# Patient Record
Sex: FEMALE | Race: WHITE | NOT HISPANIC OR LATINO | Employment: OTHER | URBAN - METROPOLITAN AREA
[De-identification: names, ages, dates, MRNs, and addresses within clinical notes are randomized per-mention and may not be internally consistent; named-entity substitution may affect disease eponyms.]

---

## 2017-04-16 ENCOUNTER — HOSPITAL ENCOUNTER (INPATIENT)
Facility: HOSPITAL | Age: 82
LOS: 5 days | Discharge: HOME HEALTH | DRG: 291 | End: 2017-04-21
Attending: EMERGENCY MEDICINE | Admitting: INTERNAL MEDICINE
Payer: COMMERCIAL

## 2017-04-16 ENCOUNTER — APPOINTMENT (EMERGENCY)
Dept: RADIOLOGY | Facility: HOSPITAL | Age: 82
DRG: 291 | End: 2017-04-16
Payer: COMMERCIAL

## 2017-04-16 DIAGNOSIS — G45.9 TIA (TRANSIENT ISCHEMIC ATTACK): ICD-10-CM

## 2017-04-16 DIAGNOSIS — J90 PLEURAL EFFUSION: ICD-10-CM

## 2017-04-16 DIAGNOSIS — J18.9 PNA (PNEUMONIA): Primary | ICD-10-CM

## 2017-04-16 PROBLEM — I50.9 CHF (CONGESTIVE HEART FAILURE) (HCC): Status: ACTIVE | Noted: 2017-04-16

## 2017-04-16 LAB
ANION GAP SERPL CALCULATED.3IONS-SCNC: 7 MMOL/L (ref 4–13)
ATRIAL RATE: 312 BPM
BASOPHILS # BLD AUTO: 0.05 THOUSANDS/ΜL (ref 0–0.1)
BASOPHILS NFR BLD AUTO: 1 % (ref 0–1)
BUN SERPL-MCNC: 21 MG/DL (ref 5–25)
CALCIUM SERPL-MCNC: 9.6 MG/DL (ref 8.3–10.1)
CHLORIDE SERPL-SCNC: 102 MMOL/L (ref 100–108)
CO2 SERPL-SCNC: 28 MMOL/L (ref 21–32)
CREAT SERPL-MCNC: 1.05 MG/DL (ref 0.6–1.3)
DIGOXIN SERPL-MCNC: <0.2 NG/ML (ref 0.8–2)
EOSINOPHIL # BLD AUTO: 0.15 THOUSAND/ΜL (ref 0–0.61)
EOSINOPHIL NFR BLD AUTO: 2 % (ref 0–6)
ERYTHROCYTE [DISTWIDTH] IN BLOOD BY AUTOMATED COUNT: 15.6 % (ref 11.6–15.1)
GFR SERPL CREATININE-BSD FRML MDRD: 49.9 ML/MIN/1.73SQ M
GLUCOSE SERPL-MCNC: 113 MG/DL (ref 65–140)
HCT VFR BLD AUTO: 38.8 % (ref 34.8–46.1)
HGB BLD-MCNC: 12.8 G/DL (ref 11.5–15.4)
LYMPHOCYTES # BLD AUTO: 1.18 THOUSANDS/ΜL (ref 0.6–4.47)
LYMPHOCYTES NFR BLD AUTO: 19 % (ref 14–44)
MCH RBC QN AUTO: 28.3 PG (ref 26.8–34.3)
MCHC RBC AUTO-ENTMCNC: 33 G/DL (ref 31.4–37.4)
MCV RBC AUTO: 86 FL (ref 82–98)
MONOCYTES # BLD AUTO: 0.95 THOUSAND/ΜL (ref 0.17–1.22)
MONOCYTES NFR BLD AUTO: 15 % (ref 4–12)
NEUTROPHILS # BLD AUTO: 3.98 THOUSANDS/ΜL (ref 1.85–7.62)
NEUTS SEG NFR BLD AUTO: 63 % (ref 43–75)
NRBC BLD AUTO-RTO: 0 /100 WBCS
NT-PROBNP SERPL-MCNC: 4514 PG/ML
PLATELET # BLD AUTO: 176 THOUSANDS/UL (ref 149–390)
PMV BLD AUTO: 10.1 FL (ref 8.9–12.7)
POTASSIUM SERPL-SCNC: 4.2 MMOL/L (ref 3.5–5.3)
QRS AXIS: 70 DEGREES
QRSD INTERVAL: 96 MS
QT INTERVAL: 344 MS
QTC INTERVAL: 409 MS
RBC # BLD AUTO: 4.53 MILLION/UL (ref 3.81–5.12)
SODIUM SERPL-SCNC: 137 MMOL/L (ref 136–145)
SPECIMEN SOURCE: NORMAL
T WAVE AXIS: 4 DEGREES
TROPONIN I BLD-MCNC: 0.01 NG/ML (ref 0–0.08)
VENTRICULAR RATE: 85 BPM
WBC # BLD AUTO: 6.32 THOUSAND/UL (ref 4.31–10.16)

## 2017-04-16 PROCEDURE — 87040 BLOOD CULTURE FOR BACTERIA: CPT | Performed by: EMERGENCY MEDICINE

## 2017-04-16 PROCEDURE — A9270 NON-COVERED ITEM OR SERVICE: HCPCS | Performed by: INTERNAL MEDICINE

## 2017-04-16 PROCEDURE — 71020 HB CHEST X-RAY 2VW FRONTAL&LATL: CPT

## 2017-04-16 PROCEDURE — 36415 COLL VENOUS BLD VENIPUNCTURE: CPT | Performed by: EMERGENCY MEDICINE

## 2017-04-16 PROCEDURE — 83880 ASSAY OF NATRIURETIC PEPTIDE: CPT | Performed by: EMERGENCY MEDICINE

## 2017-04-16 PROCEDURE — 71275 CT ANGIOGRAPHY CHEST: CPT

## 2017-04-16 PROCEDURE — 94640 AIRWAY INHALATION TREATMENT: CPT

## 2017-04-16 PROCEDURE — 85025 COMPLETE CBC W/AUTO DIFF WBC: CPT | Performed by: EMERGENCY MEDICINE

## 2017-04-16 PROCEDURE — 94760 N-INVAS EAR/PLS OXIMETRY 1: CPT

## 2017-04-16 PROCEDURE — 84484 ASSAY OF TROPONIN QUANT: CPT

## 2017-04-16 PROCEDURE — 80048 BASIC METABOLIC PNL TOTAL CA: CPT | Performed by: EMERGENCY MEDICINE

## 2017-04-16 PROCEDURE — 80162 ASSAY OF DIGOXIN TOTAL: CPT | Performed by: EMERGENCY MEDICINE

## 2017-04-16 PROCEDURE — 93005 ELECTROCARDIOGRAM TRACING: CPT | Performed by: EMERGENCY MEDICINE

## 2017-04-16 PROCEDURE — 99285 EMERGENCY DEPT VISIT HI MDM: CPT

## 2017-04-16 PROCEDURE — 93005 ELECTROCARDIOGRAM TRACING: CPT

## 2017-04-16 RX ORDER — ATORVASTATIN CALCIUM 40 MG/1
40 TABLET, FILM COATED ORAL DAILY
Status: DISCONTINUED | OUTPATIENT
Start: 2017-04-17 | End: 2017-04-21 | Stop reason: HOSPADM

## 2017-04-16 RX ORDER — DOCUSATE SODIUM 100 MG/1
100 CAPSULE, LIQUID FILLED ORAL 2 TIMES DAILY
Status: DISCONTINUED | OUTPATIENT
Start: 2017-04-16 | End: 2017-04-21 | Stop reason: HOSPADM

## 2017-04-16 RX ORDER — AMLODIPINE BESYLATE 10 MG/1
10 TABLET ORAL DAILY
Status: DISCONTINUED | OUTPATIENT
Start: 2017-04-17 | End: 2017-04-21 | Stop reason: HOSPADM

## 2017-04-16 RX ORDER — LEVALBUTEROL 1.25 MG/.5ML
1.25 SOLUTION, CONCENTRATE RESPIRATORY (INHALATION) EVERY 8 HOURS PRN
Status: DISCONTINUED | OUTPATIENT
Start: 2017-04-16 | End: 2017-04-16

## 2017-04-16 RX ORDER — ATORVASTATIN CALCIUM 40 MG/1
40 TABLET, FILM COATED ORAL DAILY
COMMUNITY

## 2017-04-16 RX ORDER — AZITHROMYCIN 250 MG/1
500 TABLET, FILM COATED ORAL EVERY 24 HOURS
Status: DISCONTINUED | OUTPATIENT
Start: 2017-04-17 | End: 2017-04-20

## 2017-04-16 RX ORDER — ALPRAZOLAM 0.25 MG/1
0.25 TABLET ORAL 2 TIMES DAILY PRN
Status: DISCONTINUED | OUTPATIENT
Start: 2017-04-16 | End: 2017-04-21 | Stop reason: HOSPADM

## 2017-04-16 RX ORDER — OXYBUTYNIN CHLORIDE 5 MG/1
5 TABLET ORAL 2 TIMES DAILY
Status: DISCONTINUED | OUTPATIENT
Start: 2017-04-16 | End: 2017-04-21 | Stop reason: HOSPADM

## 2017-04-16 RX ORDER — OMEPRAZOLE 20 MG/1
20 CAPSULE, DELAYED RELEASE ORAL
COMMUNITY

## 2017-04-16 RX ORDER — ASPIRIN 81 MG/1
81 TABLET, CHEWABLE ORAL DAILY
Status: DISCONTINUED | OUTPATIENT
Start: 2017-04-17 | End: 2017-04-21 | Stop reason: HOSPADM

## 2017-04-16 RX ORDER — DOCUSATE SODIUM 100 MG/1
100 CAPSULE, LIQUID FILLED ORAL DAILY
COMMUNITY
End: 2021-04-05

## 2017-04-16 RX ORDER — ONDANSETRON 2 MG/ML
4 INJECTION INTRAMUSCULAR; INTRAVENOUS EVERY 6 HOURS PRN
Status: DISCONTINUED | OUTPATIENT
Start: 2017-04-16 | End: 2017-04-21 | Stop reason: HOSPADM

## 2017-04-16 RX ORDER — B-COMPLEX WITH VITAMIN C
1 TABLET ORAL
COMMUNITY

## 2017-04-16 RX ORDER — AMLODIPINE BESYLATE 10 MG/1
2.5 TABLET ORAL DAILY
COMMUNITY
End: 2021-01-08 | Stop reason: HOSPADM

## 2017-04-16 RX ORDER — DIGOXIN 125 MCG
250 TABLET ORAL DAILY
Status: DISCONTINUED | OUTPATIENT
Start: 2017-04-17 | End: 2017-04-17

## 2017-04-16 RX ORDER — SENNOSIDES 8.6 MG
1 TABLET ORAL DAILY
Status: DISCONTINUED | OUTPATIENT
Start: 2017-04-17 | End: 2017-04-21 | Stop reason: HOSPADM

## 2017-04-16 RX ORDER — PANTOPRAZOLE SODIUM 20 MG/1
20 TABLET, DELAYED RELEASE ORAL
Status: DISCONTINUED | OUTPATIENT
Start: 2017-04-17 | End: 2017-04-21 | Stop reason: HOSPADM

## 2017-04-16 RX ORDER — FUROSEMIDE 10 MG/ML
20 INJECTION INTRAMUSCULAR; INTRAVENOUS
Status: DISCONTINUED | OUTPATIENT
Start: 2017-04-16 | End: 2017-04-19

## 2017-04-16 RX ORDER — THIAMINE MONONITRATE (VIT B1) 100 MG
100 TABLET ORAL DAILY
Status: DISCONTINUED | OUTPATIENT
Start: 2017-04-17 | End: 2017-04-21 | Stop reason: HOSPADM

## 2017-04-16 RX ORDER — CALCIUM CARBONATE 200(500)MG
1000 TABLET,CHEWABLE ORAL DAILY PRN
Status: DISCONTINUED | OUTPATIENT
Start: 2017-04-16 | End: 2017-04-21 | Stop reason: HOSPADM

## 2017-04-16 RX ORDER — ALBUTEROL SULFATE 2.5 MG/3ML
2.5 SOLUTION RESPIRATORY (INHALATION) EVERY 4 HOURS PRN
Status: DISCONTINUED | OUTPATIENT
Start: 2017-04-16 | End: 2017-04-17

## 2017-04-16 RX ADMIN — DOCUSATE SODIUM 100 MG: 100 CAPSULE, LIQUID FILLED ORAL at 18:44

## 2017-04-16 RX ADMIN — IOHEXOL 85 ML: 350 INJECTION, SOLUTION INTRAVENOUS at 16:21

## 2017-04-16 RX ADMIN — AZITHROMYCIN MONOHYDRATE 500 MG: 500 INJECTION, POWDER, LYOPHILIZED, FOR SOLUTION INTRAVENOUS at 18:01

## 2017-04-16 RX ADMIN — CEFTRIAXONE 1000 MG: 1 INJECTION, POWDER, FOR SOLUTION INTRAMUSCULAR; INTRAVENOUS at 17:18

## 2017-04-16 RX ADMIN — ALBUTEROL SULFATE 2.5 MG: 2.5 SOLUTION RESPIRATORY (INHALATION) at 20:27

## 2017-04-16 RX ADMIN — OXYBUTYNIN CHLORIDE 5 MG: 5 TABLET ORAL at 18:58

## 2017-04-16 RX ADMIN — FUROSEMIDE 20 MG: 10 INJECTION, SOLUTION INTRAMUSCULAR; INTRAVENOUS at 18:44

## 2017-04-17 ENCOUNTER — APPOINTMENT (INPATIENT)
Dept: NON INVASIVE DIAGNOSTICS | Facility: HOSPITAL | Age: 82
DRG: 291 | End: 2017-04-17
Attending: INTERNAL MEDICINE
Payer: COMMERCIAL

## 2017-04-17 ENCOUNTER — APPOINTMENT (INPATIENT)
Dept: PHYSICAL THERAPY | Facility: HOSPITAL | Age: 82
DRG: 291 | End: 2017-04-17
Payer: COMMERCIAL

## 2017-04-17 LAB
ALBUMIN SERPL BCP-MCNC: 3.1 G/DL (ref 3.5–5)
ALP SERPL-CCNC: 205 U/L (ref 46–116)
ALT SERPL W P-5'-P-CCNC: 26 U/L (ref 12–78)
ANION GAP SERPL CALCULATED.3IONS-SCNC: 8 MMOL/L (ref 4–13)
AST SERPL W P-5'-P-CCNC: 31 U/L (ref 5–45)
BILIRUB SERPL-MCNC: 0.82 MG/DL (ref 0.2–1)
BUN SERPL-MCNC: 17 MG/DL (ref 5–25)
CALCIUM SERPL-MCNC: 8.7 MG/DL (ref 8.3–10.1)
CHLORIDE SERPL-SCNC: 98 MMOL/L (ref 100–108)
CO2 SERPL-SCNC: 29 MMOL/L (ref 21–32)
CREAT SERPL-MCNC: 0.88 MG/DL (ref 0.6–1.3)
ERYTHROCYTE [DISTWIDTH] IN BLOOD BY AUTOMATED COUNT: 15.5 % (ref 11.6–15.1)
GFR SERPL CREATININE-BSD FRML MDRD: >60 ML/MIN/1.73SQ M
GLUCOSE SERPL-MCNC: 80 MG/DL (ref 65–140)
GRAM STN SPEC: NORMAL
GRAM STN SPEC: NORMAL
HCT VFR BLD AUTO: 37.2 % (ref 34.8–46.1)
HGB BLD-MCNC: 12.5 G/DL (ref 11.5–15.4)
L PNEUMO1 AG UR QL IA.RAPID: NEGATIVE
MAGNESIUM SERPL-MCNC: 1.8 MG/DL (ref 1.6–2.6)
MCH RBC QN AUTO: 28.7 PG (ref 26.8–34.3)
MCHC RBC AUTO-ENTMCNC: 33.6 G/DL (ref 31.4–37.4)
MCV RBC AUTO: 85 FL (ref 82–98)
PLATELET # BLD AUTO: 182 THOUSANDS/UL (ref 149–390)
PMV BLD AUTO: 10 FL (ref 8.9–12.7)
POTASSIUM SERPL-SCNC: 3.7 MMOL/L (ref 3.5–5.3)
PROT SERPL-MCNC: 7.1 G/DL (ref 6.4–8.2)
RBC # BLD AUTO: 4.36 MILLION/UL (ref 3.81–5.12)
S PNEUM AG UR QL: NEGATIVE
SODIUM SERPL-SCNC: 135 MMOL/L (ref 136–145)
TROPONIN I SERPL-MCNC: <0.02 NG/ML
WBC # BLD AUTO: 7.07 THOUSAND/UL (ref 4.31–10.16)

## 2017-04-17 PROCEDURE — A9270 NON-COVERED ITEM OR SERVICE: HCPCS | Performed by: INTERNAL MEDICINE

## 2017-04-17 PROCEDURE — G8979 MOBILITY GOAL STATUS: HCPCS

## 2017-04-17 PROCEDURE — 87086 URINE CULTURE/COLONY COUNT: CPT | Performed by: INTERNAL MEDICINE

## 2017-04-17 PROCEDURE — 97163 PT EVAL HIGH COMPLEX 45 MIN: CPT

## 2017-04-17 PROCEDURE — G8988 SELF CARE GOAL STATUS: HCPCS

## 2017-04-17 PROCEDURE — 97530 THERAPEUTIC ACTIVITIES: CPT

## 2017-04-17 PROCEDURE — 87449 NOS EACH ORGANISM AG IA: CPT | Performed by: INTERNAL MEDICINE

## 2017-04-17 PROCEDURE — 80053 COMPREHEN METABOLIC PANEL: CPT | Performed by: INTERNAL MEDICINE

## 2017-04-17 PROCEDURE — 87205 SMEAR GRAM STAIN: CPT | Performed by: INTERNAL MEDICINE

## 2017-04-17 PROCEDURE — 97167 OT EVAL HIGH COMPLEX 60 MIN: CPT

## 2017-04-17 PROCEDURE — 94760 N-INVAS EAR/PLS OXIMETRY 1: CPT

## 2017-04-17 PROCEDURE — A9270 NON-COVERED ITEM OR SERVICE: HCPCS | Performed by: PHYSICIAN ASSISTANT

## 2017-04-17 PROCEDURE — G8987 SELF CARE CURRENT STATUS: HCPCS

## 2017-04-17 PROCEDURE — 87070 CULTURE OTHR SPECIMN AEROBIC: CPT | Performed by: INTERNAL MEDICINE

## 2017-04-17 PROCEDURE — 94640 AIRWAY INHALATION TREATMENT: CPT

## 2017-04-17 PROCEDURE — 85027 COMPLETE CBC AUTOMATED: CPT | Performed by: INTERNAL MEDICINE

## 2017-04-17 PROCEDURE — 84484 ASSAY OF TROPONIN QUANT: CPT | Performed by: INTERNAL MEDICINE

## 2017-04-17 PROCEDURE — 83735 ASSAY OF MAGNESIUM: CPT | Performed by: INTERNAL MEDICINE

## 2017-04-17 PROCEDURE — G8978 MOBILITY CURRENT STATUS: HCPCS

## 2017-04-17 PROCEDURE — 93306 TTE W/DOPPLER COMPLETE: CPT

## 2017-04-17 RX ORDER — HYDROCODONE POLISTIREX AND CHLORPHENIRAMINE POLISTIREX 10; 8 MG/5ML; MG/5ML
2.5 SUSPENSION, EXTENDED RELEASE ORAL EVERY 12 HOURS PRN
Status: DISCONTINUED | OUTPATIENT
Start: 2017-04-17 | End: 2017-04-21 | Stop reason: HOSPADM

## 2017-04-17 RX ORDER — GUAIFENESIN 600 MG
1200 TABLET, EXTENDED RELEASE 12 HR ORAL EVERY 12 HOURS SCHEDULED
Status: DISCONTINUED | OUTPATIENT
Start: 2017-04-17 | End: 2017-04-21 | Stop reason: HOSPADM

## 2017-04-17 RX ORDER — ALBUTEROL SULFATE 90 UG/1
2 AEROSOL, METERED RESPIRATORY (INHALATION) EVERY 4 HOURS PRN
Status: DISCONTINUED | OUTPATIENT
Start: 2017-04-17 | End: 2017-04-21 | Stop reason: HOSPADM

## 2017-04-17 RX ORDER — LEVALBUTEROL INHALATION SOLUTION 0.63 MG/3ML
0.63 SOLUTION RESPIRATORY (INHALATION)
Status: DISCONTINUED | OUTPATIENT
Start: 2017-04-17 | End: 2017-04-21 | Stop reason: HOSPADM

## 2017-04-17 RX ADMIN — DIGOXIN 250 MCG: 0.12 TABLET ORAL at 08:08

## 2017-04-17 RX ADMIN — FUROSEMIDE 20 MG: 10 INJECTION, SOLUTION INTRAMUSCULAR; INTRAVENOUS at 16:55

## 2017-04-17 RX ADMIN — FUROSEMIDE 20 MG: 10 INJECTION, SOLUTION INTRAMUSCULAR; INTRAVENOUS at 07:52

## 2017-04-17 RX ADMIN — OXYBUTYNIN CHLORIDE 5 MG: 5 TABLET ORAL at 08:15

## 2017-04-17 RX ADMIN — OXYBUTYNIN CHLORIDE 5 MG: 5 TABLET ORAL at 17:22

## 2017-04-17 RX ADMIN — LEVALBUTEROL HYDROCHLORIDE 0.63 MG: 0.63 SOLUTION RESPIRATORY (INHALATION) at 13:59

## 2017-04-17 RX ADMIN — LEVALBUTEROL HYDROCHLORIDE 0.63 MG: 0.63 SOLUTION RESPIRATORY (INHALATION) at 19:41

## 2017-04-17 RX ADMIN — AZITHROMYCIN 500 MG: 250 TABLET, FILM COATED ORAL at 17:23

## 2017-04-17 RX ADMIN — GUAIFENESIN 1200 MG: 600 TABLET, EXTENDED RELEASE ORAL at 12:58

## 2017-04-17 RX ADMIN — GUAIFENESIN 1200 MG: 600 TABLET, EXTENDED RELEASE ORAL at 20:31

## 2017-04-17 RX ADMIN — ASPIRIN 81 MG: 81 TABLET, CHEWABLE ORAL at 08:09

## 2017-04-17 RX ADMIN — LEVALBUTEROL HYDROCHLORIDE 0.63 MG: 0.63 SOLUTION RESPIRATORY (INHALATION) at 08:32

## 2017-04-17 RX ADMIN — DOCUSATE SODIUM 100 MG: 100 CAPSULE, LIQUID FILLED ORAL at 07:57

## 2017-04-17 RX ADMIN — ATORVASTATIN CALCIUM 40 MG: 40 TABLET, FILM COATED ORAL at 08:08

## 2017-04-17 RX ADMIN — AMLODIPINE BESYLATE 10 MG: 10 TABLET ORAL at 08:09

## 2017-04-17 RX ADMIN — PANTOPRAZOLE SODIUM 20 MG: 20 TABLET, DELAYED RELEASE ORAL at 05:32

## 2017-04-17 RX ADMIN — SENNOSIDES 8.6 MG: 8.6 TABLET, FILM COATED ORAL at 08:09

## 2017-04-17 RX ADMIN — PSYLLIUM HUSK 1 PACKET: 6 GRANULE ORAL at 07:58

## 2017-04-17 RX ADMIN — Medication 100 MG: at 07:57

## 2017-04-17 RX ADMIN — CEFTRIAXONE 1000 MG: 1 INJECTION, POWDER, FOR SOLUTION INTRAMUSCULAR; INTRAVENOUS at 16:56

## 2017-04-18 ENCOUNTER — APPOINTMENT (INPATIENT)
Dept: NON INVASIVE DIAGNOSTICS | Facility: HOSPITAL | Age: 82
DRG: 291 | End: 2017-04-18
Payer: COMMERCIAL

## 2017-04-18 PROBLEM — I34.0 MITRAL REGURGITATION: Status: ACTIVE | Noted: 2017-04-18

## 2017-04-18 PROBLEM — I42.9 CARDIOMYOPATHY (HCC): Chronic | Status: ACTIVE | Noted: 2017-04-18

## 2017-04-18 PROBLEM — I50.43 ACUTE ON CHRONIC COMBINED SYSTOLIC AND DIASTOLIC CONGESTIVE HEART FAILURE (HCC): Status: ACTIVE | Noted: 2017-04-16

## 2017-04-18 LAB — BACTERIA UR CULT: NORMAL

## 2017-04-18 PROCEDURE — A9270 NON-COVERED ITEM OR SERVICE: HCPCS | Performed by: INTERNAL MEDICINE

## 2017-04-18 PROCEDURE — A9270 NON-COVERED ITEM OR SERVICE: HCPCS | Performed by: PHYSICIAN ASSISTANT

## 2017-04-18 PROCEDURE — 94640 AIRWAY INHALATION TREATMENT: CPT

## 2017-04-18 PROCEDURE — 94760 N-INVAS EAR/PLS OXIMETRY 1: CPT

## 2017-04-18 PROCEDURE — 93308 TTE F-UP OR LMTD: CPT

## 2017-04-18 PROCEDURE — 97530 THERAPEUTIC ACTIVITIES: CPT

## 2017-04-18 RX ORDER — LISINOPRIL 5 MG/1
5 TABLET ORAL DAILY
Status: DISCONTINUED | OUTPATIENT
Start: 2017-04-18 | End: 2017-04-21 | Stop reason: HOSPADM

## 2017-04-18 RX ORDER — CARVEDILOL 6.25 MG/1
6.25 TABLET ORAL 2 TIMES DAILY WITH MEALS
Status: DISCONTINUED | OUTPATIENT
Start: 2017-04-18 | End: 2017-04-20

## 2017-04-18 RX ADMIN — GUAIFENESIN 1200 MG: 600 TABLET, EXTENDED RELEASE ORAL at 21:51

## 2017-04-18 RX ADMIN — DOCUSATE SODIUM 100 MG: 100 CAPSULE, LIQUID FILLED ORAL at 17:04

## 2017-04-18 RX ADMIN — PSYLLIUM HUSK 1 PACKET: 6 GRANULE ORAL at 08:13

## 2017-04-18 RX ADMIN — ASPIRIN 81 MG: 81 TABLET, CHEWABLE ORAL at 08:08

## 2017-04-18 RX ADMIN — ATORVASTATIN CALCIUM 40 MG: 40 TABLET, FILM COATED ORAL at 08:07

## 2017-04-18 RX ADMIN — OXYBUTYNIN CHLORIDE 5 MG: 5 TABLET ORAL at 17:06

## 2017-04-18 RX ADMIN — PANTOPRAZOLE SODIUM 20 MG: 20 TABLET, DELAYED RELEASE ORAL at 06:03

## 2017-04-18 RX ADMIN — OXYBUTYNIN CHLORIDE 5 MG: 5 TABLET ORAL at 08:13

## 2017-04-18 RX ADMIN — PERFLUTREN 3 ML/MIN: 6.52 INJECTION, SUSPENSION INTRAVENOUS at 14:40

## 2017-04-18 RX ADMIN — LEVALBUTEROL HYDROCHLORIDE 0.63 MG: 0.63 SOLUTION RESPIRATORY (INHALATION) at 13:30

## 2017-04-18 RX ADMIN — FUROSEMIDE 20 MG: 10 INJECTION, SOLUTION INTRAMUSCULAR; INTRAVENOUS at 08:01

## 2017-04-18 RX ADMIN — AMLODIPINE BESYLATE 10 MG: 10 TABLET ORAL at 08:09

## 2017-04-18 RX ADMIN — CEFTRIAXONE 1000 MG: 1 INJECTION, POWDER, FOR SOLUTION INTRAMUSCULAR; INTRAVENOUS at 17:06

## 2017-04-18 RX ADMIN — CARVEDILOL 6.25 MG: 12.5 TABLET, FILM COATED ORAL at 17:04

## 2017-04-18 RX ADMIN — AZITHROMYCIN 500 MG: 250 TABLET, FILM COATED ORAL at 17:04

## 2017-04-18 RX ADMIN — Medication 100 MG: at 08:10

## 2017-04-18 RX ADMIN — LEVALBUTEROL HYDROCHLORIDE 0.63 MG: 0.63 SOLUTION RESPIRATORY (INHALATION) at 19:49

## 2017-04-18 RX ADMIN — LEVALBUTEROL HYDROCHLORIDE 0.63 MG: 0.63 SOLUTION RESPIRATORY (INHALATION) at 07:54

## 2017-04-18 RX ADMIN — LISINOPRIL 5 MG: 5 TABLET ORAL at 13:28

## 2017-04-18 RX ADMIN — GUAIFENESIN 1200 MG: 600 TABLET, EXTENDED RELEASE ORAL at 08:08

## 2017-04-18 RX ADMIN — DOCUSATE SODIUM 100 MG: 100 CAPSULE, LIQUID FILLED ORAL at 08:09

## 2017-04-18 RX ADMIN — FUROSEMIDE 20 MG: 10 INJECTION, SOLUTION INTRAMUSCULAR; INTRAVENOUS at 17:04

## 2017-04-18 RX ADMIN — SENNOSIDES 8.6 MG: 8.6 TABLET, FILM COATED ORAL at 08:08

## 2017-04-19 ENCOUNTER — APPOINTMENT (INPATIENT)
Dept: PHYSICAL THERAPY | Facility: HOSPITAL | Age: 82
DRG: 291 | End: 2017-04-19
Payer: COMMERCIAL

## 2017-04-19 LAB
ANION GAP SERPL CALCULATED.3IONS-SCNC: 8 MMOL/L (ref 4–13)
BACTERIA SPT RESP CULT: NORMAL
BACTERIA SPT RESP CULT: NORMAL
BUN SERPL-MCNC: 22 MG/DL (ref 5–25)
CALCIUM SERPL-MCNC: 8.8 MG/DL (ref 8.3–10.1)
CHLORIDE SERPL-SCNC: 99 MMOL/L (ref 100–108)
CO2 SERPL-SCNC: 29 MMOL/L (ref 21–32)
CREAT SERPL-MCNC: 1.09 MG/DL (ref 0.6–1.3)
ERYTHROCYTE [DISTWIDTH] IN BLOOD BY AUTOMATED COUNT: 15.2 % (ref 11.6–15.1)
GFR SERPL CREATININE-BSD FRML MDRD: 47.8 ML/MIN/1.73SQ M
GLUCOSE SERPL-MCNC: 76 MG/DL (ref 65–140)
GRAM STN SPEC: NORMAL
HCT VFR BLD AUTO: 35.1 % (ref 34.8–46.1)
HGB BLD-MCNC: 11.7 G/DL (ref 11.5–15.4)
MAGNESIUM SERPL-MCNC: 2 MG/DL (ref 1.6–2.6)
MCH RBC QN AUTO: 28.3 PG (ref 26.8–34.3)
MCHC RBC AUTO-ENTMCNC: 33.3 G/DL (ref 31.4–37.4)
MCV RBC AUTO: 85 FL (ref 82–98)
PLATELET # BLD AUTO: 224 THOUSANDS/UL (ref 149–390)
PMV BLD AUTO: 10.2 FL (ref 8.9–12.7)
POTASSIUM SERPL-SCNC: 4 MMOL/L (ref 3.5–5.3)
RBC # BLD AUTO: 4.13 MILLION/UL (ref 3.81–5.12)
SODIUM SERPL-SCNC: 136 MMOL/L (ref 136–145)
WBC # BLD AUTO: 8.1 THOUSAND/UL (ref 4.31–10.16)

## 2017-04-19 PROCEDURE — A9270 NON-COVERED ITEM OR SERVICE: HCPCS | Performed by: INTERNAL MEDICINE

## 2017-04-19 PROCEDURE — A9270 NON-COVERED ITEM OR SERVICE: HCPCS | Performed by: PHYSICIAN ASSISTANT

## 2017-04-19 PROCEDURE — 80048 BASIC METABOLIC PNL TOTAL CA: CPT | Performed by: PHYSICIAN ASSISTANT

## 2017-04-19 PROCEDURE — 94760 N-INVAS EAR/PLS OXIMETRY 1: CPT

## 2017-04-19 PROCEDURE — 97110 THERAPEUTIC EXERCISES: CPT

## 2017-04-19 PROCEDURE — 97530 THERAPEUTIC ACTIVITIES: CPT

## 2017-04-19 PROCEDURE — 83735 ASSAY OF MAGNESIUM: CPT | Performed by: PHYSICIAN ASSISTANT

## 2017-04-19 PROCEDURE — 94640 AIRWAY INHALATION TREATMENT: CPT

## 2017-04-19 PROCEDURE — 85027 COMPLETE CBC AUTOMATED: CPT | Performed by: PHYSICIAN ASSISTANT

## 2017-04-19 RX ORDER — DIGOXIN 125 MCG
250 TABLET ORAL DAILY
Status: DISCONTINUED | OUTPATIENT
Start: 2017-04-19 | End: 2017-04-21 | Stop reason: HOSPADM

## 2017-04-19 RX ORDER — FUROSEMIDE 20 MG/1
20 TABLET ORAL DAILY
Status: DISCONTINUED | OUTPATIENT
Start: 2017-04-19 | End: 2017-04-21 | Stop reason: HOSPADM

## 2017-04-19 RX ADMIN — OXYBUTYNIN CHLORIDE 5 MG: 5 TABLET ORAL at 18:46

## 2017-04-19 RX ADMIN — FUROSEMIDE 20 MG: 10 INJECTION, SOLUTION INTRAMUSCULAR; INTRAVENOUS at 08:22

## 2017-04-19 RX ADMIN — LEVALBUTEROL HYDROCHLORIDE 0.63 MG: 0.63 SOLUTION RESPIRATORY (INHALATION) at 07:19

## 2017-04-19 RX ADMIN — AMLODIPINE BESYLATE 10 MG: 10 TABLET ORAL at 08:22

## 2017-04-19 RX ADMIN — GUAIFENESIN 1200 MG: 600 TABLET, EXTENDED RELEASE ORAL at 21:11

## 2017-04-19 RX ADMIN — DIGOXIN 250 MCG: 0.12 TABLET ORAL at 13:00

## 2017-04-19 RX ADMIN — OXYBUTYNIN CHLORIDE 5 MG: 5 TABLET ORAL at 08:26

## 2017-04-19 RX ADMIN — ASPIRIN 81 MG: 81 TABLET, CHEWABLE ORAL at 08:22

## 2017-04-19 RX ADMIN — LISINOPRIL 5 MG: 5 TABLET ORAL at 08:22

## 2017-04-19 RX ADMIN — LEVALBUTEROL HYDROCHLORIDE 0.63 MG: 0.63 SOLUTION RESPIRATORY (INHALATION) at 13:22

## 2017-04-19 RX ADMIN — PANTOPRAZOLE SODIUM 20 MG: 20 TABLET, DELAYED RELEASE ORAL at 05:48

## 2017-04-19 RX ADMIN — LEVALBUTEROL HYDROCHLORIDE 0.63 MG: 0.63 SOLUTION RESPIRATORY (INHALATION) at 19:38

## 2017-04-19 RX ADMIN — PSYLLIUM HUSK 1 PACKET: 6 GRANULE ORAL at 08:27

## 2017-04-19 RX ADMIN — ATORVASTATIN CALCIUM 40 MG: 40 TABLET, FILM COATED ORAL at 08:22

## 2017-04-19 RX ADMIN — GUAIFENESIN 1200 MG: 600 TABLET, EXTENDED RELEASE ORAL at 08:22

## 2017-04-19 RX ADMIN — DOCUSATE SODIUM 100 MG: 100 CAPSULE, LIQUID FILLED ORAL at 08:22

## 2017-04-19 RX ADMIN — AZITHROMYCIN 500 MG: 250 TABLET, FILM COATED ORAL at 18:48

## 2017-04-19 RX ADMIN — CARVEDILOL 6.25 MG: 12.5 TABLET, FILM COATED ORAL at 08:22

## 2017-04-19 RX ADMIN — CARVEDILOL 6.25 MG: 12.5 TABLET, FILM COATED ORAL at 16:11

## 2017-04-19 RX ADMIN — SENNOSIDES 8.6 MG: 8.6 TABLET, FILM COATED ORAL at 08:22

## 2017-04-19 RX ADMIN — DOCUSATE SODIUM 100 MG: 100 CAPSULE, LIQUID FILLED ORAL at 18:48

## 2017-04-19 RX ADMIN — Medication 100 MG: at 08:26

## 2017-04-19 RX ADMIN — FUROSEMIDE 20 MG: 20 TABLET ORAL at 13:01

## 2017-04-19 RX ADMIN — CEFTRIAXONE 1000 MG: 1 INJECTION, POWDER, FOR SOLUTION INTRAMUSCULAR; INTRAVENOUS at 18:46

## 2017-04-20 PROCEDURE — 94640 AIRWAY INHALATION TREATMENT: CPT

## 2017-04-20 PROCEDURE — 94760 N-INVAS EAR/PLS OXIMETRY 1: CPT

## 2017-04-20 PROCEDURE — A9270 NON-COVERED ITEM OR SERVICE: HCPCS | Performed by: PHYSICIAN ASSISTANT

## 2017-04-20 PROCEDURE — A9270 NON-COVERED ITEM OR SERVICE: HCPCS | Performed by: INTERNAL MEDICINE

## 2017-04-20 PROCEDURE — 97532 HB COGNITIVE SKILLS DEVELOPMENT: CPT

## 2017-04-20 RX ORDER — BENZONATATE 100 MG/1
100 CAPSULE ORAL 3 TIMES DAILY
Status: DISCONTINUED | OUTPATIENT
Start: 2017-04-20 | End: 2017-04-21 | Stop reason: HOSPADM

## 2017-04-20 RX ORDER — HYDROCODONE POLISTIREX AND CHLORPHENIRAMINE POLISTIREX 10; 8 MG/5ML; MG/5ML
2.5 SUSPENSION, EXTENDED RELEASE ORAL ONCE
Status: COMPLETED | OUTPATIENT
Start: 2017-04-20 | End: 2017-04-20

## 2017-04-20 RX ORDER — CARVEDILOL 12.5 MG/1
12.5 TABLET ORAL 2 TIMES DAILY WITH MEALS
Status: DISCONTINUED | OUTPATIENT
Start: 2017-04-20 | End: 2017-04-21 | Stop reason: HOSPADM

## 2017-04-20 RX ADMIN — LEVALBUTEROL HYDROCHLORIDE 0.63 MG: 0.63 SOLUTION RESPIRATORY (INHALATION) at 08:55

## 2017-04-20 RX ADMIN — CARVEDILOL 12.5 MG: 12.5 TABLET, FILM COATED ORAL at 17:43

## 2017-04-20 RX ADMIN — FUROSEMIDE 20 MG: 20 TABLET ORAL at 08:14

## 2017-04-20 RX ADMIN — DIGOXIN 250 MCG: 0.12 TABLET ORAL at 08:13

## 2017-04-20 RX ADMIN — Medication 100 MG: at 08:15

## 2017-04-20 RX ADMIN — GUAIFENESIN 1200 MG: 600 TABLET, EXTENDED RELEASE ORAL at 20:35

## 2017-04-20 RX ADMIN — DOCUSATE SODIUM 100 MG: 100 CAPSULE, LIQUID FILLED ORAL at 17:43

## 2017-04-20 RX ADMIN — ASPIRIN 81 MG: 81 TABLET, CHEWABLE ORAL at 08:14

## 2017-04-20 RX ADMIN — HYDROCODONE POLISTIREX AND CHLORPHENIRAMINE POLISTIREX 2.5 ML: 10; 8 SUSPENSION, EXTENDED RELEASE ORAL at 12:25

## 2017-04-20 RX ADMIN — OXYBUTYNIN CHLORIDE 5 MG: 5 TABLET ORAL at 08:19

## 2017-04-20 RX ADMIN — AMLODIPINE BESYLATE 10 MG: 10 TABLET ORAL at 08:14

## 2017-04-20 RX ADMIN — DOCUSATE SODIUM 100 MG: 100 CAPSULE, LIQUID FILLED ORAL at 08:14

## 2017-04-20 RX ADMIN — LISINOPRIL 5 MG: 5 TABLET ORAL at 08:14

## 2017-04-20 RX ADMIN — SENNOSIDES 8.6 MG: 8.6 TABLET, FILM COATED ORAL at 08:14

## 2017-04-20 RX ADMIN — BENZONATATE 100 MG: 100 CAPSULE ORAL at 12:25

## 2017-04-20 RX ADMIN — CARVEDILOL 6.25 MG: 12.5 TABLET, FILM COATED ORAL at 08:14

## 2017-04-20 RX ADMIN — CEFTRIAXONE 1000 MG: 1 INJECTION, POWDER, FOR SOLUTION INTRAMUSCULAR; INTRAVENOUS at 17:43

## 2017-04-20 RX ADMIN — OXYBUTYNIN CHLORIDE 5 MG: 5 TABLET ORAL at 17:45

## 2017-04-20 RX ADMIN — GUAIFENESIN 1200 MG: 600 TABLET, EXTENDED RELEASE ORAL at 08:14

## 2017-04-20 RX ADMIN — PANTOPRAZOLE SODIUM 20 MG: 20 TABLET, DELAYED RELEASE ORAL at 05:19

## 2017-04-20 RX ADMIN — LEVALBUTEROL HYDROCHLORIDE 0.63 MG: 0.63 SOLUTION RESPIRATORY (INHALATION) at 14:02

## 2017-04-20 RX ADMIN — HYDROCODONE POLISTIREX AND CHLORPHENIRAMINE POLISTIREX 2.5 ML: 10; 8 SUSPENSION, EXTENDED RELEASE ORAL at 01:02

## 2017-04-20 RX ADMIN — LEVALBUTEROL HYDROCHLORIDE 0.63 MG: 0.63 SOLUTION RESPIRATORY (INHALATION) at 19:13

## 2017-04-20 RX ADMIN — BENZONATATE 100 MG: 100 CAPSULE ORAL at 17:43

## 2017-04-20 RX ADMIN — BENZONATATE 100 MG: 100 CAPSULE ORAL at 20:35

## 2017-04-20 RX ADMIN — ATORVASTATIN CALCIUM 40 MG: 40 TABLET, FILM COATED ORAL at 08:13

## 2017-04-21 VITALS
SYSTOLIC BLOOD PRESSURE: 127 MMHG | RESPIRATION RATE: 18 BRPM | HEART RATE: 76 BPM | OXYGEN SATURATION: 95 % | DIASTOLIC BLOOD PRESSURE: 60 MMHG | TEMPERATURE: 98.5 F | HEIGHT: 63 IN | BODY MASS INDEX: 28.95 KG/M2 | WEIGHT: 163.36 LBS

## 2017-04-21 LAB
ANION GAP SERPL CALCULATED.3IONS-SCNC: 5 MMOL/L (ref 4–13)
BACTERIA BLD CULT: NORMAL
BACTERIA BLD CULT: NORMAL
BUN SERPL-MCNC: 24 MG/DL (ref 5–25)
CALCIUM SERPL-MCNC: 9.1 MG/DL (ref 8.3–10.1)
CHLORIDE SERPL-SCNC: 100 MMOL/L (ref 100–108)
CO2 SERPL-SCNC: 29 MMOL/L (ref 21–32)
CREAT SERPL-MCNC: 0.94 MG/DL (ref 0.6–1.3)
ERYTHROCYTE [DISTWIDTH] IN BLOOD BY AUTOMATED COUNT: 15 % (ref 11.6–15.1)
GFR SERPL CREATININE-BSD FRML MDRD: 56.7 ML/MIN/1.73SQ M
GLUCOSE SERPL-MCNC: 78 MG/DL (ref 65–140)
HCT VFR BLD AUTO: 34.8 % (ref 34.8–46.1)
HGB BLD-MCNC: 11.5 G/DL (ref 11.5–15.4)
MCH RBC QN AUTO: 27.9 PG (ref 26.8–34.3)
MCHC RBC AUTO-ENTMCNC: 33 G/DL (ref 31.4–37.4)
MCV RBC AUTO: 85 FL (ref 82–98)
PLATELET # BLD AUTO: 254 THOUSANDS/UL (ref 149–390)
PMV BLD AUTO: 10.3 FL (ref 8.9–12.7)
POTASSIUM SERPL-SCNC: 4.4 MMOL/L (ref 3.5–5.3)
RBC # BLD AUTO: 4.12 MILLION/UL (ref 3.81–5.12)
SODIUM SERPL-SCNC: 134 MMOL/L (ref 136–145)
WBC # BLD AUTO: 7.52 THOUSAND/UL (ref 4.31–10.16)

## 2017-04-21 PROCEDURE — A9270 NON-COVERED ITEM OR SERVICE: HCPCS | Performed by: INTERNAL MEDICINE

## 2017-04-21 PROCEDURE — 80048 BASIC METABOLIC PNL TOTAL CA: CPT | Performed by: PHYSICIAN ASSISTANT

## 2017-04-21 PROCEDURE — A9270 NON-COVERED ITEM OR SERVICE: HCPCS | Performed by: PHYSICIAN ASSISTANT

## 2017-04-21 PROCEDURE — 85027 COMPLETE CBC AUTOMATED: CPT | Performed by: PHYSICIAN ASSISTANT

## 2017-04-21 PROCEDURE — 94760 N-INVAS EAR/PLS OXIMETRY 1: CPT

## 2017-04-21 PROCEDURE — 94640 AIRWAY INHALATION TREATMENT: CPT

## 2017-04-21 RX ORDER — ALBUTEROL SULFATE 90 UG/1
2 AEROSOL, METERED RESPIRATORY (INHALATION) EVERY 4 HOURS PRN
Qty: 1 INHALER | Refills: 0 | Status: SHIPPED | OUTPATIENT
Start: 2017-04-21 | End: 2017-05-21

## 2017-04-21 RX ORDER — CARVEDILOL 12.5 MG/1
12.5 TABLET ORAL 2 TIMES DAILY WITH MEALS
Qty: 60 TABLET | Refills: 0 | Status: SHIPPED | OUTPATIENT
Start: 2017-04-21 | End: 2021-01-08 | Stop reason: HOSPADM

## 2017-04-21 RX ORDER — BENZONATATE 100 MG/1
100 CAPSULE ORAL 3 TIMES DAILY
Qty: 90 CAPSULE | Refills: 0 | Status: SHIPPED | OUTPATIENT
Start: 2017-04-21 | End: 2017-05-21

## 2017-04-21 RX ORDER — GUAIFENESIN 1200 MG/1
1200 TABLET, EXTENDED RELEASE ORAL EVERY 12 HOURS SCHEDULED
Qty: 60 TABLET | Refills: 0 | Status: SHIPPED | OUTPATIENT
Start: 2017-04-21 | End: 2017-05-21

## 2017-04-21 RX ORDER — CEPHALEXIN 500 MG/1
500 CAPSULE ORAL 3 TIMES DAILY
Qty: 3 CAPSULE | Refills: 0 | Status: SHIPPED | OUTPATIENT
Start: 2017-04-21 | End: 2017-04-22

## 2017-04-21 RX ORDER — LISINOPRIL 5 MG/1
5 TABLET ORAL DAILY
Qty: 30 TABLET | Refills: 0 | Status: SHIPPED | OUTPATIENT
Start: 2017-04-21 | End: 2020-04-03 | Stop reason: HOSPADM

## 2017-04-21 RX ADMIN — GUAIFENESIN 1200 MG: 600 TABLET, EXTENDED RELEASE ORAL at 08:16

## 2017-04-21 RX ADMIN — AMLODIPINE BESYLATE 10 MG: 10 TABLET ORAL at 08:16

## 2017-04-21 RX ADMIN — OXYBUTYNIN CHLORIDE 5 MG: 5 TABLET ORAL at 08:24

## 2017-04-21 RX ADMIN — LEVALBUTEROL HYDROCHLORIDE 0.63 MG: 0.63 SOLUTION RESPIRATORY (INHALATION) at 13:23

## 2017-04-21 RX ADMIN — HYDROCODONE POLISTIREX AND CHLORPHENIRAMINE POLISTIREX 2.5 ML: 10; 8 SUSPENSION, EXTENDED RELEASE ORAL at 11:03

## 2017-04-21 RX ADMIN — LEVALBUTEROL HYDROCHLORIDE 0.63 MG: 0.63 SOLUTION RESPIRATORY (INHALATION) at 07:08

## 2017-04-21 RX ADMIN — CARVEDILOL 12.5 MG: 12.5 TABLET, FILM COATED ORAL at 08:16

## 2017-04-21 RX ADMIN — ASPIRIN 81 MG: 81 TABLET, CHEWABLE ORAL at 08:16

## 2017-04-21 RX ADMIN — ATORVASTATIN CALCIUM 40 MG: 40 TABLET, FILM COATED ORAL at 08:19

## 2017-04-21 RX ADMIN — DIGOXIN 250 MCG: 0.12 TABLET ORAL at 08:23

## 2017-04-21 RX ADMIN — PANTOPRAZOLE SODIUM 20 MG: 20 TABLET, DELAYED RELEASE ORAL at 05:23

## 2017-04-21 RX ADMIN — FUROSEMIDE 20 MG: 20 TABLET ORAL at 08:16

## 2017-04-21 RX ADMIN — Medication 100 MG: at 08:21

## 2017-04-21 RX ADMIN — BENZONATATE 100 MG: 100 CAPSULE ORAL at 08:17

## 2017-04-21 RX ADMIN — LISINOPRIL 5 MG: 5 TABLET ORAL at 08:16

## 2017-05-12 ENCOUNTER — GENERIC CONVERSION - ENCOUNTER (OUTPATIENT)
Dept: OTHER | Facility: OTHER | Age: 82
End: 2017-05-12

## 2020-03-26 ENCOUNTER — APPOINTMENT (EMERGENCY)
Dept: RADIOLOGY | Facility: HOSPITAL | Age: 85
DRG: 682 | End: 2020-03-26
Payer: COMMERCIAL

## 2020-03-26 ENCOUNTER — HOSPITAL ENCOUNTER (INPATIENT)
Facility: HOSPITAL | Age: 85
LOS: 8 days | Discharge: NURSING, REHAB OR LONG-TERM CARE | DRG: 682 | End: 2020-04-03
Attending: EMERGENCY MEDICINE | Admitting: INTERNAL MEDICINE
Payer: COMMERCIAL

## 2020-03-26 DIAGNOSIS — R53.1 WEAKNESS: Primary | ICD-10-CM

## 2020-03-26 DIAGNOSIS — R79.89 PRERENAL AZOTEMIA: ICD-10-CM

## 2020-03-26 DIAGNOSIS — G47.00 INSOMNIA: ICD-10-CM

## 2020-03-26 DIAGNOSIS — I50.42 CHRONIC COMBINED SYSTOLIC AND DIASTOLIC CONGESTIVE HEART FAILURE (HCC): ICD-10-CM

## 2020-03-26 DIAGNOSIS — G93.40 ENCEPHALOPATHY: ICD-10-CM

## 2020-03-26 DIAGNOSIS — D69.6 THROMBOCYTOPENIA (HCC): ICD-10-CM

## 2020-03-26 DIAGNOSIS — N39.0 UTI (URINARY TRACT INFECTION): ICD-10-CM

## 2020-03-26 PROBLEM — N18.2 ACUTE RENAL FAILURE SUPERIMPOSED ON STAGE 2 CHRONIC KIDNEY DISEASE (HCC): Status: ACTIVE | Noted: 2020-03-26

## 2020-03-26 PROBLEM — R26.2 AMBULATORY DYSFUNCTION: Status: ACTIVE | Noted: 2020-03-26

## 2020-03-26 PROBLEM — N17.9 ACUTE RENAL FAILURE SUPERIMPOSED ON STAGE 2 CHRONIC KIDNEY DISEASE (HCC): Status: ACTIVE | Noted: 2020-03-26

## 2020-03-26 LAB
ALBUMIN SERPL BCP-MCNC: 3.4 G/DL (ref 3.5–5)
ALP SERPL-CCNC: 154 U/L (ref 46–116)
ALT SERPL W P-5'-P-CCNC: 52 U/L (ref 12–78)
ANION GAP SERPL CALCULATED.3IONS-SCNC: 4 MMOL/L (ref 4–13)
APTT PPP: 40 SECONDS (ref 23–37)
AST SERPL W P-5'-P-CCNC: 45 U/L (ref 5–45)
BACTERIA UR QL AUTO: NORMAL /HPF
BASOPHILS # BLD AUTO: 0.02 THOUSANDS/ΜL (ref 0–0.1)
BASOPHILS NFR BLD AUTO: 0 % (ref 0–1)
BILIRUB SERPL-MCNC: 0.69 MG/DL (ref 0.2–1)
BILIRUB UR QL STRIP: NEGATIVE
BUN SERPL-MCNC: 49 MG/DL (ref 5–25)
CALCIUM SERPL-MCNC: 9.8 MG/DL (ref 8.3–10.1)
CHLORIDE SERPL-SCNC: 100 MMOL/L (ref 100–108)
CLARITY UR: CLEAR
CO2 SERPL-SCNC: 31 MMOL/L (ref 21–32)
COLOR UR: YELLOW
CREAT SERPL-MCNC: 1.23 MG/DL (ref 0.6–1.3)
EOSINOPHIL # BLD AUTO: 0.06 THOUSAND/ΜL (ref 0–0.61)
EOSINOPHIL NFR BLD AUTO: 1 % (ref 0–6)
ERYTHROCYTE [DISTWIDTH] IN BLOOD BY AUTOMATED COUNT: 16.8 % (ref 11.6–15.1)
GFR SERPL CREATININE-BSD FRML MDRD: 40 ML/MIN/1.73SQ M
GLUCOSE SERPL-MCNC: 86 MG/DL (ref 65–140)
GLUCOSE UR STRIP-MCNC: NEGATIVE MG/DL
HCT VFR BLD AUTO: 31 % (ref 34.8–46.1)
HGB BLD-MCNC: 9.7 G/DL (ref 11.5–15.4)
HGB UR QL STRIP.AUTO: NEGATIVE
HYALINE CASTS #/AREA URNS LPF: NORMAL /LPF
IMM GRANULOCYTES # BLD AUTO: 0.01 THOUSAND/UL (ref 0–0.2)
IMM GRANULOCYTES NFR BLD AUTO: 0 % (ref 0–2)
INR PPP: 1.21 (ref 0.84–1.19)
KETONES UR STRIP-MCNC: NEGATIVE MG/DL
LEUKOCYTE ESTERASE UR QL STRIP: NEGATIVE
LYMPHOCYTES # BLD AUTO: 0.93 THOUSANDS/ΜL (ref 0.6–4.47)
LYMPHOCYTES NFR BLD AUTO: 17 % (ref 14–44)
MCH RBC QN AUTO: 28.7 PG (ref 26.8–34.3)
MCHC RBC AUTO-ENTMCNC: 31.3 G/DL (ref 31.4–37.4)
MCV RBC AUTO: 92 FL (ref 82–98)
MONOCYTES # BLD AUTO: 0.42 THOUSAND/ΜL (ref 0.17–1.22)
MONOCYTES NFR BLD AUTO: 8 % (ref 4–12)
NEUTROPHILS # BLD AUTO: 4.14 THOUSANDS/ΜL (ref 1.85–7.62)
NEUTS SEG NFR BLD AUTO: 74 % (ref 43–75)
NITRITE UR QL STRIP: POSITIVE
NON-SQ EPI CELLS URNS QL MICRO: NORMAL /HPF
NRBC BLD AUTO-RTO: 0 /100 WBCS
PH UR STRIP.AUTO: 5 [PH] (ref 4.5–8)
PLATELET # BLD AUTO: 64 THOUSANDS/UL (ref 149–390)
PMV BLD AUTO: 13.4 FL (ref 8.9–12.7)
POTASSIUM SERPL-SCNC: 4.6 MMOL/L (ref 3.5–5.3)
PROT SERPL-MCNC: 7 G/DL (ref 6.4–8.2)
PROT UR STRIP-MCNC: NEGATIVE MG/DL
PROTHROMBIN TIME: 14.9 SECONDS (ref 11.6–14.5)
RBC # BLD AUTO: 3.38 MILLION/UL (ref 3.81–5.12)
RBC #/AREA URNS AUTO: NORMAL /HPF
SODIUM SERPL-SCNC: 135 MMOL/L (ref 136–145)
SP GR UR STRIP.AUTO: 1.01 (ref 1–1.03)
UROBILINOGEN UR QL STRIP.AUTO: 0.2 E.U./DL
WBC # BLD AUTO: 5.58 THOUSAND/UL (ref 4.31–10.16)
WBC #/AREA URNS AUTO: NORMAL /HPF

## 2020-03-26 PROCEDURE — 71045 X-RAY EXAM CHEST 1 VIEW: CPT

## 2020-03-26 PROCEDURE — 80053 COMPREHEN METABOLIC PANEL: CPT | Performed by: EMERGENCY MEDICINE

## 2020-03-26 PROCEDURE — 85610 PROTHROMBIN TIME: CPT | Performed by: EMERGENCY MEDICINE

## 2020-03-26 PROCEDURE — 85025 COMPLETE CBC W/AUTO DIFF WBC: CPT | Performed by: EMERGENCY MEDICINE

## 2020-03-26 PROCEDURE — 93005 ELECTROCARDIOGRAM TRACING: CPT

## 2020-03-26 PROCEDURE — 81001 URINALYSIS AUTO W/SCOPE: CPT

## 2020-03-26 PROCEDURE — 36415 COLL VENOUS BLD VENIPUNCTURE: CPT | Performed by: EMERGENCY MEDICINE

## 2020-03-26 PROCEDURE — 85730 THROMBOPLASTIN TIME PARTIAL: CPT | Performed by: EMERGENCY MEDICINE

## 2020-03-26 PROCEDURE — 96365 THER/PROPH/DIAG IV INF INIT: CPT

## 2020-03-26 PROCEDURE — 96368 THER/DIAG CONCURRENT INF: CPT

## 2020-03-26 PROCEDURE — 87040 BLOOD CULTURE FOR BACTERIA: CPT | Performed by: EMERGENCY MEDICINE

## 2020-03-26 PROCEDURE — 99285 EMERGENCY DEPT VISIT HI MDM: CPT

## 2020-03-26 PROCEDURE — 99285 EMERGENCY DEPT VISIT HI MDM: CPT | Performed by: EMERGENCY MEDICINE

## 2020-03-26 RX ORDER — CALCIUM CARBONATE 500(1250)
1 TABLET ORAL 2 TIMES DAILY WITH MEALS
Status: DISCONTINUED | OUTPATIENT
Start: 2020-03-27 | End: 2020-04-03 | Stop reason: HOSPADM

## 2020-03-26 RX ORDER — SODIUM CHLORIDE 9 MG/ML
75 INJECTION, SOLUTION INTRAVENOUS CONTINUOUS
Status: DISCONTINUED | OUTPATIENT
Start: 2020-03-26 | End: 2020-03-27

## 2020-03-26 RX ORDER — ASPIRIN 81 MG/1
81 TABLET ORAL DAILY
Status: DISCONTINUED | OUTPATIENT
Start: 2020-03-27 | End: 2020-04-03 | Stop reason: HOSPADM

## 2020-03-26 RX ORDER — DIGOXIN 250 MCG
250 TABLET ORAL DAILY
Status: DISCONTINUED | OUTPATIENT
Start: 2020-03-27 | End: 2020-04-03 | Stop reason: HOSPADM

## 2020-03-26 RX ORDER — OXYBUTYNIN CHLORIDE 5 MG/1
5 TABLET ORAL 2 TIMES DAILY
Status: DISCONTINUED | OUTPATIENT
Start: 2020-03-26 | End: 2020-03-31

## 2020-03-26 RX ORDER — CITALOPRAM 10 MG/1
10 TABLET ORAL DAILY
Status: DISCONTINUED | OUTPATIENT
Start: 2020-03-27 | End: 2020-04-03 | Stop reason: HOSPADM

## 2020-03-26 RX ORDER — AMLODIPINE BESYLATE 2.5 MG/1
2.5 TABLET ORAL DAILY
Status: DISCONTINUED | OUTPATIENT
Start: 2020-03-27 | End: 2020-04-03 | Stop reason: HOSPADM

## 2020-03-26 RX ORDER — SODIUM CHLORIDE, SODIUM GLUCONATE, SODIUM ACETATE, POTASSIUM CHLORIDE, MAGNESIUM CHLORIDE, SODIUM PHOSPHATE, DIBASIC, AND POTASSIUM PHOSPHATE .53; .5; .37; .037; .03; .012; .00082 G/100ML; G/100ML; G/100ML; G/100ML; G/100ML; G/100ML; G/100ML
500 INJECTION, SOLUTION INTRAVENOUS ONCE
Status: COMPLETED | OUTPATIENT
Start: 2020-03-26 | End: 2020-03-26

## 2020-03-26 RX ORDER — DOCUSATE SODIUM 100 MG/1
100 CAPSULE, LIQUID FILLED ORAL DAILY
Status: DISCONTINUED | OUTPATIENT
Start: 2020-03-27 | End: 2020-04-03 | Stop reason: HOSPADM

## 2020-03-26 RX ORDER — PANTOPRAZOLE SODIUM 20 MG/1
20 TABLET, DELAYED RELEASE ORAL
Status: DISCONTINUED | OUTPATIENT
Start: 2020-03-27 | End: 2020-04-03 | Stop reason: HOSPADM

## 2020-03-26 RX ORDER — THIAMINE MONONITRATE (VIT B1) 100 MG
100 TABLET ORAL DAILY
Status: DISCONTINUED | OUTPATIENT
Start: 2020-03-27 | End: 2020-03-31

## 2020-03-26 RX ORDER — B-COMPLEX WITH VITAMIN C
1 TABLET ORAL
Status: DISCONTINUED | OUTPATIENT
Start: 2020-03-27 | End: 2020-03-26 | Stop reason: SDUPTHER

## 2020-03-26 RX ORDER — ATORVASTATIN CALCIUM 40 MG/1
40 TABLET, FILM COATED ORAL
Status: DISCONTINUED | OUTPATIENT
Start: 2020-03-27 | End: 2020-04-03 | Stop reason: HOSPADM

## 2020-03-26 RX ORDER — MAGNESIUM CHLORIDE 64 MG
64 TABLET, DELAYED RELEASE (ENTERIC COATED) ORAL DAILY
Status: DISCONTINUED | OUTPATIENT
Start: 2020-03-27 | End: 2020-04-03 | Stop reason: HOSPADM

## 2020-03-26 RX ORDER — ACETAMINOPHEN 325 MG/1
650 TABLET ORAL EVERY 6 HOURS PRN
Status: DISCONTINUED | OUTPATIENT
Start: 2020-03-26 | End: 2020-04-03 | Stop reason: HOSPADM

## 2020-03-26 RX ORDER — MAGNESIUM OXIDE/MAG AA CHELATE 133 MG
1 TABLET ORAL DAILY
Status: DISCONTINUED | OUTPATIENT
Start: 2020-03-27 | End: 2020-03-26 | Stop reason: SDUPTHER

## 2020-03-26 RX ORDER — ALPRAZOLAM 0.25 MG/1
0.25 TABLET ORAL
Status: DISCONTINUED | OUTPATIENT
Start: 2020-03-26 | End: 2020-04-03 | Stop reason: HOSPADM

## 2020-03-26 RX ORDER — FOLIC ACID 1 MG/1
1 TABLET ORAL DAILY
Status: DISCONTINUED | OUTPATIENT
Start: 2020-03-27 | End: 2020-04-03 | Stop reason: HOSPADM

## 2020-03-26 RX ORDER — CARVEDILOL 12.5 MG/1
12.5 TABLET ORAL 2 TIMES DAILY WITH MEALS
Status: DISCONTINUED | OUTPATIENT
Start: 2020-03-27 | End: 2020-04-03 | Stop reason: HOSPADM

## 2020-03-26 RX ORDER — ONDANSETRON 2 MG/ML
4 INJECTION INTRAMUSCULAR; INTRAVENOUS EVERY 6 HOURS PRN
Status: DISCONTINUED | OUTPATIENT
Start: 2020-03-26 | End: 2020-04-03 | Stop reason: HOSPADM

## 2020-03-26 RX ADMIN — SODIUM CHLORIDE, SODIUM GLUCONATE, SODIUM ACETATE, POTASSIUM CHLORIDE, MAGNESIUM CHLORIDE, SODIUM PHOSPHATE, DIBASIC, AND POTASSIUM PHOSPHATE 500 ML: .53; .5; .37; .037; .03; .012; .00082 INJECTION, SOLUTION INTRAVENOUS at 20:57

## 2020-03-26 RX ADMIN — CEFTRIAXONE SODIUM 1000 MG: 10 INJECTION, POWDER, FOR SOLUTION INTRAVENOUS at 20:57

## 2020-03-26 RX ADMIN — SODIUM CHLORIDE 75 ML/HR: 0.9 INJECTION, SOLUTION INTRAVENOUS at 23:14

## 2020-03-26 RX ADMIN — OXYBUTYNIN CHLORIDE 5 MG: 5 TABLET ORAL at 23:15

## 2020-03-27 PROBLEM — G93.41 ACUTE METABOLIC ENCEPHALOPATHY: Status: ACTIVE | Noted: 2020-03-27

## 2020-03-27 PROBLEM — T68.XXXA HYPOTHERMIA: Status: ACTIVE | Noted: 2020-03-27

## 2020-03-27 LAB
ANION GAP SERPL CALCULATED.3IONS-SCNC: 5 MMOL/L (ref 4–13)
ATRIAL RATE: 192 BPM
BASOPHILS # BLD AUTO: 0.01 THOUSANDS/ΜL (ref 0–0.1)
BASOPHILS NFR BLD AUTO: 0 % (ref 0–1)
BUN SERPL-MCNC: 46 MG/DL (ref 5–25)
CALCIUM SERPL-MCNC: 9.4 MG/DL (ref 8.3–10.1)
CHLORIDE SERPL-SCNC: 103 MMOL/L (ref 100–108)
CO2 SERPL-SCNC: 28 MMOL/L (ref 21–32)
CREAT SERPL-MCNC: 1.08 MG/DL (ref 0.6–1.3)
DIGOXIN SERPL-MCNC: <0.2 NG/ML (ref 0.8–2)
EOSINOPHIL # BLD AUTO: 0.05 THOUSAND/ΜL (ref 0–0.61)
EOSINOPHIL NFR BLD AUTO: 1 % (ref 0–6)
ERYTHROCYTE [DISTWIDTH] IN BLOOD BY AUTOMATED COUNT: 17 % (ref 11.6–15.1)
GFR SERPL CREATININE-BSD FRML MDRD: 46 ML/MIN/1.73SQ M
GLUCOSE SERPL-MCNC: 85 MG/DL (ref 65–140)
HCT VFR BLD AUTO: 27.7 % (ref 34.8–46.1)
HGB BLD-MCNC: 8.7 G/DL (ref 11.5–15.4)
IMM GRANULOCYTES # BLD AUTO: 0.03 THOUSAND/UL (ref 0–0.2)
IMM GRANULOCYTES NFR BLD AUTO: 1 % (ref 0–2)
LYMPHOCYTES # BLD AUTO: 0.74 THOUSANDS/ΜL (ref 0.6–4.47)
LYMPHOCYTES NFR BLD AUTO: 16 % (ref 14–44)
MAGNESIUM SERPL-MCNC: 2.4 MG/DL (ref 1.6–2.6)
MCH RBC QN AUTO: 28.6 PG (ref 26.8–34.3)
MCHC RBC AUTO-ENTMCNC: 31.4 G/DL (ref 31.4–37.4)
MCV RBC AUTO: 91 FL (ref 82–98)
MONOCYTES # BLD AUTO: 0.25 THOUSAND/ΜL (ref 0.17–1.22)
MONOCYTES NFR BLD AUTO: 6 % (ref 4–12)
NEUTROPHILS # BLD AUTO: 3.48 THOUSANDS/ΜL (ref 1.85–7.62)
NEUTS SEG NFR BLD AUTO: 76 % (ref 43–75)
NRBC BLD AUTO-RTO: 0 /100 WBCS
PHOSPHATE SERPL-MCNC: 3.6 MG/DL (ref 2.3–4.1)
PLATELET # BLD AUTO: 56 THOUSANDS/UL (ref 149–390)
PMV BLD AUTO: 13.3 FL (ref 8.9–12.7)
POTASSIUM SERPL-SCNC: 4.5 MMOL/L (ref 3.5–5.3)
QRS AXIS: -73 DEGREES
QRSD INTERVAL: 182 MS
QT INTERVAL: 500 MS
QTC INTERVAL: 503 MS
RBC # BLD AUTO: 3.04 MILLION/UL (ref 3.81–5.12)
SODIUM SERPL-SCNC: 136 MMOL/L (ref 136–145)
T WAVE AXIS: 84 DEGREES
TSH SERPL DL<=0.05 MIU/L-ACNC: 4.93 UIU/ML (ref 0.36–3.74)
VENTRICULAR RATE: 61 BPM
WBC # BLD AUTO: 4.56 THOUSAND/UL (ref 4.31–10.16)

## 2020-03-27 PROCEDURE — 80048 BASIC METABOLIC PNL TOTAL CA: CPT | Performed by: INTERNAL MEDICINE

## 2020-03-27 PROCEDURE — 93010 ELECTROCARDIOGRAM REPORT: CPT | Performed by: INTERNAL MEDICINE

## 2020-03-27 PROCEDURE — 97163 PT EVAL HIGH COMPLEX 45 MIN: CPT

## 2020-03-27 PROCEDURE — 87086 URINE CULTURE/COLONY COUNT: CPT | Performed by: HOSPITALIST

## 2020-03-27 PROCEDURE — 97167 OT EVAL HIGH COMPLEX 60 MIN: CPT

## 2020-03-27 PROCEDURE — 85025 COMPLETE CBC W/AUTO DIFF WBC: CPT | Performed by: INTERNAL MEDICINE

## 2020-03-27 PROCEDURE — 99024 POSTOP FOLLOW-UP VISIT: CPT | Performed by: HOSPITALIST

## 2020-03-27 PROCEDURE — 99223 1ST HOSP IP/OBS HIGH 75: CPT | Performed by: INTERNAL MEDICINE

## 2020-03-27 PROCEDURE — 84443 ASSAY THYROID STIM HORMONE: CPT | Performed by: INTERNAL MEDICINE

## 2020-03-27 PROCEDURE — 84100 ASSAY OF PHOSPHORUS: CPT | Performed by: INTERNAL MEDICINE

## 2020-03-27 PROCEDURE — 83735 ASSAY OF MAGNESIUM: CPT | Performed by: INTERNAL MEDICINE

## 2020-03-27 PROCEDURE — 80162 ASSAY OF DIGOXIN TOTAL: CPT | Performed by: INTERNAL MEDICINE

## 2020-03-27 RX ADMIN — DIGOXIN 250 MCG: 250 TABLET ORAL at 10:47

## 2020-03-27 RX ADMIN — Medication 1 TABLET: at 10:47

## 2020-03-27 RX ADMIN — AMLODIPINE BESYLATE 2.5 MG: 2.5 TABLET ORAL at 10:46

## 2020-03-27 RX ADMIN — CEFTRIAXONE 1000 MG: 1 INJECTION, POWDER, FOR SOLUTION INTRAMUSCULAR; INTRAVENOUS at 21:23

## 2020-03-27 RX ADMIN — OXYBUTYNIN CHLORIDE 5 MG: 5 TABLET ORAL at 10:48

## 2020-03-27 RX ADMIN — FOLIC ACID 1 MG: 1 TABLET ORAL at 10:47

## 2020-03-27 RX ADMIN — CARVEDILOL 12.5 MG: 12.5 TABLET, FILM COATED ORAL at 17:34

## 2020-03-27 RX ADMIN — CALCIUM 1 TABLET: 500 TABLET ORAL at 17:35

## 2020-03-27 RX ADMIN — ASPIRIN 81 MG: 81 TABLET ORAL at 10:47

## 2020-03-27 RX ADMIN — CARVEDILOL 12.5 MG: 12.5 TABLET, FILM COATED ORAL at 10:47

## 2020-03-27 RX ADMIN — ATORVASTATIN CALCIUM 40 MG: 40 TABLET, FILM COATED ORAL at 17:34

## 2020-03-27 RX ADMIN — OXYBUTYNIN CHLORIDE 5 MG: 5 TABLET ORAL at 17:34

## 2020-03-27 RX ADMIN — CITALOPRAM HYDROBROMIDE 10 MG: 10 TABLET ORAL at 10:46

## 2020-03-27 RX ADMIN — DOCUSATE SODIUM 100 MG: 100 CAPSULE, LIQUID FILLED ORAL at 10:47

## 2020-03-27 RX ADMIN — PANTOPRAZOLE SODIUM 20 MG: 20 TABLET, DELAYED RELEASE ORAL at 06:49

## 2020-03-27 RX ADMIN — CALCIUM 1 TABLET: 500 TABLET ORAL at 10:46

## 2020-03-27 RX ADMIN — THIAMINE HCL TAB 100 MG 100 MG: 100 TAB at 10:47

## 2020-03-27 RX ADMIN — MAGNESIUM 64 MG (MAGNESIUM CHLORIDE) TABLET,DELAYED RELEASE 64 MG: at 10:48

## 2020-03-28 PROBLEM — G93.40 ENCEPHALOPATHY: Status: ACTIVE | Noted: 2020-03-27

## 2020-03-28 PROBLEM — D69.6 THROMBOCYTOPENIA (HCC): Status: ACTIVE | Noted: 2020-03-28

## 2020-03-28 LAB
ANION GAP SERPL CALCULATED.3IONS-SCNC: 4 MMOL/L (ref 4–13)
BACTERIA UR CULT: NORMAL
BASOPHILS # BLD AUTO: 0.02 THOUSANDS/ΜL (ref 0–0.1)
BASOPHILS NFR BLD AUTO: 0 % (ref 0–1)
BUN SERPL-MCNC: 38 MG/DL (ref 5–25)
CALCIUM SERPL-MCNC: 9.7 MG/DL (ref 8.3–10.1)
CHLORIDE SERPL-SCNC: 105 MMOL/L (ref 100–108)
CO2 SERPL-SCNC: 28 MMOL/L (ref 21–32)
CREAT SERPL-MCNC: 1.24 MG/DL (ref 0.6–1.3)
EOSINOPHIL # BLD AUTO: 0.07 THOUSAND/ΜL (ref 0–0.61)
EOSINOPHIL NFR BLD AUTO: 1 % (ref 0–6)
ERYTHROCYTE [DISTWIDTH] IN BLOOD BY AUTOMATED COUNT: 17.3 % (ref 11.6–15.1)
FDP BLD QL AGGL: <10
FIBRINOGEN PPP-MCNC: 288 MG/DL (ref 227–495)
GFR SERPL CREATININE-BSD FRML MDRD: 39 ML/MIN/1.73SQ M
GLUCOSE SERPL-MCNC: 73 MG/DL (ref 65–140)
HCT VFR BLD AUTO: 29.9 % (ref 34.8–46.1)
HGB BLD-MCNC: 9.6 G/DL (ref 11.5–15.4)
IMM GRANULOCYTES # BLD AUTO: 0.04 THOUSAND/UL (ref 0–0.2)
IMM GRANULOCYTES NFR BLD AUTO: 1 % (ref 0–2)
LDH SERPL-CCNC: 218 U/L (ref 81–234)
LYMPHOCYTES # BLD AUTO: 1.13 THOUSANDS/ΜL (ref 0.6–4.47)
LYMPHOCYTES NFR BLD AUTO: 16 % (ref 14–44)
MCH RBC QN AUTO: 28.7 PG (ref 26.8–34.3)
MCHC RBC AUTO-ENTMCNC: 32.1 G/DL (ref 31.4–37.4)
MCV RBC AUTO: 90 FL (ref 82–98)
MONOCYTES # BLD AUTO: 0.65 THOUSAND/ΜL (ref 0.17–1.22)
MONOCYTES NFR BLD AUTO: 9 % (ref 4–12)
NEUTROPHILS # BLD AUTO: 5.01 THOUSANDS/ΜL (ref 1.85–7.62)
NEUTS SEG NFR BLD AUTO: 73 % (ref 43–75)
NRBC BLD AUTO-RTO: 1 /100 WBCS
PLATELET # BLD AUTO: 62 THOUSANDS/UL (ref 149–390)
PMV BLD AUTO: 14.1 FL (ref 8.9–12.7)
POTASSIUM SERPL-SCNC: 4.8 MMOL/L (ref 3.5–5.3)
RBC # BLD AUTO: 3.34 MILLION/UL (ref 3.81–5.12)
SODIUM SERPL-SCNC: 137 MMOL/L (ref 136–145)
WBC # BLD AUTO: 6.92 THOUSAND/UL (ref 4.31–10.16)

## 2020-03-28 PROCEDURE — 99223 1ST HOSP IP/OBS HIGH 75: CPT | Performed by: PSYCHIATRY & NEUROLOGY

## 2020-03-28 PROCEDURE — 80048 BASIC METABOLIC PNL TOTAL CA: CPT | Performed by: HOSPITALIST

## 2020-03-28 PROCEDURE — 99232 SBSQ HOSP IP/OBS MODERATE 35: CPT | Performed by: HOSPITALIST

## 2020-03-28 PROCEDURE — 83615 LACTATE (LD) (LDH) ENZYME: CPT | Performed by: HOSPITALIST

## 2020-03-28 PROCEDURE — 99222 1ST HOSP IP/OBS MODERATE 55: CPT | Performed by: INTERNAL MEDICINE

## 2020-03-28 PROCEDURE — 85362 FIBRIN DEGRADATION PRODUCTS: CPT | Performed by: HOSPITALIST

## 2020-03-28 PROCEDURE — 85025 COMPLETE CBC W/AUTO DIFF WBC: CPT | Performed by: HOSPITALIST

## 2020-03-28 PROCEDURE — 85384 FIBRINOGEN ACTIVITY: CPT | Performed by: HOSPITALIST

## 2020-03-28 PROCEDURE — 83010 ASSAY OF HAPTOGLOBIN QUANT: CPT | Performed by: HOSPITALIST

## 2020-03-28 RX ORDER — FUROSEMIDE 40 MG/1
40 TABLET ORAL DAILY
Status: DISCONTINUED | OUTPATIENT
Start: 2020-03-29 | End: 2020-03-31

## 2020-03-28 RX ADMIN — ASPIRIN 81 MG: 81 TABLET ORAL at 09:24

## 2020-03-28 RX ADMIN — THIAMINE HCL TAB 100 MG 100 MG: 100 TAB at 09:24

## 2020-03-28 RX ADMIN — Medication 1 TABLET: at 09:24

## 2020-03-28 RX ADMIN — CARVEDILOL 12.5 MG: 12.5 TABLET, FILM COATED ORAL at 09:25

## 2020-03-28 RX ADMIN — AMLODIPINE BESYLATE 2.5 MG: 2.5 TABLET ORAL at 09:24

## 2020-03-28 RX ADMIN — CALCIUM 1 TABLET: 500 TABLET ORAL at 17:15

## 2020-03-28 RX ADMIN — FOLIC ACID 1 MG: 1 TABLET ORAL at 09:23

## 2020-03-28 RX ADMIN — OXYBUTYNIN CHLORIDE 5 MG: 5 TABLET ORAL at 09:24

## 2020-03-28 RX ADMIN — CITALOPRAM HYDROBROMIDE 10 MG: 10 TABLET ORAL at 09:24

## 2020-03-28 RX ADMIN — DOCUSATE SODIUM 100 MG: 100 CAPSULE, LIQUID FILLED ORAL at 09:24

## 2020-03-28 RX ADMIN — MAGNESIUM 64 MG (MAGNESIUM CHLORIDE) TABLET,DELAYED RELEASE 64 MG: at 09:27

## 2020-03-28 RX ADMIN — CALCIUM 1 TABLET: 500 TABLET ORAL at 09:24

## 2020-03-28 RX ADMIN — ALPRAZOLAM 0.25 MG: 0.25 TABLET ORAL at 09:24

## 2020-03-28 RX ADMIN — CARVEDILOL 12.5 MG: 12.5 TABLET, FILM COATED ORAL at 17:14

## 2020-03-28 RX ADMIN — DIGOXIN 250 MCG: 250 TABLET ORAL at 09:24

## 2020-03-28 RX ADMIN — OXYBUTYNIN CHLORIDE 5 MG: 5 TABLET ORAL at 17:15

## 2020-03-28 RX ADMIN — PANTOPRAZOLE SODIUM 20 MG: 20 TABLET, DELAYED RELEASE ORAL at 05:51

## 2020-03-28 RX ADMIN — CEFTRIAXONE 1000 MG: 1 INJECTION, POWDER, FOR SOLUTION INTRAMUSCULAR; INTRAVENOUS at 21:05

## 2020-03-28 RX ADMIN — ATORVASTATIN CALCIUM 40 MG: 40 TABLET, FILM COATED ORAL at 17:15

## 2020-03-29 LAB
ALBUMIN SERPL BCP-MCNC: 2.9 G/DL (ref 3.5–5)
ALP SERPL-CCNC: 130 U/L (ref 46–116)
ALT SERPL W P-5'-P-CCNC: 39 U/L (ref 12–78)
AMMONIA PLAS-SCNC: 17 UMOL/L (ref 11–35)
ANION GAP SERPL CALCULATED.3IONS-SCNC: 5 MMOL/L (ref 4–13)
APTT PPP: 37 SECONDS (ref 23–37)
AST SERPL W P-5'-P-CCNC: 40 U/L (ref 5–45)
BASOPHILS # BLD AUTO: 0.02 THOUSANDS/ΜL (ref 0–0.1)
BASOPHILS NFR BLD AUTO: 0 % (ref 0–1)
BILIRUB SERPL-MCNC: 0.93 MG/DL (ref 0.2–1)
BUN SERPL-MCNC: 37 MG/DL (ref 5–25)
CALCIUM SERPL-MCNC: 9.5 MG/DL (ref 8.3–10.1)
CHLORIDE SERPL-SCNC: 105 MMOL/L (ref 100–108)
CO2 SERPL-SCNC: 27 MMOL/L (ref 21–32)
CORTIS SERPL-MCNC: 15.1 UG/DL
CREAT SERPL-MCNC: 1.19 MG/DL (ref 0.6–1.3)
EOSINOPHIL # BLD AUTO: 0.1 THOUSAND/ΜL (ref 0–0.61)
EOSINOPHIL NFR BLD AUTO: 2 % (ref 0–6)
ERYTHROCYTE [DISTWIDTH] IN BLOOD BY AUTOMATED COUNT: 17.3 % (ref 11.6–15.1)
GFR SERPL CREATININE-BSD FRML MDRD: 41 ML/MIN/1.73SQ M
GLUCOSE SERPL-MCNC: 65 MG/DL (ref 65–140)
HAPTOGLOB SERPL-MCNC: 139 MG/DL (ref 41–333)
HCT VFR BLD AUTO: 29.1 % (ref 34.8–46.1)
HGB BLD-MCNC: 9.3 G/DL (ref 11.5–15.4)
IMM GRANULOCYTES # BLD AUTO: 0.02 THOUSAND/UL (ref 0–0.2)
IMM GRANULOCYTES NFR BLD AUTO: 0 % (ref 0–2)
INR PPP: 1.18 (ref 0.84–1.19)
LDH SERPL-CCNC: 231 U/L (ref 81–234)
LYMPHOCYTES # BLD AUTO: 1.03 THOUSANDS/ΜL (ref 0.6–4.47)
LYMPHOCYTES NFR BLD AUTO: 15 % (ref 14–44)
MCH RBC QN AUTO: 28.6 PG (ref 26.8–34.3)
MCHC RBC AUTO-ENTMCNC: 32 G/DL (ref 31.4–37.4)
MCV RBC AUTO: 90 FL (ref 82–98)
MONOCYTES # BLD AUTO: 0.75 THOUSAND/ΜL (ref 0.17–1.22)
MONOCYTES NFR BLD AUTO: 11 % (ref 4–12)
NEUTROPHILS # BLD AUTO: 4.84 THOUSANDS/ΜL (ref 1.85–7.62)
NEUTS SEG NFR BLD AUTO: 72 % (ref 43–75)
NRBC BLD AUTO-RTO: 0 /100 WBCS
PLATELET # BLD AUTO: 61 THOUSANDS/UL (ref 149–390)
PMV BLD AUTO: 12.9 FL (ref 8.9–12.7)
POTASSIUM SERPL-SCNC: 4.6 MMOL/L (ref 3.5–5.3)
PROT SERPL-MCNC: 6.4 G/DL (ref 6.4–8.2)
PROTHROMBIN TIME: 14.6 SECONDS (ref 11.6–14.5)
RBC # BLD AUTO: 3.25 MILLION/UL (ref 3.81–5.12)
SODIUM SERPL-SCNC: 137 MMOL/L (ref 136–145)
WBC # BLD AUTO: 6.76 THOUSAND/UL (ref 4.31–10.16)

## 2020-03-29 PROCEDURE — 85025 COMPLETE CBC W/AUTO DIFF WBC: CPT | Performed by: HOSPITALIST

## 2020-03-29 PROCEDURE — 85730 THROMBOPLASTIN TIME PARTIAL: CPT | Performed by: INTERNAL MEDICINE

## 2020-03-29 PROCEDURE — 80053 COMPREHEN METABOLIC PANEL: CPT | Performed by: INTERNAL MEDICINE

## 2020-03-29 PROCEDURE — 85610 PROTHROMBIN TIME: CPT | Performed by: INTERNAL MEDICINE

## 2020-03-29 PROCEDURE — 97535 SELF CARE MNGMENT TRAINING: CPT

## 2020-03-29 PROCEDURE — 83010 ASSAY OF HAPTOGLOBIN QUANT: CPT | Performed by: INTERNAL MEDICINE

## 2020-03-29 PROCEDURE — 83615 LACTATE (LD) (LDH) ENZYME: CPT | Performed by: INTERNAL MEDICINE

## 2020-03-29 PROCEDURE — 99232 SBSQ HOSP IP/OBS MODERATE 35: CPT | Performed by: HOSPITALIST

## 2020-03-29 PROCEDURE — 82140 ASSAY OF AMMONIA: CPT | Performed by: HOSPITALIST

## 2020-03-29 PROCEDURE — 82533 TOTAL CORTISOL: CPT | Performed by: HOSPITALIST

## 2020-03-29 RX ORDER — CEPHALEXIN 250 MG/1
250 CAPSULE ORAL EVERY 6 HOURS SCHEDULED
Status: DISCONTINUED | OUTPATIENT
Start: 2020-03-29 | End: 2020-03-31

## 2020-03-29 RX ORDER — FUROSEMIDE 40 MG/1
40 TABLET ORAL ONCE
Status: COMPLETED | OUTPATIENT
Start: 2020-03-29 | End: 2020-03-29

## 2020-03-29 RX ADMIN — Medication 1 TABLET: at 08:48

## 2020-03-29 RX ADMIN — FOLIC ACID 1 MG: 1 TABLET ORAL at 08:48

## 2020-03-29 RX ADMIN — THIAMINE HCL TAB 100 MG 100 MG: 100 TAB at 08:44

## 2020-03-29 RX ADMIN — CITALOPRAM HYDROBROMIDE 10 MG: 10 TABLET ORAL at 08:48

## 2020-03-29 RX ADMIN — ATORVASTATIN CALCIUM 40 MG: 40 TABLET, FILM COATED ORAL at 17:07

## 2020-03-29 RX ADMIN — CEPHALEXIN 250 MG: 250 CAPSULE ORAL at 17:09

## 2020-03-29 RX ADMIN — CALCIUM 1 TABLET: 500 TABLET ORAL at 08:44

## 2020-03-29 RX ADMIN — DIGOXIN 250 MCG: 250 TABLET ORAL at 08:44

## 2020-03-29 RX ADMIN — ASPIRIN 81 MG: 81 TABLET ORAL at 08:44

## 2020-03-29 RX ADMIN — CARVEDILOL 12.5 MG: 12.5 TABLET, FILM COATED ORAL at 08:48

## 2020-03-29 RX ADMIN — CALCIUM 1 TABLET: 500 TABLET ORAL at 17:07

## 2020-03-29 RX ADMIN — DOCUSATE SODIUM 100 MG: 100 CAPSULE, LIQUID FILLED ORAL at 08:44

## 2020-03-29 RX ADMIN — PANTOPRAZOLE SODIUM 20 MG: 20 TABLET, DELAYED RELEASE ORAL at 05:45

## 2020-03-29 RX ADMIN — ALPRAZOLAM 0.25 MG: 0.25 TABLET ORAL at 23:12

## 2020-03-29 RX ADMIN — AMLODIPINE BESYLATE 2.5 MG: 2.5 TABLET ORAL at 08:44

## 2020-03-29 RX ADMIN — FUROSEMIDE 40 MG: 40 TABLET ORAL at 17:07

## 2020-03-29 RX ADMIN — OXYBUTYNIN CHLORIDE 5 MG: 5 TABLET ORAL at 17:07

## 2020-03-29 RX ADMIN — CARVEDILOL 12.5 MG: 12.5 TABLET, FILM COATED ORAL at 17:07

## 2020-03-29 RX ADMIN — OXYBUTYNIN CHLORIDE 5 MG: 5 TABLET ORAL at 08:48

## 2020-03-29 RX ADMIN — FUROSEMIDE 40 MG: 40 TABLET ORAL at 08:48

## 2020-03-29 RX ADMIN — CEPHALEXIN 250 MG: 250 CAPSULE ORAL at 23:12

## 2020-03-29 RX ADMIN — MAGNESIUM 64 MG (MAGNESIUM CHLORIDE) TABLET,DELAYED RELEASE 64 MG: at 08:54

## 2020-03-30 ENCOUNTER — APPOINTMENT (INPATIENT)
Dept: RADIOLOGY | Facility: HOSPITAL | Age: 85
DRG: 682 | End: 2020-03-30
Payer: COMMERCIAL

## 2020-03-30 LAB
ANION GAP SERPL CALCULATED.3IONS-SCNC: 5 MMOL/L (ref 4–13)
BUN SERPL-MCNC: 33 MG/DL (ref 5–25)
CALCIUM SERPL-MCNC: 9.4 MG/DL (ref 8.3–10.1)
CHLORIDE SERPL-SCNC: 104 MMOL/L (ref 100–108)
CO2 SERPL-SCNC: 29 MMOL/L (ref 21–32)
CREAT SERPL-MCNC: 1.24 MG/DL (ref 0.6–1.3)
GFR SERPL CREATININE-BSD FRML MDRD: 39 ML/MIN/1.73SQ M
GLUCOSE SERPL-MCNC: 79 MG/DL (ref 65–140)
GLUCOSE SERPL-MCNC: 89 MG/DL (ref 65–140)
GLUCOSE SERPL-MCNC: 92 MG/DL (ref 65–140)
HAPTOGLOB SERPL-MCNC: 143 MG/DL (ref 41–333)
POTASSIUM SERPL-SCNC: 4.1 MMOL/L (ref 3.5–5.3)
SODIUM SERPL-SCNC: 138 MMOL/L (ref 136–145)

## 2020-03-30 PROCEDURE — 99232 SBSQ HOSP IP/OBS MODERATE 35: CPT | Performed by: PSYCHIATRY & NEUROLOGY

## 2020-03-30 PROCEDURE — 82948 REAGENT STRIP/BLOOD GLUCOSE: CPT

## 2020-03-30 PROCEDURE — 99233 SBSQ HOSP IP/OBS HIGH 50: CPT | Performed by: INTERNAL MEDICINE

## 2020-03-30 PROCEDURE — 99232 SBSQ HOSP IP/OBS MODERATE 35: CPT | Performed by: INTERNAL MEDICINE

## 2020-03-30 PROCEDURE — 70450 CT HEAD/BRAIN W/O DYE: CPT

## 2020-03-30 PROCEDURE — 97535 SELF CARE MNGMENT TRAINING: CPT

## 2020-03-30 PROCEDURE — 97110 THERAPEUTIC EXERCISES: CPT

## 2020-03-30 PROCEDURE — 80048 BASIC METABOLIC PNL TOTAL CA: CPT | Performed by: HOSPITALIST

## 2020-03-30 PROCEDURE — 97530 THERAPEUTIC ACTIVITIES: CPT

## 2020-03-30 PROCEDURE — 97116 GAIT TRAINING THERAPY: CPT

## 2020-03-30 RX ORDER — DEXTROSE AND SODIUM CHLORIDE 5; .9 G/100ML; G/100ML
75 INJECTION, SOLUTION INTRAVENOUS CONTINUOUS
Status: DISCONTINUED | OUTPATIENT
Start: 2020-03-30 | End: 2020-03-31

## 2020-03-30 RX ADMIN — MAGNESIUM 64 MG (MAGNESIUM CHLORIDE) TABLET,DELAYED RELEASE 64 MG: at 08:23

## 2020-03-30 RX ADMIN — THIAMINE HCL TAB 100 MG 100 MG: 100 TAB at 08:22

## 2020-03-30 RX ADMIN — ASPIRIN 81 MG: 81 TABLET ORAL at 08:22

## 2020-03-30 RX ADMIN — DOCUSATE SODIUM 100 MG: 100 CAPSULE, LIQUID FILLED ORAL at 08:22

## 2020-03-30 RX ADMIN — CARVEDILOL 12.5 MG: 12.5 TABLET, FILM COATED ORAL at 08:22

## 2020-03-30 RX ADMIN — DIGOXIN 250 MCG: 250 TABLET ORAL at 08:23

## 2020-03-30 RX ADMIN — AMLODIPINE BESYLATE 2.5 MG: 2.5 TABLET ORAL at 08:23

## 2020-03-30 RX ADMIN — CEPHALEXIN 250 MG: 250 CAPSULE ORAL at 11:33

## 2020-03-30 RX ADMIN — Medication 1 TABLET: at 08:22

## 2020-03-30 RX ADMIN — PANTOPRAZOLE SODIUM 20 MG: 20 TABLET, DELAYED RELEASE ORAL at 05:04

## 2020-03-30 RX ADMIN — CITALOPRAM HYDROBROMIDE 10 MG: 10 TABLET ORAL at 08:22

## 2020-03-30 RX ADMIN — CALCIUM 1 TABLET: 500 TABLET ORAL at 08:22

## 2020-03-30 RX ADMIN — OXYBUTYNIN CHLORIDE 5 MG: 5 TABLET ORAL at 08:23

## 2020-03-30 RX ADMIN — DEXTROSE AND SODIUM CHLORIDE 50 ML/HR: 5; .9 INJECTION, SOLUTION INTRAVENOUS at 21:54

## 2020-03-30 RX ADMIN — CEPHALEXIN 250 MG: 250 CAPSULE ORAL at 05:04

## 2020-03-30 RX ADMIN — FUROSEMIDE 40 MG: 40 TABLET ORAL at 08:22

## 2020-03-30 RX ADMIN — FOLIC ACID 1 MG: 1 TABLET ORAL at 08:23

## 2020-03-31 LAB
ALBUMIN SERPL BCP-MCNC: 3 G/DL (ref 3.5–5)
ALP SERPL-CCNC: 139 U/L (ref 46–116)
ALT SERPL W P-5'-P-CCNC: 37 U/L (ref 12–78)
ANION GAP SERPL CALCULATED.3IONS-SCNC: 3 MMOL/L (ref 4–13)
AST SERPL W P-5'-P-CCNC: 36 U/L (ref 5–45)
BACTERIA BLD CULT: NORMAL
BACTERIA BLD CULT: NORMAL
BASE EX.OXY STD BLDV CALC-SCNC: 93.8 % (ref 60–80)
BASE EXCESS BLDV CALC-SCNC: 4.1 MMOL/L
BILIRUB DIRECT SERPL-MCNC: 0.45 MG/DL (ref 0–0.2)
BILIRUB SERPL-MCNC: 1.47 MG/DL (ref 0.2–1)
BUN SERPL-MCNC: 30 MG/DL (ref 5–25)
CALCIUM SERPL-MCNC: 9.2 MG/DL (ref 8.3–10.1)
CHLORIDE SERPL-SCNC: 104 MMOL/L (ref 100–108)
CO2 SERPL-SCNC: 30 MMOL/L (ref 21–32)
CREAT SERPL-MCNC: 1.1 MG/DL (ref 0.6–1.3)
ERYTHROCYTE [DISTWIDTH] IN BLOOD BY AUTOMATED COUNT: 17.1 % (ref 11.6–15.1)
GFR SERPL CREATININE-BSD FRML MDRD: 45 ML/MIN/1.73SQ M
GLUCOSE SERPL-MCNC: 73 MG/DL (ref 65–140)
GLUCOSE SERPL-MCNC: 74 MG/DL (ref 65–140)
HCO3 BLDV-SCNC: 28.1 MMOL/L (ref 24–30)
HCT VFR BLD AUTO: 30.5 % (ref 34.8–46.1)
HGB BLD-MCNC: 9.6 G/DL (ref 11.5–15.4)
MCH RBC QN AUTO: 28.5 PG (ref 26.8–34.3)
MCHC RBC AUTO-ENTMCNC: 31.5 G/DL (ref 31.4–37.4)
MCV RBC AUTO: 91 FL (ref 82–98)
O2 CT BLDV-SCNC: 14.5 ML/DL
PCO2 BLDV: 39.8 MM HG (ref 42–50)
PH BLDV: 7.47 [PH] (ref 7.3–7.4)
PLATELET # BLD AUTO: 77 THOUSANDS/UL (ref 149–390)
PMV BLD AUTO: 12.3 FL (ref 8.9–12.7)
PO2 BLDV: 87.3 MM HG (ref 35–45)
POTASSIUM SERPL-SCNC: 4.1 MMOL/L (ref 3.5–5.3)
PROT SERPL-MCNC: 6.6 G/DL (ref 6.4–8.2)
RBC # BLD AUTO: 3.37 MILLION/UL (ref 3.81–5.12)
SODIUM SERPL-SCNC: 137 MMOL/L (ref 136–145)
T4 FREE SERPL-MCNC: 1.57 NG/DL (ref 0.76–1.46)
TSH SERPL DL<=0.05 MIU/L-ACNC: 4.88 UIU/ML (ref 0.36–3.74)
VIT B12 SERPL-MCNC: 1485 PG/ML (ref 100–900)
WBC # BLD AUTO: 7.78 THOUSAND/UL (ref 4.31–10.16)

## 2020-03-31 PROCEDURE — 82805 BLOOD GASES W/O2 SATURATION: CPT | Performed by: INTERNAL MEDICINE

## 2020-03-31 PROCEDURE — 84443 ASSAY THYROID STIM HORMONE: CPT | Performed by: INTERNAL MEDICINE

## 2020-03-31 PROCEDURE — 85027 COMPLETE CBC AUTOMATED: CPT | Performed by: INTERNAL MEDICINE

## 2020-03-31 PROCEDURE — 92610 EVALUATE SWALLOWING FUNCTION: CPT

## 2020-03-31 PROCEDURE — 99232 SBSQ HOSP IP/OBS MODERATE 35: CPT | Performed by: INTERNAL MEDICINE

## 2020-03-31 PROCEDURE — 80048 BASIC METABOLIC PNL TOTAL CA: CPT | Performed by: INTERNAL MEDICINE

## 2020-03-31 PROCEDURE — 82607 VITAMIN B-12: CPT | Performed by: INTERNAL MEDICINE

## 2020-03-31 PROCEDURE — 80076 HEPATIC FUNCTION PANEL: CPT | Performed by: INTERNAL MEDICINE

## 2020-03-31 PROCEDURE — 86592 SYPHILIS TEST NON-TREP QUAL: CPT | Performed by: INTERNAL MEDICINE

## 2020-03-31 PROCEDURE — 82948 REAGENT STRIP/BLOOD GLUCOSE: CPT

## 2020-03-31 PROCEDURE — 84439 ASSAY OF FREE THYROXINE: CPT | Performed by: INTERNAL MEDICINE

## 2020-03-31 RX ORDER — DEXTROSE AND SODIUM CHLORIDE 5; .45 G/100ML; G/100ML
75 INJECTION, SOLUTION INTRAVENOUS CONTINUOUS
Status: DISCONTINUED | OUTPATIENT
Start: 2020-03-31 | End: 2020-04-01

## 2020-03-31 RX ADMIN — DIGOXIN 250 MCG: 250 TABLET ORAL at 09:00

## 2020-03-31 RX ADMIN — CEPHALEXIN 250 MG: 250 CAPSULE ORAL at 11:12

## 2020-03-31 RX ADMIN — CARVEDILOL 12.5 MG: 12.5 TABLET, FILM COATED ORAL at 09:00

## 2020-03-31 RX ADMIN — CALCIUM 1 TABLET: 500 TABLET ORAL at 09:00

## 2020-03-31 RX ADMIN — Medication 1 TABLET: at 09:00

## 2020-03-31 RX ADMIN — FUROSEMIDE 40 MG: 40 TABLET ORAL at 09:00

## 2020-03-31 RX ADMIN — AMLODIPINE BESYLATE 2.5 MG: 2.5 TABLET ORAL at 09:00

## 2020-03-31 RX ADMIN — THIAMINE HYDROCHLORIDE 100 MG: 100 INJECTION, SOLUTION INTRAMUSCULAR; INTRAVENOUS at 12:45

## 2020-03-31 RX ADMIN — DOCUSATE SODIUM 100 MG: 100 CAPSULE, LIQUID FILLED ORAL at 09:00

## 2020-03-31 RX ADMIN — THIAMINE HCL TAB 100 MG 100 MG: 100 TAB at 09:00

## 2020-03-31 RX ADMIN — FOLIC ACID 1 MG: 1 TABLET ORAL at 09:00

## 2020-03-31 RX ADMIN — DEXTROSE AND SODIUM CHLORIDE 75 ML/HR: 5; .45 INJECTION, SOLUTION INTRAVENOUS at 20:14

## 2020-03-31 RX ADMIN — CITALOPRAM HYDROBROMIDE 10 MG: 10 TABLET ORAL at 09:00

## 2020-03-31 RX ADMIN — MAGNESIUM 64 MG (MAGNESIUM CHLORIDE) TABLET,DELAYED RELEASE 64 MG: at 09:01

## 2020-03-31 RX ADMIN — ASPIRIN 81 MG: 81 TABLET ORAL at 09:00

## 2020-03-31 RX ADMIN — OXYBUTYNIN CHLORIDE 5 MG: 5 TABLET ORAL at 09:00

## 2020-04-01 PROBLEM — T68.XXXA HYPOTHERMIA: Status: RESOLVED | Noted: 2020-03-27 | Resolved: 2020-04-01

## 2020-04-01 LAB
ANION GAP SERPL CALCULATED.3IONS-SCNC: 4 MMOL/L (ref 4–13)
BASOPHILS # BLD AUTO: 0.03 THOUSANDS/ΜL (ref 0–0.1)
BASOPHILS NFR BLD AUTO: 0 % (ref 0–1)
BUN SERPL-MCNC: 24 MG/DL (ref 5–25)
CALCIUM SERPL-MCNC: 9.4 MG/DL (ref 8.3–10.1)
CHLORIDE SERPL-SCNC: 105 MMOL/L (ref 100–108)
CO2 SERPL-SCNC: 31 MMOL/L (ref 21–32)
CREAT SERPL-MCNC: 0.94 MG/DL (ref 0.6–1.3)
EOSINOPHIL # BLD AUTO: 0.14 THOUSAND/ΜL (ref 0–0.61)
EOSINOPHIL NFR BLD AUTO: 2 % (ref 0–6)
ERYTHROCYTE [DISTWIDTH] IN BLOOD BY AUTOMATED COUNT: 17.2 % (ref 11.6–15.1)
GFR SERPL CREATININE-BSD FRML MDRD: 55 ML/MIN/1.73SQ M
GLUCOSE SERPL-MCNC: 94 MG/DL (ref 65–140)
HCT VFR BLD AUTO: 32.5 % (ref 34.8–46.1)
HGB BLD-MCNC: 10.3 G/DL (ref 11.5–15.4)
IMM GRANULOCYTES # BLD AUTO: 0.02 THOUSAND/UL (ref 0–0.2)
IMM GRANULOCYTES NFR BLD AUTO: 0 % (ref 0–2)
LYMPHOCYTES # BLD AUTO: 0.81 THOUSANDS/ΜL (ref 0.6–4.47)
LYMPHOCYTES NFR BLD AUTO: 10 % (ref 14–44)
MCH RBC QN AUTO: 28.9 PG (ref 26.8–34.3)
MCHC RBC AUTO-ENTMCNC: 31.7 G/DL (ref 31.4–37.4)
MCV RBC AUTO: 91 FL (ref 82–98)
MONOCYTES # BLD AUTO: 0.95 THOUSAND/ΜL (ref 0.17–1.22)
MONOCYTES NFR BLD AUTO: 11 % (ref 4–12)
NEUTROPHILS # BLD AUTO: 6.39 THOUSANDS/ΜL (ref 1.85–7.62)
NEUTS SEG NFR BLD AUTO: 77 % (ref 43–75)
NRBC BLD AUTO-RTO: 0 /100 WBCS
PLATELET # BLD AUTO: 95 THOUSANDS/UL (ref 149–390)
PMV BLD AUTO: 12.2 FL (ref 8.9–12.7)
POTASSIUM SERPL-SCNC: 3.9 MMOL/L (ref 3.5–5.3)
RBC # BLD AUTO: 3.57 MILLION/UL (ref 3.81–5.12)
RPR SER QL: NORMAL
SODIUM SERPL-SCNC: 140 MMOL/L (ref 136–145)
WBC # BLD AUTO: 8.34 THOUSAND/UL (ref 4.31–10.16)

## 2020-04-01 PROCEDURE — 85025 COMPLETE CBC W/AUTO DIFF WBC: CPT | Performed by: INTERNAL MEDICINE

## 2020-04-01 PROCEDURE — 80048 BASIC METABOLIC PNL TOTAL CA: CPT | Performed by: INTERNAL MEDICINE

## 2020-04-01 PROCEDURE — 92526 ORAL FUNCTION THERAPY: CPT

## 2020-04-01 PROCEDURE — 99232 SBSQ HOSP IP/OBS MODERATE 35: CPT | Performed by: INTERNAL MEDICINE

## 2020-04-01 PROCEDURE — 97530 THERAPEUTIC ACTIVITIES: CPT

## 2020-04-01 RX ADMIN — CARVEDILOL 12.5 MG: 12.5 TABLET, FILM COATED ORAL at 17:54

## 2020-04-01 RX ADMIN — DOCUSATE SODIUM 100 MG: 100 CAPSULE, LIQUID FILLED ORAL at 08:26

## 2020-04-01 RX ADMIN — CALCIUM 1 TABLET: 500 TABLET ORAL at 08:26

## 2020-04-01 RX ADMIN — CALCIUM 1 TABLET: 500 TABLET ORAL at 17:53

## 2020-04-01 RX ADMIN — Medication 1 TABLET: at 08:26

## 2020-04-01 RX ADMIN — CITALOPRAM HYDROBROMIDE 10 MG: 10 TABLET ORAL at 08:26

## 2020-04-01 RX ADMIN — ATORVASTATIN CALCIUM 40 MG: 40 TABLET, FILM COATED ORAL at 17:54

## 2020-04-01 RX ADMIN — CARVEDILOL 12.5 MG: 12.5 TABLET, FILM COATED ORAL at 08:25

## 2020-04-01 RX ADMIN — AMLODIPINE BESYLATE 2.5 MG: 2.5 TABLET ORAL at 08:26

## 2020-04-01 RX ADMIN — THIAMINE HYDROCHLORIDE 100 MG: 100 INJECTION, SOLUTION INTRAMUSCULAR; INTRAVENOUS at 10:04

## 2020-04-01 RX ADMIN — FOLIC ACID 1 MG: 1 TABLET ORAL at 08:26

## 2020-04-01 RX ADMIN — DIGOXIN 250 MCG: 250 TABLET ORAL at 08:26

## 2020-04-01 RX ADMIN — ASPIRIN 81 MG: 81 TABLET ORAL at 08:26

## 2020-04-01 RX ADMIN — MAGNESIUM 64 MG (MAGNESIUM CHLORIDE) TABLET,DELAYED RELEASE 64 MG: at 08:27

## 2020-04-02 PROCEDURE — 99232 SBSQ HOSP IP/OBS MODERATE 35: CPT | Performed by: INTERNAL MEDICINE

## 2020-04-02 RX ORDER — FUROSEMIDE 20 MG/1
20 TABLET ORAL DAILY
Status: DISCONTINUED | OUTPATIENT
Start: 2020-04-02 | End: 2020-04-03 | Stop reason: HOSPADM

## 2020-04-02 RX ADMIN — CITALOPRAM HYDROBROMIDE 10 MG: 10 TABLET ORAL at 09:20

## 2020-04-02 RX ADMIN — DOCUSATE SODIUM 100 MG: 100 CAPSULE, LIQUID FILLED ORAL at 09:21

## 2020-04-02 RX ADMIN — FOLIC ACID 1 MG: 1 TABLET ORAL at 09:20

## 2020-04-02 RX ADMIN — ATORVASTATIN CALCIUM 40 MG: 40 TABLET, FILM COATED ORAL at 17:34

## 2020-04-02 RX ADMIN — FUROSEMIDE 20 MG: 20 TABLET ORAL at 11:53

## 2020-04-02 RX ADMIN — CALCIUM 1 TABLET: 500 TABLET ORAL at 17:35

## 2020-04-02 RX ADMIN — CARVEDILOL 12.5 MG: 12.5 TABLET, FILM COATED ORAL at 06:55

## 2020-04-02 RX ADMIN — CALCIUM 1 TABLET: 500 TABLET ORAL at 06:55

## 2020-04-02 RX ADMIN — MAGNESIUM 64 MG (MAGNESIUM CHLORIDE) TABLET,DELAYED RELEASE 64 MG: at 09:20

## 2020-04-02 RX ADMIN — THIAMINE HYDROCHLORIDE 100 MG: 100 INJECTION, SOLUTION INTRAMUSCULAR; INTRAVENOUS at 09:20

## 2020-04-02 RX ADMIN — Medication 1 TABLET: at 09:20

## 2020-04-02 RX ADMIN — DIGOXIN 250 MCG: 250 TABLET ORAL at 09:21

## 2020-04-02 RX ADMIN — ASPIRIN 81 MG: 81 TABLET ORAL at 09:20

## 2020-04-02 RX ADMIN — PANTOPRAZOLE SODIUM 20 MG: 20 TABLET, DELAYED RELEASE ORAL at 06:55

## 2020-04-02 RX ADMIN — AMLODIPINE BESYLATE 2.5 MG: 2.5 TABLET ORAL at 09:21

## 2020-04-03 VITALS
OXYGEN SATURATION: 98 % | RESPIRATION RATE: 18 BRPM | WEIGHT: 164.46 LBS | SYSTOLIC BLOOD PRESSURE: 115 MMHG | TEMPERATURE: 97.3 F | HEART RATE: 61 BPM | BODY MASS INDEX: 29.14 KG/M2 | DIASTOLIC BLOOD PRESSURE: 49 MMHG | HEIGHT: 63 IN

## 2020-04-03 PROBLEM — D64.9 ANEMIA: Status: ACTIVE | Noted: 2020-04-03

## 2020-04-03 PROBLEM — N18.2 ACUTE RENAL FAILURE SUPERIMPOSED ON STAGE 2 CHRONIC KIDNEY DISEASE (HCC): Status: RESOLVED | Noted: 2020-03-26 | Resolved: 2020-04-03

## 2020-04-03 PROBLEM — N17.9 ACUTE RENAL FAILURE SUPERIMPOSED ON STAGE 2 CHRONIC KIDNEY DISEASE (HCC): Status: RESOLVED | Noted: 2020-03-26 | Resolved: 2020-04-03

## 2020-04-03 PROBLEM — G93.40 ENCEPHALOPATHY: Status: RESOLVED | Noted: 2020-03-27 | Resolved: 2020-04-03

## 2020-04-03 PROBLEM — N39.0 ACUTE LOWER UTI: Status: RESOLVED | Noted: 2020-03-26 | Resolved: 2020-04-03

## 2020-04-03 PROCEDURE — 97530 THERAPEUTIC ACTIVITIES: CPT

## 2020-04-03 PROCEDURE — 97129 THER IVNTJ 1ST 15 MIN: CPT

## 2020-04-03 PROCEDURE — 97535 SELF CARE MNGMENT TRAINING: CPT

## 2020-04-03 PROCEDURE — 97116 GAIT TRAINING THERAPY: CPT

## 2020-04-03 PROCEDURE — 99239 HOSP IP/OBS DSCHRG MGMT >30: CPT | Performed by: INTERNAL MEDICINE

## 2020-04-03 RX ORDER — ALPRAZOLAM 0.25 MG/1
0.25 TABLET ORAL
Qty: 10 TABLET | Refills: 0 | Status: SHIPPED | OUTPATIENT
Start: 2020-04-03 | End: 2021-01-08 | Stop reason: HOSPADM

## 2020-04-03 RX ORDER — FUROSEMIDE 20 MG/1
20 TABLET ORAL DAILY
Refills: 0
Start: 2020-04-03 | End: 2021-01-08 | Stop reason: HOSPADM

## 2020-04-03 RX ADMIN — CARVEDILOL 12.5 MG: 12.5 TABLET, FILM COATED ORAL at 08:13

## 2020-04-03 RX ADMIN — ATORVASTATIN CALCIUM 40 MG: 40 TABLET, FILM COATED ORAL at 18:23

## 2020-04-03 RX ADMIN — CARVEDILOL 12.5 MG: 12.5 TABLET, FILM COATED ORAL at 18:24

## 2020-04-03 RX ADMIN — Medication 1 TABLET: at 08:13

## 2020-04-03 RX ADMIN — DIGOXIN 250 MCG: 250 TABLET ORAL at 08:13

## 2020-04-03 RX ADMIN — CALCIUM 1 TABLET: 500 TABLET ORAL at 18:24

## 2020-04-03 RX ADMIN — THIAMINE HYDROCHLORIDE 100 MG: 100 INJECTION, SOLUTION INTRAMUSCULAR; INTRAVENOUS at 08:18

## 2020-04-03 RX ADMIN — FOLIC ACID 1 MG: 1 TABLET ORAL at 08:12

## 2020-04-03 RX ADMIN — MAGNESIUM 64 MG (MAGNESIUM CHLORIDE) TABLET,DELAYED RELEASE 64 MG: at 08:14

## 2020-04-03 RX ADMIN — CITALOPRAM HYDROBROMIDE 10 MG: 10 TABLET ORAL at 08:13

## 2020-04-03 RX ADMIN — AMLODIPINE BESYLATE 2.5 MG: 2.5 TABLET ORAL at 08:13

## 2020-04-03 RX ADMIN — PANTOPRAZOLE SODIUM 20 MG: 20 TABLET, DELAYED RELEASE ORAL at 06:28

## 2020-04-03 RX ADMIN — FUROSEMIDE 20 MG: 20 TABLET ORAL at 08:12

## 2020-04-03 RX ADMIN — DOCUSATE SODIUM 100 MG: 100 CAPSULE, LIQUID FILLED ORAL at 08:14

## 2020-04-03 RX ADMIN — ASPIRIN 81 MG: 81 TABLET ORAL at 08:12

## 2020-04-03 RX ADMIN — CALCIUM 1 TABLET: 500 TABLET ORAL at 08:13

## 2020-12-28 ENCOUNTER — HOSPITAL ENCOUNTER (INPATIENT)
Facility: HOSPITAL | Age: 85
LOS: 10 days | Discharge: NURSING, REHAB OR LONG-TERM CARE | DRG: 291 | End: 2021-01-08
Attending: EMERGENCY MEDICINE | Admitting: INTERNAL MEDICINE
Payer: COMMERCIAL

## 2020-12-28 ENCOUNTER — APPOINTMENT (EMERGENCY)
Dept: RADIOLOGY | Facility: HOSPITAL | Age: 85
DRG: 291 | End: 2020-12-28
Payer: COMMERCIAL

## 2020-12-28 DIAGNOSIS — R53.1 GENERALIZED WEAKNESS: Primary | ICD-10-CM

## 2020-12-28 DIAGNOSIS — I50.9 CHF (CONGESTIVE HEART FAILURE) (HCC): ICD-10-CM

## 2020-12-28 DIAGNOSIS — N17.9 AKI (ACUTE KIDNEY INJURY) (HCC): ICD-10-CM

## 2020-12-28 DIAGNOSIS — E87.1 HYPONATREMIA: ICD-10-CM

## 2020-12-28 DIAGNOSIS — T68.XXXD HYPOTHERMIA, SUBSEQUENT ENCOUNTER: ICD-10-CM

## 2020-12-28 DIAGNOSIS — R60.9 SWELLING: ICD-10-CM

## 2020-12-28 DIAGNOSIS — G92.8 TOXIC METABOLIC ENCEPHALOPATHY: ICD-10-CM

## 2020-12-28 DIAGNOSIS — J90 BILATERAL PLEURAL EFFUSION: ICD-10-CM

## 2020-12-28 DIAGNOSIS — Z20.822 COVID-19 RULED OUT: ICD-10-CM

## 2020-12-28 DIAGNOSIS — D69.6 THROMBOCYTOPENIA (HCC): ICD-10-CM

## 2020-12-28 DIAGNOSIS — I50.42 CHRONIC COMBINED SYSTOLIC AND DIASTOLIC CONGESTIVE HEART FAILURE (HCC): ICD-10-CM

## 2020-12-28 DIAGNOSIS — E87.5 HYPERKALEMIA: ICD-10-CM

## 2020-12-28 DIAGNOSIS — I50.43 ACUTE ON CHRONIC COMBINED SYSTOLIC AND DIASTOLIC CONGESTIVE HEART FAILURE (HCC): ICD-10-CM

## 2020-12-28 DIAGNOSIS — I48.91 ATRIAL FIBRILLATION, UNSPECIFIED TYPE (HCC): ICD-10-CM

## 2020-12-28 LAB
ALBUMIN SERPL BCP-MCNC: 3.2 G/DL (ref 3.5–5)
ALP SERPL-CCNC: 122 U/L (ref 46–116)
ALT SERPL W P-5'-P-CCNC: 35 U/L (ref 12–78)
ANION GAP SERPL CALCULATED.3IONS-SCNC: 1 MMOL/L (ref 4–13)
AST SERPL W P-5'-P-CCNC: 45 U/L (ref 5–45)
BASOPHILS # BLD AUTO: 0.01 THOUSANDS/ΜL (ref 0–0.1)
BASOPHILS NFR BLD AUTO: 0 % (ref 0–1)
BILIRUB SERPL-MCNC: 0.93 MG/DL (ref 0.2–1)
BILIRUB UR QL STRIP: NEGATIVE
BUN SERPL-MCNC: 55 MG/DL (ref 5–25)
CALCIUM ALBUM COR SERPL-MCNC: 10.7 MG/DL (ref 8.3–10.1)
CALCIUM SERPL-MCNC: 10.1 MG/DL (ref 8.3–10.1)
CHLORIDE SERPL-SCNC: 94 MMOL/L (ref 100–108)
CLARITY UR: NORMAL
CO2 SERPL-SCNC: 33 MMOL/L (ref 21–32)
COLOR UR: YELLOW
CREAT SERPL-MCNC: 1.64 MG/DL (ref 0.6–1.3)
EOSINOPHIL # BLD AUTO: 0.08 THOUSAND/ΜL (ref 0–0.61)
EOSINOPHIL NFR BLD AUTO: 1 % (ref 0–6)
ERYTHROCYTE [DISTWIDTH] IN BLOOD BY AUTOMATED COUNT: 16.5 % (ref 11.6–15.1)
FLUAV RNA RESP QL NAA+PROBE: NEGATIVE
FLUBV RNA RESP QL NAA+PROBE: NEGATIVE
GFR SERPL CREATININE-BSD FRML MDRD: 28 ML/MIN/1.73SQ M
GLUCOSE SERPL-MCNC: 131 MG/DL (ref 65–140)
GLUCOSE UR STRIP-MCNC: NEGATIVE MG/DL
HCT VFR BLD AUTO: 34.1 % (ref 34.8–46.1)
HGB BLD-MCNC: 10.6 G/DL (ref 11.5–15.4)
HGB UR QL STRIP.AUTO: NEGATIVE
IMM GRANULOCYTES # BLD AUTO: 0.01 THOUSAND/UL (ref 0–0.2)
IMM GRANULOCYTES NFR BLD AUTO: 0 % (ref 0–2)
KETONES UR STRIP-MCNC: NEGATIVE MG/DL
LEUKOCYTE ESTERASE UR QL STRIP: NEGATIVE
LYMPHOCYTES # BLD AUTO: 0.74 THOUSANDS/ΜL (ref 0.6–4.47)
LYMPHOCYTES NFR BLD AUTO: 12 % (ref 14–44)
MCH RBC QN AUTO: 29.7 PG (ref 26.8–34.3)
MCHC RBC AUTO-ENTMCNC: 31.1 G/DL (ref 31.4–37.4)
MCV RBC AUTO: 96 FL (ref 82–98)
MONOCYTES # BLD AUTO: 0.39 THOUSAND/ΜL (ref 0.17–1.22)
MONOCYTES NFR BLD AUTO: 6 % (ref 4–12)
NEUTROPHILS # BLD AUTO: 5.14 THOUSANDS/ΜL (ref 1.85–7.62)
NEUTS SEG NFR BLD AUTO: 81 % (ref 43–75)
NITRITE UR QL STRIP: NEGATIVE
NRBC BLD AUTO-RTO: 1 /100 WBCS
PH UR STRIP.AUTO: 5.5 [PH]
PLATELET # BLD AUTO: 66 THOUSANDS/UL (ref 149–390)
PMV BLD AUTO: 14.4 FL (ref 8.9–12.7)
POTASSIUM SERPL-SCNC: 6.1 MMOL/L (ref 3.5–5.3)
PROT SERPL-MCNC: 7.1 G/DL (ref 6.4–8.2)
PROT UR STRIP-MCNC: NEGATIVE MG/DL
RBC # BLD AUTO: 3.57 MILLION/UL (ref 3.81–5.12)
RSV RNA RESP QL NAA+PROBE: NEGATIVE
SARS-COV-2 RNA RESP QL NAA+PROBE: NEGATIVE
SODIUM SERPL-SCNC: 128 MMOL/L (ref 136–145)
SP GR UR STRIP.AUTO: 1.02 (ref 1–1.03)
T4 FREE SERPL-MCNC: 1.34 NG/DL (ref 0.76–1.46)
TROPONIN I SERPL-MCNC: <0.02 NG/ML
TSH SERPL DL<=0.05 MIU/L-ACNC: 5.22 UIU/ML (ref 0.36–3.74)
UROBILINOGEN UR QL STRIP.AUTO: 0.2 E.U./DL
WBC # BLD AUTO: 6.37 THOUSAND/UL (ref 4.31–10.16)

## 2020-12-28 PROCEDURE — 81003 URINALYSIS AUTO W/O SCOPE: CPT | Performed by: EMERGENCY MEDICINE

## 2020-12-28 PROCEDURE — 93005 ELECTROCARDIOGRAM TRACING: CPT

## 2020-12-28 PROCEDURE — 99285 EMERGENCY DEPT VISIT HI MDM: CPT

## 2020-12-28 PROCEDURE — 83880 ASSAY OF NATRIURETIC PEPTIDE: CPT | Performed by: INTERNAL MEDICINE

## 2020-12-28 PROCEDURE — 99285 EMERGENCY DEPT VISIT HI MDM: CPT | Performed by: EMERGENCY MEDICINE

## 2020-12-28 PROCEDURE — 0241U HB NFCT DS VIR RESP RNA 4 TRGT: CPT | Performed by: EMERGENCY MEDICINE

## 2020-12-28 PROCEDURE — 84484 ASSAY OF TROPONIN QUANT: CPT | Performed by: EMERGENCY MEDICINE

## 2020-12-28 PROCEDURE — 84443 ASSAY THYROID STIM HORMONE: CPT | Performed by: EMERGENCY MEDICINE

## 2020-12-28 PROCEDURE — 36415 COLL VENOUS BLD VENIPUNCTURE: CPT | Performed by: EMERGENCY MEDICINE

## 2020-12-28 PROCEDURE — 82607 VITAMIN B-12: CPT | Performed by: INTERNAL MEDICINE

## 2020-12-28 PROCEDURE — 96374 THER/PROPH/DIAG INJ IV PUSH: CPT

## 2020-12-28 PROCEDURE — 71045 X-RAY EXAM CHEST 1 VIEW: CPT

## 2020-12-28 PROCEDURE — 82746 ASSAY OF FOLIC ACID SERUM: CPT | Performed by: INTERNAL MEDICINE

## 2020-12-28 PROCEDURE — 85025 COMPLETE CBC W/AUTO DIFF WBC: CPT | Performed by: EMERGENCY MEDICINE

## 2020-12-28 PROCEDURE — 80053 COMPREHEN METABOLIC PANEL: CPT | Performed by: EMERGENCY MEDICINE

## 2020-12-28 PROCEDURE — 84439 ASSAY OF FREE THYROXINE: CPT | Performed by: EMERGENCY MEDICINE

## 2020-12-28 RX ORDER — ALBUTEROL SULFATE 90 UG/1
2 AEROSOL, METERED RESPIRATORY (INHALATION) EVERY 4 HOURS PRN
COMMUNITY

## 2020-12-28 RX ORDER — TORSEMIDE 20 MG/1
20 TABLET ORAL DAILY
COMMUNITY
End: 2021-01-08 | Stop reason: HOSPADM

## 2020-12-28 RX ORDER — FUROSEMIDE 10 MG/ML
20 INJECTION INTRAMUSCULAR; INTRAVENOUS ONCE
Status: COMPLETED | OUTPATIENT
Start: 2020-12-28 | End: 2020-12-28

## 2020-12-28 RX ORDER — CALCIUM GLUCONATE 20 MG/ML
1 INJECTION, SOLUTION INTRAVENOUS ONCE
Status: COMPLETED | OUTPATIENT
Start: 2020-12-29 | End: 2020-12-29

## 2020-12-28 RX ADMIN — SODIUM CHLORIDE 500 ML: 0.9 INJECTION, SOLUTION INTRAVENOUS at 22:26

## 2020-12-28 RX ADMIN — FUROSEMIDE 20 MG: 10 INJECTION, SOLUTION INTRAMUSCULAR; INTRAVENOUS at 23:24

## 2020-12-29 ENCOUNTER — APPOINTMENT (INPATIENT)
Dept: NON INVASIVE DIAGNOSTICS | Facility: HOSPITAL | Age: 85
DRG: 291 | End: 2020-12-29
Payer: COMMERCIAL

## 2020-12-29 ENCOUNTER — APPOINTMENT (INPATIENT)
Dept: RADIOLOGY | Facility: HOSPITAL | Age: 85
DRG: 291 | End: 2020-12-29
Payer: COMMERCIAL

## 2020-12-29 PROBLEM — E87.1 HYPONATREMIA: Status: ACTIVE | Noted: 2020-12-29

## 2020-12-29 PROBLEM — E87.5 HYPERKALEMIA: Status: ACTIVE | Noted: 2020-12-29

## 2020-12-29 PROBLEM — G93.40 ACUTE ENCEPHALOPATHY: Status: RESOLVED | Noted: 2020-03-27 | Resolved: 2020-12-29

## 2020-12-29 PROBLEM — T68.XXXA HYPOTHERMIA: Status: RESOLVED | Noted: 2020-03-27 | Resolved: 2020-12-29

## 2020-12-29 PROBLEM — L53.9 ERYTHEMA OF LOWER EXTREMITY: Status: ACTIVE | Noted: 2020-12-29

## 2020-12-29 PROBLEM — G92.8 TOXIC METABOLIC ENCEPHALOPATHY: Status: ACTIVE | Noted: 2020-03-27

## 2020-12-29 LAB
ANION GAP SERPL CALCULATED.3IONS-SCNC: 3 MMOL/L (ref 4–13)
ANION GAP SERPL CALCULATED.3IONS-SCNC: 3 MMOL/L (ref 4–13)
ANION GAP SERPL CALCULATED.3IONS-SCNC: 4 MMOL/L (ref 4–13)
ANION GAP SERPL CALCULATED.3IONS-SCNC: 5 MMOL/L (ref 4–13)
ATRIAL RATE: 288 BPM
ATRIAL RATE: 75 BPM
BUN SERPL-MCNC: 48 MG/DL (ref 5–25)
BUN SERPL-MCNC: 53 MG/DL (ref 5–25)
BUN SERPL-MCNC: 54 MG/DL (ref 5–25)
BUN SERPL-MCNC: 55 MG/DL (ref 5–25)
CALCIUM SERPL-MCNC: 10.1 MG/DL (ref 8.3–10.1)
CALCIUM SERPL-MCNC: 8.6 MG/DL (ref 8.3–10.1)
CALCIUM SERPL-MCNC: 9.9 MG/DL (ref 8.3–10.1)
CALCIUM SERPL-MCNC: 9.9 MG/DL (ref 8.3–10.1)
CHLORIDE SERPL-SCNC: 102 MMOL/L (ref 100–108)
CHLORIDE SERPL-SCNC: 93 MMOL/L (ref 100–108)
CHLORIDE SERPL-SCNC: 95 MMOL/L (ref 100–108)
CHLORIDE SERPL-SCNC: 97 MMOL/L (ref 100–108)
CO2 SERPL-SCNC: 27 MMOL/L (ref 21–32)
CO2 SERPL-SCNC: 28 MMOL/L (ref 21–32)
CO2 SERPL-SCNC: 30 MMOL/L (ref 21–32)
CO2 SERPL-SCNC: 31 MMOL/L (ref 21–32)
CREAT SERPL-MCNC: 1.3 MG/DL (ref 0.6–1.3)
CREAT SERPL-MCNC: 1.52 MG/DL (ref 0.6–1.3)
CREAT SERPL-MCNC: 1.52 MG/DL (ref 0.6–1.3)
CREAT SERPL-MCNC: 1.55 MG/DL (ref 0.6–1.3)
DIGOXIN SERPL-MCNC: <0.2 NG/ML (ref 0.8–2)
EOSINOPHIL NFR URNS MANUAL: 0 %
ERYTHROCYTE [DISTWIDTH] IN BLOOD BY AUTOMATED COUNT: 16.1 % (ref 11.6–15.1)
FOLATE SERPL-MCNC: >20 NG/ML (ref 3.1–17.5)
GFR SERPL CREATININE-BSD FRML MDRD: 30 ML/MIN/1.73SQ M
GFR SERPL CREATININE-BSD FRML MDRD: 37 ML/MIN/1.73SQ M
GLUCOSE SERPL-MCNC: 57 MG/DL (ref 65–140)
GLUCOSE SERPL-MCNC: 64 MG/DL (ref 65–140)
GLUCOSE SERPL-MCNC: 70 MG/DL (ref 65–140)
GLUCOSE SERPL-MCNC: 73 MG/DL (ref 65–140)
GLUCOSE SERPL-MCNC: 90 MG/DL (ref 65–140)
HCT VFR BLD AUTO: 28.7 % (ref 34.8–46.1)
HGB BLD-MCNC: 9.3 G/DL (ref 11.5–15.4)
MCH RBC QN AUTO: 30.1 PG (ref 26.8–34.3)
MCHC RBC AUTO-ENTMCNC: 32.4 G/DL (ref 31.4–37.4)
MCV RBC AUTO: 93 FL (ref 82–98)
NT-PROBNP SERPL-MCNC: 2511 PG/ML
OSMOLALITY UR/SERPL-RTO: 286 MMOL/KG (ref 282–298)
OSMOLALITY UR: 343 MMOL/KG
PLATELET # BLD AUTO: 57 THOUSANDS/UL (ref 149–390)
PMV BLD AUTO: 14 FL (ref 8.9–12.7)
POTASSIUM SERPL-SCNC: 4.9 MMOL/L (ref 3.5–5.3)
POTASSIUM SERPL-SCNC: 5.5 MMOL/L (ref 3.5–5.3)
POTASSIUM SERPL-SCNC: 5.6 MMOL/L (ref 3.5–5.3)
POTASSIUM SERPL-SCNC: 5.9 MMOL/L (ref 3.5–5.3)
QRS AXIS: -70 DEGREES
QRS AXIS: 251 DEGREES
QRSD INTERVAL: 162 MS
QRSD INTERVAL: 196 MS
QT INTERVAL: 510 MS
QT INTERVAL: 538 MS
QTC INTERVAL: 517 MS
QTC INTERVAL: 592 MS
RBC # BLD AUTO: 3.09 MILLION/UL (ref 3.81–5.12)
SODIUM 24H UR-SCNC: 44 MOL/L
SODIUM SERPL-SCNC: 126 MMOL/L (ref 136–145)
SODIUM SERPL-SCNC: 127 MMOL/L (ref 136–145)
SODIUM SERPL-SCNC: 131 MMOL/L (ref 136–145)
SODIUM SERPL-SCNC: 134 MMOL/L (ref 136–145)
T WAVE AXIS: 87 DEGREES
T WAVE AXIS: 91 DEGREES
T4 FREE SERPL-MCNC: 1.38 NG/DL (ref 0.76–1.46)
TSH SERPL DL<=0.05 MIU/L-ACNC: 3.88 UIU/ML (ref 0.36–3.74)
VENTRICULAR RATE: 62 BPM
VENTRICULAR RATE: 73 BPM
VIT B12 SERPL-MCNC: 1826 PG/ML (ref 100–900)
WBC # BLD AUTO: 5.04 THOUSAND/UL (ref 4.31–10.16)

## 2020-12-29 PROCEDURE — 82948 REAGENT STRIP/BLOOD GLUCOSE: CPT

## 2020-12-29 PROCEDURE — 93970 EXTREMITY STUDY: CPT

## 2020-12-29 PROCEDURE — 83935 ASSAY OF URINE OSMOLALITY: CPT | Performed by: PHYSICIAN ASSISTANT

## 2020-12-29 PROCEDURE — 99223 1ST HOSP IP/OBS HIGH 75: CPT | Performed by: INTERNAL MEDICINE

## 2020-12-29 PROCEDURE — 93010 ELECTROCARDIOGRAM REPORT: CPT | Performed by: INTERNAL MEDICINE

## 2020-12-29 PROCEDURE — 80048 BASIC METABOLIC PNL TOTAL CA: CPT | Performed by: INTERNAL MEDICINE

## 2020-12-29 PROCEDURE — 70450 CT HEAD/BRAIN W/O DYE: CPT

## 2020-12-29 PROCEDURE — 84443 ASSAY THYROID STIM HORMONE: CPT | Performed by: INTERNAL MEDICINE

## 2020-12-29 PROCEDURE — 80048 BASIC METABOLIC PNL TOTAL CA: CPT | Performed by: PHYSICIAN ASSISTANT

## 2020-12-29 PROCEDURE — 36415 COLL VENOUS BLD VENIPUNCTURE: CPT | Performed by: INTERNAL MEDICINE

## 2020-12-29 PROCEDURE — 83930 ASSAY OF BLOOD OSMOLALITY: CPT | Performed by: PHYSICIAN ASSISTANT

## 2020-12-29 PROCEDURE — 93970 EXTREMITY STUDY: CPT | Performed by: SURGERY

## 2020-12-29 PROCEDURE — 85027 COMPLETE CBC AUTOMATED: CPT | Performed by: INTERNAL MEDICINE

## 2020-12-29 PROCEDURE — 84300 ASSAY OF URINE SODIUM: CPT | Performed by: PHYSICIAN ASSISTANT

## 2020-12-29 PROCEDURE — 87205 SMEAR GRAM STAIN: CPT | Performed by: NURSE PRACTITIONER

## 2020-12-29 PROCEDURE — C8929 TTE W OR WO FOL WCON,DOPPLER: HCPCS

## 2020-12-29 PROCEDURE — 84439 ASSAY OF FREE THYROXINE: CPT | Performed by: INTERNAL MEDICINE

## 2020-12-29 PROCEDURE — 80162 ASSAY OF DIGOXIN TOTAL: CPT | Performed by: INTERNAL MEDICINE

## 2020-12-29 PROCEDURE — G1004 CDSM NDSC: HCPCS

## 2020-12-29 PROCEDURE — 87040 BLOOD CULTURE FOR BACTERIA: CPT | Performed by: INTERNAL MEDICINE

## 2020-12-29 RX ORDER — CITALOPRAM 10 MG/1
10 TABLET ORAL DAILY
Status: DISCONTINUED | OUTPATIENT
Start: 2020-12-29 | End: 2021-01-02

## 2020-12-29 RX ORDER — FUROSEMIDE 10 MG/ML
20 INJECTION INTRAMUSCULAR; INTRAVENOUS ONCE
Status: COMPLETED | OUTPATIENT
Start: 2020-12-29 | End: 2020-12-29

## 2020-12-29 RX ORDER — ASPIRIN 81 MG/1
81 TABLET ORAL DAILY
Status: DISCONTINUED | OUTPATIENT
Start: 2020-12-29 | End: 2021-01-02

## 2020-12-29 RX ORDER — PANTOPRAZOLE SODIUM 40 MG/1
40 TABLET, DELAYED RELEASE ORAL
Status: DISCONTINUED | OUTPATIENT
Start: 2020-12-29 | End: 2021-01-08 | Stop reason: HOSPADM

## 2020-12-29 RX ORDER — CEFAZOLIN SODIUM 1 G/50ML
1000 SOLUTION INTRAVENOUS EVERY 12 HOURS
Status: DISCONTINUED | OUTPATIENT
Start: 2020-12-29 | End: 2021-01-01

## 2020-12-29 RX ORDER — B-COMPLEX WITH VITAMIN C
1 TABLET ORAL
Status: DISCONTINUED | OUTPATIENT
Start: 2020-12-29 | End: 2021-01-02

## 2020-12-29 RX ORDER — ALBUTEROL SULFATE 90 UG/1
2 AEROSOL, METERED RESPIRATORY (INHALATION) EVERY 4 HOURS PRN
Status: DISCONTINUED | OUTPATIENT
Start: 2020-12-29 | End: 2021-01-08 | Stop reason: HOSPADM

## 2020-12-29 RX ORDER — FUROSEMIDE 10 MG/ML
20 INJECTION INTRAMUSCULAR; INTRAVENOUS
Status: DISCONTINUED | OUTPATIENT
Start: 2020-12-29 | End: 2020-12-30

## 2020-12-29 RX ORDER — DOCUSATE SODIUM 100 MG/1
100 CAPSULE, LIQUID FILLED ORAL DAILY
Status: DISCONTINUED | OUTPATIENT
Start: 2020-12-29 | End: 2021-01-02

## 2020-12-29 RX ORDER — THIAMINE MONONITRATE (VIT B1) 100 MG
100 TABLET ORAL DAILY
Status: DISCONTINUED | OUTPATIENT
Start: 2020-12-29 | End: 2021-01-01

## 2020-12-29 RX ORDER — FOLIC ACID 1 MG/1
1 TABLET ORAL DAILY
Status: DISCONTINUED | OUTPATIENT
Start: 2020-12-29 | End: 2021-01-02

## 2020-12-29 RX ORDER — CARVEDILOL 12.5 MG/1
12.5 TABLET ORAL 2 TIMES DAILY WITH MEALS
Status: DISCONTINUED | OUTPATIENT
Start: 2020-12-29 | End: 2021-01-02

## 2020-12-29 RX ORDER — ATORVASTATIN CALCIUM 40 MG/1
40 TABLET, FILM COATED ORAL
Status: DISCONTINUED | OUTPATIENT
Start: 2020-12-29 | End: 2021-01-02

## 2020-12-29 RX ORDER — AMLODIPINE BESYLATE 2.5 MG/1
2.5 TABLET ORAL DAILY
Status: DISCONTINUED | OUTPATIENT
Start: 2020-12-29 | End: 2021-01-02

## 2020-12-29 RX ORDER — ONDANSETRON 2 MG/ML
4 INJECTION INTRAMUSCULAR; INTRAVENOUS EVERY 6 HOURS PRN
Status: DISCONTINUED | OUTPATIENT
Start: 2020-12-29 | End: 2021-01-02

## 2020-12-29 RX ADMIN — FUROSEMIDE 20 MG: 10 INJECTION, SOLUTION INTRAMUSCULAR; INTRAVENOUS at 01:05

## 2020-12-29 RX ADMIN — CALCIUM GLUCONATE 1 G: 20 INJECTION, SOLUTION INTRAVENOUS at 00:30

## 2020-12-29 RX ADMIN — Medication 1 TABLET: at 09:36

## 2020-12-29 RX ADMIN — PANTOPRAZOLE SODIUM 40 MG: 40 TABLET, DELAYED RELEASE ORAL at 09:37

## 2020-12-29 RX ADMIN — CARVEDILOL 12.5 MG: 12.5 TABLET, FILM COATED ORAL at 09:39

## 2020-12-29 RX ADMIN — FUROSEMIDE 20 MG: 10 INJECTION, SOLUTION INTRAMUSCULAR; INTRAVENOUS at 16:08

## 2020-12-29 RX ADMIN — DOCUSATE SODIUM 100 MG: 100 CAPSULE, LIQUID FILLED ORAL at 09:54

## 2020-12-29 RX ADMIN — ATORVASTATIN CALCIUM 40 MG: 40 TABLET, FILM COATED ORAL at 18:39

## 2020-12-29 RX ADMIN — CEFAZOLIN SODIUM 1000 MG: 1 SOLUTION INTRAVENOUS at 09:58

## 2020-12-29 RX ADMIN — THIAMINE HCL TAB 100 MG 100 MG: 100 TAB at 09:37

## 2020-12-29 RX ADMIN — CARVEDILOL 12.5 MG: 12.5 TABLET, FILM COATED ORAL at 18:38

## 2020-12-29 RX ADMIN — Medication 1 TABLET: at 09:37

## 2020-12-29 RX ADMIN — CEFAZOLIN SODIUM 1000 MG: 1 SOLUTION INTRAVENOUS at 21:45

## 2020-12-29 RX ADMIN — PERFLUTREN 0.6 ML/MIN: 6.52 INJECTION, SUSPENSION INTRAVENOUS at 14:40

## 2020-12-29 RX ADMIN — ASPIRIN 81 MG: 81 TABLET ORAL at 09:36

## 2020-12-29 RX ADMIN — AMLODIPINE BESYLATE 2.5 MG: 2.5 TABLET ORAL at 09:37

## 2020-12-29 RX ADMIN — FOLIC ACID 1 MG: 1 TABLET ORAL at 09:58

## 2020-12-29 RX ADMIN — CITALOPRAM HYDROBROMIDE 10 MG: 10 TABLET ORAL at 09:37

## 2020-12-29 NOTE — QUICK NOTE
[facility] Post Admit Check:     Date of visit: 12/29/20    The patient was admitted earlier to the [facility]  Please see initial intake history and physical for detailed admission history  This is a supplemental update following a post admit checkup  Subjective: Awakens easily to voice, unable to discern any specific complaints  States she is thirsty  Denies chest pain/palpitations, shortness of breath, nausea/vomiting, abdominal pain  Exam:   General: appears comfortable, no acute distress  HEENT: atraumatic, normocephalic  PERRLA  Dry mucous membranes  Cardiac: irregularly irregular no murmur/rub/gallop  Resp: CTA bilaterally, diminished bases  No rales/rhonchi/wheezing  Abd: +BS, soft, non-tender  Msk: 1+ pitting edema LE bilaterally, erythematous, hot to the touch  Patient spontaneously moves upper/lower ext bilaterally  Neuro: alert and oriented to self  Awakens easily to voice but slow to respond  No focal deficits appreciated    Assessment and Plan:   · Acute metabolic encephalopathy:  Likely multifactorial in setting of NADEEM, hyperkalemia, possible ? LE cellulitis  Appears to be slowly improving this AM but not to baseline  CT head negative on admission  · Acute kidney injury:  Baseline creatinine 0 9-1 1, creatinine 1 64 on admission down to 1 55 this morning s/p IV Lasix 20 mg x2  Consult Nephrology  Retroperitoneal ultrasound ordered  Urinalysis negative UTI  Avoid nephrotoxic agents, hypotension  · Hyponatremia:  Euvolemic possibly borderline hypovolemic, however does have chronic b/l pleural effusions and chronic LE edema per daughter  Sodium did improve s/p IV lasix x2  Continue fluid restriction  Check urine osmolality, urine sodium, serum osmolality  Appreciate nephrology input  May need to consider gentle IVF  · Hyperkalemia: In setting of NADEEM  Hold multivitamin  Low potassium diet  Appreciate nephrology recs    Discussed with nephrology - repeat BMP now, consider treatment based on new labs  · Lower extremity edema w/ erythema: per daughter patient with poor vasculature, does have chronic extremity edema and erythema  Skin is hot to the touch  Given encephalopathy, hypothermia on admission no other infectious source will start on IV ancef and monitor  Order venous duplex r/o DVT  · Chronic atrial fibrillation:  No on anticoagulation 2/2 fall risk  Rate controlled on carvedilol  No longer on digoxin  · Thrombocytopenia:  Platelets 70E, no evidence of active bleeding  Appears chronic  Monitor CBC    Discussed at length with patient's daughter Robina via phone      Jose Maria Coleman PA-C

## 2020-12-29 NOTE — ASSESSMENT & PLAN NOTE
· Presented with increased weakness and confusion; with recent treatment for apparent UTI  · Hyponatremia and acute renal failure are noted with evidence of volume overload with specific plans for these as below  · Otherwise with no other clear infectious source at this time, will follow up results of blood cultures x2  · Check B12 and folate, CT head  · Neuro checks

## 2020-12-29 NOTE — ASSESSMENT & PLAN NOTE
· Sodium 128 on presentation, possibly in setting of volume overload  · Assess response to IV Lasix as above and monitor serial BMP

## 2020-12-29 NOTE — ASSESSMENT & PLAN NOTE
· With hypothermia on presentation    Possibly in setting of limited oral intake, cannot exclude infection  · Continue Andrés Benjy  · TSH reviewed

## 2020-12-29 NOTE — ED PROVIDER NOTES
History  Chief Complaint   Patient presents with    Weakness - Generalized     pt c/o increasing generalized weakness, difficulty ambulating, not eating as much, and SOB  recently diagnosed with UTI     Pascual Hairston is a 80y o  year old female with PMH of CVA, CHF, Afib [facility]  Patient noted to have difficulty ambulating, unable to get up out of bed  Patient also noted to have decreased oral intake  Patient recent treated with cephalexin for UTI  Patient resides at home alone  Patient denies fevers, chest pain, dyspnea, abdominal pain  Patient underwent outpatient labs and imaging today which showed hyperkalemia, acute kidney injury and bilateral pleural effusions  Patient has been compliant with previously prescribed torsemide daily  No reported fevers  Patient has not had known COVID exposure  History provided by:  Patient and relative   used: No        Prior to Admission Medications   Prescriptions Last Dose Informant Patient Reported? Taking? ALPRAZolam (XANAX) 0 25 mg tablet Not Taking at Unknown time  No No   Sig: Take 1 tablet (0 25 mg total) by mouth daily at bedtime as needed for sleep   Patient not taking: Reported on 12/28/2020   Calcium Carbonate (CALTRATE 600 PO)   Yes Yes   Sig: Take 1 tablet by mouth 2 (two) times a day  Multiple Vitamins-Minerals (CENTRUM SILVER PO)   Yes Yes   Sig: Take 1 tablet by mouth daily  Specialty Vitamins Products (MAGNESIUM, AMINO ACID CHELATE,) 133 MG tablet Not Taking at Unknown time  Yes No   Sig: Take 1 tablet by mouth daily  albuterol (PROVENTIL HFA,VENTOLIN HFA) 90 mcg/act inhaler   Yes Yes   Sig: Inhale 2 puffs every 4 (four) hours as needed for wheezing   amLODIPine (NORVASC) 10 mg tablet More than a month at Unknown time  Yes No   Sig: Take 2 5 mg by mouth daily    aspirin 81 MG tablet   Yes Yes   Sig: Take 81 mg by mouth daily     atorvastatin (LIPITOR) 40 mg tablet   Yes Yes   Sig: Take 40 mg by mouth daily   calcium carbonate-vitamin D (OSCAL-D) 500 mg-200 units per tablet   Yes Yes   Sig: Take 1 tablet by mouth daily with breakfast   carvedilol (COREG) 12 5 mg tablet   No Yes   Sig: Take 1 tablet by mouth 2 (two) times a day with meals for 30 days   Patient taking differently: Take 12 5 mg by mouth 2 (two) times a day with meals    citalopram (CeleXA) 10 mg tablet   Yes Yes   Sig: Take 10 mg by mouth daily  digoxin (LANOXIN) 0 125 mg tablet Not Taking at Unknown time  Yes No   Sig: Take 250 mcg by mouth daily  docusate sodium (COLACE) 100 mg capsule   Yes Yes   Sig: Take 100 mg by mouth daily   folic acid (FOLVITE) 1 mg tablet   Yes Yes   Sig: Take 1 mg by mouth daily  furosemide (LASIX) 20 mg tablet Not Taking at Unknown time  No No   Sig: Take 1 tablet (20 mg total) by mouth daily   Patient not taking: Reported on 12/28/2020   magnesium chloride (MAG-DELAY) 535 mg Not Taking at Unknown time  Yes No   Sig: Take 64 mg by mouth daily   omeprazole (PriLOSEC) 20 mg delayed release capsule   Yes Yes   Sig: Take 20 mg by mouth Sunday, Tues, Thurs   psyllium (METAMUCIL) 0 52 G capsule Not Taking at Unknown time  Yes No   Sig: Take 0 52 g by mouth daily  thiamine (VITAMIN B1) 100 mg tablet Not Taking at Unknown time  Yes No   Sig: Take 100 mg by mouth daily     torsemide (DEMADEX) 20 mg tablet   Yes Yes   Sig: Take 20 mg by mouth daily      Facility-Administered Medications: None       Past Medical History:   Diagnosis Date    Arthritis     Cancer ([facility] )     breast    Cardiac disease     afib    CHF (congestive heart failure) (HCC)     Hyperlipidemia     Hypertension     Stroke ([facility] )     TIA    TIA (transient ischemic attack)        Past Surgical History:   Procedure Laterality Date    BREAST SURGERY      right lumpectomy    TOTAL HIP ARTHROPLASTY Bilateral        Family History   Problem Relation Age of Onset    Cancer Sister      I have reviewed and agree with the history as documented  E-Cigarette/Vaping    E-Cigarette Use Never User      E-Cigarette/Vaping Substances    Nicotine No     THC No     Flavoring No     Other No     Unknown No      Social History     Tobacco Use    Smoking status: Never Smoker    Smokeless tobacco: Never Used   Substance Use Topics    Alcohol use: Not Currently    Drug use: No        Review of Systems   Constitutional: Positive for activity change and appetite change  Negative for chills and fever  HENT: Negative for congestion and rhinorrhea  Eyes: Negative for photophobia and visual disturbance  Respiratory: Positive for cough  Negative for choking, chest tightness, shortness of breath and wheezing  Cardiovascular: Positive for leg swelling  Negative for chest pain  Gastrointestinal: Negative for abdominal distention, abdominal pain, constipation, diarrhea, nausea and vomiting  Endocrine: Negative for polyuria  Genitourinary: Negative for difficulty urinating, dysuria, flank pain and hematuria  Musculoskeletal: Negative for arthralgias  Skin: Negative for rash  Neurological: Positive for weakness  Negative for seizures, syncope and light-headedness  Psychiatric/Behavioral: Negative for behavioral problems and confusion  All other systems reviewed and are negative  Physical Exam  ED Triage Vitals [12/28/20 2120]   Temperature Pulse Respirations Blood Pressure SpO2   (!) 94 2 °F (34 6 °C) 64 (!) 25 166/77 90 %      Temp Source Heart Rate Source Patient Position - Orthostatic VS BP Location FiO2 (%)   Tympanic Monitor Sitting Right arm --      Pain Score       --             Orthostatic Vital Signs  Vitals:    12/29/20 1126 12/29/20 1245 12/29/20 1518 12/29/20 1608   BP: 142/58  110/58 131/63   Pulse: 63 64 69 72   Patient Position - Orthostatic VS: Lying  Lying Lying       Physical Exam  Vitals signs and nursing note reviewed  Constitutional:       General: She is not in acute distress       Appearance: She is well-developed  She is ill-appearing and toxic-appearing  She is not diaphoretic  Interventions: Nasal cannula in place  HENT:      Head: Normocephalic and atraumatic  Nose: No congestion or rhinorrhea  Eyes:      General:         Right eye: No discharge  Left eye: No discharge  Neck:      Musculoskeletal: Neck supple  Vascular: No JVD  Cardiovascular:      Rate and Rhythm: Normal rate and regular rhythm  Pulses:           Radial pulses are 2+ on the right side and 2+ on the left side  Heart sounds: Murmur present  Pulmonary:      Effort: No accessory muscle usage or respiratory distress  Breath sounds: No stridor  Examination of the right-lower field reveals rales  Examination of the left-lower field reveals rales  Rales present  No decreased breath sounds, wheezing or rhonchi  Abdominal:      General: Bowel sounds are normal  There is no distension  Palpations: Abdomen is soft  Tenderness: There is no abdominal tenderness  There is no guarding or rebound  Musculoskeletal:      Right lower leg: She exhibits no tenderness  Edema present  Left lower leg: She exhibits no tenderness  Edema present  Skin:     Capillary Refill: Capillary refill takes less than 2 seconds  Findings: No rash  Neurological:      Mental Status: She is alert and oriented to person, place, and time     Psychiatric:         Mood and Affect: Mood normal          Behavior: Behavior normal          ED Medications  Medications   albuterol (PROVENTIL HFA,VENTOLIN HFA) inhaler 2 puff (has no administration in time range)   amLODIPine (NORVASC) tablet 2 5 mg (2 5 mg Oral Given 12/29/20 0937)   aspirin (ECOTRIN LOW STRENGTH) EC tablet 81 mg (81 mg Oral Given 12/29/20 0936)   atorvastatin (LIPITOR) tablet 40 mg (has no administration in time range)   calcium carbonate-vitamin D (OSCAL-D) 500 mg-200 units per tablet 1 tablet (1 tablet Oral Given 12/29/20 0937)   carvedilol (COREG) tablet 12 5 mg (12 5 mg Oral Given 12/29/20 0939)   citalopram (CeleXA) tablet 10 mg (10 mg Oral Given 51/73/62 1818)   folic acid (FOLVITE) tablet 1 mg (1 mg Oral Given 12/29/20 0958)   docusate sodium (COLACE) capsule 100 mg (100 mg Oral Given 12/29/20 0954)   thiamine (VITAMIN B1) tablet 100 mg (100 mg Oral Given 12/29/20 0937)   pantoprazole (PROTONIX) EC tablet 40 mg (40 mg Oral Given 12/29/20 0937)   ondansetron (ZOFRAN) injection 4 mg (has no administration in time range)   ceFAZolin (ANCEF) IVPB (premix in dextrose) 1,000 mg 50 mL (0 mg Intravenous Stopped 12/29/20 1030)   furosemide (LASIX) injection 20 mg (20 mg Intravenous Given 12/29/20 1608)   furosemide (LASIX) injection 20 mg (20 mg Intravenous Given 12/28/20 2324)   calcium gluconate 1 g in sodium chloride 0 9% 50 mL (premix) (0 g Intravenous Stopped 12/29/20 0102)   furosemide (LASIX) injection 20 mg (20 mg Intravenous Given 12/29/20 0105)   perflutren lipid microsphere (DEFINITY) injection (0 6 mL/min Intravenous Given 12/29/20 1440)       Diagnostic Studies  Results Reviewed     Procedure Component Value Units Date/Time    Basic metabolic panel [[id number]] Collected: 12/29/20 1608    Lab Status: In process Specimen: Blood from Arm, Left Updated: 12/29/20 1617    T4, free [[id number]]  (Normal) Collected: 12/29/20 1247    Lab Status: Final result Specimen: Blood from Arm, Left Updated: 12/29/20 1434     Free T4 1 38 ng/dL     TSH, 3rd generation with Free T4 reflex [[id number]]  (Abnormal) Collected: 12/29/20 1247    Lab Status: Final result Specimen: Blood from Arm, Left Updated: 12/29/20 1417     TSH 3RD Clarance Screws 3 880 uIU/mL     Narrative:      Patients undergoing fluorescein dye angiography may retain small amounts of fluorescein in the body for 48-72 hours post procedure  Samples containing fluorescein can produce falsely depressed TSH values  If the patient had this procedure,a specimen should be resubmitted post fluorescein clearance  Urine Eosinophils [[id number]] Collected: 12/29/20 1052    Lab Status: Final result Specimen: Urine, Other Updated: 12/29/20 1331     Eosinophil, Urine 0 %     Basic metabolic panel [[id number]]  (Abnormal) Collected: 12/29/20 1247    Lab Status: Final result Specimen: Blood from Arm, Left Updated: 12/29/20 1310     Sodium 127 mmol/L      Potassium 5 5 mmol/L      Chloride 93 mmol/L      CO2 31 mmol/L      ANION GAP 3 mmol/L      BUN 53 mg/dL      Creatinine 1 52 mg/dL      Glucose 73 mg/dL      Calcium 10 1 mg/dL      eGFR 30 ml/min/1 73sq m     Narrative:      [place] guidelines for Chronic Kidney Disease (CKD):     Stage 1 with normal or high GFR (GFR > 90 mL/min/1 73 square meters)    Stage 2 Mild CKD (GFR = 60-89 mL/min/1 73 square meters)    Stage 3A Moderate CKD (GFR = 45-59 mL/min/1 73 square meters)    Stage 3B Moderate CKD (GFR = 30-44 mL/min/1 73 square meters)    Stage 4 Severe CKD (GFR = 15-29 mL/min/1 73 square meters)    Stage 5 End Stage CKD (GFR <15 mL/min/1 73 square meters)  Note: GFR calculation is accurate only with a steady state creatinine    Fingerstick Glucose (POCT) [[id number]]  (Normal) Collected: 12/29/20 1225    Lab Status: Final result Updated: 12/29/20 1226     POC Glucose 70 mg/dl     Blood culture [[id number]] Collected: 12/29/20 6536    Lab Status: Preliminary result Specimen: Blood from Arm, Right Updated: 12/29/20 1201     Blood Culture Received in Microbiology Lab  Culture in Progress  Blood culture [[id number]] Collected: 12/29/20 0617    Lab Status: Preliminary result Specimen: Blood from Arm, Left Updated: 12/29/20 1201     Blood Culture Received in Microbiology Lab  Culture in Progress  Sodium, urine, random [[id number]] Collected: 12/29/20 0931    Lab Status: Final result Specimen: Urine, Other Updated: 12/29/20 1008     Sodium, Ur 44    Osmolality-"If this is regarding a toxic alcohol, STOP  Test is not routinely indicated   Please consult medical  on call for further guidance " [[id number]]  (Normal) Collected: 12/29/20 0928    Lab Status: Final result Specimen: Blood from Arm, Right Updated: 12/29/20 1000     Osmolality Serum 286 mmol/KG     Basic metabolic panel [[id number]]  (Abnormal) Collected: 12/29/20 0928    Lab Status: Final result Specimen: Blood from Arm, Right Updated: 12/29/20 0955     Sodium 131 mmol/L      Potassium 5 9 mmol/L      Chloride 97 mmol/L      CO2 30 mmol/L      ANION GAP 4 mmol/L      BUN 55 mg/dL      Creatinine 1 55 mg/dL      Glucose 64 mg/dL      Calcium 9 9 mg/dL      eGFR 30 ml/min/1 73sq m     Narrative:      [place] guidelines for Chronic Kidney Disease (CKD):     Stage 1 with normal or high GFR (GFR > 90 mL/min/1 73 square meters)    Stage 2 Mild CKD (GFR = 60-89 mL/min/1 73 square meters)    Stage 3A Moderate CKD (GFR = 45-59 mL/min/1 73 square meters)    Stage 3B Moderate CKD (GFR = 30-44 mL/min/1 73 square meters)    Stage 4 Severe CKD (GFR = 15-29 mL/min/1 73 square meters)    Stage 5 End Stage CKD (GFR <15 mL/min/1 73 square meters)  Note: GFR calculation is accurate only with a steady state creatinine    Osmolality, urine [[id number]]  (Normal) Collected: 12/29/20 0928    Lab Status: Final result Specimen: Urine, Other Updated: 12/29/20 0947     Osmolality, Ur 343 mmol/KG     Digoxin level [[id number]]  (Abnormal) Collected: 12/29/20 0633    Lab Status: Final result Specimen: Blood from Arm, Right Updated: 12/29/20 0801     Digoxin Lvl <0 2 ng/mL     NT-BNP PRO [[id number]]  (Abnormal) Collected: 12/28/20 2152    Lab Status: Final result Specimen: Blood from Arm, Right Updated: 12/29/20 0555     NT-proBNP 2,511 pg/mL     Vitamin B12 [[id number]]  (Abnormal) Collected: 12/28/20 2152    Lab Status: Final result Specimen: Blood from Arm, Right Updated: 12/29/20 0555     Vitamin B-12 1,826 pg/mL     Folate [[id number]]  (Abnormal) Collected: 12/28/20 2152    Lab Status: Final result Specimen: Blood from Arm, Right Updated: 12/29/20 0555     Folate >20 0 ng/mL     CBC (With Platelets) [[id number]]  (Abnormal) Collected: 12/29/20 0421    Lab Status: Final result Specimen: Blood from Arm, Left Updated: 12/29/20 0457     WBC 5 04 Thousand/uL      RBC 3 09 Million/uL      Hemoglobin 9 3 g/dL      Hematocrit 28 7 %      MCV 93 fL      MCH 30 1 pg      MCHC 32 4 g/dL      RDW 16 1 %      Platelets 57 Thousands/uL      MPV 14 0 fL     Basic metabolic panel [[id number]]  (Abnormal) Collected: 12/29/20 0248    Lab Status: Final result Specimen: Blood from Hand, Right Updated: 12/29/20 0316     Sodium 126 mmol/L      Potassium 5 6 mmol/L      Chloride 95 mmol/L      CO2 28 mmol/L      ANION GAP 3 mmol/L      BUN 54 mg/dL      Creatinine 1 52 mg/dL      Glucose 90 mg/dL      Calcium 9 9 mg/dL      eGFR 30 ml/min/1 73sq m     Narrative:      [place] guidelines for Chronic Kidney Disease (CKD):     Stage 1 with normal or high GFR (GFR > 90 mL/min/1 73 square meters)    Stage 2 Mild CKD (GFR = 60-89 mL/min/1 73 square meters)    Stage 3A Moderate CKD (GFR = 45-59 mL/min/1 73 square meters)    Stage 3B Moderate CKD (GFR = 30-44 mL/min/1 73 square meters)    Stage 4 Severe CKD (GFR = 15-29 mL/min/1 73 square meters)    Stage 5 End Stage CKD (GFR <15 mL/min/1 73 square meters)  Note: GFR calculation is accurate only with a steady state creatinine    UA w Reflex to Microscopic w Reflex to Culture [[id number]] Collected: 12/28/20 5578    Lab Status: Final result Specimen: Urine, Straight Cath Updated: 12/28/20 2341     Color, UA Yellow     Clarity, UA Cloudy     Specific Gravity, UA 1 018     pH, UA 5 5     Leukocytes, UA Negative     Nitrite, UA Negative     Protein, UA Negative mg/dl      Glucose, UA Negative mg/dl      Ketones, UA Negative mg/dl      Urobilinogen, UA 0 2 E U /dl      Bilirubin, UA Negative     Blood, UA Negative    COVID19, Influenza A/B, RSV PCR, [facility] [[id number]]  (Normal) Collected: 12/28/20 2205    Lab Status: Final result Specimen: Nares from Nasopharyngeal Swab Updated: 12/28/20 2310     SARS-CoV-2 Negative     INFLUENZA A PCR Negative     INFLUENZA B PCR Negative     RSV PCR Negative    Narrative: This test has been authorized by FDA under an EUA (Emergency Use Assay) for use by authorized laboratories  Clinical caution and judgement should be used with the interpretation of these results with consideration of the clinical impression and other laboratory testing  Testing reported as "Positive" or "Negative" has been proven to be accurate according to standard laboratory validation requirements  All testing is performed with control materials showing appropriate reactivity at standard intervals      T4, free [[id number]]  (Normal) Collected: 12/28/20 2152    Lab Status: Final result Specimen: Blood from Arm, Right Updated: 12/28/20 2251     Free T4 1 34 ng/dL     Comprehensive metabolic panel [[id number]]  (Abnormal) Collected: 12/28/20 2152    Lab Status: Final result Specimen: Blood from Arm, Right Updated: 12/28/20 2239     Sodium 128 mmol/L      Potassium 6 1 mmol/L      Chloride 94 mmol/L      CO2 33 mmol/L      ANION GAP 1 mmol/L      BUN 55 mg/dL      Creatinine 1 64 mg/dL      Glucose 131 mg/dL      Calcium 10 1 mg/dL      Corrected Calcium 10 7 mg/dL      AST 45 U/L      ALT 35 U/L      Alkaline Phosphatase 122 U/L      Total Protein 7 1 g/dL      Albumin 3 2 g/dL      Total Bilirubin 0 93 mg/dL      eGFR 28 ml/min/1 73sq m     Narrative:      [facility] guidelines for Chronic Kidney Disease (CKD):     Stage 1 with normal or high GFR (GFR > 90 mL/min/1 73 square meters)    Stage 2 Mild CKD (GFR = 60-89 mL/min/1 73 square meters)    Stage 3A Moderate CKD (GFR = 45-59 mL/min/1 73 square meters)    Stage 3B Moderate CKD (GFR = 30-44 mL/min/1 73 square meters)    Stage 4 Severe CKD (GFR = 15-29 mL/min/1 73 square meters)    Stage 5 End Stage CKD (GFR <15 mL/min/1 73 square meters)  Note: GFR calculation is accurate only with a steady state creatinine    TSH, 3rd generation with Free T4 reflex [[id number]]  (Abnormal) Collected: 12/28/20 2152    Lab Status: Final result Specimen: Blood from Arm, Right Updated: 12/28/20 2234     TSH 3RD GENERATON 5 220 uIU/mL     Narrative:      Patients undergoing fluorescein dye angiography may retain small amounts of fluorescein in the body for 48-72 hours post procedure  Samples containing fluorescein can produce falsely depressed TSH values  If the patient had this procedure,a specimen should be resubmitted post fluorescein clearance  Troponin I [[id number]]  (Normal) Collected: 12/28/20 2152    Lab Status: Final result Specimen: Blood from Arm, Right Updated: 12/28/20 2227     Troponin I <0 02 ng/mL     CBC and differential [[id number]]  (Abnormal) Collected: 12/28/20 2152    Lab Status: Final result Specimen: Blood from Arm, Right Updated: 12/28/20 2218     WBC 6 37 Thousand/uL      RBC 3 57 Million/uL      Hemoglobin 10 6 g/dL      Hematocrit 34 1 %      MCV 96 fL      MCH 29 7 pg      MCHC 31 1 g/dL      RDW 16 5 %      MPV 14 4 fL      Platelets 66 Thousands/uL      nRBC 1 /100 WBCs      Neutrophils Relative 81 %      Immat GRANS % 0 %      Lymphocytes Relative 12 %      Monocytes Relative 6 %      Eosinophils Relative 1 %      Basophils Relative 0 %      Neutrophils Absolute 5 14 Thousands/µL      Immature Grans Absolute 0 01 Thousand/uL      Lymphocytes Absolute 0 74 Thousands/µL      Monocytes Absolute 0 39 Thousand/µL      Eosinophils Absolute 0 08 Thousand/µL      Basophils Absolute 0 01 Thousands/µL                  VAS lower limb venous duplex study, complete bilateral   Final Result by Christopher Glover DO (12/29 4938)      CT head wo contrast   Final Result by Xavier Vogel MD (12/29 0321)      No acute intracranial abnormality    Mild age-appropriate generalized atrophy and mild microangiopathic changes appear similar to the prior study  Workstation performed: [id number]         XR chest 1 view portable   Final Result by Aleja Killian MD (12/29 0715)      Chronic small bilateral pleural effusions, slightly increased on the right, with mild right base atelectasis  Workstation performed: [id number]         US retroperitoneal complete    (Results Pending)         Procedures  Procedures      ED Course  ED Course as of Dec 29 1624   Mon Dec 28, 2020   2218 WBC: 6 37   2218 Hemoglobin(!): 10 6   2218 Platelet Count(!): 66   2218 Procedure Note: EKG  Date/Time: 12/28/20 9:47 PM   Interpreted by: Serenity Varela DO  Indications / Diagnosis: Weakness  ECG reviewed by me, the ED Provider: yes   The EKG demonstrates:  Rhythm: Ventricular Paced @ 62 BPM  Intervals: QTc interval prolonged 517ms  Axis: Left axis deviation  QRS/Blocks: Prolonged QRS in setting of paced rhythm  ST Changes: Nonspecific ST changes aVL, V4, V5  No acute ST segments changes  No TWI  No STD  No [place]  Comparison: Compared to prior EKG performed on 03/26/2020 2234 Troponin I: <0 02   2251 TSH 3RD GENERATON(!): 5 220   2251 Potassium(!): 6 1   2251 Creatinine(!): 1 64   2317 SARS-COV-2: Negative               Identification of Seniors at Risk      Most Recent Value   (ISAR) Identification of Seniors at Risk   Before the illness or injury that brought you to the Emergency, did you need someone to help you on a regular basis? 1 Filed at: 12/28/2020 2128   In the last 24 hours, have you needed more help than usual?  1 Filed at: 12/28/2020 2128   Have you been hospitalized for one or more nights during the past 6 months? 0 Filed at: 12/28/2020 2128   In general, do you see well?  0 Filed at: 12/28/2020 2128   In general, do you have serious problems with your memory? 1 Filed at: 12/28/2020 2128   Do you take more than three different medications every day?   1 Filed at: 12/28/2020 2128   ISAR Score  4 Filed at: 12/28/2020 2128                              MDM  Number of Diagnoses or Management Options  NADEEM (acute kidney injury) [facility]):   Bilateral pleural effusion:   CHF (congestive heart failure) ([facility] ):   COVID-19 ruled out:   Generalized weakness:   Hyperkalemia:   Hyponatremia:   Thrombocytopenia [facility]):   Diagnosis management comments: 80 y o  female presenting for generalized weakness  Will order CBC, CMP, troponin, EKG, CXR, COVID PCR to evaluate for ACS, PTX, pulmonary disease, widened mediastinum, COVID, CHF exacerbation  Will also recheck UA and TSH  DDx includes CHF exacerbation, dehydration, NADEEM, hyponatremia  No focal sign of infection on exam    Will admit for further evaluation and management  Patient care discussed with Dr Brooks Paz who will assume care and admit patient to the hospital  I have discussed with the patient our recommendation of inpatient admission for further medical care  I have answered all of the patient's questions and concerns   The patient is in agreement with the plan to proceed with admission to the hospital         Amount and/or Complexity of Data Reviewed  Clinical lab tests: ordered and reviewed  Tests in the radiology section of CPT®: ordered and reviewed  Review and summarize past medical records: yes  Discuss the patient with other providers: yes  Independent visualization of images, tracings, or specimens: yes    Patient Progress  Patient progress: stable      Disposition  Final diagnoses:   Generalized weakness   Hyperkalemia   NADEEM (acute kidney injury) ([facility] )   CHF (congestive heart failure) ([facility] )   Bilateral pleural effusion   [facility]ocyt[facility] ([facility] )   COVID-19 ruled out   Hyponatremia     Time reflects when diagnosis was documented in both MDM as applicable and the Disposition within this note     Time User Action Codes Description Comment    12/28/2020 10:59 PM Manpreet Newberry Add [R53 1] Generalized weakness     12/28/2020 10:59 PM Enrique Landing Add [E87 5] Hyperkalemia     12/28/2020 10:59 PM Enrique Landing Add [N17 9] ANDEEM (acute kidney injury) ([facility] )     12/28/2020 10:59 PM Enrique Landing Add [I50 9] CHF (congestive heart failure) ([facility] )     12/28/2020 11:22 PM Enrique Landing Add [J90] Bilateral pleural effusion     12/28/2020 11:46 PM Copiah Landing Add [D69 6] [facility]ocyt[facility]     12/28/2020 11:53 PM Copiah Landing Add [A89 539] COVID-19 ruled out     12/29/2020  1:59 AM Linh Sheets D Add [I50 42] Chronic combined systolic and diastolic congestive heart failure ([place] [address] )     12/29/2020  7:19 AM Derenda Asper Add [E87 1] Hyponatremia     12/29/2020  9:17 AM Payal Check Add [R60 9] Swelling     12/29/2020  4:24 PM Enrique Landing Modify [E87 1] Hyponatremia       ED Disposition     ED Disposition Condition Date/Time Comment    Admit Stable Mon Dec 28, 2020 11:43 PM Case was discussed with Dr Jakub Rollins and the patient's admission status was agreed to be Admission Status: inpatient status to the service of Dr Jakub Rollins  Follow-up Information    None         Patient's Medications   Discharge Prescriptions    No medications on file     No discharge procedures on file  PDMP Review       Value Time User    PDMP Reviewed  Yes 12/29/2020  1:56 AM Tyrese Hunt DO           ED Provider  Attending physically available and evaluated Amaya Melissa  I managed the patient along with the ED Attending      Electronically Signed by DO Caitlyn Trinidad DO  12/29/20 [id number]

## 2020-12-29 NOTE — ASSESSMENT & PLAN NOTE
· Continue Coreg; digoxin listed on home medication list however daughter states patient no longer takes this  · Not on anticoagulation

## 2020-12-29 NOTE — PLAN OF CARE
Problem: Potential for Falls  Goal: Patient will remain free of falls  Description: INTERVENTIONS:  - Assess patient frequently for physical needs  -  Identify cognitive and physical deficits and behaviors that affect risk of falls    -  Allison fall precautions as indicated by assessment   - Educate patient/family on patient safety including physical limitations  - Instruct patient to call for assistance with activity based on assessment  - Modify environment to reduce risk of injury  - Consider OT/PT consult to assist with strengthening/mobility  Outcome: Progressing

## 2020-12-29 NOTE — ASSESSMENT & PLAN NOTE
Lab Results   Component Value Date    EGFR 30 12/29/2020    EGFR 28 12/28/2020    EGFR 55 04/01/2020    CREATININE 1 52 (H) 12/29/2020    CREATININE 1 64 (H) 12/28/2020    CREATININE 0 94 04/01/2020   · POA, baseline approximately 0 9  · Possibly in setting of volume overload, and will assess response to IV Lasix as above  · UA reviewed, measure PVR and bladder scan  · Monitor with BMP  · Hold nephrotoxins avoid hypotension

## 2020-12-29 NOTE — H&P
H&P- Pop Pak 5/5/1932, 80 y o  female MRN: [id number]    Unit/Bed#: ED 03 Encounter: [id number]    Primary Care Provider: Mayda Tony MD   Date and time admitted to hospital: 12/28/2020  9:15 PM        * Acute encephalopathy  Assessment & Plan  · Presented with increased weakness and confusion; with recent treatment for apparent UTI  · Hyponatremia and acute renal failure are noted with evidence of volume overload with specific plans for these as below  · Otherwise with no other clear infectious source at this time, will follow up results of blood cultures x2  · Check B12 and folate, CT head  · Neuro checks    Acute on chronic combined systolic and diastolic congestive heart failure (HCC)  Assessment & Plan  Wt Readings from Last 3 Encounters:   12/28/20 81 2 kg (179 lb)   04/03/20 74 6 kg (164 lb 7 4 oz)   04/21/17 74 1 kg (163 lb 5 8 oz)     · Patient with lower extremity edema and CXR suspicious for pulmonary vascular congestion  · Received total 40 mg IV Lasix in ED and will assess response  · Monitor daily weights, I/O  · Low-sodium, fluid-restricted diet        Hyponatremia  Assessment & Plan  · Sodium 128 on presentation, possibly in setting of volume overload  · Assess response to IV Lasix as above and monitor serial BMP    Hypothermia  Assessment & Plan  · With hypothermia on presentation    Possibly in setting of limited oral intake, cannot exclude infection  · Continue Andrés Hugger  · TSH reviewed    Acute renal failure superimposed on stage 2 chronic kidney disease [facility])  Assessment & Plan  Lab Results   Component Value Date    EGFR 30 12/29/2020    EGFR 28 12/28/2020    EGFR 55 04/01/2020    CREATININE 1 52 (H) 12/29/2020    CREATININE 1 64 (H) 12/28/2020    CREATININE 0 94 04/01/2020   · POA, baseline approximately 0 9  · Possibly in setting of volume overload, and will assess response to IV Lasix as above  · UA reviewed, measure PVR and bladder scan  · Monitor with BMP  · Hold nephrotoxins avoid hypotension    Essential hypertension  Assessment & Plan  · Continue home antihypertensives with hold parameters  · Monitor blood pressure per protocol    Atrial fibrillation (HCC)  Assessment & Plan  · Continue Coreg; digoxin listed on home medication list however daughter states patient no longer takes this  · Not on anticoagulation      VTE Prophylaxis: Heparin  / sequential compression device   Code Status: Level 3 - DNAR and DNI  POLST: POLST form is not discussed and not completed at this time  Discussion with family: Daughter at bedside    Anticipated Length of Stay:  Patient will be admitted on an Inpatient basis with an anticipated length of stay of  Greater than 2 midnights  Justification for Hospital Stay: Please see detailed plans noted above  Chief Complaint:     Generalized weakness and confusion  History of Present Illness:  Marino Hwang is a 80 y o  female who has a past medical history significant for combined systolic and diastolic CHF, stage 2 chronic kidney disease, AFib not on anticoagulation secondary to fall risk, history of prior CVA  The bulk of history is obtained from chart review and discussion with patient's daughter as patient with confusion which limits her ability to provide history  Presented with increasing generalized weakness and confusion, markedly worsening over the past 3 days, described as worsening ability to ambulate to where patient could not even get out of bed  Additionally was noted with decrease in appetite  The patient's daughter reported that patient had recently been treated for apparent UTI; no fevers/chills, focal weakness, headache, or new rashes/wounds were noted  On day presentation, she had outpatient workup which revealed acute renal failure with hyperkalemia and CXR with bilateral pleural effusions for which patient was advised to present to ED for further evaluation    Was treated for hyperkalemia with calcium gluconate and IV Lasix (noted with some evidence of volume overload), and is admitted for further management of acute encephalopathy      Review of Systems:  Unable to perform ROS: Altered mental status    Past Medical and Surgical History:   Past Medical History:   Diagnosis Date    Arthritis     Cancer ([facility] )     breast    Cardiac disease     afib    CHF (congestive heart failure) ([facility] )     Hyperlipidemia     Hypertension     Stroke ([place] [address] )     TIA    TIA (transient ischemic attack)      Past Surgical History:   Procedure Laterality Date    BREAST SURGERY      right lumpectomy    TOTAL HIP ARTHROPLASTY Bilateral        Meds/Allergies:    Current Facility-Administered Medications:     albuterol (PROVENTIL HFA,VENTOLIN HFA) inhaler 2 puff, 2 puff, Inhalation, Q4H PRN, Aissatou Haven, DO    amLODIPine (NORVASC) tablet 2 5 mg, 2 5 mg, Oral, Daily, Aissatou Haven, DO    aspirin (ECOTRIN LOW STRENGTH) EC tablet 81 mg, 81 mg, Oral, Daily, Aissatou Haven, DO    atorvastatin (LIPITOR) tablet 40 mg, 40 mg, Oral, After Dinner, Aissatou Haven, DO    calcium carbonate-vitamin D (OSCAL-D) 500 mg-200 units per tablet 1 tablet, 1 tablet, Oral, Daily With Breakfast, [place] Haven, DO    carvedilol (COREG) tablet 12 5 mg, 12 5 mg, Oral, BID With Meals, Aissatou Haven, DO    citalopram (CeleXA) tablet 10 mg, 10 mg, Oral, Daily, Aissatou Haven, DO    docusate sodium (COLACE) capsule 100 mg, 100 mg, Oral, Daily, Aissatou Haven, DO    folic acid (FOLVITE) tablet 1 mg, 1 mg, Oral, Daily, Aissatou Haven, DO    multivitamin-minerals (CENTRUM) tablet 1 tablet, 1 tablet, Oral, Daily, [place] Haven, DO    ondansetron (ZOFRAN) injection 4 mg, 4 mg, Intravenous, Q6H PRN, [place] Haven, DO    pantoprazole (PROTONIX) EC tablet 40 mg, 40 mg, Oral, Early Morning, Aissatou Haven, DO    thiamine (VITAMIN B1) tablet 100 mg, 100 mg, Oral, Daily, [place] Haven, DO    Current Outpatient Medications:     albuterol (PROVENTIL HFA,VENTOLIN HFA) 90 mcg/act inhaler, Inhale 2 puffs every 4 (four) hours as needed for wheezing, Disp: , Rfl:     aspirin 81 MG tablet, Take 81 mg by mouth daily  , Disp: , Rfl:     atorvastatin (LIPITOR) 40 mg tablet, Take 40 mg by mouth daily, Disp: , Rfl:     Calcium Carbonate (CALTRATE 600 PO), Take 1 tablet by mouth 2 (two) times a day , Disp: , Rfl:     calcium carbonate-vitamin D (OSCAL-D) 500 mg-200 units per tablet, Take 1 tablet by mouth daily with breakfast, Disp: , Rfl:     carvedilol (COREG) 12 5 mg tablet, Take 1 tablet by mouth 2 (two) times a day with meals for 30 days (Patient taking differently: Take 12 5 mg by mouth 2 (two) times a day with meals ), Disp: 60 tablet, Rfl: 0    citalopram (CeleXA) 10 mg tablet, Take 10 mg by mouth daily  , Disp: , Rfl:     docusate sodium (COLACE) 100 mg capsule, Take 100 mg by mouth daily, Disp: , Rfl:     folic acid (FOLVITE) 1 mg tablet, Take 1 mg by mouth daily  , Disp: , Rfl:     Multiple Vitamins-Minerals (CENTRUM SILVER PO), Take 1 tablet by mouth daily  , Disp: , Rfl:     omeprazole (PriLOSEC) 20 mg delayed release capsule, Take 20 mg by mouth Sunday, Tues, Thurs, Disp: , Rfl:     torsemide (DEMADEX) 20 mg tablet, Take 20 mg by mouth daily, Disp: , Rfl:     ALPRAZolam (XANAX) 0 25 mg tablet, Take 1 tablet (0 25 mg total) by mouth daily at bedtime as needed for sleep (Patient not taking: Reported on 12/28/2020), Disp: 10 tablet, Rfl: 0    amLODIPine (NORVASC) 10 mg tablet, Take 2 5 mg by mouth daily , Disp: , Rfl:     digoxin (LANOXIN) 0 125 mg tablet, Take 250 mcg by mouth daily  , Disp: , Rfl:     furosemide (LASIX) 20 mg tablet, Take 1 tablet (20 mg total) by mouth daily (Patient not taking: Reported on 12/28/2020), Disp: , Rfl: 0    magnesium chloride (MAG-DELAY) 535 mg, Take 64 mg by mouth daily, Disp: , Rfl:     psyllium (METAMUCIL) 0 52 G capsule, Take 0 52 g by mouth daily  , Disp: , Rfl:     Specialty Vitamins Products (MAGNESIUM, AMINO ACID CHELATE,) 133 MG tablet, Take 1 tablet by mouth daily  , Disp: , Rfl:     thiamine (VITAMIN B1) 100 mg tablet, Take 100 mg by mouth daily  , Disp: , Rfl:     Allergies: Allergies   Allergen Reactions    Lovenox [Enoxaparin Sodium] Swelling     History:  Marital Status: Unknown     Substance Use History:   Social History     Substance and Sexual Activity   Alcohol Use Not Currently     Social History     Tobacco Use   Smoking Status Never Smoker   Smokeless Tobacco Never Used     Social History     Substance and Sexual Activity   Drug Use No       Family History:  Family History   Problem Relation Age of Onset    Cancer Sister        Physical Exam:     Vitals:   Blood Pressure: 117/59 (12/29/20 0215)  Pulse: 66 (12/29/20 0330)  Temperature: (!) 92 °F (33 3 °C) (12/29/20 0330)  Temp Source: Tympanic (12/29/20 0330)  Respirations: 16 (12/29/20 0330)  Weight - Scale: 81 2 kg (179 lb) (12/28/20 2120)  SpO2: 97 % (12/29/20 0330)    Constitutional:  Well developed, well nourished, no acute distress, non-toxic appearance   Eyes:  PERRL, conjunctiva normal   HENT:  Atraumatic, external ears normal, nose normal, oropharynx moist, no pharyngeal exudates  Neck- normal range of motion, no tenderness, supple   Respiratory:  No respiratory distress, diminished breath sounds with bibasilar rales, no wheezing   Cardiovascular:  Normal rate, normal rhythm, no murmurs, no gallops, no rubs   GI:  Soft, nondistended, normal bowel sounds, nontender, no organomegaly, no mass, no rebound, no guarding   :  No costovertebral angle tenderness   Musculoskeletal:  Bilateral lower extremity edema, no tenderness, no deformities  Back- no tenderness  Integument:  Well hydrated, no rash   Lymphatic:  No lymphadenopathy noted   Neurologic:  Somnolent, awakens to voice briefly, MAEx4, no focal deficits visualized       Lab Results: I have personally reviewed pertinent reports        Results from last 7 days   Lab Units 12/29/20  0421 12/28/20  2152   WBC Thousand/uL 5 04 6  37   HEMOGLOBIN g/dL 9 3* 10 6*   HEMATOCRIT % 28 7* 34 1*   PLATELETS Thousands/uL 57* 66*   NEUTROS PCT %  --  81*   LYMPHS PCT %  --  12*   MONOS PCT %  --  6   EOS PCT %  --  1     Results from last 7 days   Lab Units 12/29/20  0248 12/28/20  2152   POTASSIUM mmol/L 5 6* 6 1*   CHLORIDE mmol/L 95* 94*   CO2 mmol/L 28 33*   BUN mg/dL 54* 55*   CREATININE mg/dL 1 52* 1 64*   CALCIUM mg/dL 9 9 10 1   ALK PHOS U/L  --  122*   ALT U/L  --  35   AST U/L  --  45           EKG:  Ventricular paced rhythm    Imaging: I have personally reviewed pertinent films in PACS    CXR: personally reviewed, bilateral apparent pulmonary vascular congestion visualized  Final radiologist read is pending      ** Please Note: Dragon 360 Dictation voice to text software was used in the creation of this document   **

## 2020-12-29 NOTE — ASSESSMENT & PLAN NOTE
Wt Readings from Last 3 Encounters:   12/28/20 81 2 kg (179 lb)   04/03/20 74 6 kg (164 lb 7 4 oz)   04/21/17 74 1 kg (163 lb 5 8 oz)     · Patient with lower extremity edema and CXR suspicious for pulmonary vascular congestion  · Received total 40 mg IV Lasix in ED and will assess response  · Monitor daily weights, I/O  · Low-sodium, fluid-restricted diet

## 2020-12-29 NOTE — ASSESSMENT & PLAN NOTE
· K 6 1 on admission, improving s/p calcium gluconate and IV Lasix  · EKG without concerning changes  · Continue to monitor with BMP

## 2020-12-29 NOTE — UTILIZATION REVIEW
Initial Clinical Review    Admission: Date/Time/Statement:   Admission Orders (From admission, onward)     Ordered        12/29/20 0020  Inpatient Admission  Once                   Orders Placed This Encounter   Procedures    Inpatient Admission     Standing Status:   Standing     Number of Occurrences:   1     Order Specific Question:   Admitting Physician     Answer:   Akil Quintana [[id number]]     Order Specific Question:   Level of Care     Answer:   Med Surg [16]     Order Specific Question:   Estimated length of stay     Answer:   More than 2 Midnights     Order Specific Question:   Certification     Answer:   I certify that inpatient services are medically necessary for this patient for a duration of greater than two midnights  See H&P and MD Progress Notes for additional information about the patient's course of treatment  ED Arrival Information     Expected Arrival Acuity Means of Arrival Escorted By Service Admission Type    - 12/28/2020 21:15 Emergent Walk-In Self Hospitalist Emergency    Arrival Complaint    Weakness         Chief Complaint   Patient presents with    Weakness - Generalized     pt c/o increasing generalized weakness, difficulty ambulating, not eating as much, and SOB  recently diagnosed with UTI     Assessment/Plan:   Mrs Ardyth Baumgarten is an 81 yo female who presents to the ED from home with c/o increasing generalized weakness and confusion x 3 days, worsening ability to ambulate, cannot get OOB, decreased appetite, recent treatment for UTI  PMH: combined systolic and diastolic CHF, stage 2 chronic kidney disease, AFib not on anticoagulation secondary to fall risk, history of prior CVA  On day of admission she had OP work up showing ARF with Hyperkalemia, CXR showing bilat pl effusions  In the ED she was treated for hyperkalemia with Guillermo gluconate and IV Lasix  She is admitted to INPATIENT status with Acute Encephalopathy - blood cultures, CT head, check B12 and folate, Neuro checks  Acute on chronic Combined CHF - IV lasix in ED, low Na, fluid restricted diet  Hyponatremia - 128 - serial BMP  Hypothermia - Andrés Benjy, check TSH, pt is on oxygen at 2L NC  ARF on CKD stage 2 - hold nephrotoxins, measure PVR, avoid Hypotension  A fib not on anticoags - continue Coreg, Dig       12/29 Nephrology Consult - NADEEM - suspected pre-renal, r/t diuretics, recent UTI and antibiotic use, decreased oral intake  Pt was showsn to have BLE edema, pleural effusion on CXR - IV fluids x 10 hrs and monitor response, pure Wick for accurate I&O, urine eos ro r/o AIN from antibiotic use, trend labs and volume status        ED Triage Vitals [12/28/20 2120]   Temperature Pulse Respirations Blood Pressure SpO2   (!) 94 2 °F (34 6 °C) 64 (!) 25 166/77 90 %      Temp Source Heart Rate Source Patient Position - Orthostatic VS BP Location FiO2 (%)   Tympanic Monitor Sitting Right arm --      Pain Score       --          Wt Readings from Last 1 Encounters:   12/28/20 81 2 kg (179 lb)     Additional Vital Signs:   12/29/20 1126  95 5 °F (35 3 °C)Abnormal    63  16  142/58  97 %  28    2 L/min  Nasal cannula  Lying   Temp: Patient continues to have heating lamps on her at 12/29/20 1126   12/29/20 0936    80    174/69Abnormal                12/29/20 0934    78  16  164/80  98 %  28    2 L/min  Nasal cannula  Lying   12/29/20 0925  95 8 °F (35 4 °C)Abnormal                      12/29/20 0330  92 °F (33 3 °C)Abnormal   66  16    97 %  28    2 L/min  Nasal cannula     12/29/20 0215    65  16  117/59  98 %        None (Room air)  Lying   12/29/20 0128    62  16  131/69  100 %    2 L/min    Nasal cannula  Lying   12/29/20 0124  89 6 °F (32 °C)Abnormal                      12/29/20 0102    64  16    100 %  28    2 L/min  Nasal cannula  Lying   12/29/20 0000    64  16  121/54  100 %    2 L/min    Nasal cannula  Lying   12/28/20 2325    64  16  108/54  100 %  28    2 L/min  Nasal cannula Lying   12/28/20 2250    63    149/61  100 %        Nasal cannula  Lying   12/28/20 2130                  None (Room air)     12/28/20 2129          100 %    2 L/min    Nasal cannula       Pertinent Labs/Diagnostic Test Results:     12/28 CXR - Chronic small bilateral pleural effusions, slightly increased on the right, with mild right base atelectasis    12/29 CT head - No acute intracranial abnormality  Mild age-appropriate generalized atrophy and mild microangiopathic changes appear similar to the prior study      12/29 BLE venous duplex - no thrombus bilat        Results from last 7 days   Lab Units 12/28/20 2205   SARS-COV-2  Negative     Results from last 7 days   Lab Units 12/29/20  0421 12/28/20  2152   WBC Thousand/uL 5 04 6 37   HEMOGLOBIN g/dL 9 3* 10 6*   HEMATOCRIT % 28 7* 34 1*   PLATELETS Thousands/uL 57* 66*   NEUTROS ABS Thousands/µL  --  5 14     Results from last 7 days   Lab Units 12/29/20  0928 12/29/20  0248 12/28/20  2152   SODIUM mmol/L 131* 126* 128*   POTASSIUM mmol/L 5 9* 5 6* 6 1*   CHLORIDE mmol/L 97* 95* 94*   CO2 mmol/L 30 28 33*   ANION GAP mmol/L 4 3* 1*   BUN mg/dL 55* 54* 55*   CREATININE mg/dL 1 55* 1 52* 1 64*   EGFR ml/min/1 73sq m 30 30 28   CALCIUM mg/dL 9 9 9 9 10 1     Results from last 7 days   Lab Units 12/28/20  2152   AST U/L 45   ALT U/L 35   ALK PHOS U/L 122*   TOTAL PROTEIN g/dL 7 1   ALBUMIN g/dL 3 2*   TOTAL BILIRUBIN mg/dL 0 93     Results from last 7 days   Lab Units 12/29/20  0928 12/29/20  0248 12/28/20  2152   GLUCOSE RANDOM mg/dL 64* 90 131     Results from last 7 days   Lab Units 12/29/20  0928   OSMOLALITY, SERUM mmol/     Results from last 7 days   Lab Units 12/28/20  2152   TROPONIN I ng/mL <0 02     Results from last 7 days   Lab Units 12/28/20  2152   TSH 3RD GENERATON uIU/mL 5 220*     Results from last 7 days   Lab Units 12/29/20  0633   DIGOXIN LVL ng/mL <0 2*     Results from last 7 days   Lab Units 12/28/20  6359   NT-PRO BNP pg/mL 2,511*     Results from last 7 days   Lab Units 12/29/20  0931 12/28/20  2318   CLARITY UA   --  Cloudy   COLOR UA   --  Yellow   SPEC GRAV UA   --  1 018   PH UA   --  5 5   GLUCOSE UA mg/dl  --  Negative   KETONES UA mg/dl  --  Negative   BLOOD UA   --  Negative   PROTEIN UA mg/dl  --  Negative   NITRITE UA   --  Negative   BILIRUBIN UA   --  Negative   UROBILINOGEN UA E U /dl  --  0 2   LEUKOCYTES UA   --  Negative   SODIUM UR  44  --      Results from last 7 days   Lab Units 12/28/20  2205   INFLUENZA A PCR  Negative   INFLUENZA B PCR  Negative   RSV PCR  Negative     ED Treatment:   Medication Administration from 12/28/2020 2115 to 12/29/2020 1112    Date/Time Order Dose Route Action   12/28/2020 2226 sodium chloride 0 9 % bolus 500 mL 500 mL Intravenous New Bag   12/28/2020 2324 furosemide (LASIX) injection 20 mg 20 mg Intravenous Given   12/29/2020 0030 calcium gluconate 1 g in sodium chloride 0 9% 50 mL (premix) 1 g Intravenous New Bag   12/29/2020 0105 furosemide (LASIX) injection 20 mg 20 mg Intravenous Given   12/29/2020 0937 amLODIPine (NORVASC) tablet 2 5 mg 2 5 mg Oral Given   12/29/2020 0936 aspirin (ECOTRIN LOW STRENGTH) EC tablet 81 mg 81 mg Oral Given   12/29/2020 0937 calcium carbonate-vitamin D (OSCAL-D) 500 mg-200 units per tablet 1 tablet 1 tablet Oral Given   12/29/2020 0939 carvedilol (COREG) tablet 12 5 mg 12 5 mg Oral Given   12/29/2020 0937 citalopram (CeleXA) tablet 10 mg 10 mg Oral Given   67/04/2937 4255 folic acid (FOLVITE) tablet 1 mg 1 mg Oral Given   12/29/2020 0954 docusate sodium (COLACE) capsule 100 mg 100 mg Oral Given   12/29/2020 0937 thiamine (VITAMIN B1) tablet 100 mg 100 mg Oral Given   12/29/2020 0937 pantoprazole (PROTONIX) EC tablet 40 mg 40 mg Oral Given   12/29/2020 0936 multivitamin-minerals (CENTRUM) tablet 1 tablet 1 tablet Oral Given   12/29/2020 0958 ceFAZolin (ANCEF) IVPB (premix in dextrose) 1,000 mg 50 mL 1,000 mg Intravenous New Bag        Past Medical History:   Diagnosis Date    Arthritis     Cancer [facility])     breast    Cardiac disease     afib    CHF (congestive heart failure) (HCC)     Hyperlipidemia     Hypertension     Stroke ([facility] )     TIA    TIA (transient ischemic attack)      Present on Admission:   Acute on chronic combined systolic and diastolic congestive heart failure (HCC)   Essential hypertension   Atrial fibrillation (HCC)   Acute encephalopathy   Acute renal failure superimposed on stage 2 chronic kidney disease (HCC)   Hyponatremia   Hypothermia   Hyperkalemia    Admitting Diagnosis: Weakness [R53 1]     Age/Sex: 80 y o  female     Admission Orders:    Scheduled Medications:  amLODIPine, 2 5 mg, Oral, Daily  aspirin, 81 mg, Oral, Daily  atorvastatin, 40 mg, Oral, After Dinner  calcium carbonate-vitamin D, 1 tablet, Oral, Daily With Breakfast  carvedilol, 12 5 mg, Oral, BID With Meals  cefazolin, 1,000 mg, Intravenous, Q12H  citalopram, 10 mg, Oral, Daily  docusate sodium, 100 mg, Oral, Daily  folic acid, 1 mg, Oral, Daily  pantoprazole, 40 mg, Oral, Early Morning  thiamine, 100 mg, Oral, Daily      Continuous IV Infusions:     PRN Meds:  albuterol, 2 puff, Inhalation, Q4H PRN  ondansetron, 4 mg, Intravenous, Q6H PRN    Tele  SCDs  Daily wt   up as melinda   Bladder scan for PVR  bilat foot pumps  US retroperitoneal   Blood cultures   Urine studies  Cardiac diet with fl restriction  IP CONSULT TO NUTRITION SERVICES  IP CONSULT TO NEPHROLOGY    Network Utilization Review Department  ATTENTION: Please call with any questions or concerns to [phone] and carefully listen to the prompts so that you are directed to the right person  All voicemails are confidential   Manolo Flood all requests for admission clinical reviews, approved or denied determinations and any other requests to dedicated fax number below belonging to the campus where the patient is receiving treatment   List of dedicated fax numbers for the Facilities:  FACILITY NAME UR FAX NUMBER   ADMISSION DENIALS (Administrative/Medical Necessity) [phone]   PARENT CHILD HEALTH (Maternity/NICU/Pediatrics) [facility]  [phone]   Miguel Walters 7383 (  Raymon Dalton "Meredith" 103) [address] [address] [place] [address] [phone]   [place] [address] [phone]

## 2020-12-29 NOTE — CONSULTS
Consultation - Nephrology   Hank Retana 80 y o  female MRN: [id number]  Unit/Bed#: ED 03 Encounter: [id number]    ASSESSMENT and PLAN:  Acute kidney injury (POA):  Etiology:  Suspect prerenal in the setting of diuretics, recent UTI with outpatient treatment with antibiotic with Keflex, decreased oral intake, +/- ATN, +/- AIN  There was concern for volume overload with x-ray revealing chronic bilateral pleural effusions slightly increased on the right, with bilateral lower extremity edema-status post IV Lasix 20 mg yesterday  - After review of records it appears that the patient has a baseline Creatinine of 0 9-1 2 mg/dL  With EGFR mid 40 range dating back to 2016  - patient was admitted with a creatinine of 1 64 mg/dL  12/28/2020  -current creatinine 1 55 today  - Avoid nephrotoxins, adjust meds to appropriate GFR   - Clinically, patient is not uremic and there is no acute indication for renal replacement therapy (dialysis)  - Optimize hemodynamic status to avoid delay in renal recovery    Workup:  -urinalysis essentially bland without hematuria or proteinuria  Plan:  -consider gentle IV fluids over 10 hours-will discussed with Dr Rosa Tran  -will check renal ultrasound in the setting of NADEEM to rule out obstruction  -continue with pure Sharlene Bearded for accurate I&O  -will check urine eos to rule out AIN with antibiotic use  -continue trend I/O, lab values volume status    Chronic kidney disease IIIA:    Etiology:  Suspect secondary to hypertensive nephrosclerosis, return nephrosclerosis, age-related nephron loss  -after review medical records baseline creatinine between 0 9-1 2 with EGFR mid 40 range dating back to 2016  -does not follow with Nephrology  -will need follow-up on discharge    Blood pressure/hypertension:  With episodes of relative hypotension  -home medications include:  Carvedilol 12 5 mg 2 times daily, torsemide 20 mg daily,  -current medications-amlodipine 2 5 mg daily with hold parameters for SBP less than 130, Coreg 12 5 mg 2 times daily  -status post IV Lasix 20 mg today for hyperkalemia as well as yesterday  -maximize hemodynamics to maintain MAP >65  -avoid hypotension or fluctuations in blood pressure  -will continue to trend    H&H/anemia:  Likely secondary to chronic kidney disease  -current hemoglobin 8 3  -transfuse if hemoglobin less than 7 0    Acid-base:  Current bicarb 30  -will continue to trend    Volume status:   - Clinically patient appears to be fairly euvolemic to the dry side  -suspect component intravascular volume depletion  -will discuss volume management Dr Andersen    Electrolytes:  Hyponatremia:  Suspect component of prerenal as well as SIADH  - admission sodium 128 decreased to 126 and currently 131 this a m   -status post IV Lasix yesterday as well as this morning for hyperkalemia  -patient currently on Celexa 10 mg daily-could be component with SIADH  -workup:  Urine osmolality 343, urine sodium 44-suggestive of SIADH    Hyperkalemia:  Suspect component of NADEEM in the setting of prerenal with volume depletion  -status post IV Lasix this a m   -consider  -avoid electrolyte replacement to include potassium, magnesium, multivitamin    Encephalopathy:  Suspect secondary to underlying infection with previous treatment for UTI    Hypothermia:   -blood cultures pending    Other history:  Atrial fibrillation, history of breast cancer, prior TIA, hyperlipidemia       HISTORY OF PRESENT ILLNESS:  Requesting Physician: Yefri Moses DO  Reason for Consult:  Acute kidney injury, hyponatremia    Brigida Doshi is a 80 y o  female with past medical history of Chronic Kidney Disease IIIA baseline creatinine 0 9-1 2 dating back to 2016 with EGFR mid 40 range,  hypertension, combined CHF  TIA, hyperlipidemia, CHF, arthritis, atrial fibrillation not on anticoagulation therapy due to risk of falls, breast cancer, previous admission with suspected UTI, encephalopathy, anemia and thrombocytopenia in April, 2020 who presented to the emergency room with weakness and confusion that worsened over the last three days along with decreased appetite  Workup revealed bilateral pleural effusions, hyperkalemia, elevated creatinine, and hyponatremia  She was given IV Lasix and a renal consultation is requested today for assistance in the management of NADEEM and hyponatremia  Patient is still somewhat lethargic  Opens eyes briefly  Follows simple commands but unable to answer questions prior to falling asleep and unable to perform a full ROS  All information is obtained through the chart  Of note, her daughter is in ER RN but is not present currently       PAST MEDICAL HISTORY:  Past Medical History:   Diagnosis Date    Arthritis     Cancer [facility])     breast    Cardiac disease     afib    [facility]congestive [facility] failure) [facility] [address] [facility]     Hyperlipidemia     Hypertension     Stroke ([facility] )     TIA    TIA (transient ischemic attack)        PAST SURGICAL HISTORY:  Past Surgical History:   Procedure Laterality Date    BREAST SURGERY      right lumpectomy    TOTAL HIP ARTHROPLASTY Bilateral        ALLERGIES:  Allergies   Allergen Reactions    Lovenox [Enoxaparin Sodium] Swelling       SOCIAL HISTORY:  Social History     Substance and Sexual Activity   Alcohol Use Not Currently     Social History     Substance and Sexual Activity   Drug Use No     Social History     Tobacco Use   Smoking Status Never Smoker   Smokeless Tobacco Never Used       FAMILY HISTORY:  Family History   Problem Relation Age of Onset    Cancer Sister        MEDICATIONS:    Current Facility-Administered Medications:     albuterol (PROVENTIL HFA,VENTOLIN HFA) inhaler 2 puff, 2 puff, Inhalation, Q4H PRN, Valerie Carin, DO    amLODIPine (NORVASC) tablet 2 5 mg, 2 5 mg, Oral, Daily, Valerie Carin, DO, 2 5 mg at 12/29/20 0937    aspirin (ECOTRIN LOW STRENGTH) EC tablet 81 mg, 81 mg, Oral, Daily, Valerie Carin, DO, 81 mg at 12/29/20 0936    atorvastatin (LIPITOR) tablet 40 mg, 40 mg, Oral, After Dinner, Mt Queen DO    calcium carbonate-vitamin D (OSCAL-D) 500 mg-200 units per tablet 1 tablet, 1 tablet, Oral, Daily With Breakfast, Mt Queen DO, 1 tablet at 12/29/20 0937    carvedilol (COREG) tablet 12 5 mg, 12 5 mg, Oral, BID With Meals, Mt Queen DO, 12 5 mg at 12/29/20 0939    ceFAZolin (ANCEF) IVPB (premix in dextrose) 1,000 mg 50 mL, 1,000 mg, Intravenous, Q12H, Joseph Cuevas PA-C, Last Rate: 100 mL/hr at 12/29/20 0958, 1,000 mg at 12/29/20 0958    citalopram (CeleXA) tablet 10 mg, 10 mg, Oral, Daily, Mt Queen, , 10 mg at 12/29/20 5669    docusate sodium (COLACE) capsule 100 mg, 100 mg, Oral, Daily, Mt Queen, DO, 100 mg at 71/83/43 6222    folic acid (FOLVITE) tablet 1 mg, 1 mg, Oral, Daily, Mt Queen, DO, 1 mg at 12/29/20 5860    multivitamin-minerals (CENTRUM) tablet 1 tablet, 1 tablet, Oral, Daily, Mt Queen, DO, 1 tablet at 12/29/20 0936    ondansetron (ZOFRAN) injection 4 mg, 4 mg, Intravenous, Q6H PRN, Mt Queen DO    pantoprazole (PROTONIX) EC tablet 40 mg, 40 mg, Oral, Early Morning, Mt Queen, DO, 40 mg at 12/29/20 0878    thiamine (VITAMIN B1) tablet 100 mg, 100 mg, Oral, Daily, Mt Queen, DO, 100 mg at 12/29/20 5716    Current Outpatient Medications:     albuterol (PROVENTIL HFA,VENTOLIN HFA) 90 mcg/act inhaler, Inhale 2 puffs every 4 (four) hours as needed for wheezing, Disp: , Rfl:     aspirin 81 MG tablet, Take 81 mg by mouth daily  , Disp: , Rfl:     atorvastatin (LIPITOR) 40 mg tablet, Take 40 mg by mouth daily, Disp: , Rfl:     Calcium Carbonate (CALTRATE 600 PO), Take 1 tablet by mouth 2 (two) times a day , Disp: , Rfl:     calcium carbonate-vitamin D (OSCAL-D) 500 mg-200 units per tablet, Take 1 tablet by mouth daily with breakfast, Disp: , Rfl:     carvedilol (COREG) 12 5 mg tablet, Take 1 tablet by mouth 2 (two) times a day with meals for 30 days (Patient taking differently: Take 12 5 mg by mouth 2 (two) times a day with meals ), Disp: 60 tablet, Rfl: 0    citalopram (CeleXA) 10 mg tablet, Take 10 mg by mouth daily  , Disp: , Rfl:     docusate sodium (COLACE) 100 mg capsule, Take 100 mg by mouth daily, Disp: , Rfl:     folic acid (FOLVITE) 1 mg tablet, Take 1 mg by mouth daily  , Disp: , Rfl:     Multiple Vitamins-Minerals (CENTRUM SILVER PO), Take 1 tablet by mouth daily  , Disp: , Rfl:     omeprazole (PriLOSEC) 20 mg delayed release capsule, Take 20 mg by mouth Sunday, Tues, Thurs, Disp: , Rfl:     torsemide (DEMADEX) 20 mg tablet, Take 20 mg by mouth daily, Disp: , Rfl:     ALPRAZolam (XANAX) 0 25 mg tablet, Take 1 tablet (0 25 mg total) by mouth daily at bedtime as needed for sleep (Patient not taking: Reported on 12/28/2020), Disp: 10 tablet, Rfl: 0    amLODIPine (NORVASC) 10 mg tablet, Take 2 5 mg by mouth daily , Disp: , Rfl:     digoxin (LANOXIN) 0 125 mg tablet, Take 250 mcg by mouth daily  , Disp: , Rfl:     furosemide (LASIX) 20 mg tablet, Take 1 tablet (20 mg total) by mouth daily (Patient not taking: Reported on 12/28/2020), Disp: , Rfl: 0    magnesium chloride (MAG-DELAY) 535 mg, Take 64 mg by mouth daily, Disp: , Rfl:     psyllium (METAMUCIL) 0 52 G capsule, Take 0 52 g by mouth daily  , Disp: , Rfl:     Specialty Vitamins Products (MAGNESIUM, AMINO ACID CHELATE,) 133 MG tablet, Take 1 tablet by mouth daily  , Disp: , Rfl:     thiamine (VITAMIN B1) 100 mg tablet, Take 100 mg by mouth daily  , Disp: , Rfl:     REVIEW OF SYSTEMS:  Unable to perform a full review of systems secondary to lethargy and unable to maintain verbal conversation    All information is obtained through chart    PHYSICAL EXAM:  Current Weight: Weight - Scale: 81 2 kg (179 lb)  First Weight: Weight - Scale: 81 2 kg (179 lb)  Vitals:    12/29/20 0330 12/29/20 0925 12/29/20 0934 12/29/20 0936   BP:   164/80 (!) 174/69   BP Location:   Left arm    Pulse: 66  78 80   Resp: 16  16    Temp: (!) 92 °F (33 3 °C) (!) 95 8 °F (35 4 °C)     TempSrc: Tympanic Rectal     SpO2: 97%  98%    Weight:           Intake/Output Summary (Last 24 hours) at 12/29/2020 1021  Last data filed at 12/29/2020 0931  Gross per 24 hour   Intake 50 ml   Output 200 ml   Net -150 ml     General:  No acute distress, lethargic, lying on stretcher  Skin: no rash, warm and dry  Eyes: pale conjunctivae, anicteric sclerae  ENT:  Fairly moist lips and mucous membranes  Neck: supple, no JVD noted  Chest:  Essentially clear breath sounds, decreased bases right greater than left  CVS:  Irregular rate and rhythm  Abdomen: soft, non-tender, non-distended, + bowel sounds  Extremities:  Bilateral lower extremity edema  Neuro:  Was all extremities on command, alert with verbal stimuli  Psych: appropriate affect        Lab Results:   Results from last 7 days   Lab Units 12/29/20  0928 12/29/20  0421 12/29/20  0248 12/28/20  2152   WBC Thousand/uL  --  5 04  --  6 37   HEMOGLOBIN g/dL  --  9 3*  --  10 6*   HEMATOCRIT %  --  28 7*  --  34 1*   PLATELETS Thousands/uL  --  57*  --  66*   SODIUM mmol/L 131*  --  126* 128*   POTASSIUM mmol/L 5 9*  --  5 6* 6 1*   CHLORIDE mmol/L 97*  --  95* 94*   CO2 mmol/L 30  --  28 33*   BUN mg/dL 55*  --  54* 55*   CREATININE mg/dL 1 55*  --  1 52* 1 64*   CALCIUM mg/dL 9 9  --  9 9 10 1   ALK PHOS U/L  --   --   --  122*   ALT U/L  --   --   --  35   AST U/L  --   --   --  45

## 2020-12-30 ENCOUNTER — APPOINTMENT (INPATIENT)
Dept: RADIOLOGY | Facility: HOSPITAL | Age: 85
DRG: 291 | End: 2020-12-30
Payer: COMMERCIAL

## 2020-12-30 LAB
ANION GAP SERPL CALCULATED.3IONS-SCNC: 3 MMOL/L (ref 4–13)
ANION GAP SERPL CALCULATED.3IONS-SCNC: 4 MMOL/L (ref 4–13)
ANION GAP SERPL CALCULATED.3IONS-SCNC: 5 MMOL/L (ref 4–13)
BASOPHILS # BLD AUTO: 0.01 THOUSANDS/ΜL (ref 0–0.1)
BASOPHILS NFR BLD AUTO: 0 % (ref 0–1)
BUN SERPL-MCNC: 48 MG/DL (ref 5–25)
BUN SERPL-MCNC: 48 MG/DL (ref 5–25)
BUN SERPL-MCNC: 52 MG/DL (ref 5–25)
CALCIUM SERPL-MCNC: 10 MG/DL (ref 8.3–10.1)
CALCIUM SERPL-MCNC: 9.6 MG/DL (ref 8.3–10.1)
CALCIUM SERPL-MCNC: 9.8 MG/DL (ref 8.3–10.1)
CHLORIDE SERPL-SCNC: 93 MMOL/L (ref 100–108)
CHLORIDE SERPL-SCNC: 93 MMOL/L (ref 100–108)
CHLORIDE SERPL-SCNC: 95 MMOL/L (ref 100–108)
CO2 SERPL-SCNC: 30 MMOL/L (ref 21–32)
CO2 SERPL-SCNC: 31 MMOL/L (ref 21–32)
CO2 SERPL-SCNC: 32 MMOL/L (ref 21–32)
CORTIS AM PEAK SERPL-MCNC: 21.8 UG/DL (ref 4.2–22.4)
CREAT SERPL-MCNC: 1.49 MG/DL (ref 0.6–1.3)
CREAT SERPL-MCNC: 1.56 MG/DL (ref 0.6–1.3)
CREAT SERPL-MCNC: 1.59 MG/DL (ref 0.6–1.3)
EOSINOPHIL # BLD AUTO: 0.11 THOUSAND/ΜL (ref 0–0.61)
EOSINOPHIL NFR BLD AUTO: 2 % (ref 0–6)
ERYTHROCYTE [DISTWIDTH] IN BLOOD BY AUTOMATED COUNT: 16.8 % (ref 11.6–15.1)
GFR SERPL CREATININE-BSD FRML MDRD: 29 ML/MIN/1.73SQ M
GFR SERPL CREATININE-BSD FRML MDRD: 29 ML/MIN/1.73SQ M
GFR SERPL CREATININE-BSD FRML MDRD: 31 ML/MIN/1.73SQ M
GLUCOSE SERPL-MCNC: 56 MG/DL (ref 65–140)
GLUCOSE SERPL-MCNC: 61 MG/DL (ref 65–140)
GLUCOSE SERPL-MCNC: 66 MG/DL (ref 65–140)
GLUCOSE SERPL-MCNC: 81 MG/DL (ref 65–140)
GLUCOSE SERPL-MCNC: 83 MG/DL (ref 65–140)
GLUCOSE SERPL-MCNC: 89 MG/DL (ref 65–140)
GLUCOSE SERPL-MCNC: 95 MG/DL (ref 65–140)
HCT VFR BLD AUTO: 31.2 % (ref 34.8–46.1)
HGB BLD-MCNC: 9.7 G/DL (ref 11.5–15.4)
IMM GRANULOCYTES # BLD AUTO: 0.02 THOUSAND/UL (ref 0–0.2)
IMM GRANULOCYTES NFR BLD AUTO: 0 % (ref 0–2)
LYMPHOCYTES # BLD AUTO: 0.76 THOUSANDS/ΜL (ref 0.6–4.47)
LYMPHOCYTES NFR BLD AUTO: 10 % (ref 14–44)
MAGNESIUM SERPL-MCNC: 2.5 MG/DL (ref 1.6–2.6)
MCH RBC QN AUTO: 29.5 PG (ref 26.8–34.3)
MCHC RBC AUTO-ENTMCNC: 31.1 G/DL (ref 31.4–37.4)
MCV RBC AUTO: 95 FL (ref 82–98)
MONOCYTES # BLD AUTO: 0.67 THOUSAND/ΜL (ref 0.17–1.22)
MONOCYTES NFR BLD AUTO: 9 % (ref 4–12)
NEUTROPHILS # BLD AUTO: 5.76 THOUSANDS/ΜL (ref 1.85–7.62)
NEUTS SEG NFR BLD AUTO: 79 % (ref 43–75)
NRBC BLD AUTO-RTO: 1 /100 WBCS
PHOSPHATE SERPL-MCNC: 3.5 MG/DL (ref 2.3–4.1)
PLATELET # BLD AUTO: 58 THOUSANDS/UL (ref 149–390)
POTASSIUM SERPL-SCNC: 4.8 MMOL/L (ref 3.5–5.3)
POTASSIUM SERPL-SCNC: 5 MMOL/L (ref 3.5–5.3)
POTASSIUM SERPL-SCNC: 5.1 MMOL/L (ref 3.5–5.3)
RBC # BLD AUTO: 3.29 MILLION/UL (ref 3.81–5.12)
SODIUM SERPL-SCNC: 128 MMOL/L (ref 136–145)
SODIUM SERPL-SCNC: 128 MMOL/L (ref 136–145)
SODIUM SERPL-SCNC: 130 MMOL/L (ref 136–145)
WBC # BLD AUTO: 7.33 THOUSAND/UL (ref 4.31–10.16)

## 2020-12-30 PROCEDURE — 99233 SBSQ HOSP IP/OBS HIGH 50: CPT | Performed by: INTERNAL MEDICINE

## 2020-12-30 PROCEDURE — 85025 COMPLETE CBC W/AUTO DIFF WBC: CPT | Performed by: PHYSICIAN ASSISTANT

## 2020-12-30 PROCEDURE — 83735 ASSAY OF MAGNESIUM: CPT | Performed by: INTERNAL MEDICINE

## 2020-12-30 PROCEDURE — 82948 REAGENT STRIP/BLOOD GLUCOSE: CPT

## 2020-12-30 PROCEDURE — 84100 ASSAY OF PHOSPHORUS: CPT | Performed by: NURSE PRACTITIONER

## 2020-12-30 PROCEDURE — 93306 TTE W/DOPPLER COMPLETE: CPT | Performed by: INTERNAL MEDICINE

## 2020-12-30 PROCEDURE — 82533 TOTAL CORTISOL: CPT | Performed by: INTERNAL MEDICINE

## 2020-12-30 PROCEDURE — 80048 BASIC METABOLIC PNL TOTAL CA: CPT | Performed by: INTERNAL MEDICINE

## 2020-12-30 PROCEDURE — 80048 BASIC METABOLIC PNL TOTAL CA: CPT | Performed by: PHYSICIAN ASSISTANT

## 2020-12-30 PROCEDURE — 76770 US EXAM ABDO BACK WALL COMP: CPT

## 2020-12-30 RX ORDER — FUROSEMIDE 10 MG/ML
40 INJECTION INTRAMUSCULAR; INTRAVENOUS
Status: DISCONTINUED | OUTPATIENT
Start: 2020-12-30 | End: 2021-01-02

## 2020-12-30 RX ORDER — TRAMADOL HYDROCHLORIDE 50 MG/1
50 TABLET ORAL EVERY 6 HOURS PRN
Status: DISCONTINUED | OUTPATIENT
Start: 2020-12-30 | End: 2021-01-01

## 2020-12-30 RX ORDER — LACTULOSE 20 G/30ML
20 SOLUTION ORAL 2 TIMES DAILY
Status: DISPENSED | OUTPATIENT
Start: 2020-12-30 | End: 2021-01-01

## 2020-12-30 RX ORDER — ACETAMINOPHEN 325 MG/1
650 TABLET ORAL EVERY 6 HOURS PRN
Status: DISCONTINUED | OUTPATIENT
Start: 2020-12-30 | End: 2021-01-02

## 2020-12-30 RX ORDER — DEXTROSE AND SODIUM CHLORIDE 5; .9 G/100ML; G/100ML
40 INJECTION, SOLUTION INTRAVENOUS CONTINUOUS
Status: DISCONTINUED | OUTPATIENT
Start: 2020-12-30 | End: 2020-12-30

## 2020-12-30 RX ORDER — DEXTROSE MONOHYDRATE 25 G/50ML
INJECTION, SOLUTION INTRAVENOUS
Status: COMPLETED
Start: 2020-12-30 | End: 2020-12-30

## 2020-12-30 RX ADMIN — DEXTROSE AND SODIUM CHLORIDE 40 ML/HR: 5; .9 INJECTION, SOLUTION INTRAVENOUS at 11:27

## 2020-12-30 RX ADMIN — ASPIRIN 81 MG: 81 TABLET ORAL at 08:37

## 2020-12-30 RX ADMIN — LACTULOSE 20 G: 10 SOLUTION ORAL at 17:07

## 2020-12-30 RX ADMIN — TRAMADOL HYDROCHLORIDE 50 MG: 50 TABLET, FILM COATED ORAL at 05:21

## 2020-12-30 RX ADMIN — FUROSEMIDE 20 MG: 10 INJECTION, SOLUTION INTRAMUSCULAR; INTRAVENOUS at 08:38

## 2020-12-30 RX ADMIN — SODIUM CHLORIDE 40 ML/HR: 234 INJECTION, SOLUTION, CONCENTRATE INTRAVENOUS at 22:45

## 2020-12-30 RX ADMIN — THIAMINE HCL TAB 100 MG 100 MG: 100 TAB at 08:38

## 2020-12-30 RX ADMIN — CEFAZOLIN SODIUM 1000 MG: 1 SOLUTION INTRAVENOUS at 11:22

## 2020-12-30 RX ADMIN — DEXTROSE MONOHYDRATE 50 ML: 500 INJECTION PARENTERAL at 08:49

## 2020-12-30 RX ADMIN — ATORVASTATIN CALCIUM 40 MG: 40 TABLET, FILM COATED ORAL at 17:07

## 2020-12-30 RX ADMIN — CARVEDILOL 12.5 MG: 12.5 TABLET, FILM COATED ORAL at 08:37

## 2020-12-30 RX ADMIN — DOCUSATE SODIUM 100 MG: 100 CAPSULE, LIQUID FILLED ORAL at 08:37

## 2020-12-30 RX ADMIN — CITALOPRAM HYDROBROMIDE 10 MG: 10 TABLET ORAL at 08:38

## 2020-12-30 RX ADMIN — FOLIC ACID 1 MG: 1 TABLET ORAL at 08:37

## 2020-12-30 RX ADMIN — Medication 1 TABLET: at 08:37

## 2020-12-30 RX ADMIN — CARVEDILOL 12.5 MG: 12.5 TABLET, FILM COATED ORAL at 15:40

## 2020-12-30 RX ADMIN — CEFAZOLIN SODIUM 1000 MG: 1 SOLUTION INTRAVENOUS at 21:47

## 2020-12-30 RX ADMIN — PANTOPRAZOLE SODIUM 40 MG: 40 TABLET, DELAYED RELEASE ORAL at 05:18

## 2020-12-30 RX ADMIN — FUROSEMIDE 40 MG: 10 INJECTION, SOLUTION INTRAMUSCULAR; INTRAVENOUS at 15:40

## 2020-12-30 NOTE — ASSESSMENT & PLAN NOTE
· K 6 1 on admission, improving s/p calcium gluconate and IV Lasix  · EKG without acute changes  · Likely in setting of acute kidney injury  · Improving  · Continue to monitor with BMP q 8 hours

## 2020-12-30 NOTE — ASSESSMENT & PLAN NOTE
· Presented with increased weakness and confusion; with recent treatment for apparent UTI  · Hyponatremia and acute renal failure are noted with evidence of volume overload with specific plans for these as below  · Otherwise with no other clear infectious source at this time, will follow up results of blood cultures x2 however does have erythema LE bilaterally, hot to the touch, started on IV ancef for possible superimposed cellulitis  · Vitamin b12 and folate unremarkable  · CT head negative  · Neuro checks

## 2020-12-30 NOTE — ASSESSMENT & PLAN NOTE
· With hypothermia on presentation  Possibly in setting of limited oral intake, cannot exclude infection    Started on IV ancef for possible LE cellulitis  · Improved, hermes price restarted keep temp >98  · TSH done

## 2020-12-30 NOTE — PROGRESS NOTES
NEPHROLOGY PROGRESS NOTE   Masha Warren 80 y o  female MRN: [id number]  Unit/Bed#: [facility] 501-01 Encounter: [id number]      ASSESSMENT & PLAN    1  Mild acute kidney injury on stage IIIB chronic kidney disease with encephalopathy  ? Baseline creatinine 0 9-1 2, estimated GFR in the 40s  ? Creatinine remained stable around 1 5  ? Avoiding nephrotoxins  ? Also with hyperkalemia that has improved  ? Urinalysis bland  ? Strict intakes and outputs and daily  ? Ongoing workup for encephalopathy  ? On cefazolin  ? Avoid hypotension  ? Monitor with diuresis  ? Will keep on D5 normal saline remains hypoglycemic at 40 cc an hour     2  Blood pressure/volume overload  ? Hypertension-home medications include amlodipine 2 5 mg daily, carvedilol 12 5 mg daily, and furosemide 20 mg daily  ? Weight is increased from discharge in April and with edema  ? Creatinine stable increase Lasix to 40 mg IV b i d      3  Electrolyte/acid-base status  ? Hypervolemic hyponatremia high urine osmolality normal urine sodium  ? Check TSH, on check a m  Cortisol  ? On Celexa which can contribute to ascites  ? Increase Lasix to 40 mg IV b i d  D  ? Check BMPs every 8 hours-     4  Hyperkalemia  ? Likely related to pre renal azotemia and acute kidney injury  ? With not on a Ace or Arb  ? Potassium is improving now down to 5 0  ? BMPs every 8 hours  ? Diuresed to promote potassium wasting    Discussed with slim regarding diuretic and IV fluids    SUBJECTIVE:    Patient was seen today  Mental status still poor  Oral mucosa dry not tolerating much p o   Intake    OBJECTIVE:  Current Weight: Weight - Scale: 84 8 kg (186 lb 15 2 oz)  Vitals:    12/30/20 0709   BP: 123/60   Pulse: 67   Resp: 16   Temp:    SpO2: 95%       Intake/Output Summary (Last 24 hours) at 12/30/2020 1050  Last data filed at 12/30/2020 0531  Gross per 24 hour   Intake 290 ml   Output 650 ml   Net -360 ml     Weight (last 2 days)     Date/Time   Weight    12/30/20 0531   84 8 (186 95) 12/29/20 1827   84 6 (186 51)    12/28/20 2120   81 2 (179)              General: conscious, cooperative, in not acute distress  Eyes: conjunctivae pink, anicteric sclerae  ENT:  Oral mucosa dry  Neck: supple, no JVD  Chest:  No respiratory distress  CVS: distinct S1 & S2, normal rate, regular rhythm  Abdomen: soft, non-tender, non-distended, normoactive bowel sounds  Extremities:  2+ pitting edema  Skin: no rash  Neuro:  Opens her eyes to her name not much response thereafter not responding appropriately to questions      Medications:    Current Facility-Administered Medications:     acetaminophen (TYLENOL) tablet 650 mg, 650 mg, Oral, Q6H PRN, Oliva Lewis PA-C    albuterol (PROVENTIL HFA,VENTOLIN HFA) inhaler 2 puff, 2 puff, Inhalation, Q4H PRN, Alf Hyde DO    amLODIPine (NORVASC) tablet 2 5 mg, 2 5 mg, Oral, Daily, Alf Hyde DO, 2 5 mg at 12/29/20 7263    aspirin (ECOTRIN LOW STRENGTH) EC tablet 81 mg, 81 mg, Oral, Daily, Alf Hyde DO, 81 mg at 12/30/20 4133    atorvastatin (LIPITOR) tablet 40 mg, 40 mg, Oral, After Dinner, Alf Hyde DO, 40 mg at 12/29/20 1839    calcium carbonate-vitamin D (OSCAL-D) 500 mg-200 units per tablet 1 tablet, 1 tablet, Oral, Daily With Breakfast, Alf Hyde DO, 1 tablet at 12/30/20 5831    carvedilol (COREG) tablet 12 5 mg, 12 5 mg, Oral, BID With Meals, Alf Hyde DO, 12 5 mg at 12/30/20 5694    ceFAZolin (ANCEF) IVPB (premix in dextrose) 1,000 mg 50 mL, 1,000 mg, Intravenous, Q12H, Keke Houston PA-C, Stopped at 12/29/20 2215    citalopram (CeleXA) tablet 10 mg, 10 mg, Oral, Daily, Alf Hyde DO, 10 mg at 12/30/20 9197    dextrose 5 % and sodium chloride 0 9 % infusion, 40 mL/hr, Intravenous, Continuous, Consuelo Rodriges DO    docusate sodium (COLACE) capsule 100 mg, 100 mg, Oral, Daily, Alf Hyde DO, 100 mg at 57/94/25 6249    folic acid (FOLVITE) tablet 1 mg, 1 mg, Oral, Daily, Alf Hyde DO, 1 mg at 12/30/20 3990    furosemide (LASIX) injection 40 mg, 40 mg, Intravenous, BID (diuretic), Osirismanisha Mckinney DO    ondansetron (ZOFRAN) injection 4 mg, 4 mg, Intravenous, Q6H PRN, Arcenio Myles DO    pantoprazole (PROTONIX) EC tablet 40 mg, 40 mg, Oral, Early Morning, Arcenio Myles DO, 40 mg at 12/30/20 0518    thiamine (VITAMIN B1) tablet 100 mg, 100 mg, Oral, Daily, Arcenio Myles DO, 100 mg at 12/30/20 8349    traMADol Rust Beat) tablet 50 mg, 50 mg, Oral, Q6H PRN, Vesna Verma PA-C, 50 mg at 12/30/20 2831    Invasive Devices:      Lab Results:   Results from last 7 days   Lab Units 12/30/20  0435 12/29/20  2358 12/29/20  1608 12/29/20  1247 12/29/20  0928 12/29/20  5652 12/29/20  0617 12/29/20  0421  12/28/20  2318 12/28/20  2152   WBC Thousand/uL 7 33  --   --   --   --   --   --  5 04  --   --  6 37   HEMOGLOBIN g/dL 9 7*  --   --   --   --   --   --  9 3*  --   --  10 6*   HEMATOCRIT % 31 2*  --   --   --   --   --   --  28 7*  --   --  34 1*   PLATELETS Thousands/uL 58*  --   --   --   --   --   --  57*  --   --  66*   POTASSIUM mmol/L 5 0 5 1 4 9 5 5* 5 9*  --   --   --    < >  --  6 1*   CHLORIDE mmol/L 93* 93* 102 93* 97*  --   --   --    < >  --  94*   CO2 mmol/L 31 32 27 31 30  --   --   --    < >  --  33*   BUN mg/dL 48* 52* 48* 53* 55*  --   --   --    < >  --  55*   CREATININE mg/dL 1 56* 1 59* 1 30 1 52* 1 55*  --   --   --    < >  --  1 64*   CALCIUM mg/dL 10 0 9 8 8 6 10 1 9 9  --   --   --    < >  --  10 1   MAGNESIUM mg/dL 2 5  --   --   --   --   --   --   --   --   --   --    PHOSPHORUS mg/dL 3 5  --   --   --   --   --   --   --   --   --   --    ALK PHOS U/L  --   --   --   --   --   --   --   --   --   --  122*   ALT U/L  --   --   --   --   --   --   --   --   --   --  35   AST U/L  --   --   --   --   --   --   --   --   --   --  45   BLOOD CULTURE   --   --   --   --   --  Received in Microbiology Lab  Culture in Progress  Received in Microbiology Lab  Culture in Progress    --   --   -- --    LEUKOCYTES UA   --   --   --   --   --   --   --   --   --  Negative  --    BLOOD UA   --   --   --   --   --   --   --   --   --  Negative  --     < > = values in this interval not displayed  Portions of the record may have been created with voice recognition software  Occasional wrong word or "sound a like" substitutions may have occurred due to the inherent limitations of voice recognition software  Read the chart carefully and recognize, using context, where substitutions have occurred  If you have any questions, please contact the dictating provider

## 2020-12-30 NOTE — ASSESSMENT & PLAN NOTE
Lab Results   Component Value Date    EGFR 37 12/29/2020    EGFR 30 12/29/2020    EGFR 30 12/29/2020    CREATININE 1 30 12/29/2020    CREATININE 1 52 (H) 12/29/2020    CREATININE 1 55 (H) 12/29/2020   · POA, baseline approximately 0 9  · Possibly in setting of volume overload  · Improving with IV diuresis per nephrology recs  · UA reviewed, measure PVR and bladder scan  · Monitor with BMP  · Hold nephrotoxins avoid hypotension

## 2020-12-30 NOTE — ASSESSMENT & PLAN NOTE
Wt Readings from Last 3 Encounters:   12/29/20 84 6 kg (186 lb 8 2 oz)   04/03/20 74 6 kg (164 lb 7 4 oz)   04/21/17 74 1 kg (163 lb 5 8 oz)     · Patient with lower extremity edema and CXR suspicious for pulmonary vascular congestion  · Received total 40 mg IV Lasix in ED   · Monitor daily weights, I/O  · Low-sodium, fluid-restricted diet  · Nephrology following regarding hypervolemic hyponatremia - receiving IV diuresis  · Monitor BMP  · Obtain echocardiogram - pending results consider cards consult

## 2020-12-30 NOTE — ASSESSMENT & PLAN NOTE
· Sodium 128 on presentation, possibly in setting of volume overload  · Received total 40 mg IV lasix in the ED  · Continue diuresis per nephrology recs  · Continue fluid restriction  · On celexa, 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

## 2020-12-30 NOTE — PLAN OF CARE
Problem: Potential for Falls  Goal: Patient will remain free of falls  Description: INTERVENTIONS:  - Assess patient frequently for physical needs  -  Identify cognitive and physical deficits and behaviors that affect risk of falls    -  [place] fall precautions as indicated by assessment   - Educate patient/family on patient safety including physical limitations  - Instruct patient to call for assistance with activity based on assessment  - Modify environment to reduce risk of injury  - Consider OT/PT consult to assist with strengthening/mobility  12/30/2020 0100 by Monica Wells RN  Outcome: Progressing  12/29/2020 1836 by Monica Wells RN  Outcome: Progressing

## 2020-12-30 NOTE — PROGRESS NOTES
Progress Note - Perfecto Albertina 5/5/1932, 80 y o  female MRN: [id number]    Unit/Bed#: [facility] 501-01 Encounter: [id number]    Primary Care Provider: Terri Villar MD   Date and time admitted to hospital: 12/28/2020  9:15 PM        * Toxic metabolic encephalopathy  Assessment & Plan  · Presented with increased weakness and confusion; with recent treatment for apparent UTI  · Hyponatremia and acute renal failure are noted with evidence of volume overload with specific plans for these as below  · Otherwise with no other clear infectious source at this time, will follow up results of blood cultures x2 however does have erythema LE bilaterally, hot to the touch, started on IV ancef for possible superimposed cellulitis  · Vitamin b12 and folate unremarkable  · CT head negative  · Neuro checks    Erythema of lower extremity  Assessment & Plan  · Hypothermic on admission, no leukocytosis  · Bilateral LE erythema, hot to the touch  Per daughter does have some chronic erythema at baseline  · Started on IV ancef for possible superimposed cellulitis  · Venous duplex negative DVT    Hyperkalemia  Assessment & Plan  · K 6 1 on admission, improving s/p calcium gluconate and IV Lasix  · EKG without acute changes  · Likely in setting of acute kidney injury  · Improving  · Continue to monitor with BMP q 8 hours    Hyponatremia  Assessment & Plan  · Sodium 128 on presentation, possibly in setting of volume overload  · Received total 40 mg IV lasix in the ED  · Continue diuresis per nephrology recs  · Continue fluid restriction  · On celexa, 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    Hypothermia  Assessment & Plan  · With hypothermia on presentation  Possibly in setting of limited oral intake, cannot exclude infection    Started on IV ancef for possible LE cellulitis  · Improved, hermes price restarted keep temp >98  · TSH done    Acute renal failure superimposed on stage 2 chronic kidney disease [facility])  Assessment & Plan  Lab Results Component Value Date    EGFR 37 12/29/2020    EGFR 30 12/29/2020    EGFR 30 12/29/2020    CREATININE 1 30 12/29/2020    CREATININE 1 52 (H) 12/29/2020    CREATININE 1 55 (H) 12/29/2020   · POA, baseline approximately 0 9  · Possibly in setting of volume overload  · Improving with IV diuresis per nephrology recs  · UA reviewed, measure PVR and bladder scan  · Monitor with BMP  · Hold nephrotoxins avoid hypotension    Acute on chronic combined systolic and diastolic congestive heart failure (HCC)  Assessment & Plan  Wt Readings from Last 3 Encounters:   12/29/20 84 6 kg (186 lb 8 2 oz)   04/03/20 74 6 kg (164 lb 7 4 oz)   04/21/17 74 1 kg (163 lb 5 8 oz)     · Patient with lower extremity edema and CXR suspicious for pulmonary vascular congestion  · Received total 40 mg IV Lasix in ED   · Monitor daily weights, I/O  · Low-sodium, fluid-restricted diet  · Nephrology following regarding hypervolemic hyponatremia - receiving IV diuresis  · Monitor BMP  · Obtain echocardiogram - pending results consider cards consult        Essential hypertension  Assessment & Plan  · Continue home antihypertensives with hold parameters  · Monitor blood pressure per protocol    Atrial fibrillation ([facility] [address] )  Assessment & Plan  · Continue Coreg; digoxin listed on home medication list however daughter states patient no longer takes this  · Not on anticoagulation due to fall risk         VTE Pharmacologic Prophylaxis:   Pharmacologic: Pharmacologic VTE Prophylaxis contraindicated due to allergy to a/c  Mechanical VTE Prophylaxis in Place: Yes    Patient Centered Rounds: I have performed bedside rounds with nursing staff today  Discussions with Specialists or Other Care Team Provider: medicine     Education and Discussions with Family / Patient: patient     Time Spent for Care: 45 minutes  More than 50% of total time spent on counseling and coordination of care as described above      Current Length of Stay: 1 day(s)    Current Patient Status: Inpatient   Certification Statement: The patient will continue to require additional inpatient hospital stay due to AMS, hypothermia    Discharge Plan: pending improvement of hypothermia      Code Status: Level 3 - DNAR and DNI      Subjective:   Patient is doing poorly to did not eat much today  She has low temps today  Patient keep saying, she wants everything stopped  Objective:     Vitals:   Temp (24hrs), Av 7 °F (35 4 °C), Min:92 7 °F (33 7 °C), Max:98 4 °F (36 9 °C)    Temp:  [92 7 °F (33 7 °C)-98 4 °F (36 9 °C)] 97 5 °F (36 4 °C)  HR:  [61-71] 64  Resp:  [16-18] 16  BP: (115-138)/(55-65) 119/65  SpO2:  [94 %-98 %] 94 %  Body mass index is 32 09 kg/m²  Input and Output Summary (last 24 hours): Intake/Output Summary (Last 24 hours) at 2020 1702  Last data filed at 2020 1545  Gross per 24 hour   Intake 580 ml   Output 950 ml   Net -370 ml       Physical Exam:     Physical Exam    Additional Data:     Labs:    Results from last 7 days   Lab Units 20  0435   WBC Thousand/uL 7 33   HEMOGLOBIN g/dL 9 7*   HEMATOCRIT % 31 2*   PLATELETS Thousands/uL 58*   NEUTROS PCT % 79*   LYMPHS PCT % 10*   MONOS PCT % 9   EOS PCT % 2     Results from last 7 days   Lab Units 20  0435  20  2152   POTASSIUM mmol/L 5 0   < > 6 1*   CHLORIDE mmol/L 93*   < > 94*   CO2 mmol/L 31   < > 33*   BUN mg/dL 48*   < > 55*   CREATININE mg/dL 1 56*   < > 1 64*   CALCIUM mg/dL 10 0   < > 10 1   ALK PHOS U/L  --   --  122*   ALT U/L  --   --  35   AST U/L  --   --  45    < > = values in this interval not displayed  * I Have Reviewed All Lab Data Listed Above  * Additional Pertinent Lab Tests Reviewed:  Sheryl 66 Admission Reviewed    Imaging:    Imaging Reports Reviewed Today Include:    Imaging Personally Reviewed by Myself Includes:      Recent Cultures (last 7 days):     Results from last 7 days   Lab Units 20  0633 20  0617   BLOOD CULTURE  No Growth at 24 hrs  No Growth at 24 hrs  Last 24 Hours Medication List:   Current Facility-Administered Medications   Medication Dose Route Frequency Provider Last Rate    acetaminophen  650 mg Oral Q6H PRN Yanelis Worthington PA-C      albuterol  2 puff Inhalation Q4H PRN Rye Denton, DO      amLODIPine  2 5 mg Oral Daily Rye Denton, DO      aspirin  81 mg Oral Daily Rye Denton, DO      atorvastatin  40 mg Oral After Bobbye Baton, DO      calcium carbonate-vitamin D  1 tablet Oral Daily With Breakfast Rye Denton, DO      carvedilol  12 5 mg Oral BID With Meals Rye Denton, DO      cefazolin  1,000 mg Intravenous Q12H Parish Caceres PA-C 1,000 mg (12/30/20 1122)    citalopram  10 mg Oral Daily Rye Denton, DO      dextrose 5 % and sodium chloride 0 9 %  40 mL/hr Intravenous Continuous Wei Andersen, DO 40 mL/hr (12/30/20 1127)    docusate sodium  100 mg Oral Daily Rye Denton, DO      folic acid  1 mg Oral Daily Rye Denton, DO      furosemide  40 mg Intravenous BID (diuretic) Reena Brown, DO      lactulose  20 g Oral BID Rachel Costa MD      ondansetron  4 mg Intravenous Q6H PRN Rye Denton, DO      pantoprazole  40 mg Oral Early Morning Rye Denton, DO      thiamine  100 mg Oral Daily Rye Denton, DO      traMADol  50 mg Oral Q6H PRN Yanelis Worthington PA-C          Today, Patient Was Seen By: Rachel Costa MD    ** Please Note: Dictation voice to text software may have been used in the creation of this document   **

## 2020-12-30 NOTE — NURSING NOTE
Patient states, "Please pull the plug, I am ready to die "     Dr Marybeth Carbajal with [facility] made aware  Discussion to be had with pt's family

## 2020-12-30 NOTE — CASE MANAGEMENT
PT IS NOT A 30 DAYS READMISSION  PT IS NOT A BUNDLE  CM contacted daughter Michele Ornelas to discuss DCP and cm role  Pt lives alone in a 3sh with ramp access  Prior to admission pt used a RW with ambulation and was independent with ADLS  Past hx of VNA  Pt's preference for pharmacy is [facility]  Pt has no hx of drugs/ETOH or inpt psych stays  Per daughter Michele Ornelas she has POA and will bring documents in for scanning  If pt needs STR she would prefer a referral to [facility] as pt has been there 3 times in the past   CM reviewed d/c planning process including the following: identifying help at home, patient preference for d/c planning needs, Discharge Lounge, [facility] Meds to Bed program, availability of treatment team to discuss questions or concerns patient and/or family may have regarding understanding medications and recognizing signs and symptoms once discharged  CM also encouraged patient to follow up with all recommended appointments after discharge  Patient advised of importance for patient and family to participate in managing patients medical well being

## 2020-12-30 NOTE — ASSESSMENT & PLAN NOTE
· Hypothermic on admission, no leukocytosis  · Bilateral LE erythema, hot to the touch    Per daughter does have some chronic erythema at baseline  · Started on IV ancef for possible superimposed cellulitis  · Venous duplex negative DVT

## 2020-12-30 NOTE — ASSESSMENT & PLAN NOTE
· Continue Coreg; digoxin listed on home medication list however daughter states patient no longer takes this  · Not on anticoagulation due to fall risk

## 2020-12-31 LAB
ANION GAP SERPL CALCULATED.3IONS-SCNC: 3 MMOL/L (ref 4–13)
ANION GAP SERPL CALCULATED.3IONS-SCNC: 7 MMOL/L (ref 4–13)
BASOPHILS # BLD AUTO: 0.01 THOUSANDS/ΜL (ref 0–0.1)
BASOPHILS NFR BLD AUTO: 0 % (ref 0–1)
BUN SERPL-MCNC: 42 MG/DL (ref 5–25)
BUN SERPL-MCNC: 46 MG/DL (ref 5–25)
CALCIUM SERPL-MCNC: 10.1 MG/DL (ref 8.3–10.1)
CALCIUM SERPL-MCNC: 9.3 MG/DL (ref 8.3–10.1)
CHLORIDE SERPL-SCNC: 94 MMOL/L (ref 100–108)
CHLORIDE SERPL-SCNC: 95 MMOL/L (ref 100–108)
CO2 SERPL-SCNC: 29 MMOL/L (ref 21–32)
CO2 SERPL-SCNC: 33 MMOL/L (ref 21–32)
CREAT SERPL-MCNC: 1.42 MG/DL (ref 0.6–1.3)
CREAT SERPL-MCNC: 1.45 MG/DL (ref 0.6–1.3)
EOSINOPHIL # BLD AUTO: 0.09 THOUSAND/ΜL (ref 0–0.61)
EOSINOPHIL NFR BLD AUTO: 1 % (ref 0–6)
ERYTHROCYTE [DISTWIDTH] IN BLOOD BY AUTOMATED COUNT: 17 % (ref 11.6–15.1)
GFR SERPL CREATININE-BSD FRML MDRD: 32 ML/MIN/1.73SQ M
GFR SERPL CREATININE-BSD FRML MDRD: 33 ML/MIN/1.73SQ M
GLUCOSE SERPL-MCNC: 125 MG/DL (ref 65–140)
GLUCOSE SERPL-MCNC: 163 MG/DL (ref 65–140)
GLUCOSE SERPL-MCNC: 77 MG/DL (ref 65–140)
GLUCOSE SERPL-MCNC: 98 MG/DL (ref 65–140)
HCT VFR BLD AUTO: 29.5 % (ref 34.8–46.1)
HGB BLD-MCNC: 9.3 G/DL (ref 11.5–15.4)
IMM GRANULOCYTES # BLD AUTO: 0.03 THOUSAND/UL (ref 0–0.2)
IMM GRANULOCYTES NFR BLD AUTO: 0 % (ref 0–2)
LYMPHOCYTES # BLD AUTO: 0.61 THOUSANDS/ΜL (ref 0.6–4.47)
LYMPHOCYTES NFR BLD AUTO: 7 % (ref 14–44)
MCH RBC QN AUTO: 29.9 PG (ref 26.8–34.3)
MCHC RBC AUTO-ENTMCNC: 31.5 G/DL (ref 31.4–37.4)
MCV RBC AUTO: 95 FL (ref 82–98)
MONOCYTES # BLD AUTO: 0.78 THOUSAND/ΜL (ref 0.17–1.22)
MONOCYTES NFR BLD AUTO: 9 % (ref 4–12)
NEUTROPHILS # BLD AUTO: 7.28 THOUSANDS/ΜL (ref 1.85–7.62)
NEUTS SEG NFR BLD AUTO: 83 % (ref 43–75)
NRBC BLD AUTO-RTO: 1 /100 WBCS
PLATELET # BLD AUTO: 63 THOUSANDS/UL (ref 149–390)
PMV BLD AUTO: 13.7 FL (ref 8.9–12.7)
POTASSIUM SERPL-SCNC: 4.6 MMOL/L (ref 3.5–5.3)
POTASSIUM SERPL-SCNC: 4.8 MMOL/L (ref 3.5–5.3)
RBC # BLD AUTO: 3.11 MILLION/UL (ref 3.81–5.12)
SODIUM SERPL-SCNC: 130 MMOL/L (ref 136–145)
SODIUM SERPL-SCNC: 131 MMOL/L (ref 136–145)
WBC # BLD AUTO: 8.8 THOUSAND/UL (ref 4.31–10.16)

## 2020-12-31 PROCEDURE — 80048 BASIC METABOLIC PNL TOTAL CA: CPT | Performed by: INTERNAL MEDICINE

## 2020-12-31 PROCEDURE — 99233 SBSQ HOSP IP/OBS HIGH 50: CPT | Performed by: INTERNAL MEDICINE

## 2020-12-31 PROCEDURE — 85025 COMPLETE CBC W/AUTO DIFF WBC: CPT | Performed by: INTERNAL MEDICINE

## 2020-12-31 PROCEDURE — 82948 REAGENT STRIP/BLOOD GLUCOSE: CPT

## 2020-12-31 RX ORDER — LANOLIN ALCOHOL/MO/W.PET/CERES
3 CREAM (GRAM) TOPICAL
Status: DISCONTINUED | OUTPATIENT
Start: 2020-12-31 | End: 2021-01-02

## 2020-12-31 RX ORDER — MIRTAZAPINE 15 MG/1
7.5 TABLET, FILM COATED ORAL
Status: DISCONTINUED | OUTPATIENT
Start: 2020-12-31 | End: 2021-01-02

## 2020-12-31 RX ADMIN — MELATONIN 3 MG: at 21:29

## 2020-12-31 RX ADMIN — LACTULOSE 20 G: 10 SOLUTION ORAL at 08:44

## 2020-12-31 RX ADMIN — MIRTAZAPINE 7.5 MG: 15 TABLET, FILM COATED ORAL at 21:29

## 2020-12-31 RX ADMIN — CEFAZOLIN SODIUM 1000 MG: 1 SOLUTION INTRAVENOUS at 09:33

## 2020-12-31 RX ADMIN — ASPIRIN 81 MG: 81 TABLET ORAL at 08:43

## 2020-12-31 RX ADMIN — AMLODIPINE BESYLATE 2.5 MG: 2.5 TABLET ORAL at 08:43

## 2020-12-31 RX ADMIN — ATORVASTATIN CALCIUM 40 MG: 40 TABLET, FILM COATED ORAL at 17:06

## 2020-12-31 RX ADMIN — CEFAZOLIN SODIUM 1000 MG: 1 SOLUTION INTRAVENOUS at 21:29

## 2020-12-31 RX ADMIN — CITALOPRAM HYDROBROMIDE 10 MG: 10 TABLET ORAL at 08:43

## 2020-12-31 RX ADMIN — FUROSEMIDE 40 MG: 10 INJECTION, SOLUTION INTRAMUSCULAR; INTRAVENOUS at 08:44

## 2020-12-31 RX ADMIN — FUROSEMIDE 40 MG: 10 INJECTION, SOLUTION INTRAMUSCULAR; INTRAVENOUS at 16:16

## 2020-12-31 RX ADMIN — THIAMINE HCL TAB 100 MG 100 MG: 100 TAB at 08:43

## 2020-12-31 RX ADMIN — CARVEDILOL 12.5 MG: 12.5 TABLET, FILM COATED ORAL at 08:43

## 2020-12-31 RX ADMIN — FOLIC ACID 1 MG: 1 TABLET ORAL at 08:43

## 2020-12-31 RX ADMIN — CARVEDILOL 12.5 MG: 12.5 TABLET, FILM COATED ORAL at 16:14

## 2020-12-31 RX ADMIN — Medication 1 TABLET: at 08:44

## 2020-12-31 NOTE — ASSESSMENT & PLAN NOTE
· Presented with increased weakness and confusion; with recent treatment for apparent UTI, low temps  · Hyponatremia and acute renal failure are noted with evidence of volume overload with specific plans for these as below  · Otherwise with no other clear infectious source at this time, will follow up results of blood cultures x2 however does have erythema LE bilaterally, hot to the touch, started on IV ancef for possible superimposed cellulitis  · Vitamin b12 and folate unremarkable  · CT head negative

## 2020-12-31 NOTE — ASSESSMENT & PLAN NOTE
Lab Results   Component Value Date    EGFR 33 12/31/2020    EGFR 32 12/30/2020    EGFR 31 12/30/2020    CREATININE 1 42 (H) 12/31/2020    CREATININE 1 45 (H) 12/30/2020    CREATININE 1 49 (H) 12/30/2020   · POA, baseline approximately 0 9  · Possibly in setting of volume overload  · Improving with IV diuresis per nephrology recs  · UA reviewed, measure PVR and bladder scan  · Monitor with BMP  · Hold nephrotoxins avoid hypotension

## 2020-12-31 NOTE — ASSESSMENT & PLAN NOTE
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

## 2020-12-31 NOTE — PLAN OF CARE
Problem: Potential for Falls  Goal: Patient will remain free of falls  Description: INTERVENTIONS:  - Assess patient frequently for physical needs  -  Identify cognitive and physical deficits and behaviors that affect risk of falls  -  [place] fall precautions as indicated by assessment   - Educate patient/family on patient safety including physical limitations  - Instruct patient to call for assistance with activity based on assessment  - Modify environment to reduce risk of injury  - Consider OT/PT consult to assist with strengthening/mobility  12/31/2020 0007 by Chata Lieberman RN  Outcome: Progressing  12/31/2020 0006 by Chata Lieberman RN  Outcome: Progressing     Problem: Prexisting or High Potential for Compromised Skin Integrity  Goal: Skin integrity is maintained or improved  Description: INTERVENTIONS:  - Identify patients at risk for skin breakdown  - Assess and monitor skin integrity  - Assess and monitor nutrition and hydration status  - Monitor labs   - Assess for incontinence   - Turn and reposition patient  - Assist with mobility/ambulation  - Relieve pressure over bony prominences  - Avoid friction and shearing  - Provide appropriate hygiene as needed including keeping skin clean and dry  - Evaluate need for skin moisturizer/barrier cream  - Collaborate with interdisciplinary team   - Patient/family teaching  - Consider wound care consult   12/31/2020 0007 by Chata Lieberman RN  Outcome: Progressing  12/31/2020 0006 by Chata Lieberman RN  Outcome: Progressing     Problem: Nutrition/Hydration-ADULT  Goal: Nutrient/Hydration intake appropriate for improving, restoring or maintaining nutritional needs  Description: Monitor and assess patient's nutrition/hydration status for malnutrition  Collaborate with interdisciplinary team and initiate plan and interventions as ordered  Monitor patient's weight and dietary intake as ordered or per policy   Utilize nutrition screening tool and intervene as necessary  Determine patient's food preferences and provide high-protein, high-caloric foods as appropriate       INTERVENTIONS:  - Monitor oral intake, urinary output, labs, and treatment plans  - Assess nutrition and hydration status and recommend course of action  - Evaluate amount of meals eaten  - Assist patient with eating if necessary   - Allow adequate time for meals  - Recommend/ encourage appropriate diets, oral nutritional supplements, and vitamin/mineral supplements  - Order, calculate, and assess calorie counts as needed  - Recommend, monitor, and adjust tube feedings and TPN/PPN based on assessed needs  - Assess need for intravenous fluids  - Provide specific nutrition/hydration education as appropriate  - Include patient/family/caregiver in decisions related to nutrition  12/31/2020 0007 by Keira Santillan RN  Outcome: Progressing  12/31/2020 0006 by Keira Santillan RN  Outcome: Progressing     Problem: MUSCULOSKELETAL - ADULT  Goal: Maintain or return mobility to safest level of function  Description: INTERVENTIONS:  - Assess patient's ability to carry out ADLs; assess patient's baseline for ADL function and identify physical deficits which impact ability to perform ADLs (bathing, care of mouth/teeth, toileting, grooming, dressing, etc )  - Assess/evaluate cause of self-care deficits   - Assess range of motion  - Assess patient's mobility  - Assess patient's need for assistive devices and provide as appropriate  - Encourage maximum independence but intervene and supervise when necessary  - Involve family in performance of ADLs  - Assess for home care needs following discharge   - Consider OT consult to assist with ADL evaluation and planning for discharge  - Provide patient education as appropriate  Outcome: Progressing  Goal: Maintain proper alignment of affected body part  Description: INTERVENTIONS:  - Support, maintain and protect limb and body alignment  - Provide patient/ family with appropriate education  Outcome: Progressing

## 2020-12-31 NOTE — PROGRESS NOTES
Progress Note - Shannan Vera 5/5/1932, 80 y o  female MRN: [id number]    Unit/Bed#: [facility] 501-01 Encounter: [id number]    Primary Care Provider: Christine Louis MD   Date and time admitted to hospital: 12/28/2020  9:15 PM        * Toxic metabolic encephalopathy  Assessment & Plan  · Presented with increased weakness and confusion; with recent treatment for apparent UTI, low temps  · Hyponatremia and acute renal failure are noted with evidence of volume overload with specific plans for these as below  · Otherwise with no other clear infectious source at this time, will follow up results of blood cultures x2 however does have erythema LE bilaterally, hot to the touch, started on IV ancef for possible superimposed cellulitis  · Vitamin b12 and folate unremarkable  · CT head negative      Erythema of lower extremity  Assessment & Plan  · Hypothermic on admission, no leukocytosis  · Bilateral LE erythema, hot to the touch  Per daughter does have some chronic erythema at baseline  · Started on IV ancef for possible superimposed cellulitis  · Venous duplex negative DVT    Hyperkalemia  Assessment & Plan  · K 6 1 on admission, improving s/p calcium gluconate and IV Lasix  · EKG without acute changes  · Likely in setting of acute kidney injury  · Improving  · Continue to monitor with BMP q 8 hours    Hyponatremia  Assessment & Plan  · Sodium 128 on POA, poor po intake and volume overload  · Received total 40 mg IV lasix in the ED  · Continue diuresis per nephrology recs  · Continue fluid restriction  · On celexa, 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    Hypothermia  Assessment & Plan  · With hypothermia on presentation  Possibly in setting of limited oral intake, cannot exclude infection    Started on IV ancef for possible LE cellulitis  · Improved, hermes price restarted keep temp >98  · TSH done    Acute renal failure superimposed on stage 2 chronic kidney disease [facility])  Assessment & Plan  Lab Results   Component Value Date    EGFR 33 12/31/2020    EGFR 32 12/30/2020    EGFR 31 12/30/2020    CREATININE 1 42 (H) 12/31/2020    CREATININE 1 45 (H) 12/30/2020    CREATININE 1 49 (H) 12/30/2020   · POA, baseline approximately 0 9  · Possibly in setting of volume overload  · Improving with IV diuresis per nephrology recs  · UA reviewed, measure PVR and bladder scan  · Monitor with BMP  · Hold nephrotoxins avoid hypotension    Acute on chronic combined systolic and diastolic congestive heart failure (HCC)  Assessment & Plan  Wt Readings from Last 3 Encounters:   12/30/20 84 8 kg (186 lb 15 2 oz)   04/03/20 74 6 kg (164 lb 7 4 oz)   04/21/17 74 1 kg (163 lb 5 8 oz)     · Patient with lower extremity edema and CXR suspicious for pulmonary vascular congestion  · Received total 40 mg IV Lasix in ED   · Monitor daily weights, I/O  · Low-sodium, fluid-restricted diet  · Nephrology following regarding hypervolemic hyponatremia - receiving IV diuresis  · Monitor BMP          Essential hypertension  Assessment & Plan  · Continue home antihypertensives with hold parameters  · Monitor blood pressure per protocol    Atrial fibrillation (HCC)  Assessment & Plan  · Continue Coreg; digoxin listed on home medication list however daughter states patient no longer takes this  · Not on anticoagulation due to fall risk       VTE Pharmacologic Prophylaxis:   Pharmacologic: Heparin  Mechanical VTE Prophylaxis in Place: Yes    Patient Centered Rounds: I have performed bedside rounds with nursing staff today  Discussions with Specialists or Other Care Team Provider: nephrology     Education and Discussions with Family / Patient: patient's daughter     Time Spent for Care: 45 minutes  More than 50% of total time spent on counseling and coordination of care as described above      Current Length of Stay: 2 day(s)    Current Patient Status: Inpatient   Certification Statement: The patient will continue to require additional inpatient hospital stay due to change in mental status    Discharge Plan: pending     Code Status: Level 3 - DNAR and DNI      Subjective:   Patient is doing well  Objective:     Vitals:   Temp (24hrs), Av °F (36 1 °C), Min:95 5 °F (35 3 °C), Max:97 7 °F (36 5 °C)    Temp:  [95 5 °F (35 3 °C)-97 7 °F (36 5 °C)] 95 5 °F (35 3 °C)  HR:  [64-83] 69  Resp:  [16-17] 16  BP: (119-134)/(62-66) 129/64  SpO2:  [92 %-97 %] 94 %  Body mass index is 32 09 kg/m²  Input and Output Summary (last 24 hours): Intake/Output Summary (Last 24 hours) at 2020 1544  Last data filed at 2020 1301  Gross per 24 hour   Intake 1696 66 ml   Output 1100 ml   Net 596 66 ml       Physical Exam:     Physical Exam  Vitals signs and nursing note reviewed  Constitutional:       General: She is not in acute distress  Appearance: Normal appearance  She is obese  She is not ill-appearing, toxic-appearing or diaphoretic  HENT:      Head: Normocephalic and atraumatic  Nose: Nose normal    Eyes:      Extraocular Movements: Extraocular movements intact  Conjunctiva/sclera: Conjunctivae normal       Pupils: Pupils are equal, round, and reactive to light  Neck:      Musculoskeletal: Normal range of motion and neck supple  No neck rigidity or muscular tenderness  Vascular: No carotid bruit  Cardiovascular:      Rate and Rhythm: Normal rate and regular rhythm  Heart sounds: No friction rub  No gallop  Pulmonary:      Effort: No respiratory distress  Breath sounds: No stridor  No wheezing or rhonchi  Abdominal:      General: Abdomen is flat  Bowel sounds are normal       Palpations: Abdomen is soft  Musculoskeletal: Normal range of motion  General: No swelling, tenderness, deformity or signs of injury  Right lower leg: No edema  Lymphadenopathy:      Cervical: No cervical adenopathy  Neurological:      General: No focal deficit present  Mental Status: She is alert and oriented to person, place, and time  Mental status is at baseline  Cranial Nerves: No cranial nerve deficit  Sensory: No sensory deficit  Motor: No weakness  Coordination: Coordination normal       Gait: Gait normal       Deep Tendon Reflexes: Reflexes normal    Psychiatric:         Mood and Affect: Mood normal          Behavior: Behavior normal          Thought Content: Thought content normal          Judgment: Judgment normal          Additional Data:     Labs:    Results from last 7 days   Lab Units 12/31/20  0556   WBC Thousand/uL 8 80   HEMOGLOBIN g/dL 9 3*   HEMATOCRIT % 29 5*   PLATELETS Thousands/uL 63*   NEUTROS PCT % 83*   LYMPHS PCT % 7*   MONOS PCT % 9   EOS PCT % 1     Results from last 7 days   Lab Units 12/31/20  0556  12/28/20  2152   POTASSIUM mmol/L 4 6   < > 6 1*   CHLORIDE mmol/L 95*   < > 94*   CO2 mmol/L 29   < > 33*   BUN mg/dL 42*   < > 55*   CREATININE mg/dL 1 42*   < > 1 64*   CALCIUM mg/dL 9 3   < > 10 1   ALK PHOS U/L  --   --  122*   ALT U/L  --   --  35   AST U/L  --   --  45    < > = values in this interval not displayed  * I Have Reviewed All Lab Data Listed Above  * Additional Pertinent Lab Tests Reviewed: Sheryl 66 Admission Reviewed    Imaging:    Imaging Reports Reviewed Today Include:    Imaging Personally Reviewed by Myself Includes:      Recent Cultures (last 7 days):     Results from last 7 days   Lab Units 12/29/20  0633 12/29/20  0617   BLOOD CULTURE  No Growth at 48 hrs  No Growth at 48 hrs         Last 24 Hours Medication List:   Current Facility-Administered Medications   Medication Dose Route Frequency Provider Last Rate    acetaminophen  650 mg Oral Q6H PRN Darnell Moreland PA-C      albuterol  2 puff Inhalation Q4H PRN Alf Hyde DO      amLODIPine  2 5 mg Oral Daily Alf Hyde DO      aspirin  81 mg Oral Daily Alf Hyde DO      atorvastatin  40 mg Oral After Gardenia Lennon DO      calcium carbonate-vitamin D  1 tablet Oral Daily With Breakfast Nicole Locks, DO      carvedilol  12 5 mg Oral BID With Meals Nicole Locks, DO      cefazolin  1,000 mg Intravenous Q12H Moiz Caceres PA-C 1,000 mg (12/31/20 0933)    citalopram  10 mg Oral Daily Nicole Locks, DO      docusate sodium  100 mg Oral Daily Nicole Locks, DO      folic acid  1 mg Oral Daily Nicole Locks, DO      furosemide  40 mg Intravenous BID (diuretic) Cinthya Colindres, DO      lactulose  20 g Oral BID Mikaela Rose MD      melatonin  3 mg Oral HS Mikaela Rose MD      mirtazapine  7 5 mg Oral HS Mikaela Rose MD      ondansetron  4 mg Intravenous Q6H PRN Nicole Locks, DO      pantoprazole  40 mg Oral Early Morning Nicole Locks, DO      thiamine  100 mg Oral Daily Nicole Locks, DO      traMADol  50 mg Oral Q6H PRN Nandini Chaudhari PA-C          Today, Patient Was Seen By: Mikaela Rose MD    ** Please Note: Dictation voice to text software may have been used in the creation of this document   **

## 2020-12-31 NOTE — ASSESSMENT & PLAN NOTE
Wt Readings from Last 3 Encounters:   12/30/20 84 8 kg (186 lb 15 2 oz)   04/03/20 74 6 kg (164 lb 7 4 oz)   04/21/17 74 1 kg (163 lb 5 8 oz)     · Patient with lower extremity edema and CXR suspicious for pulmonary vascular congestion  · Received total 40 mg IV Lasix in ED   · Monitor daily weights, I/O  · Low-sodium, fluid-restricted diet  · Nephrology following regarding hypervolemic hyponatremia - receiving IV diuresis  · Monitor BMP

## 2020-12-31 NOTE — PROGRESS NOTES
NEPHROLOGY PROGRESS NOTE   Megha Zheng 80 y o  female MRN: [id number]  Unit/Bed#: [facility] 501-01 Encounter: [id number]      ASSESSMENT & PLAN    1  Mild acute kidney injury on stage IIIB chronic kidney disease with encephalopathy  ? Baseline creatinine 0 9-1 2, estimated GFR in the 40s  ? Creatinine remained stable around 1 5  ? Avoiding nephrotoxins  ? Also with hyperkalemia that has improved  ? Urinalysis bland  ? Strict intakes and outputs and daily  ? Ongoing workup for encephalopathy  ? On cefazolin  ? Avoid hypotension  ? Monitor with diuresis  ? Blood sugars are better holding IV fluids will see if oral intake improves     2  Blood pressure/volume overload  ? Hypertension-home medications include amlodipine 2 5 mg daily, carvedilol 12 5 mg daily, and furosemide 20 mg daily  ? Weight is increased from discharge in April and with edema  ? Creatinine stable   ? Continue furosemide 40 mg IV b i d      3  Electrolyte/acid-base status  ? Hypervolemic hyponatremia high urine osmolality normal urine sodium  ? TSH elevated T4 normal,   ? A m  Cortisol not low  ? On Celexa which can contribute to  hyponatremia  ? Increase Lasix to 40 mg IV b i d  D  ? Can now check BMPs daily     4  Hyperkalemia  ? Likely related to pre renal azotemia and acute kidney injury  ? not on a Ace or Arb  ?  Potassium is now normal  ? Diuresed to promote potassium wasting       SUBJECTIVE:    Patient is somewhat more alert but is confused  Tolerating some hydration    OBJECTIVE:  Current Weight: Weight - Scale: 84 8 kg (186 lb 15 2 oz)  Vitals:    12/31/20 1146   BP: 129/64   Pulse: 69   Resp:    Temp: (!) 97 4 °F (36 3 °C)   SpO2:        Intake/Output Summary (Last 24 hours) at 12/31/2020 1203  Last data filed at 12/31/2020 0913  Gross per 24 hour   Intake 1696 66 ml   Output 1100 ml   Net 596 66 ml     Weight (last 2 days)     Date/Time   Weight    12/30/20 0531   84 8 (186 95)    12/29/20 1827   84 6 (186 51)              General:  Confused no acute distress  Eyes: conjunctivae pink, anicteric sclerae  ENT: lips and mucous membranes moist  Neck:  Oral mucosa dry  Chest: clear breath sounds bilateral, no crackles, ronchus or wheezings  CVS: distinct S1 & S2, normal rate, regular rhythm  Abdomen: soft, non-tender, non-distended, normoactive bowel sounds  Extremities:  Edema improved from admission  Skin: no rash  Neuro:  Confused but more alert      Medications:    Current Facility-Administered Medications:     acetaminophen (TYLENOL) tablet 650 mg, 650 mg, Oral, Q6H PRN, Oliva Lewis PA-C    albuterol (PROVENTIL HFA,VENTOLIN HFA) inhaler 2 puff, 2 puff, Inhalation, Q4H PRN, Loistine Shearing, DO    amLODIPine (NORVASC) tablet 2 5 mg, 2 5 mg, Oral, Daily, Loistine Shearing, DO, 2 5 mg at 12/31/20 0843    aspirin (ECOTRIN LOW STRENGTH) EC tablet 81 mg, 81 mg, Oral, Daily, Loistine Shearing, DO, 81 mg at 12/31/20 0843    atorvastatin (LIPITOR) tablet 40 mg, 40 mg, Oral, After Dinner, Loistine Shearing, DO, 40 mg at 12/30/20 1707    calcium carbonate-vitamin D (OSCAL-D) 500 mg-200 units per tablet 1 tablet, 1 tablet, Oral, Daily With Breakfast, Loistine Shearing, DO, 1 tablet at 12/31/20 0844    carvedilol (COREG) tablet 12 5 mg, 12 5 mg, Oral, BID With Meals, Loistine Shearing, DO, 12 5 mg at 12/31/20 0843    ceFAZolin (ANCEF) IVPB (premix in dextrose) 1,000 mg 50 mL, 1,000 mg, Intravenous, Q12H, Jorje Norman PA-C, Last Rate: 100 mL/hr at 12/31/20 0933, 1,000 mg at 12/31/20 0933    citalopram (CeleXA) tablet 10 mg, 10 mg, Oral, Daily, Loistine Shearing, DO, 10 mg at 12/31/20 2129    docusate sodium (COLACE) capsule 100 mg, 100 mg, Oral, Daily, Loistine Shearing, DO, 100 mg at 80/73/47 5856    folic acid (FOLVITE) tablet 1 mg, 1 mg, Oral, Daily, Jr Ward DO, 1 mg at 12/31/20 0843    furosemide (LASIX) injection 40 mg, 40 mg, Intravenous, BID (diuretic), Cosmo Ramírez DO, 40 mg at 12/31/20 0844    lactulose oral solution 20 g, 20 g, Oral, BID, Carl DAVIS Fredis Pisano MD, 20 g at 12/31/20 0844    ondansetron [facility]) injection 4 mg, 4 mg, Intravenous, Q6H PRN, Daniela Edgar, DO    pantoprazole (PROTONIX) EC tablet 40 mg, 40 mg, Oral, Early Morning, Daniela Edgar, DO, 40 mg at 12/30/20 0518    thiamine (VITAMIN B1) tablet 100 mg, 100 mg, Oral, Daily, Daniela Edgar, DO, 100 mg at 12/31/20 6061    traMADol Maranthony Higgins) tablet 50 mg, 50 mg, Oral, Q6H PRN, Greg Alvarado PA-C, 50 mg at 12/30/20 0521    Invasive Devices:      Lab Results:   Results from last 7 days   Lab Units 12/31/20  0556 12/30/20  2350 12/30/20  1651 12/30/20  0435 12/29/20  2358  12/29/20  5619 12/29/20  0617 12/29/20  0421  12/28/20  2318 12/28/20  2152   WBC Thousand/uL 8 80  --   --  7 33  --   --   --   --  5 04  --   --  6 37   HEMOGLOBIN g/dL 9 3*  --   --  9 7*  --   --   --   --  9 3*  --   --  10 6*   HEMATOCRIT % 29 5*  --   --  31 2*  --   --   --   --  28 7*  --   --  34 1*   PLATELETS Thousands/uL 63*  --   --  58*  --   --   --   --  57*  --   --  66*   POTASSIUM mmol/L 4 6 4 8 4 8 5 0 5 1   < >  --   --   --    < >  --  6 1*   CHLORIDE mmol/L 95* 94* 95* 93* 93*   < >  --   --   --    < >  --  94*   CO2 mmol/L 29 33* 30 31 32   < >  --   --   --    < >  --  33*   BUN mg/dL 42* 46* 48* 48* 52*   < >  --   --   --    < >  --  55*   CREATININE mg/dL 1 42* 1 45* 1 49* 1 56* 1 59*   < >  --   --   --    < >  --  1 64*   CALCIUM mg/dL 9 3 10 1 9 6 10 0 9 8   < >  --   --   --    < >  --  10 1   MAGNESIUM mg/dL  --   --   --  2 5  --   --   --   --   --   --   --   --    PHOSPHORUS mg/dL  --   --   --  3 5  --   --   --   --   --   --   --   --    ALK PHOS U/L  --   --   --   --   --   --   --   --   --   --   --  122*   ALT U/L  --   --   --   --   --   --   --   --   --   --   --  35   AST U/L  --   --   --   --   --   --   --   --   --   --   --  45   BLOOD CULTURE   --   --   --   --   --   --  No Growth at 48 hrs   No Growth at 48 hrs   --   --   --   --    LEUKOCYTES UA   --   --   -- --   --   --   --   --   --   --  Negative  --    BLOOD UA   --   --   --   --   --   --   --   --   --   --  Negative  --     < > = values in this interval not displayed  Portions of the record may have been created with voice recognition software  Occasional wrong word or "sound a like" substitutions may have occurred due to the inherent limitations of voice recognition software  Read the chart carefully and recognize, using context, where substitutions have occurred  If you have any questions, please contact the dictating provider

## 2021-01-01 PROBLEM — R79.89 ELEVATED TSH: Status: ACTIVE | Noted: 2021-01-01

## 2021-01-01 LAB
ANION GAP SERPL CALCULATED.3IONS-SCNC: 2 MMOL/L (ref 4–13)
BUN SERPL-MCNC: 35 MG/DL (ref 5–25)
CALCIUM SERPL-MCNC: 9.3 MG/DL (ref 8.3–10.1)
CHLORIDE SERPL-SCNC: 93 MMOL/L (ref 100–108)
CO2 SERPL-SCNC: 33 MMOL/L (ref 21–32)
CREAT SERPL-MCNC: 1.35 MG/DL (ref 0.6–1.3)
GFR SERPL CREATININE-BSD FRML MDRD: 35 ML/MIN/1.73SQ M
GLUCOSE SERPL-MCNC: 106 MG/DL (ref 65–140)
GLUCOSE SERPL-MCNC: 108 MG/DL (ref 65–140)
GLUCOSE SERPL-MCNC: 121 MG/DL (ref 65–140)
GLUCOSE SERPL-MCNC: 139 MG/DL (ref 65–140)
GLUCOSE SERPL-MCNC: 88 MG/DL (ref 65–140)
POTASSIUM SERPL-SCNC: 4.2 MMOL/L (ref 3.5–5.3)
SODIUM SERPL-SCNC: 128 MMOL/L (ref 136–145)

## 2021-01-01 PROCEDURE — 99233 SBSQ HOSP IP/OBS HIGH 50: CPT | Performed by: INTERNAL MEDICINE

## 2021-01-01 PROCEDURE — 97163 PT EVAL HIGH COMPLEX 45 MIN: CPT

## 2021-01-01 PROCEDURE — 97530 THERAPEUTIC ACTIVITIES: CPT

## 2021-01-01 PROCEDURE — 82948 REAGENT STRIP/BLOOD GLUCOSE: CPT

## 2021-01-01 PROCEDURE — 99223 1ST HOSP IP/OBS HIGH 75: CPT | Performed by: INTERNAL MEDICINE

## 2021-01-01 PROCEDURE — 80307 DRUG TEST PRSMV CHEM ANLYZR: CPT | Performed by: INTERNAL MEDICINE

## 2021-01-01 PROCEDURE — 80048 BASIC METABOLIC PNL TOTAL CA: CPT | Performed by: INTERNAL MEDICINE

## 2021-01-01 RX ORDER — THIAMINE MONONITRATE (VIT B1) 100 MG
100 TABLET ORAL DAILY
Status: DISCONTINUED | OUTPATIENT
Start: 2021-01-02 | End: 2021-01-02

## 2021-01-01 RX ORDER — LACTULOSE 20 G/30ML
20 SOLUTION ORAL 2 TIMES DAILY
Status: DISCONTINUED | OUTPATIENT
Start: 2021-01-01 | End: 2021-01-02

## 2021-01-01 RX ADMIN — FOLIC ACID 1 MG: 1 TABLET ORAL at 08:01

## 2021-01-01 RX ADMIN — AMLODIPINE BESYLATE 2.5 MG: 2.5 TABLET ORAL at 08:01

## 2021-01-01 RX ADMIN — DOCUSATE SODIUM 100 MG: 100 CAPSULE, LIQUID FILLED ORAL at 08:00

## 2021-01-01 RX ADMIN — FUROSEMIDE 40 MG: 10 INJECTION, SOLUTION INTRAMUSCULAR; INTRAVENOUS at 16:46

## 2021-01-01 RX ADMIN — LACTULOSE 20 G: 10 SOLUTION ORAL at 08:08

## 2021-01-01 RX ADMIN — ATORVASTATIN CALCIUM 40 MG: 40 TABLET, FILM COATED ORAL at 17:40

## 2021-01-01 RX ADMIN — MIRTAZAPINE 7.5 MG: 15 TABLET, FILM COATED ORAL at 21:21

## 2021-01-01 RX ADMIN — THIAMINE HCL TAB 100 MG 100 MG: 100 TAB at 08:01

## 2021-01-01 RX ADMIN — LACTULOSE 20 G: 10 SOLUTION ORAL at 17:40

## 2021-01-01 RX ADMIN — CITALOPRAM HYDROBROMIDE 10 MG: 10 TABLET ORAL at 08:01

## 2021-01-01 RX ADMIN — CEFAZOLIN SODIUM 1000 MG: 1 SOLUTION INTRAVENOUS at 10:00

## 2021-01-01 RX ADMIN — CARVEDILOL 12.5 MG: 12.5 TABLET, FILM COATED ORAL at 08:01

## 2021-01-01 RX ADMIN — ASPIRIN 81 MG: 81 TABLET ORAL at 08:08

## 2021-01-01 RX ADMIN — MELATONIN 3 MG: at 21:21

## 2021-01-01 RX ADMIN — CARVEDILOL 12.5 MG: 12.5 TABLET, FILM COATED ORAL at 16:46

## 2021-01-01 RX ADMIN — Medication 1 TABLET: at 08:01

## 2021-01-01 RX ADMIN — FUROSEMIDE 40 MG: 10 INJECTION, SOLUTION INTRAMUSCULAR; INTRAVENOUS at 08:01

## 2021-01-01 RX ADMIN — PANTOPRAZOLE SODIUM 40 MG: 40 TABLET, DELAYED RELEASE ORAL at 06:23

## 2021-01-01 NOTE — ASSESSMENT & PLAN NOTE
Wt Readings from Last 3 Encounters:   01/01/21 84 5 kg (186 lb 4 6 oz)   04/03/20 74 6 kg (164 lb 7 4 oz)   04/21/17 74 1 kg (163 lb 5 8 oz)     · Patient with lower extremity edema and CXR suspicious for pulmonary vascular congestion  · Received total 40 mg IV Lasix in ED   · Monitor daily weights, I/O  · Low-sodium, fluid-restricted diet  · Nephrology following regarding hypervolemic hyponatremia - receiving IV diuresis  · Monitor BMP

## 2021-01-01 NOTE — PLAN OF CARE
Problem: PHYSICAL THERAPY ADULT  Goal: Performs mobility at highest level of function for planned discharge setting  See evaluation for individualized goals  Description: Treatment/Interventions: Functional transfer training, LE strengthening/ROM, Elevations, Therapeutic exercise, Endurance training, Patient/family training, Equipment eval/education, Bed mobility, Gait training, Spoke to nursing          See flowsheet documentation for full assessment, interventions and recommendations  Note: Prognosis: Fair  Problem List: Decreased strength, Decreased endurance, Impaired balance, Decreased mobility, Decreased cognition, Impaired judgement, Decreased safety awareness, Obesity, Decreased skin integrity  Assessment: Pt is a 79 yo female admitted [facility]* recent UTI,hyperkalemia,NADEEM,B pleural effusion,generalized weakness,dec Po intake and poor appetite,difficulty ambulating  Pt lives alone in [address] access and use of RW for mobility  Pt was completely (I) PTA  Pt currently is not at functional mobility baseline,needs significant Ax2 for mobility and gait,use of RW for mobility,lethargic and dec cognition during PT eval,slow to respond to verbal instruction and commands,multiple lines,abnormal lab values  Pt demonstrates maximal deficits during functional mobility and gait including dec endurance,dec BLE strength,dec balance,ataxic and unsteady movements,lethargy and dec cognition and needs maxAx1 for BM and modAx2 for transfers and gait with use of RW  Pt would cont to benefit from skilled inpt PT services to maximize functional independence  Barriers to Discharge: Decreased caregiver support, Inaccessible home environment(lives alone and 3 SH)     PT Discharge Recommendation: [id number]          See flowsheet documentation for full assessment

## 2021-01-01 NOTE — PLAN OF CARE
Problem: Potential for Falls  Goal: Patient will remain free of falls  Description: INTERVENTIONS:  - Assess patient frequently for physical needs  -  Identify cognitive and physical deficits and behaviors that affect risk of falls  -  Concrete fall precautions as indicated by assessment   - Educate patient/family on patient safety including physical limitations  - Instruct patient to call for assistance with activity based on assessment  - Modify environment to reduce risk of injury  - Consider OT/PT consult to assist with strengthening/mobility  Outcome: Progressing     Problem: Prexisting or High Potential for Compromised Skin Integrity  Goal: Skin integrity is maintained or improved  Description: INTERVENTIONS:  - Identify patients at risk for skin breakdown  - Assess and monitor skin integrity  - Assess and monitor nutrition and hydration status  - Monitor labs   - Assess for incontinence   - Turn and reposition patient  - Assist with mobility/ambulation  - Relieve pressure over bony prominences  - Avoid friction and shearing  - Provide appropriate hygiene as needed including keeping skin clean and dry  - Evaluate need for skin moisturizer/barrier cream  - Collaborate with interdisciplinary team   - Patient/family teaching  - Consider wound care consult   Outcome: Progressing     Problem: Nutrition/Hydration-ADULT  Goal: Nutrient/Hydration intake appropriate for improving, restoring or maintaining nutritional needs  Description: Monitor and assess patient's nutrition/hydration status for malnutrition  Collaborate with interdisciplinary team and initiate plan and interventions as ordered  Monitor patient's weight and dietary intake as ordered or per policy  Utilize nutrition screening tool and intervene as necessary  Determine patient's food preferences and provide high-protein, high-caloric foods as appropriate       INTERVENTIONS:  - Monitor oral intake, urinary output, labs, and treatment plans  - Assess nutrition and hydration status and recommend course of action  - Evaluate amount of meals eaten  - Assist patient with eating if necessary   - Allow adequate time for meals  - Recommend/ encourage appropriate diets, oral nutritional supplements, and vitamin/mineral supplements  - Order, calculate, and assess calorie counts as needed  - Recommend, monitor, and adjust tube feedings and TPN/PPN based on assessed needs  - Assess need for intravenous fluids  - Provide specific nutrition/hydration education as appropriate  - Include patient/family/caregiver in decisions related to nutrition  Outcome: Progressing     Problem: MUSCULOSKELETAL - ADULT  Goal: Maintain or return mobility to safest level of function  Description: INTERVENTIONS:  - Assess patient's ability to carry out ADLs; assess patient's baseline for ADL function and identify physical deficits which impact ability to perform ADLs (bathing, care of mouth/teeth, toileting, grooming, dressing, etc )  - Assess/evaluate cause of self-care deficits   - Assess range of motion  - Assess patient's mobility  - Assess patient's need for assistive devices and provide as appropriate  - Encourage maximum independence but intervene and supervise when necessary  - Involve family in performance of ADLs  - Assess for home care needs following discharge   - Consider OT consult to assist with ADL evaluation and planning for discharge  - Provide patient education as appropriate  Outcome: Progressing  Goal: Maintain proper alignment of affected body part  Description: INTERVENTIONS:  - Support, maintain and protect limb and body alignment  - Provide patient/ family with appropriate education  Outcome: Progressing

## 2021-01-01 NOTE — ASSESSMENT & PLAN NOTE
· With hypothermia on presentation  Possibly in setting of limited oral intake, cannot exclude infection    Started on IV ancef for possible LE cellulitis  · Improved, hermes price restarted keep temp >98  · TSH wnl

## 2021-01-01 NOTE — PLAN OF CARE
Problem: Potential for Falls  Goal: Patient will remain free of falls  Description: INTERVENTIONS:  - Assess patient frequently for physical needs  -  Identify cognitive and physical deficits and behaviors that affect risk of falls    -  [place] fall precautions as indicated by assessment   - Educate patient/family on patient safety including physical limitations  - Instruct patient to call for assistance with activity based on assessment  - Modify environment to reduce risk of injury  - Consider OT/PT consult to assist with strengthening/mobility  Outcome: Progressing

## 2021-01-01 NOTE — PHYSICAL THERAPY NOTE
Physical Therapy Evaluation:    2 forms of pt ID verified:name,birthdate and pt ID lynette    Patient's Name: Valentina Woodruff    Admitting Diagnosis  Hyperkalemia [E87 5]  Swelling [R60 9]  CHF (congestive heart failure) (HCC) [I50 9]  Hyponatremia [E87 1]  Weakness [R53 1]  Thrombocytopenia (HCC) [D69 6]  Bilateral pleural effusion [J90]  NADEEM (acute kidney injury) ([address] ) [N17 9]  Chronic combined systolic and diastolic congestive heart failure ([address] ) [I50 42]  Generalized weakness [R53 1]  COVID-19 ruled out [Z20 822]    Problem List  Patient Active Problem List   Diagnosis    Atrial fibrillation ([facility]    Essential hypertension    Left lower lobe pneumonia    Acute on chronic combined systolic and diastolic congestive heart failure ([address] )    Cardiomyopathy ([address] )    Mitral regurgitation    Acute renal failure superimposed on stage 2 chronic kidney disease ([address] )    Ambulatory dysfunction    Toxic metabolic encephalopathy    Hypothermia    Thrombocytopenia (HCC)    Anemia    Hyponatremia    Hyperkalemia    Erythema of lower extremity       Past Medical History  Past Medical History:   Diagnosis Date    Arthritis     Cancer ([address] )     breast    Cardiac disease     afib    CHF (congestive heart failure) ([address] )     Hyperlipidemia     Hypertension     Stroke ([address] )     TIA    TIA (transient ischemic attack)        Past Surgical History  Past Surgical History:   Procedure Laterality Date    BREAST SURGERY      right lumpectomy    TOTAL HIP ARTHROPLASTY Bilateral           01/01/21 0957   PT Last Visit   PT Visit Date 01/01/21   Note Type   Note type Evaluation  (and tx session)   Pain Assessment   Pain Rating: FLACC (Rest) - Face 0   Pain Rating: FLACC (Rest) - Legs 0   Pain Rating: FLACC (Rest) - Activity 0   Pain Rating: FLACC (Rest) - Cry 0   Pain Rating: FLACC (Rest) - Consolability 0   Score: FLACC (Rest) 0   Home Living   Type of [address] [place], [place] [address]ed entrance;Multi-level   Home Equipment Walker   Additional Comments pt lives alone in [address] [facility] with ramp access  use of RW PTA for mobility and completely (I) for ADLs   Prior Function   Level of [place] Independent with ADLs and functional mobility   Lives With Alone   Receives Help From Other (Comment)  (unclear at this time)   ADL Assistance Independent   IADLs Needs assistance   Falls in the last 6 months 0  (per pt PTA,need to clarify)   Restrictions/Precautions   Other Precautions Cognitive; Bed Alarm;Multiple lines; Fall Risk  (very confused and lethargic)   General   Additional Pertinent History NADEEM,hyperkalemia,B pleural effusion,generlaized weakness,recent UTI,dec Po intake and dec appetite,difficulty ambulating   Family/Caregiver Present No   Cognition   Overall Cognitive Status Impaired   Arousal/Participation Lethargic   Orientation Level Oriented to person;Disoriented to place; Disoriented to time;Disoriented to situation   Following Commands Follows one step commands inconsistently   RLE Assessment   RLE Assessment   (at least 3/5 grossly throughout)   LLE Assessment   LLE Assessment   (at least 3 out of 5 grossly throughout)   Coordination   Movements are Fluid and Coordinated 0   Coordination and Movement Description B knee buckling during static stand,weakness,dec use of RLE and LUE for mobility and  of handle of RW   Sensation WFL   Light Touch   RLE Light Touch Grossly intact   LLE Light Touch Grossly intact   Bed Mobility   Rolling R 3  Moderate assistance   Additional items Assist x 1;HOB elevated; Bedrails; Increased time required;Verbal cues;LE management   Supine to Sit 2  Maximal assistance   Additional items Assist x 1;HOB elevated; Bedrails; Increased time required;Verbal cues;LE management  (pt able to sit at EOB greater than 5 min needing S )   Transfers   Sit to Stand 3  Moderate assistance   Additional items Assist x 2;Bedrails; Increased time required;Verbal cues   Stand to Sit 3  Moderate assistance   Additional items Assist x 2;Bedrails; Increased time required;Verbal cues   Additional Comments pt able to bear weight through BLE with support of RW in front during initial sit to stand transfer   Ambulation/Elevation   Gait pattern Poor UE support;R Knee Balaji;L Knee Balaji; Improper Weight shift;Narrow LONNIE; Forward Flexion;Decreased R stance;Decreased L stance;Shuffling; Inconsistent marj; Foward flexed; Short stride; Ataxia; Step to;Excessively slow   Gait Assistance 3  Moderate assist   Additional items Assist x 2;Verbal cues; Tactile cues  (dec RLE hip flexion to clear LE during steps)   Assistive Device Rolling walker   Distance 3 to 4 sidesteps towards [facility] with use of RW in front   Balance   Static Sitting   (at EOB fair to fair minus)   Dynamic Sitting   (zero)   Static Standing Zero   Dynamic Standing   (zero)   Ambulatory Zero   Endurance Deficit   Endurance Deficit Yes   Endurance Deficit Description weakness,lethargy,poor activity tolerance   Activity Tolerance   Activity Tolerance Patient limited by fatigue  (poor)   Nurse Made Aware yes Marcy   Assessment   Prognosis Fair   Problem List Decreased strength;Decreased endurance; Impaired balance;Decreased mobility; Decreased cognition; Impaired judgement;Decreased safety awareness; Obesity; Decreased skin integrity   Assessment Pt is a 81 yo female [facility]* recent UTI,hyperkalemia,NADEEM,B pleural effusion,generalized weakness,dec Po intake and poor appetite,difficulty ambulating  Pt lives alone in [address] access and use of RW for mobility  Pt was completely (I) PTA  Pt currently is not at functional mobility baseline,needs significant Ax2 for mobility and gait,use of RW for mobility,lethargic and dec cognition during PT eval,slow to respond to verbal instruction and commands,multiple lines,abnormal lab values   Pt demonstrates maximal deficits during functional mobility and gait including dec endurance,dec BLE strength,dec balance,ataxic and unsteady movements,lethargy and dec cognition and needs maxAx1 for BM and modAx2 for transfers and gait with use of RW  Pt would cont to benefit from skilled inpt PT services to maximize functional independence  Barriers to Discharge Decreased caregiver support; Inaccessible home environment  (lives alone and 3 SH)   Goals   Patient Goals to rest   STG Expiration Date 01/11/21   Short Term Goal #1 in 7-10 days:(1) Pt will be able to ambulate greater than 50 feet with use of RW and chair follow as needed on various surfaces needing minAx1 in order to A pt to return to PLOF, (2) activity tolerance:45 mins/45mins, (3) pt will be able to perform sit to stand transfers needing minAx1 to and from various surfaces consistently in order to return to PLOF, (4) pt will be able to perform BM needing minAx1 to A pt to return to PLOF, (5) (I) with BLE therapeutic ex HEP in various positions to A pt to inc balance,strength,mobility,endurance and to A to dec pain, (6) inc balance 1/2 grade in order to dec fall risk, (7) pt will be able to go up and down 1 full flight of steps needing minAx1 in order to navigate 3 SH as able and as needed prior to D/C, (8) cont to provide pt and pt family education for safe D/C planning, (9) inc BLE strength 1/2 to 1 full grade in order to A pt to inc balance,strength,mobility,endurance and to A to dec pain   PT Treatment Day 1   Plan   Treatment/Interventions Functional transfer training;LE strengthening/ROM; Elevations; Therapeutic exercise; Endurance training;Patient/family training;Equipment eval/education; Bed mobility;Gait training;Spoke to nursing   PT Frequency Other (Comment)  (3-5x/week)   Recommendation   PT Discharge Recommendation Post-Acute Rehabilitation Services     Gómez Myers, DPT    Time BW:[id number]   Time Out:1015  Total Time: 18 mins      S:  Following sit to stand transfer and ambulation assessment, pt sitting at EOB and requesting to return to bed   Education and encouragement for pt to sit upright in chair, pt declined  O:  Pt able to perform sit to supine maxAx2 with significant A of BLE  Pt needs max Ax1 to reposition in supine and proper positioning of green wedges and pillows for off loading and repositioning  Pt is lethargic and has dec cognition throughout session  A:  Pt cont to need significant A for mobility and needs redirection with max verbal and physical instruction secondary to dec cognition and lethargy  Pt is incontinent of urine  Pt would cont to benefit from skilled inpt PT services to maximize functional independence  P:  Cont skilled inpt PT services with DC plan to inpt rehab  Cont pt care for education,BM,transfers,gait,mobility,strength,endurance and balance      Rema Ibarra, PT

## 2021-01-01 NOTE — PROGRESS NOTES
After Visit Summary   5/31/2017    Don Samson    MRN: [id number]           Patient Information     Date Of Birth          1952        Visit Information        Provider Department      5/31/2017 8:30 PM Flavio Woo MD [facility]        Today's Diagnoses     Cough    -  1    Lower respiratory infection          Care Instructions      What Is Acute Bronchitis?  Acute or short-term bronchitis last for days or weeks. It occurs when the bronchial tubes (airways in the lungs) are irritated by a virus, bacteria, or allergen. This causes a cough that produces yellow or greenish mucus.  Inside healthy lungs    Air travels in and out of the lungs through the airways. The linings of these airways produce sticky mucus. This mucus traps particles that enter the lungs. Tiny structures called cilia then sweep the particles out of the airways.     Healthy airway: Airways are normally open. Air moves in and out easily.      Healthy cilia: Tiny, hairlike cilia sweep mucus and particles up and out of the airways.   Lungs with bronchitis  Bronchitis often occurs with a cold or the flu virus. The airways become inflamed (red and swollen). There is a deep  hacking  cough from the extra mucus. Other symptoms may include:    Wheezing    Chest discomfort    Shortness of breath    Mild fever  A second infection, this time due to bacteria, may then occur. And airways irritated by allergens or smoke are more likely to get infected.        Inflamed airway: Inflammation and extra mucus narrow the airway, causing shortness of breath.      Impaired cilia: Extra mucus impairs cilia, causing congestion and wheezing. Smoking makes the problem worse.   Making a diagnosis  A physical exam, health history, and certain tests help your healthcare provider make the diagnosis.  Health history  Your healthcare provider will ask you about your symptoms.  The exam  Your provider listens to your chest for  Progress Note - Donovan Alfredo 5/5/1932, 80 y o  female MRN: [id number]    Unit/Bed#: [facility] 501-01 Encounter: [id number]    Primary Care Provider: Joceline Corral MD   Date and time admitted to hospital: 12/28/2020  9:15 PM        * Toxic metabolic encephalopathy  Assessment & Plan  · Presented with increased weakness and confusion; with recent treatment for apparent UTI, low temps  · Hyponatremia and acute renal failure are noted with evidence of volume overload with specific plans for these as below  · Otherwise with no other clear infectious source at this time, will follow up results of blood cultures x2 however does have erythema LE bilaterally, hot to the touch, started on IV ancef for possible superimposed cellulitis  · Vitamin b12 and folate unremarkable  · CT head negative      Erythema of lower extremity  Assessment & Plan  · Hypothermic on admission, no leukocytosis  · Bilateral LE erythema, hot to the touch  Per daughter does have some chronic erythema at baseline  · Started on IV ancef for possible superimposed cellulitis  · Venous duplex negative DVT    Hyperkalemia  Assessment & Plan  · K 6 1 on admission, improving s/p calcium gluconate and IV Lasix  · EKG without acute changes  · Likely in setting of acute kidney injury  · Improving  · Continue to monitor with BMP q 8 hours    Hyponatremia  Assessment & Plan  · Sodium 128 on POA, poor po intake and volume overload  · Received total 40 mg IV lasix in the ED  · Continue diuresis per nephrology recs  · Continue fluid restriction  · On celexa, 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    Hypothermia  Assessment & Plan  · With hypothermia on presentation  Possibly in setting of limited oral intake, cannot exclude infection    Started on IV ancef for possible LE cellulitis  · Improved, hermes price restarted keep temp >98  · TSH wnl    Acute renal failure superimposed on stage 2 chronic kidney disease [facility])  Assessment & Plan  Lab Results   Component Value Date EGFR 35 01/01/2021    EGFR 33 12/31/2020    EGFR 32 12/30/2020    CREATININE 1 35 (H) 01/01/2021    CREATININE 1 42 (H) 12/31/2020    CREATININE 1 45 (H) 12/30/2020   · POA, baseline approximately 0 9  · Possibly in setting of volume overload  · Improving with IV diuresis per nephrology recs  · UA reviewed, measure PVR and bladder scan  · Monitor with BMP  · Hold nephrotoxins avoid hypotension    Acute on chronic combined systolic and diastolic congestive heart failure (HCC)  Assessment & Plan  Wt Readings from Last 3 Encounters:   01/01/21 84 5 kg (186 lb 4 6 oz)   04/03/20 74 6 kg (164 lb 7 4 oz)   04/21/17 74 1 kg (163 lb 5 8 oz)     · Patient with lower extremity edema and CXR suspicious for pulmonary vascular congestion  · Received total 40 mg IV Lasix in ED   · Monitor daily weights, I/O  · Low-sodium, fluid-restricted diet  · Nephrology following regarding hypervolemic hyponatremia - receiving IV diuresis  · Monitor BMP          Essential hypertension  Assessment & Plan  · Continue home antihypertensives with hold parameters  · Monitor blood pressure per protocol    Atrial fibrillation (HCC)  Assessment & Plan  · Continue Coreg; digoxin listed on home medication list however daughter states patient no longer takes this  · Not on anticoagulation due to fall risk       VTE Pharmacologic Prophylaxis:   Pharmacologic: Heparin  Mechanical VTE Prophylaxis in Place: Yes    Patient Centered Rounds: I have performed bedside rounds with nursing staff today  Discussions with Specialists or Other Care Team Provider:      Education and Discussions with Family / Patient: patient's daughter    Time Spent for Care: 45 minutes  More than 50% of total time spent on counseling and coordination of care as described above      Current Length of Stay: 3 day(s)    Current Patient Status: Inpatient   Certification Statement: The patient will continue to require additional inpatient hospital stay due to mentation    Discharge signs of congestion. He or she may also check your ears, nose, and throat.  Possible tests    A sputum test for bacteria. This requires a sample of mucus from the lungs.    A nasal or throat swab for bacterial infection.    A chest X-ray if your healthcare provider thinks you have pneumonia.    Tests to check for an underlying condition, such as allergies, asthma, or COPD. You may need to see a specialist for more lung function testing.  Treating a cough  The main treatment for bronchitis is easing symptoms. Avoiding smoke, allergens, and other things that trigger coughing can often help. If the infection is bacterial, you may be given antibiotics. During the illness, it's important to get plenty of sleep. To ease symptoms:    Don t smoke, and avoid secondhand smoke.    Use a humidifier, or breathe in steam from a hot shower. This may help loosen mucus.    Drink a lot of water and juice. They can soothe the throat and may help thin mucus.    Sit up or use extra pillows when in bed to help lessen coughing and congestion.    Ask your provider about using cough medicine, pain and fever medicine, or a decongestant.  Antibiotics  Most cases of bronchitis are caused by cold or flu viruses. Antibiotics don t treat viral illness. Taking antibiotics when they are not needed increases your risk of getting an infection later that is antibiotic-resistant. Your provider will prescribe antibiotics if the infection is caused by bacteria. If they are prescribed:    Take the medicine until it is used up, even if symptoms have improved. If you don t, the bronchitis may come back.    Take them as directed. For instance, some medicines should be taken with food.    Ask your provider or pharmacist what side effects are common, and what to do about them.  Follow-up care  You should see your provider again in 2 to 3 weeks. By this time, symptoms should have improved. An infection that lasts longer may mean you have a more serious  Plan: pending     Code Status: Level 3 - DNAR and DNI      Subjective:   Patient is doing poorly still  Eating very little and blood glucose are low  Objective:     Vitals:   Temp (24hrs), Av 3 °F (36 3 °C), Min:96 4 °F (35 8 °C), Max:97 9 °F (36 6 °C)    Temp:  [96 4 °F (35 8 °C)-97 9 °F (36 6 °C)] 97 4 °F (36 3 °C)  HR:  [64-71] 64  Resp:  [17-22] 18  BP: (120-128)/(64-69) 128/65  SpO2:  [94 %-96 %] 96 %  Body mass index is 31 98 kg/m²  Input and Output Summary (last 24 hours): Intake/Output Summary (Last 24 hours) at 2021 1718  Last data filed at 2021 1701  Gross per 24 hour   Intake 680 ml   Output 1350 ml   Net -670 ml       Physical Exam:     Physical Exam  Vitals signs and nursing note reviewed  HENT:      Head: Normocephalic and atraumatic  Mouth/Throat:      Mouth: Mucous membranes are moist       Pharynx: Oropharynx is clear  No oropharyngeal exudate or posterior oropharyngeal erythema  Neck:      Musculoskeletal: Normal range of motion and neck supple  Cardiovascular:      Rate and Rhythm: Normal rate  Pulmonary:      Effort: Pulmonary effort is normal       Breath sounds: Normal breath sounds  Abdominal:      General: Abdomen is flat  Bowel sounds are normal  There is no distension  Palpations: Abdomen is soft  There is no mass  Tenderness: There is no abdominal tenderness  Musculoskeletal: Normal range of motion  General: No swelling, tenderness, deformity or signs of injury  Right lower leg: No edema  Skin:     General: Skin is warm and dry  Coloration: Skin is not jaundiced or pale  Findings: No bruising, erythema, lesion or rash  Neurological:      General: No focal deficit present  Mental Status: She is alert  Mental status is at baseline  She is disoriented  Cranial Nerves: No cranial nerve deficit  Motor: No weakness           Additional Data:     Labs:    Results from last 7 days   Lab Units problem.  Prevention    Avoid tobacco smoke. If you smoke, quit. Stay away from smoky places. Ask friends and family not to smoke around you, or in your home or car.    Get checked for allergies.    Ask your provider about getting a yearly flu shot, and pneumococcal or pneumonia shots.    Wash your hands often. This helps reduce the chance of picking up viruses that cause colds and flu.  Call your healthcare provider if:    Symptoms worsen, or new symptoms develop.    Breathing problems worsen or  become severe.    Symptoms don t get better within a week, or within 3 days of taking antibiotics.     4711-5519 [employer]. [address] [place] [address]. All rights reserved. This information is not intended as a substitute for professional medical care. Always follow your healthcare professional's instructions.                Follow-ups after your visit        Follow-up notes from your care team     Return if symptoms worsen or fail to improve.      Who to contact     If you have questions or need follow up information about today's clinic visit or your schedule please contact [facility] directly at [phone].  Normal or non-critical lab and imaging results will be communicated to you by [email or web address], letter or phone within 4 business days after the clinic has received the results. If you do not hear from us within 7 days, please contact the clinic through [employer] or phone. If you have a critical or abnormal lab result, we will notify you by phone as soon as possible.  Submit refill requests through getbetter! or call your pharmacy and they will forward the refill request to us. Please allow 3 business days for your refill to be completed.          Additional Information About Your Visit        getbetter! Information     getbetter! lets you send messages to your doctor, view your test results, renew your prescriptions, schedule appointments and more. To sign up, go to  [email or web address] . Click on \"Log in\" on the left side of the screen, which will take you to the Welcome page. Then click on \"Sign up Now\" on the right side of the page.     You will be asked to enter the access code listed below, as well as some personal information. Please follow the directions to create your username and password.     Your access code is: [email or web address]  Expires: 2017  1:52 PM     Your access code will  in 90 days. If you need help or a new code, please call your [facility] clinic or [phone].        Care EveryWhere ID     This is your Care EveryWhere ID. This could be used by other organizations to access your [facility] medical records  [id number]        Your Vitals Were     Pulse Temperature Height Pulse Oximetry BMI (Body Mass Index)       78 97.8  F (36.6  C) (Tympanic) 5' 11\" (1.803 m) 98% 33.19 kg/m2        Blood Pressure from Last 3 Encounters:   17 137/87   17 (!) 172/100   16 152/90    Weight from Last 3 Encounters:   17 238 lb (108 kg)   17 238 lb (108 kg)   16 235 lb (106.6 kg)                 Today's Medication Changes          These changes are accurate as of: 17  9:41 PM.  If you have any questions, ask your nurse or doctor.               Start taking these medicines.        Dose/Directions    azithromycin 250 MG tablet   Commonly known as:  ZITHROMAX   Used for:  Lower respiratory infection   Started by:  Flavio Woo MD        Two tablets first day, then one tablet daily for four days   Quantity:  6 tablet   Refills:  0       guaiFENesin-codeine 100-10 MG/5ML Soln solution   Commonly known as:  ROBITUSSIN AC   Used for:  Cough   Started by:  Flavio Woo MD        Dose:  1 tsp.   Take 5 mLs by mouth every 4 hours as needed for cough   Quantity:  120 mL   Refills:  0            Where to get your medicines      These medications were sent to [facility] [id number] [place] [address] CHANDRIKA PINTO AT " 12/31/20  0556   WBC Thousand/uL 8 80   HEMOGLOBIN g/dL 9 3*   HEMATOCRIT % 29 5*   PLATELETS Thousands/uL 63*   NEUTROS PCT % 83*   LYMPHS PCT % 7*   MONOS PCT % 9   EOS PCT % 1     Results from last 7 days   Lab Units 01/01/21  0149  12/28/20  2152   POTASSIUM mmol/L 4 2   < > 6 1*   CHLORIDE mmol/L 93*   < > 94*   CO2 mmol/L 33*   < > 33*   BUN mg/dL 35*   < > 55*   CREATININE mg/dL 1 35*   < > 1 64*   CALCIUM mg/dL 9 3   < > 10 1   ALK PHOS U/L  --   --  122*   ALT U/L  --   --  35   AST U/L  --   --  45    < > = values in this interval not displayed  * I Have Reviewed All Lab Data Listed Above  * Additional Pertinent Lab Tests Reviewed: Sheryl 66 Admission Reviewed    Imaging:    Imaging Reports Reviewed Today Include:    Imaging Personally Reviewed by Myself Includes:     Recent Cultures (last 7 days):     Results from last 7 days   Lab Units 12/29/20  0633 12/29/20  0617   BLOOD CULTURE  No Growth at 72 hrs  No Growth at 72 hrs         Last 24 Hours Medication List:   Current Facility-Administered Medications   Medication Dose Route Frequency Provider Last Rate    acetaminophen  650 mg Oral Q6H PRN Wilfredo Thorpe PA-C      albuterol  2 puff Inhalation Q4H PRN Wicho Sarabia DO      amLODIPine  2 5 mg Oral Daily Wicho Sarabia DO      aspirin  81 mg Oral Daily Wicho Sarabia DO      atorvastatin  40 mg Oral After Elmyra Amada, DO      calcium carbonate-vitamin D  1 tablet Oral Daily With Breakfast Wicho Sarabia DO      carvedilol  12 5 mg Oral BID With Meals Wicho Sarabia DO      citalopram  10 mg Oral Daily Wicho Sarabia DO      docusate sodium  100 mg Oral Daily Wicho Sarabia DO      folic acid  1 mg Oral Daily Wicho Sarabia DO      furosemide  40 mg Intravenous BID (diuretic) Mari Sweeney, DO      lactulose  20 g Oral BID Melvin Gay MD      lactulose  20 g Oral BID Melvin Gay MD      melatonin  3 mg Oral HS Melvin Gay [facility] [address] [place] [id number]    Hours:  24-hours Phone:  [phone]     azithromycin 250 MG tablet         Some of these will need a paper prescription and others can be bought over the counter.  Ask your nurse if you have questions.     Bring a paper prescription for each of these medications     guaiFENesin-codeine 100-10 MG/5ML Soln solution                Primary Care Provider Office Phone # Fax #    William Blanco [phone]       [facility] [address]  [facility] [address]        Thank you!     Thank you for choosing [facility]  for your care. Our goal is always to provide you with excellent care. Hearing back from our patients is one way we can continue to improve our services. Please take a few minutes to complete the written survey that you may receive in the mail after your visit with us. Thank you!             Your Updated Medication List - Protect others around you: Learn how to safely use, store and throw away your medicines at www.disposemymeds.org.          This list is accurate as of: 5/31/17  9:41 PM.  Always use your most recent med list.                   Brand Name Dispense Instructions for use    acetaminophen-caffeine 500-65 MG Tabs    EXCEDRIN TENSION HEADACHE     Take 1 tablet by mouth 2 times daily as needed for mild pain Reported on 3/30/2017       acetaminophen-codeine 300-30 MG per tablet    TYLENOL #3    18 tablet    Take 1-2 tablets by mouth nightly as needed for pain maximum 2 tablet(s) per day       ALEVE PO      Reported on 3/30/2017       azithromycin 250 MG tablet    ZITHROMAX    6 tablet    Two tablets first day, then one tablet daily for four days       baclofen 10 MG tablet    LIORESAL    90 tablet    Take 1 tablet (10 mg) by mouth 3 times daily       COLD MEDICINE PLUS PO      Take 325 mg by mouth as needed       * diclofenac 1 % Gel topical gel    VOLTAREN    100 g    Apply  MD      mirtazapine  7 5 mg Oral HS Rachel Costa MD      [START ON 1/2/2021] multivitamin IVPB  10 mL Intravenous Daily Rachel Costa MD      ondansetron  4 mg Intravenous Q6H PRN Francitas Larose, DO      pantoprazole  40 mg Oral Early Morning Francitas Larose, DO      [START ON 1/2/2021] thiamine  100 mg Oral Daily Rachel Costa MD          Today, Patient Was Seen By: Rachel Costa MD    ** Please Note: Dictation voice to text software may have been used in the creation of this document   ** 2 times daily to feet/foot.       * diclofenac 1 % Gel topical gel    VOLTAREN    100 g    Apply 4 grams to knees or 2 grams to hands four times daily using enclosed dosing card.       guaiFENesin-codeine 100-10 MG/5ML Soln solution    ROBITUSSIN AC    120 mL    Take 5 mLs by mouth every 4 hours as needed for cough       HYDROcodone-acetaminophen 5-325 MG per tablet    NORCO    30 tablet    Take 1 tablet by mouth every 4 hours as needed for pain maximum 2 tablet(s) per day       naproxen 500 MG tablet    NAPROSYN    60 tablet    Take 1 tablet (500 mg) by mouth 2 times daily as needed for moderate pain       * Notice:  This list has 2 medication(s) that are the same as other medications prescribed for you. Read the directions carefully, and ask your doctor or other care provider to review them with you.

## 2021-01-01 NOTE — PLAN OF CARE
Problem: Potential for Falls  Goal: Patient will remain free of falls  Description: INTERVENTIONS:  - Assess patient frequently for physical needs  -  Identify cognitive and physical deficits and behaviors that affect risk of falls    -  [place] fall precautions as indicated by assessment   - Educate patient/family on patient safety including physical limitations  - Instruct patient to call for assistance with activity based on assessment  - Modify environment to reduce risk of injury  - Consider OT/PT consult to assist with strengthening/mobility  1/1/2021 0520 by Kimo Devries RN  Outcome: Progressing  1/1/2021 0519 by Kimo Devries RN  Outcome: Progressing

## 2021-01-01 NOTE — CONSULTS
Consultation - Umesh Cobb 80 y o  female MRN: [id number]    Unit/Bed#: [facility] 501-01 Encounter: [id number]      Assessment/Plan     Elevated TSH  Assessment & Plan  Is suspected and appropriate for her age, and with normal free T4 hypothyroidism is less likely  Acute renal failure superimposed on stage 2 chronic kidney disease [facility])  Assessment & Plan  Lab Results   Component Value Date    EGFR 35 01/01/2021    EGFR 33 12/31/2020    EGFR 32 12/30/2020    CREATININE 1 35 (H) 01/01/2021    CREATININE 1 42 (H) 12/31/2020    CREATININE 1 45 (H) 12/30/2020     Management as per primary team,  Nephrology on board    Atrial fibrillation [facility])  Assessment & Plan  Management as per primary team    * Toxic metabolic encephalopathy  Assessment & Plan  With hypothermia and urethral abnormality including hyponatremia and hyperkalemia,  Patient has had episodes of low sugar, low dose one was 56 mg/dL, she did not have any episodes of hypoglycemia less than 55 mg/dL she does not fulfill the criteria for Whipple's triad, and hypoglycemia is less likely causing encephalopathy  Low sugar is mostly due to poor nutrition, as she is not eating her meals in the hospital and probably she was not eating at home while she was sick, in addition as patient was hypothermic, fingersticks are not reliable, also considering CHF exacerbation causing low peripheral perfusion  Cortisol a m  Was checked which showed 21 8, appropriate response to stress, adrenal insufficiency is unlikely  Will recommend nutrition consult along with SLP evaluation                   Inpatient consult to Endocrinology     Performed by  Anne Lorenzo MD     Authorized by Anayeli Soler MD              CC:   Conclusion    History of Present Illness     HPI: Umesh Cobb is a 80y o  year old female with past medical history of stage IIIb CKD, atrial fibrillation, prior stroke, combined diastolic and systolics CHF who was brought in to the hospital for confusion and general weakness for past 3 days prior to admission  Patient was found to be hypothermic( 94 8), bilateral lower extremity erythema and lethargia, labs showed hyponatremia, hyperkalemia, acute kidney injury as well as hypoglycemia  Patient was found to be hypoglycemic as low as 56 mg/dL  A m  Cortisol was checked which is 21 8  Free T4 of 1 38 ng/dL and TSH of 3 880,    Patient seen and examined at bedside, patient is minimally verbal, does follow simple commands,   Spoke with the patient's nurse, she states that patient eats very minimally her meals,  She did not have any low blood sugars less than 70 for last 48 hours  Spoke with patient's daughter, patient does not have any history of diabetes she is not on antithyroid medication, no history of thyroid disease and she is not on any thyroid replacement therapy  She lives alone and she was in her usual state of hold onto was found with the family with change in mental status status 3 days prior to admission  Review of Systems  Was not able to perform due to patient's baseline condition      Historical Information   Past Medical History:   Diagnosis Date    Arthritis     Cancer [facility])     breast    Cardiac disease     afib    [facility] [address] [facility]     Hyperlipidemia     Hypertension     Stroke ([facility] )     TIA    TIA (transient ischemic attack)      Past Surgical History:   Procedure Laterality Date    BREAST SURGERY      right lumpectomy    TOTAL HIP ARTHROPLASTY Bilateral      Social History   Social History     Substance and Sexual Activity   Alcohol Use Not Currently     Social History     Substance and Sexual Activity   Drug Use No     Social History     Tobacco Use   Smoking Status Never Smoker   Smokeless Tobacco Never Used     Family History:   Family History   Problem Relation Age of Onset    Cancer Sister        Meds/Allergies   Current Facility-Administered Medications   Medication Dose Route Frequency Provider Last Rate Last Admin    acetaminophen (TYLENOL) tablet 650 mg  650 mg Oral Q6H PRN Oliva Lewis PA-C        albuterol (PROVENTIL HFA,VENTOLIN HFA) inhaler 2 puff  2 puff Inhalation Q4H PRN Rogers Ailyn, DO        amLODIPine (NORVASC) tablet 2 5 mg  2 5 mg Oral Daily Rogers Robertson, DO   2 5 mg at 01/01/21 0801    aspirin (ECOTRIN LOW STRENGTH) EC tablet 81 mg  81 mg Oral Daily Rogers Robertson, DO   81 mg at 01/01/21 6700    atorvastatin (LIPITOR) tablet 40 mg  40 mg Oral After Boby Salehith, DO   40 mg at 12/31/20 1706    calcium carbonate-vitamin D (OSCAL-D) 500 mg-200 units per tablet 1 tablet  1 tablet Oral Daily With Breakfast Rogers Robertson, DO   1 tablet at 01/01/21 0801    carvedilol (COREG) tablet 12 5 mg  12 5 mg Oral BID With Meals Rogers Robertson, DO   12 5 mg at 01/01/21 0801    ceFAZolin (ANCEF) IVPB (premix in dextrose) 1,000 mg 50 mL  1,000 mg Intravenous Q12H Kaylyn Baltazar PA-C 100 mL/hr at 01/01/21 1000 1,000 mg at 01/01/21 1000    citalopram (CeleXA) tablet 10 mg  10 mg Oral Daily Rogers Robertson, DO   10 mg at 01/01/21 0801    docusate sodium (COLACE) capsule 100 mg  100 mg Oral Daily Rogers Robertson, DO   100 mg at 29/51/56 1099    folic acid (FOLVITE) tablet 1 mg  1 mg Oral Daily Rogers Robertson, DO   1 mg at 01/01/21 0801    furosemide (LASIX) injection 40 mg  40 mg Intravenous BID (diuretic) Arjun Nascimento, DO   40 mg at 01/01/21 0801    lactulose oral solution 20 g  20 g Oral BID Eleanor Francis MD   20 g at 01/01/21 0808    melatonin tablet 3 mg  3 mg Oral HS Eleanor Francis MD   3 mg at 12/31/20 2129    mirtazapine (REMERON) tablet 7 5 mg  7 5 mg Oral HS Eleanor Francis MD   7 5 mg at 12/31/20 2129    ondansetron (ZOFRAN) injection 4 mg  4 mg Intravenous Q6H PRN Rogers Robertson DO        pantoprazole (PROTONIX) EC tablet 40 mg  40 mg Oral Early Morning Rogers Robertson DO   40 mg at 01/01/21 0294    thiamine (VITAMIN B1) tablet 100 mg  100 mg Oral Daily Arcenio Myles, DO   100 mg at 01/01/21 0801    traMADol (ULTRAM) tablet 50 mg  50 mg Oral Q6H PRN Oliva Lewis PA-C   50 mg at 12/30/20 6650     Allergies   Allergen Reactions    Lovenox [Enoxaparin Sodium] Swelling       Objective   Vitals: Blood pressure 127/64, pulse 66, temperature (!) 97 3 °F (36 3 °C), temperature source Rectal, resp  rate 21, height 5' 4" (1 626 m), weight 84 5 kg (186 lb 4 6 oz), SpO2 95 %  Intake/Output Summary (Last 24 hours) at 1/1/2021 1009  Last data filed at 1/1/2021 2851  Gross per 24 hour   Intake 650 ml   Output 1150 ml   Net -500 ml     Invasive Devices     Peripheral Intravenous Line            Peripheral IV 12/30/20 Right;Ventral (anterior) Forearm 1 day          Drain            External Urinary Catheter 1 day                Physical Exam  Constitutional:       Appearance: She is well-developed  Comments: Minimally verbal,   Does follow simple commands   HENT:      Head: Normocephalic and atraumatic  Nose: Nose normal    Eyes:      Pupils: Pupils are equal, round, and reactive to light  Neck:      Musculoskeletal: Normal range of motion  Thyroid: No thyromegaly  Vascular: No JVD  Cardiovascular:      Rate and Rhythm: Normal rate and regular rhythm  Heart sounds: Normal heart sounds  No murmur  No friction rub  No gallop  Pulmonary:      Effort: Pulmonary effort is normal  No respiratory distress  Breath sounds: Normal breath sounds  No stridor  No wheezing or rales  Chest:      Chest wall: No tenderness  Abdominal:      General: Bowel sounds are normal  There is no distension  Palpations: Abdomen is soft  There is no mass  Tenderness: There is no abdominal tenderness  There is no guarding  Musculoskeletal: Normal range of motion  General: No deformity  Skin:     General: Skin is warm  Neurological:      Mental Status: She is alert  Comments: Minimally verbal   Moves all extremities    Does follow simple commands          The history was obtained from the review of the chart,    Lab Results:       Lab Results   Component Value Date    WBC 8 80 12/31/2020    HGB 9 3 (L) 12/31/2020    HCT 29 5 (L) 12/31/2020    MCV 95 12/31/2020    PLT 63 (L) 12/31/2020     Lab Results   Component Value Date/Time    BUN 35 (H) 01/01/2021 01:49 AM    K 4 2 01/01/2021 01:49 AM    CL 93 (L) 01/01/2021 01:49 AM    CO2 33 (H) 01/01/2021 01:49 AM    CREATININE 1 35 (H) 01/01/2021 01:49 AM    AST 45 12/28/2020 09:52 PM    ALT 35 12/28/2020 09:52 PM    ALB 3 2 (L) 12/28/2020 09:52 PM     No results for input(s): CHOL, HDL, LDL, TRIG, VLDL in the last 72 hours  No results found for: Patsie Mater  POC Glucose (mg/dl)   Date Value   01/01/2021 121   12/31/2020 163 (H)       Imaging Studies: I have personally reviewed pertinent reports  Portions of the record may have been created with voice recognition software

## 2021-01-01 NOTE — PROGRESS NOTES
NEPHROLOGY PROGRESS NOTE   Masha Warren 80 y o  female MRN: [id number]  Unit/Bed#: [facility] 501-01 Encounter: [id number]      ASSESSMENT & PLAN     1  Mild acute kidney injury on stage IIIB chronic kidney disease with encephalopathy  ? Baseline creatinine 0 9-1 2, estimated GFR in the 40s  ? Creatinine remained stable and continues to improve  ? Avoiding nephrotoxins  ? Also with hyperkalemia that has improved  ? Urinalysis bland  ? Strict intakes and outputs and daily  ? Ongoing workup for encephalopathy  ? On cefazolin  ? Avoid hypotension  ? Monitor with diuresis  ? Blood sugars are better holding IV fluids oral intake remains poor     2  Blood pressure/volume overload  ? Hypertension-home medications include amlodipine 2 5 mg daily, carvedilol 12 5 mg daily, and furosemide 20 mg daily  ? Weight is increased from discharge in April and with edema  ? Creatinine stable   ? Continue furosemide 40 mg IV b i d  now with pure Wick, 1 8 L output, weight slowly improved     3  Electrolyte/acid-base status  ? Hypervolemic hyponatremia high urine osmolality normal urine sodium  ? TSH elevated T4 normal,   ? A m  Cortisol not low  ? On Celexa which can contribute to  hyponatremia  ? Continue Lasix to 40 mg IV b i d  D  ? BMP daily  ? Encourage solute intake     4  Hyperkalemia  ? Likely related to pre renal azotemia and acute kidney injury  ? not on a Ace or Arb  ?  Potassium is now normal  ? Diuresed to promote potassium wasting       SUBJECTIVE:    Patient's seen today  Sitting eating some her meal  purewick in place draining urine    OBJECTIVE:  Current Weight: Weight - Scale: 84 5 kg (186 lb 4 6 oz)  Vitals:    01/01/21 0700   BP: 127/64   Pulse: 66   Resp: 21   Temp: (!) 97 3 °F (36 3 °C)   SpO2: 95%       Intake/Output Summary (Last 24 hours) at 1/1/2021 0954  Last data filed at 1/1/2021 9928  Gross per 24 hour   Intake 650 ml   Output 1150 ml   Net -500 ml     Weight (last 2 days)     Date/Time   Weight    01/01/21 0583 84 5 (186 29)    12/30/20 0531   84 8 (186 95)              General: conscious, cooperative, in not acute distress  Eyes: conjunctivae pink, anicteric sclerae  ENT: lips and mucous membranes moist  Neck: supple, no JVD  Chest: clear breath sounds bilateral, no crackles, ronchus or wheezings  CVS: distinct S1 & S2, normal rate, regular rhythm  Abdomen: soft, non-tender, non-distended, normoactive bowel sounds  Extremities:  2+ edema to the  Skin: no rash  Neuro:  Remains confused      Medications:    Current Facility-Administered Medications:     acetaminophen (TYLENOL) tablet 650 mg, 650 mg, Oral, Q6H PRN, Oliva Lewis PA-C    albuterol (PROVENTIL HFA,VENTOLIN HFA) inhaler 2 puff, 2 puff, Inhalation, Q4H PRN, [facility]     amLODIPine (NORVASC) tablet 2 5 mg, 2 5 mg, Oral, Daily, [facility] , 2 5 mg at 01/01/21 0801    aspirin (ECOTRIN LOW STRENGTH) EC tablet 81 mg, 81 mg, Oral, Daily, [facility] DO, 81 mg at 01/01/21 0808    atorvastatin (LIPITOR) tablet 40 mg, 40 mg, Oral, After Dinner, [facility] , 40 mg at 12/31/20 1706    calcium carbonate-vitamin D (OSCAL-D) 500 mg-200 units per tablet 1 tablet, 1 tablet, Oral, Daily With Breakfast, [facility], 1 tablet at 01/01/21 0801    carvedilol (COREG) tablet 12 5 mg, 12 5 mg, Oral, BID With Meals, [facility] , 12 5 mg at 01/01/21 0801    ceFAZolin (ANCEF) IVPB (premix in dextrose) 1,000 mg 50 mL, 1,000 mg, Intravenous, Q12H, Yadiel Gordon PA-C, Last Rate: 100 mL/hr at 12/31/20 2129, 1,000 mg at 12/31/20 2129    citalopram (CeleXA) tablet 10 mg, 10 mg, Oral, Daily, [facility], 10 mg at 01/01/21 0801    docusate sodium (COLACE) capsule 100 mg, 100 mg, Oral, Daily, Arnoldo Leung DO, 100 mg at 35/82/01 4406    folic acid (FOLVITE) tablet 1 mg, 1 mg, Oral, Daily, Arnoldo Leung DO, 1 mg at 01/01/21 0801    furosemide (LASIX) injection 40 mg, 40 mg, Intravenous, BID (diuretic), Weisupa Andersen DO, 40 mg at 01/01/21 0801    lactulose oral solution 20 g, 20 g, Oral, BID, Dennis Winslow MD, 20 g at 01/01/21 0808    melatonin tablet 3 mg, 3 mg, Oral, HS, Dennis Winslow MD, 3 mg at 12/31/20 2129    mirtazapine (REMERON) tablet 7 5 mg, 7 5 mg, Oral, HS, Dennis Winslow MD, 7 5 mg at 12/31/20 2129    ondansetron (ZOFRAN) injection 4 mg, 4 mg, Intravenous, Q6H PRN, Joey Robison DO    pantoprazole (PROTONIX) EC tablet 40 mg, 40 mg, Oral, Early Morning, Joey Robison DO, 40 mg at 01/01/21 8426    thiamine (VITAMIN B1) tablet 100 mg, 100 mg, Oral, Daily, Joey Robison DO, 100 mg at 01/01/21 0801    traMADol (ULTRAM) tablet 50 mg, 50 mg, Oral, Q6H PRN, Ita Henriquez PA-C, 50 mg at 12/30/20 0521    Invasive Devices:      Lab Results:   Results from last 7 days   Lab Units 01/01/21  0149 12/31/20  0556 12/30/20  2350 12/30/20  1651 12/30/20  0435  12/29/20  0633 12/29/20  0617 12/29/20  0421  12/28/20  2318 12/28/20  2152   WBC Thousand/uL  --  8 80  --   --  7 33  --   --   --  5 04  --   --  6 37   HEMOGLOBIN g/dL  --  9 3*  --   --  9 7*  --   --   --  9 3*  --   --  10 6*   HEMATOCRIT %  --  29 5*  --   --  31 2*  --   --   --  28 7*  --   --  34 1*   PLATELETS Thousands/uL  --  63*  --   --  58*  --   --   --  57*  --   --  66*   POTASSIUM mmol/L 4 2 4 6 4 8 4 8 5 0   < >  --   --   --    < >  --  6 1*   CHLORIDE mmol/L 93* 95* 94* 95* 93*   < >  --   --   --    < >  --  94*   CO2 mmol/L 33* 29 33* 30 31   < >  --   --   --    < >  --  33*   BUN mg/dL 35* 42* 46* 48* 48*   < >  --   --   --    < >  --  55*   CREATININE mg/dL 1 35* 1 42* 1 45* 1 49* 1 56*   < >  --   --   --    < >  --  1 64*   CALCIUM mg/dL 9 3 9 3 10 1 9 6 10 0   < >  --   --   --    < >  --  10 1   MAGNESIUM mg/dL  --   --   --   --  2 5  --   --   --   --   --   --   --    PHOSPHORUS mg/dL  --   --   --   --  3 5  --   --   --   --   --   --   --    ALK PHOS U/L  --   --   --   --   --   --   --   --   --   --   --  122*   ALT U/L  --   --   --   --   --   --   --   --   --   --   --  35   AST U/L  --   --   --   --   --   --   --   --   --   --   --  45   BLOOD CULTURE   --   --   --   --   --   --  No Growth at 48 hrs  No Growth at 48 hrs   --   --   --   --    LEUKOCYTES UA   --   --   --   --   --   --   --   --   --   --  Negative  --    BLOOD UA   --   --   --   --   --   --   --   --   --   --  Negative  --     < > = values in this interval not displayed  Portions of the record may have been created with voice recognition software  Occasional wrong word or "sound a like" substitutions may have occurred due to the inherent limitations of voice recognition software  Read the chart carefully and recognize, using context, where substitutions have occurred  If you have any questions, please contact the dictating provider

## 2021-01-01 NOTE — ASSESSMENT & PLAN NOTE
Lab Results   Component Value Date    EGFR 35 01/01/2021    EGFR 33 12/31/2020    EGFR 32 12/30/2020    CREATININE 1 35 (H) 01/01/2021    CREATININE 1 42 (H) 12/31/2020    CREATININE 1 45 (H) 12/30/2020   · POA, baseline approximately 0 9  · Possibly in setting of volume overload  · Improving with IV diuresis per nephrology recs  · UA reviewed, measure PVR and bladder scan  · Monitor with BMP  · Hold nephrotoxins avoid hypotension

## 2021-01-01 NOTE — ASSESSMENT & PLAN NOTE
With hypothermia and urethral abnormality including hyponatremia and hyperkalemia,  Patient has had episodes of low sugar, low dose one was 56 mg/dL, she did not have any episodes of hypoglycemia less than 55 mg/dL she does not fulfill the criteria for Whipple's triad, and hypoglycemia is less likely causing encephalopathy  Low sugar is mostly due to poor nutrition, as she is not eating her meals in the hospital and probably she was not eating at home while she was sick, in addition as patient was hypothermic, fingersticks are not reliable, also considering CHF exacerbation causing low peripheral perfusion  Cortisol a m  Was checked which showed 21 8, appropriate response to stress, adrenal insufficiency is unlikely  Will recommend nutrition consult along with SLP evaluation

## 2021-01-02 PROBLEM — Z71.89 GOALS OF CARE, COUNSELING/DISCUSSION: Status: ACTIVE | Noted: 2021-01-02

## 2021-01-02 LAB
AMMONIA PLAS-SCNC: 16 UMOL/L (ref 11–35)
ANION GAP SERPL CALCULATED.3IONS-SCNC: 4 MMOL/L (ref 4–13)
BUN SERPL-MCNC: 35 MG/DL (ref 5–25)
CALCIUM SERPL-MCNC: 9.5 MG/DL (ref 8.3–10.1)
CHLORIDE SERPL-SCNC: 96 MMOL/L (ref 100–108)
CO2 SERPL-SCNC: 34 MMOL/L (ref 21–32)
CORTIS SERPL-MCNC: 21.3 UG/DL
CORTIS SERPL-MCNC: 41.1 UG/DL
CREAT SERPL-MCNC: 1.36 MG/DL (ref 0.6–1.3)
GFR SERPL CREATININE-BSD FRML MDRD: 35 ML/MIN/1.73SQ M
GLUCOSE SERPL-MCNC: 84 MG/DL (ref 65–140)
LIPASE SERPL-CCNC: 188 U/L (ref 73–393)
POTASSIUM SERPL-SCNC: 4 MMOL/L (ref 3.5–5.3)
SODIUM SERPL-SCNC: 134 MMOL/L (ref 136–145)

## 2021-01-02 PROCEDURE — 82140 ASSAY OF AMMONIA: CPT | Performed by: INTERNAL MEDICINE

## 2021-01-02 PROCEDURE — 99222 1ST HOSP IP/OBS MODERATE 55: CPT | Performed by: UROLOGY

## 2021-01-02 PROCEDURE — 82533 TOTAL CORTISOL: CPT | Performed by: INTERNAL MEDICINE

## 2021-01-02 PROCEDURE — 83690 ASSAY OF LIPASE: CPT | Performed by: INTERNAL MEDICINE

## 2021-01-02 PROCEDURE — 99233 SBSQ HOSP IP/OBS HIGH 50: CPT | Performed by: INTERNAL MEDICINE

## 2021-01-02 PROCEDURE — 99222 1ST HOSP IP/OBS MODERATE 55: CPT | Performed by: INTERNAL MEDICINE

## 2021-01-02 PROCEDURE — 92610 EVALUATE SWALLOWING FUNCTION: CPT

## 2021-01-02 PROCEDURE — 80048 BASIC METABOLIC PNL TOTAL CA: CPT | Performed by: INTERNAL MEDICINE

## 2021-01-02 RX ORDER — BISACODYL 10 MG
10 SUPPOSITORY, RECTAL RECTAL DAILY
Status: DISCONTINUED | OUTPATIENT
Start: 2021-01-02 | End: 2021-01-08 | Stop reason: HOSPADM

## 2021-01-02 RX ORDER — COSYNTROPIN 0.25 MG/ML
0.25 INJECTION, POWDER, FOR SOLUTION INTRAMUSCULAR; INTRAVENOUS ONCE
Status: COMPLETED | OUTPATIENT
Start: 2021-01-02 | End: 2021-01-02

## 2021-01-02 RX ORDER — METOPROLOL TARTRATE 5 MG/5ML
2.5 INJECTION INTRAVENOUS EVERY 6 HOURS PRN
Status: DISCONTINUED | OUTPATIENT
Start: 2021-01-02 | End: 2021-01-08 | Stop reason: HOSPADM

## 2021-01-02 RX ORDER — CARVEDILOL 12.5 MG/1
12.5 TABLET ORAL 2 TIMES DAILY WITH MEALS
Status: DISCONTINUED | OUTPATIENT
Start: 2021-01-02 | End: 2021-01-02

## 2021-01-02 RX ORDER — FUROSEMIDE 10 MG/ML
20 INJECTION INTRAMUSCULAR; INTRAVENOUS ONCE
Status: COMPLETED | OUTPATIENT
Start: 2021-01-02 | End: 2021-01-02

## 2021-01-02 RX ORDER — DEXTROSE AND SODIUM CHLORIDE 5; .45 G/100ML; G/100ML
50 INJECTION, SOLUTION INTRAVENOUS CONTINUOUS
Status: DISCONTINUED | OUTPATIENT
Start: 2021-01-02 | End: 2021-01-04

## 2021-01-02 RX ORDER — ACETAMINOPHEN 650 MG/1
325 SUPPOSITORY RECTAL EVERY 4 HOURS PRN
Status: DISCONTINUED | OUTPATIENT
Start: 2021-01-02 | End: 2021-01-08 | Stop reason: HOSPADM

## 2021-01-02 RX ORDER — FUROSEMIDE 10 MG/ML
40 INJECTION INTRAMUSCULAR; INTRAVENOUS DAILY
Status: DISCONTINUED | OUTPATIENT
Start: 2021-01-03 | End: 2021-01-05

## 2021-01-02 RX ADMIN — FUROSEMIDE 40 MG: 10 INJECTION, SOLUTION INTRAMUSCULAR; INTRAVENOUS at 10:24

## 2021-01-02 RX ADMIN — BISACODYL 10 MG: 10 SUPPOSITORY RECTAL at 16:06

## 2021-01-02 RX ADMIN — PANTOPRAZOLE SODIUM 40 MG: 40 TABLET, DELAYED RELEASE ORAL at 06:20

## 2021-01-02 RX ADMIN — DEXTROSE AND SODIUM CHLORIDE 50 ML/HR: 5; .45 INJECTION, SOLUTION INTRAVENOUS at 11:24

## 2021-01-02 RX ADMIN — COSYNTROPIN 0.25 MG: 0.25 INJECTION, POWDER, LYOPHILIZED, FOR SOLUTION INTRAMUSCULAR; INTRAVENOUS at 20:22

## 2021-01-02 RX ADMIN — FUROSEMIDE 20 MG: 10 INJECTION, SOLUTION INTRAMUSCULAR; INTRAVENOUS at 15:03

## 2021-01-02 NOTE — ASSESSMENT & PLAN NOTE
No significant improvement from admission  She is able to control her temperature  However, not eating at all  Family informed her wishes no tube feeding  Will monitor closely   Palliative care recommendations appreciated

## 2021-01-02 NOTE — CONSULTS
UROLOGY CONSULTATION NOTE     Patient Identifiers: Whitley Joaquin (MRN [id number])  Service Requesting Consultation:  [facility] Internal Medicine  Service Providing Consultation:  Urology, Lopez Soria MD    Date of Service: 1/2/2021  Inpatient consult to Urology  Consult performed by: Lopez Soria MD  Consult ordered by: Angel Barajas MD          Reason for Consultation:  Indeterminate left renal mass    History of Present Illness:     Whitley Joaquin is a 80 y o  old female admitted with hypothermia and found on workup to have a complex left renal cyst   The patient is confused and unable to give any history  She denies pain  History is obtained from review of the chart  There is prior history urinary tract infection  ASSESSMENT & PLAN:     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  Acute kidney injury    Recommendation:  In light of her poor performance status, acute kidney injury and failure to thrive doubt workup is indicated  Should she improved significantly, outpatient evaluation to include MRI of the kidneys with and without contrast can be considered to ensure the lesion is not malignant  In any event this is a small lesion and even if malignant would be unlikely to affect her life span  Urology will sign off  Please call should problems arise or for further evaluation is to be considered  Discussed with Medicine      Past Medical, Past Surgical History:     Past Medical History:   Diagnosis Date    Arthritis     Cancer [facility])     breast    Cardiac disease     afib    [facility] [address] [facility]     Hyperlipidemia     Hypertension     [facility]     TIA    TIA (transient ischemic attack)    :    Past Surgical History:   Procedure Laterality Date    BREAST SURGERY      right lumpectomy    TOTAL HIP ARTHROPLASTY Bilateral    :    Medications, Allergies:     Current Facility-Administered Medications   Medication Dose Route Frequency    acetaminophen (TYLENOL) tablet 650 mg  650 mg Oral Q6H PRN    albuterol (PROVENTIL HFA,VENTOLIN HFA) inhaler 2 puff  2 puff Inhalation Q4H PRN    amLODIPine (NORVASC) tablet 2 5 mg  2 5 mg Oral Daily    aspirin (ECOTRIN LOW STRENGTH) EC tablet 81 mg  81 mg Oral Daily    atorvastatin (LIPITOR) tablet 40 mg  40 mg Oral After Dinner    calcium carbonate-vitamin D (OSCAL-D) 500 mg-200 units per tablet 1 tablet  1 tablet Oral Daily With Breakfast    carvedilol (COREG) tablet 12 5 mg  12 5 mg Oral BID With Meals    citalopram (CeleXA) tablet 10 mg  10 mg Oral Daily    docusate sodium (COLACE) capsule 100 mg  100 mg Oral Daily    folic acid (FOLVITE) tablet 1 mg  1 mg Oral Daily    furosemide (LASIX) injection 40 mg  40 mg Intravenous BID (diuretic)    lactulose oral solution 20 g  20 g Oral BID    melatonin tablet 3 mg  3 mg Oral HS    mirtazapine (REMERON) tablet 7 5 mg  7 5 mg Oral HS    multivitamin-minerals (CENTRUM) tablet 1 tablet  1 tablet Oral Daily    ondansetron (ZOFRAN) injection 4 mg  4 mg Intravenous Q6H PRN    pantoprazole (PROTONIX) EC tablet 40 mg  40 mg Oral Early Morning    thiamine (VITAMIN B1) tablet 100 mg  100 mg Oral Daily       Allergies: Allergies   Allergen Reactions    Lovenox [Enoxaparin Sodium] Swelling   :    Social and Family History:   Social History:   Social History     Tobacco Use    Smoking status: Never Smoker    Smokeless tobacco: Never Used   Substance Use Topics    Alcohol use: Not Currently    Drug use: No        Social History     Tobacco Use   Smoking Status Never Smoker   Smokeless Tobacco Never Used       Family History:  Family History   Problem Relation Age of Onset    Cancer Sister    :     Review of Systems:     Review of systems unable to perform secondary to mental status      Physical Exam:     /63 (BP Location: Left arm)   Pulse 68   Temp 99 °F (37 2 °C) (Rectal)   Resp 22   Ht 5' 4" (1 626 m)   Wt 84 5 kg (186 lb 4 6 oz)   SpO2 91% BMI 31 98 kg/m²   General appearance: Confused, awake  Head: Normocephalic, without obvious abnormality, atraumatic  Neck: supple, symmetrical, trachea midline  Back: symmetric, no curvature  ROM normal  No CVA tenderness  Lungs: Normal respiratory effort  Heart: regular rate and rhythm  Abdomen: soft, non-tender; bowel sounds normal; no masses,  no organomegaly  Extremities: extremities normal, warm and well-perfused; no cyanosis, clubbing, or edema  Neurologic: Grossly normal      Labs:     Lab Results   Component Value Date    HGB 9 3 (L) 12/31/2020    HCT 29 5 (L) 12/31/2020    WBC 8 80 12/31/2020    PLT 63 (L) 12/31/2020   ]    Lab Results   Component Value Date    K 4 2 01/01/2021    CL 93 (L) 01/01/2021    CO2 33 (H) 01/01/2021    BUN 35 (H) 01/01/2021    CREATININE 1 35 (H) 01/01/2021    CALCIUM 9 3 01/01/2021   ]  Urinalysis 12/28/2020 is negative  Imaging:   I personally reviewed the images and report of the following studies:  Renal ultrasound, blood work and urinalysis          Thank you for allowing me to participate in this patients care  Please do not hesitate to call with any additional questions    Samara Ricks MD

## 2021-01-02 NOTE — CONSULTS
Consultation - Palliative & Supportive Care   Jarett Eng  80 y o   female  PPHP 501/PPHP 501-01   MRN: [id number]  Encounter: [id number]    ASSESSMENT:    Patient Active Problem List   Diagnosis    Atrial fibrillation [facility]    Essential hypertension    Left lower lobe pneumonia    Acute on chronic combined systolic and diastolic congestive heart failure ([facility] )    Cardiomyopathy ([facility]    Mitral regurgitation    Acute renal failure superimposed on stage 2 chronic kidney disease ([facility]    Ambulatory dysfunction    Toxic metabolic encephalopathy    Hypothermia    Thrombocytopenia (HCC)    Anemia    Hyponatremia    Hyperkalemia    Erythema of lower extremity    Elevated TSH       PLAN:    1  Goals:    Family requests to have today to speak amongst themselves, led by daughter/Oscar, to determine best course of action for mom  She is steadfast in honoring patient's wish of no feeding tubes, and may be leaning more towards hospice cares   [facility] will revisit [address] tomorrow   For now, continue current cares for encephalopathy    Code status: Level 3 - DNAR and DNI   Decisional apparatus:  Patient does not have capacity to make medical decisions on my exam today  If such capacity is lost, patient's substitute decision maker would default to [facility] [place]  Advance Directive / Living Will / POLST:  Reviewed with daughter    2  Social Support:   Supportive listening provided   Time spent providing emotional support to daughter who is understandably struggling with this difficult decision at this time  We appreciate the opportunity to participate in this patient's care  We will continue to follow  Please do not hesitate to contact our on-call provider through our clinic answering service at [phone] should you have acute symptom control concerns      IDENTIFICATION:  Inpatient consult to Palliative Care  Consult performed by: Hortencia Goldberg MD  Consult ordered by: Shweta Daly MD        Reason for Consult / Principal Problem: [facility]    HISTORY OF PRESENT ILLNESS:    Ashley Samaniego is a 80 y o  female with chronic biventricular CHF, CKD2, HTN, Afib admitted from home after being found confused by family  Since admission, was being managed for presumed infection/UTI/cellulitis, hermes eliser for hypothermia, ARF, hyperkalemia, hyponatermia  She is seen in consultation by nephrology and endocrinology  Her CT Head was negative  She continues to decline/fail to improve significantly  Now with poor oral intake  Evaluated by SLP this morning and was placed on NPO for confusion, food pocketing, severe oropharyngeal dysphagia  [facility] for [address]  Seen today at bedside  Awake but confused  Spoke to daughter/ER contact/Oscar  Prior to coming in, patient largely independently, with some assistance needed for showering (patient's family comes in to ensure safety)  This is therefore a dramatic change  Spoke to daughter about overall failure to thrive clinical picture with no clear source of reason for her encephalopathy despite full supportive cares  Poor oral intake/nutrition a big issue at this time  Spoke to her about hospice as an option  Pt declines a feeding tube per her living will       Interview and exam limited by: confusion    Review of Systems   Unable to perform ROS: Mental status change       Past Medical History:   Diagnosis Date    Arthritis     Cancer ([facility] )     breast    Cardiac disease     afib    CHF (congestive heart failure) ([facility]     Hyperlipidemia     Hypertension     Stroke ([facility] )     TIA    TIA (transient ischemic attack)      Past Surgical History:   Procedure Laterality Date    BREAST SURGERY      right lumpectomy    TOTAL HIP ARTHROPLASTY Bilateral      Social History     Socioeconomic History    Marital status: Unknown     Spouse name: Not on file    Number of children: Not on file    Years of education: Not on file    Highest education level: Not on file   Occupational History    Not on file   Social Needs    Financial resource strain: Not on file    Food insecurity     Worry: Not on file     Inability: Not on file    Transportation needs     Medical: Not on file     Non-medical: Not on file   Tobacco Use    Smoking status: Never Smoker    Smokeless tobacco: Never Used   Substance and Sexual Activity    Alcohol use: Not Currently    Drug use: No    Sexual activity: Not Currently   Lifestyle    Physical activity     Days per week: Not on file     Minutes per session: Not on file    Stress: Not on file   Relationships    Social connections     Talks on phone: Not on file     Gets together: Not on file     Attends [employer] service: Not on file     Active member of club or organization: Not on file     Attends meetings of clubs or organizations: Not on file     Relationship status: Not on file    Intimate partner violence     Fear of current or ex partner: Not on file     Emotionally abused: Not on file     Physically abused: Not on file     Forced sexual activity: Not on file   Other Topics Concern    Not on file   Social History Narrative    Not on file     Family History   Problem Relation Age of Onset    Cancer Sister        MEDICATIONS / ALLERGIES:  all current active meds have been reviewed    Allergies   Allergen Reactions    Lovenox [Enoxaparin Sodium] Swelling       OBJECTIVE:  /63 (BP Location: Left arm)   Pulse 68   Temp 99 °F (37 2 °C) (Rectal)   Resp 22   Ht 5' 4" (1 626 m)   Wt 84 5 kg (186 lb 4 6 oz)   SpO2 91%   BMI 31 98 kg/m²   Physical Exam:  Constitutional: Appears well-developed and well-nourished  In no acute physical or emotional distress  Head: Normocephalic and atraumatic  Eyes: EOM are normal  No ocular discharge  No scleral icterus  Neck: No visible adenopathy or masses  Respiratory: Effort normal  No stridor  No respiratory distress  Gastrointestinal: No abdominal distension  Musculoskeletal: No edema  Neurological: Alert, confused, unable to provide history   Skin: Dry, no diaphoresis  Pale  Psychiatric: Calm     Lab Results: I have personally reviewed pertinent labs  Imaging Studies: I have personally reviewed pertinent reports  EKG, Pathology, and Other Studies: I have personally reviewed pertinent reports  Counseling / Coordination of Care:  Counseling / Coordination of Care  Total floor / unit time spent today 60+ minutes  Greater than 50% of total time was spent with the patient and / or family counseling and / or coordination of care  A description of the counseling / coordination of care: provided medical updates, discussed palliative care, discussed hospice care, determined competency, determined goals of care, determined POA, determined social/family support, discussed plans of care, discussed symptom management, provided psychosocial support       Karen Lee MD  [address] and Supportive Care

## 2021-01-02 NOTE — PROGRESS NOTES
Progress Note - Umesh Jameslyly 5/5/1932, 80 y o  female MRN: [id number]    Unit/Bed#: [facility] 501-01 Encounter: [id number]    Primary Care Provider: Pastor Bourne MD   Date and time admitted to hospital: 12/28/2020  9:15 PM        * Toxic metabolic encephalopathy  Assessment & Plan  · Presented with increased weakness and confusion; with recent treatment for apparent UTI, low temps  · Hyponatremia and acute renal failure are noted with evidence of volume overload with specific plans for these as below  · Otherwise with no other clear infectious source at this time, will follow up results of blood cultures x2 however does have erythema LE bilaterally, hot to the touch, started on IV ancef for possible superimposed cellulitis  · Vitamin b12 and folate unremarkable  · CT head negative      Goals of care, counseling/discussion  Assessment & Plan  No significant improvement from admission  She is able to control her temperature  However, not eating at all  Family informed her wishes no tube feeding  Will monitor closely   Palliative care recommendations appreciated  Erythema of lower extremity  Assessment & Plan  · Hypothermic on admission, no leukocytosis  · Bilateral LE erythema, hot to the touch    Per daughter does have some chronic erythema at baseline  · Started on IV ancef for possible superimposed cellulitis  · Venous duplex negative DVT    Hyperkalemia  Assessment & Plan  · K 6 1 on admission, improving s/p calcium gluconate and IV Lasix  · EKG without acute changes  · Likely in setting of acute kidney injury  · Improving  · Continue to monitor with BMP q 8 hours    Hyponatremia  Assessment & Plan  · Sodium 128 on POA, poor po intake and volume overload  · Received total 40 mg IV lasix in the ED  · Continue diuresis per nephrology recs  · Continue fluid restriction  · On celexa, 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  · Holding all oral medications      Hypothermia  Assessment & Plan  · With hypothermia on presentation  Possibly in setting of limited oral intake, cannot exclude infection  Started on IV ancef for possible LE cellulitis  · Improved, hermes price restarted keep temp >98  · TSH wnl    Acute renal failure superimposed on stage 2 chronic kidney disease [facility])  Assessment & Plan  Lab Results   Component Value Date    EGFR 35 01/02/2021    EGFR 35 01/01/2021    EGFR 33 12/31/2020    CREATININE 1 36 (H) 01/02/2021    CREATININE 1 35 (H) 01/01/2021    CREATININE 1 42 (H) 12/31/2020   · POA, baseline approximately 0 9  · Possibly in setting of volume overload  · Improving with IV diuresis per nephrology recs  · UA reviewed, measure PVR and bladder scan  · Monitor with BMP  · Hold nephrotoxins avoid hypotension    Acute on chronic combined systolic and diastolic congestive heart failure (HCC)  Assessment & Plan  Wt Readings from Last 3 Encounters:   01/01/21 84 5 kg (186 lb 4 6 oz)   04/03/20 74 6 kg (164 lb 7 4 oz)   04/21/17 74 1 kg (163 lb 5 8 oz)     · Patient with lower extremity edema and CXR suspicious for pulmonary vascular congestion  · Received total 40 mg IV Lasix in ED   · Monitor daily weights, I/O  · Low-sodium, fluid-restricted diet  · Nephrology following regarding hypervolemic hyponatremia - receiving IV diuresis  · Monitor BMP          Essential hypertension  Assessment & Plan  · Continue home antihypertensives with hold parameters  · Monitor blood pressure per protocol    Atrial fibrillation ([facility] )  Assessment & Plan  · Continue Coreg; digoxin listed on home medication list however daughter states patient no longer takes this  · Not on anticoagulation due to fall risk         VTE Pharmacologic Prophylaxis:   Pharmacologic: Heparin  Mechanical VTE Prophylaxis in Place: Yes    Patient Centered Rounds: I have performed bedside rounds with nursing staff today      Discussions with Specialists or Other Care Team Provider: palliative care, nephrology     Education and Discussions with Family / Patient: patient     Time Spent for Care: 45 minutes  More than 50% of total time spent on counseling and coordination of care as described above  Current Length of Stay: 4 day(s)    Current Patient Status: Inpatient   Certification Statement: The patient will continue to require additional inpatient hospital stay due to ams    Discharge Plan: pending improvement  Code Status: Level 3 - DNAR and DNI      Subjective:       Objective:     Vitals:   Temp (24hrs), Av 4 °F (36 9 °C), Min:97 8 °F (36 6 °C), Max:99 °F (37 2 °C)    Temp:  [97 8 °F (36 6 °C)-99 °F (37 2 °C)] 97 8 °F (36 6 °C)  HR:  [68] 68  Resp:  [18-22] 20  BP: ()/(48-67) 150/67  SpO2:  [91 %-96 %] 96 %  Body mass index is 31 98 kg/m²  Input and Output Summary (last 24 hours): Intake/Output Summary (Last 24 hours) at 2021 1619  Last data filed at 2021 2303  Gross per 24 hour   Intake 170 ml   Output 575 ml   Net -405 ml       Physical Exam:     Physical Exam  Vitals signs and nursing note reviewed  Constitutional:       General: She is not in acute distress  Appearance: Normal appearance  She is obese  She is not ill-appearing, toxic-appearing or diaphoretic  HENT:      Head: Normocephalic and atraumatic  Nose: Nose normal  No congestion or rhinorrhea  Mouth/Throat:      Mouth: Mucous membranes are dry  Pharynx: No oropharyngeal exudate or posterior oropharyngeal erythema  Eyes:      General: No scleral icterus  Right eye: No discharge  Left eye: No discharge  Extraocular Movements: Extraocular movements intact  Conjunctiva/sclera: Conjunctivae normal       Pupils: Pupils are equal, round, and reactive to light  Cardiovascular:      Rate and Rhythm: Normal rate  Pulmonary:      Effort: Pulmonary effort is normal  No respiratory distress  Breath sounds: Normal breath sounds  No stridor  No wheezing or rhonchi  Abdominal:      General: Abdomen is flat   Bowel sounds are normal       Palpations: Abdomen is soft  Musculoskeletal: Normal range of motion  General: No swelling, tenderness, deformity or signs of injury  Right lower leg: No edema  Left lower leg: No edema  Skin:     General: Skin is warm and dry  Capillary Refill: Capillary refill takes less than 2 seconds  Coloration: Skin is not jaundiced or pale  Findings: No bruising, erythema, lesion or rash  Neurological:      General: No focal deficit present  Mental Status: She is alert  Mental status is at baseline  She is disoriented  Cranial Nerves: No cranial nerve deficit  Sensory: No sensory deficit  Motor: No weakness  Coordination: Coordination normal       Gait: Gait normal       Deep Tendon Reflexes: Reflexes normal    Psychiatric:         Mood and Affect: Mood normal          Behavior: Behavior normal          Thought Content: Thought content normal          Additional Data:     Labs:    Results from last 7 days   Lab Units 12/31/20  0556   WBC Thousand/uL 8 80   HEMOGLOBIN g/dL 9 3*   HEMATOCRIT % 29 5*   PLATELETS Thousands/uL 63*   NEUTROS PCT % 83*   LYMPHS PCT % 7*   MONOS PCT % 9   EOS PCT % 1     Results from last 7 days   Lab Units 01/02/21  0507  12/28/20  2152   POTASSIUM mmol/L 4 0   < > 6 1*   CHLORIDE mmol/L 96*   < > 94*   CO2 mmol/L 34*   < > 33*   BUN mg/dL 35*   < > 55*   CREATININE mg/dL 1 36*   < > 1 64*   CALCIUM mg/dL 9 5   < > 10 1   ALK PHOS U/L  --   --  122*   ALT U/L  --   --  35   AST U/L  --   --  45    < > = values in this interval not displayed  * I Have Reviewed All Lab Data Listed Above  * Additional Pertinent Lab Tests Reviewed:  [address] Admission Reviewed    Imaging:    Imaging Reports Reviewed Today Include:    Imaging Personally Reviewed by Myself Includes:       Recent Cultures (last 7 days):     Results from last 7 days   Lab Units 12/29/20  0633 12/29/20  0617   BLOOD CULTURE  No Growth After 4 Days  No Growth After 4 Days  Last 24 Hours Medication List:   Current Facility-Administered Medications   Medication Dose Route Frequency Provider Last Rate    acetaminophen  325 mg Rectal Q4H PRN Hyun Chatman MD      albuterol  2 puff Inhalation Q4H PRN Aissatou Freed DO      bisacodyl  10 mg Rectal Daily Hyun Chatman MD      dextrose 5 % and sodium chloride 0 45 %  50 mL/hr Intravenous Continuous Wei Andersen, DO 50 mL/hr (01/02/21 1124)    [START ON 1/3/2021] furosemide  40 mg Intravenous Daily Viviana Partida DO      metoprolol  2 5 mg Intravenous Q6H PRN Hyun Chatman MD      morphine injection  1 mg Intravenous Q4H PRN Hyun Chatman MD      pantoprazole  40 mg Oral Early Morning Aissatou Freed DO          Today, Patient Was Seen By: Hyun Chatman MD    ** Please Note: Dictation voice to text software may have been used in the creation of this document   **

## 2021-01-02 NOTE — PROGRESS NOTES
NEPHROLOGY PROGRESS NOTE   Radamse Johnson 80 y o  female MRN: [id number]  Unit/Bed#: [facility] 501-01 Encounter: [id number]      ASSESSMENT & PLAN    1   acute kidney injury on stage IIIB chronic kidney disease with encephalopathy  ? Baseline creatinine 0 9-1 2, estimated GFR in the 40s  ? Creatinine remained stable  today's creatinine is pending  ? Avoiding nephrotoxins  ? Also with hyperkalemia that has improved  ? Urinalysis bland  ? Strict intakes and outputs and daily  ? Ongoing workup for encephalopathy  ?  off antibiotics  ? Avoid hypotension  ?  still with poor p o  intake  ? continue D5 half-normal saline started at 50 cc an titrate  ?  decreased furosemide daily     2  Blood pressure/volume overload  ? Hypertension-home medications include amlodipine 2 5 mg daily, carvedilol 12 5 mg daily, and furosemide 20 mg daily  ? Creatinine stable   ? Continue furosemide 40 mg IV  Daily for now  ? now with some low blood pressure discontinue amlodipine  ?  hold carvedilol for systolic of 127     3  Electrolyte/acid-base status  ? Hypervolemic hyponatremia high urine osmolality normal urine sodium  § TSH elevated T4 normal,   § A m  Cortisol not low  § On Celexa which can contribute to  hyponatremia  § change Lasix to 40 mg iv daily  § BMP daily  § Encourage solute intake  § Repeat BMP today     4  Hyperkalemia  ? Likely related to pre renal azotemia and acute kidney injury  ? not on a Ace or Arb  ?  Potassium is now normal  ? Diuresed to promote potassium wasting    SUBJECTIVE:     patient was seen   remains confused   not tolerating p o  intake well   no chest pain or shortness of breath    OBJECTIVE:  Current Weight: Weight - Scale: 84 5 kg (186 lb 4 6 oz)  Vitals:    01/02/21 0614   BP: 146/63   Pulse: 68   Resp: 22   Temp: 99 °F (37 2 °C)   SpO2: 91%       Intake/Output Summary (Last 24 hours) at 1/2/2021 1039  Last data filed at 1/1/2021 2303  Gross per 24 hour   Intake 170 ml   Output 725 ml   Net -555 ml     Weight (last 2 days)     Date/Time   Weight    01/01/21 0552   84 5 (186 29)              General: conscious, cooperative, in not acute distress  Eyes: conjunctivae pink, anicteric sclerae  ENT: lips and mucous membranes moist  Neck: supple, no JVD  Chest: no accessory muscle use or respiratory distress  CVS:  No tachycardia  Abdomen: soft, non-tender, non-distended, normoactive bowel sounds  Extremities: 2+ pitting  Skin: no rash  Neuro:  encephalopathy      Medications:    Current Facility-Administered Medications:     acetaminophen (TYLENOL) tablet 650 mg, 650 mg, Oral, Q6H PRN, Oliva Lewis PA-C    albuterol (PROVENTIL HFA,VENTOLIN HFA) inhaler 2 puff, 2 puff, Inhalation, Q4H PRN, Jr Martining, DO    amLODIPine (NORVASC) tablet 2 5 mg, 2 5 mg, Oral, Daily, Lokim Martining, DO, Stopped at 01/02/21 0917    aspirin (ECOTRIN LOW STRENGTH) EC tablet 81 mg, 81 mg, Oral, Daily, Jr Shearing, DO, Stopped at 01/02/21 0916    atorvastatin (LIPITOR) tablet 40 mg, 40 mg, Oral, After Dinner, Jr Shearing, DO, 40 mg at 01/01/21 1740    calcium carbonate-vitamin D (OSCAL-D) 500 mg-200 units per tablet 1 tablet, 1 tablet, Oral, Daily With Breakfast, Jr Shearing, DO, Stopped at 01/02/21 0915    carvedilol (COREG) tablet 12 5 mg, 12 5 mg, Oral, BID With Meals, Jr Shearing, DO, Stopped at 01/02/21 0915    citalopram (CeleXA) tablet 10 mg, 10 mg, Oral, Daily, Loistine Shearing, DO, Stopped at 01/02/21 0916    dextrose 5 % and sodium chloride 0 45 % infusion, 50 mL/hr, Intravenous, Continuous, Wei Andersen DO    docusate sodium (COLACE) capsule 100 mg, 100 mg, Oral, Daily, Loistine Shearing, DO, Stopped at 76/80/67 1753    folic acid (FOLVITE) tablet 1 mg, 1 mg, Oral, Daily, Jr Martining, DO, Stopped at 01/02/21 0916    [START ON 1/3/2021] furosemide (LASIX) injection 40 mg, 40 mg, Intravenous, Daily, Cosmo Ramírez DO    lactulose oral solution 20 g, 20 g, Oral, BID, Markus Dunn MD, Stopped at 01/02/21 8253    melatonin tablet 3 mg, 3 mg, Oral, HS, Preet Cleary MD, 3 mg at 01/01/21 2121    mirtazapine (REMERON) tablet 7 5 mg, 7 5 mg, Oral, HS, Preet Cleary MD, 7 5 mg at 01/01/21 2121    pantoprazole (PROTONIX) EC tablet 40 mg, 40 mg, Oral, Early Morning, Estefanía Sanches DO, 40 mg at 01/02/21 0620    thiamine (VITAMIN B1) tablet 100 mg, 100 mg, Oral, Daily, Preet Cleary MD, Stopped at 01/02/21 3319      Lab Results:   Results from last 7 days   Lab Units 01/01/21  0149 12/31/20  0556 12/30/20  2350 12/30/20  1651 12/30/20  0435  12/29/20  1870 12/29/20  0617 12/29/20  0421  12/28/20  2318 12/28/20  2152   WBC Thousand/uL  --  8 80  --   --  7 33  --   --   --  5 04  --   --  6 37   HEMOGLOBIN g/dL  --  9 3*  --   --  9 7*  --   --   --  9 3*  --   --  10 6*   HEMATOCRIT %  --  29 5*  --   --  31 2*  --   --   --  28 7*  --   --  34 1*   PLATELETS Thousands/uL  --  63*  --   --  58*  --   --   --  57*  --   --  66*   POTASSIUM mmol/L 4 2 4 6 4 8 4 8 5 0   < >  --   --   --    < >  --  6 1*   CHLORIDE mmol/L 93* 95* 94* 95* 93*   < >  --   --   --    < >  --  94*   CO2 mmol/L 33* 29 33* 30 31   < >  --   --   --    < >  --  33*   BUN mg/dL 35* 42* 46* 48* 48*   < >  --   --   --    < >  --  55*   CREATININE mg/dL 1 35* 1 42* 1 45* 1 49* 1 56*   < >  --   --   --    < >  --  1 64*   CALCIUM mg/dL 9 3 9 3 10 1 9 6 10 0   < >  --   --   --    < >  --  10 1   MAGNESIUM mg/dL  --   --   --   --  2 5  --   --   --   --   --   --   --    PHOSPHORUS mg/dL  --   --   --   --  3 5  --   --   --   --   --   --   --    ALK PHOS U/L  --   --   --   --   --   --   --   --   --   --   --  122*   ALT U/L  --   --   --   --   --   --   --   --   --   --   --  35   AST U/L  --   --   --   --   --   --   --   --   --   --   --  45   BLOOD CULTURE   --   --   --   --   --   --  No Growth at 72 hrs   No Growth at 72 hrs   --   --   --   --    LEUKOCYTES UA   --   --   --   --   --   --   --   --   --   --  Negative  -- BLOOD UA   --   --   --   --   --   --   --   --   --   --  Negative  --     < > = values in this interval not displayed  Portions of the record may have been created with voice recognition software  Occasional wrong word or "sound a like" substitutions may have occurred due to the inherent limitations of voice recognition software  Read the chart carefully and recognize, using context, where substitutions have occurred  If you have any questions, please contact the dictating provider

## 2021-01-02 NOTE — SPEECH THERAPY NOTE
Speech Language/Pathology  Speech/Language Pathology  Assessment    Patient Name: Gaviota Sanches  NGQEC'T Date: 1/2/2021     Problem List  Principal Problem:    Toxic metabolic encephalopathy  Active Problems:    Atrial fibrillation ([facility] )    Essential hypertension    Acute on chronic combined systolic and diastolic congestive heart failure (HCC)    Acute renal failure superimposed on stage 2 chronic kidney disease (HCC)    Hypothermia    Hyponatremia    Hyperkalemia    Erythema of lower extremity    Elevated TSH    Past Medical History  Past Medical History:   Diagnosis Date    Arthritis     Cancer [facility])     breast    Cardiac disease     afib    CHF (congestive heart failure) ([facility] )     Hyperlipidemia     Hypertension     Stroke ([facility] )     TIA    TIA (transient ischemic attack)      Past Surgical History  Past Surgical History:   Procedure Laterality Date    BREAST SURGERY      right lumpectomy    TOTAL HIP ARTHROPLASTY Bilateral           Bedside Swallow Evaluation:    Summary:  Pt presents w/ confusion (gown off when I arrived), mumbling  I was unable to understand most of what she said  Not following basic commands  Set up additional suction (other suction used for purewik)  Aggressive oral care completed w/ remanants of food (? From when) removed  + gaga but no swallow throughout  Attempted ice chips and tsps puree  No attempt to transfer any of the trials  Suctioned  Trails d/c'd  Severe oral/pharyngeal dysphaia at thistime, likely contributed to by mental status  Recommendations:  Diet: NPO including meds  Meds: tube, iv for now  Oral care: frequent  At least 3-4x's/day  Aspiration precautions  Reflux precautions  Therapy Prognosis: unknown  May improve as encephalopathy resolves  Frequency: 3-5x/week     Goal(s):  Pt will tolerate least restrictive diet w/out s/s aspiration or oral/pharyngeal difficulties       Patient's goal: unable to state    Consider consult w/:  Nutrition    Reason for consult:  R/o aspiration  Determine safest and least restrictive diet  Change in mental status  respiratory compromise  h/o dysphagia   Current diet:  Was on puree and pudding thick here  Then advanced to regular  Tray was not touched when I arrived  Premorbid diet[de-identified]  Presume regular  Previous VBS:  None  Pt seen by other ST's here, last seen 4/1/20 at which time she was on dysphagia 2 mechanical  and thin w/ recommended f/u to advance diet  O2 requirement:  nc  Voice/Speech:  Mumbles, minimal  Social:  Home alone  Follows commands:  no                       Cognitive Status:  confused  Oral mech exam:  Dentition: partial natural  Labial strength and ROM:suspect reduced  Unable to complete oral mech  Lingual strength and ROM:same  Mandibular strength and ROM:same  Secretion management: mouth dry  Small amt suctioned posterior buccal cavities  Volitional cough:unable  Volitional swallow:unable  Oral care completed    Items administered:  Noted above    Oral stage:severe  Lip closure:-  Mastication:-  Bolus formation:-  Bolus control:-  Transfer:-  Oral residue:suctioned frontal    Pharyngeal stage: severe  Swallow promptness:absent  Laryngeal rise:absent  Wet voice:-  Throat clear:-  Cough:-    Esophageal stage:  No s/s reported    NPO posted    Results d/w:  Pt, nursing, physician      Chief Complaint:     Generalized weakness and confusion  History of Present Illness:  Amaya Melissa is a 80 y o  female who has a past medical history significant for combined systolic and diastolic CHF, stage 2 chronic kidney disease, AFib not on anticoagulation secondary to fall risk, history of prior CVA  The bulk of history is obtained from chart review and discussion with patient's daughter as patient with confusion which limits her ability to provide history    Presented with increasing generalized weakness and confusion, markedly worsening over the past 3 days, described as worsening ability to ambulate to where patient could not even get out of bed  Additionally was noted with decrease in appetite  The patient's daughter reported that patient had recently been treated for apparent UTI; no fevers/chills, focal weakness, headache, or new rashes/wounds were noted  On day presentation, she had outpatient workup which revealed acute renal failure with hyperkalemia and CXR with bilateral pleural effusions for which patient was advised to present to ED for further evaluation  Was treated for hyperkalemia with calcium gluconate and IV Lasix (noted with some evidence of volume overload), and is admitted for further management of acute encephalopathy

## 2021-01-02 NOTE — ASSESSMENT & PLAN NOTE
Lab Results   Component Value Date    EGFR 35 01/02/2021    EGFR 35 01/01/2021    EGFR 33 12/31/2020    CREATININE 1 36 (H) 01/02/2021    CREATININE 1 35 (H) 01/01/2021    CREATININE 1 42 (H) 12/31/2020   · POA, baseline approximately 0 9  · Possibly in setting of volume overload  · Improving with IV diuresis per nephrology recs  · UA reviewed, measure PVR and bladder scan  · Monitor with BMP  · Hold nephrotoxins avoid hypotension

## 2021-01-02 NOTE — ED ATTENDING ATTESTATION
12/28/2020  ILiz DO, saw and evaluated the patient  I have discussed the patient with the resident/non-physician practitioner and agree with the resident's/non-physician practitioner's findings, Plan of Care, and MDM as documented in the resident's/non-physician practitioner's note, except where noted  All available labs and Radiology studies were reviewed  I was present for key portions of any procedure(s) performed by the resident/non-physician practitioner and I was immediately available to provide assistance  At this point I agree with the current assessment done in the Emergency Department  I have conducted an independent evaluation of this patient a history and physical is as follows:      80 yof with hx renal failure, chf, cva, p/w weakness  Ams  Recent tx of uti  Looks volume overloaded  Check labs, likely diuresis   dc  ED Course         Critical Care Time  Procedures

## 2021-01-02 NOTE — ASSESSMENT & PLAN NOTE
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

## 2021-01-03 LAB
BACTERIA BLD CULT: NORMAL
BACTERIA BLD CULT: NORMAL
GLUCOSE SERPL-MCNC: 102 MG/DL (ref 65–140)
GLUCOSE SERPL-MCNC: 148 MG/DL (ref 65–140)

## 2021-01-03 PROCEDURE — 99233 SBSQ HOSP IP/OBS HIGH 50: CPT | Performed by: INTERNAL MEDICINE

## 2021-01-03 PROCEDURE — 82948 REAGENT STRIP/BLOOD GLUCOSE: CPT

## 2021-01-03 PROCEDURE — 99232 SBSQ HOSP IP/OBS MODERATE 35: CPT | Performed by: INTERNAL MEDICINE

## 2021-01-03 RX ADMIN — BISACODYL 10 MG: 10 SUPPOSITORY RECTAL at 16:00

## 2021-01-03 RX ADMIN — FUROSEMIDE 40 MG: 10 INJECTION, SOLUTION INTRAMUSCULAR; INTRAVENOUS at 08:07

## 2021-01-03 RX ADMIN — DEXTROSE AND SODIUM CHLORIDE 50 ML/HR: 5; .45 INJECTION, SOLUTION INTRAVENOUS at 06:14

## 2021-01-03 NOTE — PROGRESS NOTES
NEPHROLOGY PROGRESS NOTE   Ronny Evans 80 y o  female MRN: [id number]  Unit/Bed#: [facility] 501-01 Encounter: [id number]      ASSESSMENT & PLAN    1   acute kidney injury on stage IIIB chronic kidney disease with encephalopathy  ? Baseline creatinine 0 9-1 2, estimated GFR in the 40s  ? Creatinine remained 1 36  ? Avoiding nephrotoxins  ? Also with hyperkalemia that has improved  ? Urinalysis bland  ? Strict intakes and outputs and daily  ? Ongoing workup for encephalopathy  ?  off antibiotics  ? Avoid hypotension  ?  still with poor p o  intake  ? continue D5 half-normal saline started at 50 cc an titrate  ?  decreased furosemide daily     2  Blood pressure/volume overload  ? Hypertension-home medications include amlodipine 2 5 mg daily, carvedilol 12 5 mg daily, and furosemide 20 mg daily  ? Creatinine stable   ? Continue furosemide 40 mg IV  Daily for now  ? now with some low blood pressure discontinue amlodipine  ?  hold carvedilol for systolic of 149     3  Electrolyte/acid-base status  ? Hypervolemic hyponatremia high urine osmolality normal urine sodium  § TSH elevated T4 normal,   § A m  Cortisol not low  § On Celexa which can contribute to  hyponatremia  § change Lasix to 40 mg iv daily  § BMP shows sodium of 134  § Encourage solute intake     4  Hyperkalemia  ? Likely related to pre renal azotemia and acute kidney injury  ? not on a Ace or Arb  ?  Potassium is now normal  ? Diuresed to promote potassium wasting    Ongoing goals of care discussion    SUBJECTIVE:    Acute events overnight  Resting comfortably    OBJECTIVE:  Current Weight: Weight - Scale: (bed zeroed with patient in it, unable to weigh)  Vitals:    01/03/21 0703   BP: 133/58   Pulse: 64   Resp: 13   Temp: 97 7 °F (36 5 °C)   SpO2: 99%       Intake/Output Summary (Last 24 hours) at 1/3/2021 0840  Last data filed at 1/3/2021 0801  Gross per 24 hour   Intake 1030 83 ml   Output 894 ml   Net 136 83 ml     Weight (last 2 days)     Date/Time Weight    01/03/21 0547       Weight: bed zeroed with patient in it, unable to weigh at 01/03/21 0547    01/01/21 0552   84 5 (186 29)              General: conscious, cooperative, in no acute distress  Eyes:  Pallor  ENT: lips and mucous membranes moist  Neck: supple, no JVD  Chest: clear breath sounds bilateral, no crackles, ronchus or wheezings  CVS: distinct S1 & S2, normal rate, regular rhythm  Abdomen: soft, non-tender, non-distended, normoactive bowel sounds  Extremities:  2+ edema  Skin: no rash    Medications:    Current Facility-Administered Medications:     acetaminophen (TYLENOL) rectal suppository 325 mg, 325 mg, Rectal, Q4H PRN, Yung Almanza MD    albuterol (PROVENTIL HFA,VENTOLIN HFA) inhaler 2 puff, 2 puff, Inhalation, Q4H PRN, Jose Priest DO    bisacodyl (DULCOLAX) rectal suppository 10 mg, 10 mg, Rectal, Daily, Yung Almanza MD, 10 mg at 01/02/21 1606    dextrose 5 % and sodium chloride 0 45 % infusion, 50 mL/hr, Intravenous, Continuous, Wei Andersen, DO, Last Rate: 50 mL/hr at 01/03/21 0614, 50 mL/hr at 01/03/21 0614    furosemide (LASIX) injection 40 mg, 40 mg, Intravenous, Daily, Wei Andersen, DO, 40 mg at 01/03/21 0807    metoprolol (LOPRESSOR) injection 2 5 mg, 2 5 mg, Intravenous, Q6H PRN, Yung Almanza MD    morphine injection 1 mg, 1 mg, Intravenous, Q4H PRN, Yung Almanza MD    pantoprazole (PROTONIX) EC tablet 40 mg, 40 mg, Oral, Early Morning, Jose Priest DO, 40 mg at 01/02/21 4331       Lab Results:   Results from last 7 days   Lab Units 01/02/21  0507 01/01/21  0149 12/31/20  0556 12/30/20  2350 12/30/20  1651 12/30/20  0435  12/29/20  0633 12/29/20  0617 12/29/20  0421  12/28/20  2318 12/28/20 2152   WBC Thousand/uL  --   --  8 80  --   --  7 33  --   --   --  5 04  --   --  6 37   HEMOGLOBIN g/dL  --   --  9 3*  --   --  9 7*  --   --   --  9 3*  --   --  10 6*   HEMATOCRIT %  --   --  29 5*  --   --  31 2*  --   --   --  28 7*  --   --  34 1* PLATELETS Thousands/uL  --   --  63*  --   --  58*  --   --   --  57*  --   --  66*   POTASSIUM mmol/L 4 0 4 2 4 6 4 8 4 8 5 0   < >  --   --   --    < >  --  6 1*   CHLORIDE mmol/L 96* 93* 95* 94* 95* 93*   < >  --   --   --    < >  --  94*   CO2 mmol/L 34* 33* 29 33* 30 31   < >  --   --   --    < >  --  33*   BUN mg/dL 35* 35* 42* 46* 48* 48*   < >  --   --   --    < >  --  55*   CREATININE mg/dL 1 36* 1 35* 1 42* 1 45* 1 49* 1 56*   < >  --   --   --    < >  --  1 64*   CALCIUM mg/dL 9 5 9 3 9 3 10 1 9 6 10 0   < >  --   --   --    < >  --  10 1   MAGNESIUM mg/dL  --   --   --   --   --  2 5  --   --   --   --   --   --   --    PHOSPHORUS mg/dL  --   --   --   --   --  3 5  --   --   --   --   --   --   --    ALK PHOS U/L  --   --   --   --   --   --   --   --   --   --   --   --  122*   ALT U/L  --   --   --   --   --   --   --   --   --   --   --   --  35   AST U/L  --   --   --   --   --   --   --   --   --   --   --   --  45   BLOOD CULTURE   --   --   --   --   --   --   --  No Growth After 4 Days  No Growth After 4 Days  --   --   --   --    LEUKOCYTES UA   --   --   --   --   --   --   --   --   --   --   --  Negative  --    BLOOD UA   --   --   --   --   --   --   --   --   --   --   --  Negative  --     < > = values in this interval not displayed  Portions of the record may have been created with voice recognition software  Occasional wrong word or "sound a like" substitutions may have occurred due to the inherent limitations of voice recognition software  Read the chart carefully and recognize, using context, where substitutions have occurred  If you have any questions, please contact the dictating provider

## 2021-01-03 NOTE — SPEECH THERAPY NOTE
Speech Language/Pathology    Speech/Language Pathology Progress Note    Patient Name: Umesh Cobb  KCAST'E Date: 1/3/2021     Problem List  Principal Problem:    Toxic metabolic encephalopathy  Active Problems:    Atrial fibrillation ([facility] )    Essential hypertension    Acute on chronic combined systolic and diastolic congestive heart failure (HCC)    Acute renal failure superimposed on stage 2 chronic kidney disease (HCC)    Hypothermia    Hyponatremia    Hyperkalemia    Erythema of lower extremity    Elevated TSH    Goals of care, counseling/discussion       Past Medical History  Past Medical History:   Diagnosis Date    Arthritis     Cancer ([facility] )     breast    Cardiac disease     afib    CHF (congestive heart failure) ([facility] )     Hyperlipidemia     Hypertension     Stroke ([place] [address] )     TIA    TIA (transient ischemic attack)         Past Surgical History  Past Surgical History:   Procedure Laterality Date    BREAST SURGERY      right lumpectomy    TOTAL HIP ARTHROPLASTY Bilateral        Attempted to assess patient today; however, unable to sustain adequate level of alertness for PO  Pt was able to open eyes follow some simple 1 step commands briefly including ( open eyes, open mouth, stick out tongue, swallow)  Level of alertness waxing and waning and eventually fell back to sleep  Unable to sustain adequate level of alertness for PO, no trials provided today  RN notified, will continue assessment as patient appropriate to participate

## 2021-01-03 NOTE — PLAN OF CARE
Problem: Potential for Falls  Goal: Patient will remain free of falls  Description: INTERVENTIONS:  - Assess patient frequently for physical needs  -  Identify cognitive and physical deficits and behaviors that affect risk of falls  -  [place] fall precautions as indicated by assessment   - Educate patient/family on patient safety including physical limitations  - Instruct patient to call for assistance with activity based on assessment  - Modify environment to reduce risk of injury  - Consider OT/PT consult to assist with strengthening/mobility  Outcome: Progressing     Problem: Prexisting or High Potential for Compromised Skin Integrity  Goal: Skin integrity is maintained or improved  Description: INTERVENTIONS:  - Identify patients at risk for skin breakdown  - Assess and monitor skin integrity  - Assess and monitor nutrition and hydration status  - Monitor labs   - Assess for incontinence   - Turn and reposition patient  - Assist with mobility/ambulation  - Relieve pressure over bony prominences  - Avoid friction and shearing  - Provide appropriate hygiene as needed including keeping skin clean and dry  - Evaluate need for skin moisturizer/barrier cream  - Collaborate with interdisciplinary team   - Patient/family teaching  - Consider wound care consult   Outcome: Progressing     Problem: Nutrition/Hydration-ADULT  Goal: Nutrient/Hydration intake appropriate for improving, restoring or maintaining nutritional needs  Description: Monitor and assess patient's nutrition/hydration status for malnutrition  Collaborate with interdisciplinary team and initiate plan and interventions as ordered  Monitor patient's weight and dietary intake as ordered or per policy  Utilize nutrition screening tool and intervene as necessary  Determine patient's food preferences and provide high-protein, high-caloric foods as appropriate       INTERVENTIONS:  - Monitor oral intake, urinary output, labs, and treatment plans  - Assess nutrition and hydration status and recommend course of action  - Evaluate amount of meals eaten  - Assist patient with eating if necessary   - Allow adequate time for meals  - Recommend/ encourage appropriate diets, oral nutritional supplements, and vitamin/mineral supplements  - Order, calculate, and assess calorie counts as needed  - Recommend, monitor, and adjust tube feedings and TPN/PPN based on assessed needs  - Assess need for intravenous fluids  - Provide specific nutrition/hydration education as appropriate  - Include patient/family/caregiver in decisions related to nutrition  Outcome: Progressing     Problem: MUSCULOSKELETAL - ADULT  Goal: Maintain or return mobility to safest level of function  Description: INTERVENTIONS:  - Assess patient's ability to carry out ADLs; assess patient's baseline for ADL function and identify physical deficits which impact ability to perform ADLs (bathing, care of mouth/teeth, toileting, grooming, dressing, etc )  - Assess/evaluate cause of self-care deficits   - Assess range of motion  - Assess patient's mobility  - Assess patient's need for assistive devices and provide as appropriate  - Encourage maximum independence but intervene and supervise when necessary  - Involve family in performance of ADLs  - Assess for home care needs following discharge   - Consider OT consult to assist with ADL evaluation and planning for discharge  - Provide patient education as appropriate  Outcome: Progressing  Goal: Maintain proper alignment of affected body part  Description: INTERVENTIONS:  - Support, maintain and protect limb and body alignment  - Provide patient/ family with appropriate education  Outcome: Progressing

## 2021-01-03 NOTE — PROGRESS NOTES
Progress Note - Palliative & Supportive Care  Hank Retana  80 y o   female  PPHP 501/[facility]HP 501-01   MRN: [id number]  Encounter: [id number]     ASSESSMENT:    Patient Active Problem List   Diagnosis    Atrial fibrillation ([address] )    Essential hypertension    Left lower lobe pneumonia    Acute on chronic combined systolic and diastolic congestive heart failure ([address] )    [facility]    Mitral regurgitation    Acute renal failure superimposed on stage 2 chronic kidney disease (HCC)    Ambulatory dysfunction    Toxic metabolic encephalopathy    Hypothermia    Thrombocytopenia (HCC)    Anemia    Hyponatremia    Hyperkalemia    Erythema of lower extremity    Elevated TSH    Goals of care, counseling/discussion       PLAN:    1  Goals:    Hold off on hospice talks for today, pt with some neuro improvement   Per discussion with daughter yesterday, does not want feeding tube  Will revisit depending on her clinical course and prognosis   Continue current cares   PSC will be available to assist with further [address] discussion depending on her hospital course   D/w SLIM  Code status: Level 3 - DNAR and DNI   Decisional apparatus:  Patient does not have capacity to make medical decisions on my exam today  If such capacity is lost, patient's substitute decision maker would default to children, led by daughter/[place] by [place]  Advance Directive / Living Will / POLST:  Reviewed with Daughter    3  Prognosis: guarded    We appreciate the opportunity to participate in this patient's care  We will continue to follow  Please do not hesitate to contact our on-call provider through our clinic answering service at [phone] should you have acute symptom control concerns  INTERVAL HISTORY:  Chart reviewed  No acute events overnight  Opened eyes and smiled  Said she wants to get up and that it is time for her pills  Denies any complaints   Spoke to daughter who wants to continue current cares and observe for today  Review of Systems   Unable to perform ROS: Mental status change   Respiratory: Negative for shortness of breath  Gastrointestinal: Negative for abdominal pain  Musculoskeletal: Negative for back pain  MEDICATIONS / ALLERGIES:  all current active meds have been reviewed    Allergies   Allergen Reactions    Lovenox [Enoxaparin Sodium] Swelling       OBJECTIVE:  /58   Pulse 64   Temp 97 7 °F (36 5 °C)   Resp 13   Ht 5' 4" (1 626 m)   Wt 84 5 kg (186 lb 4 6 oz)   SpO2 99%   BMI 31 98 kg/m²   Physical Exam:  Constitutional: Appears frail, acutely sick but not toxic looking  In no acute physical or emotional distress  Head: Normocephalic and atraumatic  Lips dry  Eyes: EOM are normal  No ocular discharge  No scleral icterus  Neck: No visible adenopathy or masses  Respiratory: Effort normal  No stridor  No respiratory distress  Gastrointestinal: No abdominal distension  Musculoskeletal: No edema  Neurological: awake, confused, able to talk but did not follow commands  Smiled  Skin: Dry, no diaphoresis  Pale   Psychiatric: Calm     Lab Results:   I have personally reviewed pertinent labs  Imaging Studies: I have personally reviewed pertinent reports  EKG, Pathology, and Other Studies: I have personally reviewed pertinent reports  Counseling / Coordination of Care  Total floor / unit time spent today 35 minutes  Greater than 50% of total time was spent with the patient and / or family counseling and / or coordination of care  A description of the counseling / coordination of care: provided medical updates, discussed palliative care, determined competency, determined goals of care, determined POA, determined social/family support, discussed plans of care, discussed symptom management, provided psychosocial support      Darin Vogt MD  [address] and Supportive Care

## 2021-01-04 ENCOUNTER — APPOINTMENT (INPATIENT)
Dept: RADIOLOGY | Facility: HOSPITAL | Age: 86
DRG: 291 | End: 2021-01-04
Attending: INTERNAL MEDICINE
Payer: COMMERCIAL

## 2021-01-04 PROBLEM — R13.10 DYSPHAGIA: Status: ACTIVE | Noted: 2021-01-04

## 2021-01-04 LAB
ANION GAP SERPL CALCULATED.3IONS-SCNC: 1 MMOL/L (ref 4–13)
BASOPHILS # BLD AUTO: 0.03 THOUSANDS/ΜL (ref 0–0.1)
BASOPHILS NFR BLD AUTO: 0 % (ref 0–1)
BUN SERPL-MCNC: 27 MG/DL (ref 5–25)
CALCIUM SERPL-MCNC: 9 MG/DL (ref 8.3–10.1)
CHLORIDE SERPL-SCNC: 97 MMOL/L (ref 100–108)
CO2 SERPL-SCNC: 36 MMOL/L (ref 21–32)
CREAT SERPL-MCNC: 1.1 MG/DL (ref 0.6–1.3)
EOSINOPHIL # BLD AUTO: 0.33 THOUSAND/ΜL (ref 0–0.61)
EOSINOPHIL NFR BLD AUTO: 3 % (ref 0–6)
ERYTHROCYTE [DISTWIDTH] IN BLOOD BY AUTOMATED COUNT: 17 % (ref 11.6–15.1)
GFR SERPL CREATININE-BSD FRML MDRD: 45 ML/MIN/1.73SQ M
GLUCOSE SERPL-MCNC: 101 MG/DL (ref 65–140)
HCT VFR BLD AUTO: 31.2 % (ref 34.8–46.1)
HGB BLD-MCNC: 9.5 G/DL (ref 11.5–15.4)
IMM GRANULOCYTES # BLD AUTO: 0.04 THOUSAND/UL (ref 0–0.2)
IMM GRANULOCYTES NFR BLD AUTO: 0 % (ref 0–2)
LYMPHOCYTES # BLD AUTO: 0.77 THOUSANDS/ΜL (ref 0.6–4.47)
LYMPHOCYTES NFR BLD AUTO: 7 % (ref 14–44)
MCH RBC QN AUTO: 29.9 PG (ref 26.8–34.3)
MCHC RBC AUTO-ENTMCNC: 30.4 G/DL (ref 31.4–37.4)
MCV RBC AUTO: 98 FL (ref 82–98)
MONOCYTES # BLD AUTO: 1.19 THOUSAND/ΜL (ref 0.17–1.22)
MONOCYTES NFR BLD AUTO: 10 % (ref 4–12)
NEUTROPHILS # BLD AUTO: 9.32 THOUSANDS/ΜL (ref 1.85–7.62)
NEUTS SEG NFR BLD AUTO: 80 % (ref 43–75)
NRBC BLD AUTO-RTO: 0 /100 WBCS
PLATELET # BLD AUTO: 100 THOUSANDS/UL (ref 149–390)
PMV BLD AUTO: 12.5 FL (ref 8.9–12.7)
POTASSIUM SERPL-SCNC: 3.5 MMOL/L (ref 3.5–5.3)
RBC # BLD AUTO: 3.18 MILLION/UL (ref 3.81–5.12)
SODIUM SERPL-SCNC: 134 MMOL/L (ref 136–145)
WBC # BLD AUTO: 11.68 THOUSAND/UL (ref 4.31–10.16)

## 2021-01-04 PROCEDURE — 80048 BASIC METABOLIC PNL TOTAL CA: CPT | Performed by: INTERNAL MEDICINE

## 2021-01-04 PROCEDURE — 99233 SBSQ HOSP IP/OBS HIGH 50: CPT | Performed by: INTERNAL MEDICINE

## 2021-01-04 PROCEDURE — 92611 MOTION FLUOROSCOPY/SWALLOW: CPT

## 2021-01-04 PROCEDURE — 92526 ORAL FUNCTION THERAPY: CPT

## 2021-01-04 PROCEDURE — 99232 SBSQ HOSP IP/OBS MODERATE 35: CPT | Performed by: INTERNAL MEDICINE

## 2021-01-04 PROCEDURE — 74230 X-RAY XM SWLNG FUNCJ C+: CPT

## 2021-01-04 PROCEDURE — 85025 COMPLETE CBC W/AUTO DIFF WBC: CPT | Performed by: INTERNAL MEDICINE

## 2021-01-04 RX ORDER — POTASSIUM CHLORIDE 14.9 MG/ML
20 INJECTION INTRAVENOUS ONCE
Status: COMPLETED | OUTPATIENT
Start: 2021-01-04 | End: 2021-01-04

## 2021-01-04 RX ORDER — METOPROLOL TARTRATE 5 MG/5ML
2.5 INJECTION INTRAVENOUS EVERY 6 HOURS PRN
Status: DISCONTINUED | OUTPATIENT
Start: 2021-01-04 | End: 2021-01-04 | Stop reason: SDUPTHER

## 2021-01-04 RX ORDER — BISACODYL 10 MG
10 SUPPOSITORY, RECTAL RECTAL DAILY
Status: DISCONTINUED | OUTPATIENT
Start: 2021-01-05 | End: 2021-01-04 | Stop reason: SDUPTHER

## 2021-01-04 RX ORDER — AMLODIPINE BESYLATE 2.5 MG/1
2.5 TABLET ORAL DAILY
Status: DISCONTINUED | OUTPATIENT
Start: 2021-01-05 | End: 2021-01-05

## 2021-01-04 RX ORDER — B-COMPLEX WITH VITAMIN C
1 TABLET ORAL
Status: DISCONTINUED | OUTPATIENT
Start: 2021-01-05 | End: 2021-01-08 | Stop reason: HOSPADM

## 2021-01-04 RX ORDER — ONDANSETRON 2 MG/ML
4 INJECTION INTRAMUSCULAR; INTRAVENOUS EVERY 6 HOURS PRN
Status: DISCONTINUED | OUTPATIENT
Start: 2021-01-04 | End: 2021-01-08 | Stop reason: HOSPADM

## 2021-01-04 RX ORDER — DEXTROSE MONOHYDRATE 50 MG/ML
40 INJECTION, SOLUTION INTRAVENOUS CONTINUOUS
Status: DISCONTINUED | OUTPATIENT
Start: 2021-01-04 | End: 2021-01-05

## 2021-01-04 RX ORDER — FUROSEMIDE 10 MG/ML
20 INJECTION INTRAMUSCULAR; INTRAVENOUS ONCE
Status: COMPLETED | OUTPATIENT
Start: 2021-01-04 | End: 2021-01-04

## 2021-01-04 RX ORDER — ASPIRIN 81 MG/1
81 TABLET ORAL DAILY
Status: DISCONTINUED | OUTPATIENT
Start: 2021-01-05 | End: 2021-01-08 | Stop reason: HOSPADM

## 2021-01-04 RX ORDER — LANOLIN ALCOHOL/MO/W.PET/CERES
3 CREAM (GRAM) TOPICAL
Status: DISCONTINUED | OUTPATIENT
Start: 2021-01-04 | End: 2021-01-08 | Stop reason: HOSPADM

## 2021-01-04 RX ORDER — CITALOPRAM 10 MG/1
10 TABLET ORAL DAILY
Status: DISCONTINUED | OUTPATIENT
Start: 2021-01-05 | End: 2021-01-08 | Stop reason: HOSPADM

## 2021-01-04 RX ORDER — FOLIC ACID 1 MG/1
1 TABLET ORAL DAILY
Status: DISCONTINUED | OUTPATIENT
Start: 2021-01-05 | End: 2021-01-08 | Stop reason: HOSPADM

## 2021-01-04 RX ORDER — CARVEDILOL 12.5 MG/1
12.5 TABLET ORAL 2 TIMES DAILY WITH MEALS
Status: DISCONTINUED | OUTPATIENT
Start: 2021-01-05 | End: 2021-01-07

## 2021-01-04 RX ORDER — ATORVASTATIN CALCIUM 40 MG/1
40 TABLET, FILM COATED ORAL
Status: DISCONTINUED | OUTPATIENT
Start: 2021-01-04 | End: 2021-01-08 | Stop reason: HOSPADM

## 2021-01-04 RX ORDER — THIAMINE MONONITRATE (VIT B1) 100 MG
100 TABLET ORAL DAILY
Status: DISCONTINUED | OUTPATIENT
Start: 2021-01-05 | End: 2021-01-08 | Stop reason: HOSPADM

## 2021-01-04 RX ORDER — DOCUSATE SODIUM 100 MG/1
100 CAPSULE, LIQUID FILLED ORAL DAILY
Status: DISCONTINUED | OUTPATIENT
Start: 2021-01-05 | End: 2021-01-08 | Stop reason: HOSPADM

## 2021-01-04 RX ORDER — ACETAMINOPHEN 325 MG/1
650 TABLET ORAL EVERY 6 HOURS PRN
Status: DISCONTINUED | OUTPATIENT
Start: 2021-01-04 | End: 2021-01-08 | Stop reason: HOSPADM

## 2021-01-04 RX ADMIN — DEXTROSE AND SODIUM CHLORIDE 50 ML/HR: 5; .45 INJECTION, SOLUTION INTRAVENOUS at 00:15

## 2021-01-04 RX ADMIN — PANTOPRAZOLE SODIUM 40 MG: 40 TABLET, DELAYED RELEASE ORAL at 05:13

## 2021-01-04 RX ADMIN — MELATONIN 3 MG: at 22:58

## 2021-01-04 RX ADMIN — FUROSEMIDE 20 MG: 10 INJECTION, SOLUTION INTRAMUSCULAR; INTRAVENOUS at 22:59

## 2021-01-04 RX ADMIN — DEXTROSE 40 ML/HR: 5 SOLUTION INTRAVENOUS at 12:06

## 2021-01-04 RX ADMIN — BISACODYL 10 MG: 10 SUPPOSITORY RECTAL at 09:22

## 2021-01-04 RX ADMIN — ATORVASTATIN CALCIUM 40 MG: 40 TABLET, FILM COATED ORAL at 22:58

## 2021-01-04 RX ADMIN — FUROSEMIDE 40 MG: 10 INJECTION, SOLUTION INTRAMUSCULAR; INTRAVENOUS at 09:22

## 2021-01-04 RX ADMIN — POTASSIUM CHLORIDE 20 MEQ: 14.9 INJECTION, SOLUTION INTRAVENOUS at 12:12

## 2021-01-04 NOTE — PROGRESS NOTES
Progress Note - Jeanna Ford 5/5/1932, 80 y o  female MRN: [id number]    Unit/Bed#: [facility] 501-01 Encounter: [id number]    Primary Care Provider: Patty Amador MD   Date and time admitted to hospital: 12/28/2020  9:15 PM        * Toxic metabolic encephalopathy  Assessment & Plan  · Presented with increased weakness and confusion; with recent treatment for apparent UTI, low temps  · Hyponatremia and acute renal failure are noted with evidence of volume overload with specific plans for these as below  · Otherwise with no other clear infectious source at this time, will follow up results of blood cultures x2 however does have erythema LE bilaterally, hot to the touch, started on IV ancef for possible superimposed cellulitis  · Vitamin b12 and folate unremarkable  · CT head negative  · Patient is more responsive today      Dysphagia  Assessment & Plan  Patient has dysphagia, she was npo for days, now on trial she did not do well  Ensure patient is not eating or drinking until clear     Goals of care, counseling/discussion  Assessment & Plan  No significant improvement from admission  She is able to control her temperature  However, not eating at all  Family informed her wishes no tube feeding  Will monitor closely   Palliative care recommendations appreciated  Erythema of lower extremity  Assessment & Plan  · Hypothermic on admission, no leukocytosis  · Bilateral LE erythema, hot to the touch    Per daughter does have some chronic erythema at baseline  · Started on IV ancef for possible superimposed cellulitis  · Venous duplex negative DVT    Hyperkalemia  Assessment & Plan  · K 6 1 on admission, improving s/p calcium gluconate and IV Lasix  · EKG without acute changes  · Likely in setting of acute kidney injury  · Improving  · Continue to monitor with BMP q 8 hours    Hyponatremia  Assessment & Plan  · Sodium 128 on POA, poor po intake and volume overload, improved  · Received total 40 mg IV lasix in the ED  · Continue diuresis per nephrology recs  · Continue fluid restriction  · On celexa, 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  · Holding all oral medications      Hypothermia  Assessment & Plan  · With hypothermia on presentation  Possibly in setting of limited oral intake, cannot exclude infection    Started on IV ancef for possible LE cellulitis  · Improved, hermes price restarted keep temp >98, requiring warming   · TSH wnl    Acute renal failure superimposed on stage 2 chronic kidney disease [facility])  Assessment & Plan  Lab Results   Component Value Date    EGFR 35 01/02/2021    EGFR 35 01/01/2021    EGFR 33 12/31/2020    CREATININE 1 36 (H) 01/02/2021    CREATININE 1 35 (H) 01/01/2021    CREATININE 1 42 (H) 12/31/2020   · POA, baseline approximately 0 9  · Possibly in setting of volume overload  · Improving with IV diuresis per nephrology recs  · UA reviewed, measure PVR and bladder scan  · Monitor with BMP  · Hold nephrotoxins avoid hypotension    Acute on chronic combined systolic and diastolic congestive heart failure (HCC)  Assessment & Plan  Wt Readings from Last 3 Encounters:   01/01/21 84 5 kg (186 lb 4 6 oz)   04/03/20 74 6 kg (164 lb 7 4 oz)   04/21/17 74 1 kg (163 lb 5 8 oz)     · Patient with lower extremity edema and CXR suspicious for pulmonary vascular congestion  · Received total 40 mg IV Lasix in ED   · Monitor daily weights, I/O  · Low-sodium, fluid-restricted diet  · Nephrology following regarding hypervolemic hyponatremia - receiving IV diuresis  · Monitor BMP          Essential hypertension  Assessment & Plan  · Continue home antihypertensives with hold parameters  · Monitor blood pressure per protocol    Atrial fibrillation ([facility] [address] )  Assessment & Plan  · Continue Coreg; digoxin listed on home medication list however daughter states patient no longer takes this  · Not on anticoagulation due to fall risk           VTE Pharmacologic Prophylaxis:   Pharmacologic: Heparin  Mechanical VTE Prophylaxis in Place: Yes    Patient Centered Rounds: I have performed bedside rounds with nursing staff today  Discussions with Specialists or Other Care Team Provider: speech and swallow, need to order barium swallow to look for dysphagia     Education and Discussions with Family / Patient: patient and update daughter     Time Spent for Care: 45 minutes  More than 50% of total time spent on counseling and coordination of care as described above  Current Length of Stay: 6 day(s)    Current Patient Status: Inpatient   Certification Statement: The patient will continue to require additional inpatient hospital stay due to dysphagia    Discharge Plan: patient is improving     Code Status: Level 3 - DNAR and DNI      Subjective:   Patient is doing much better over the past few days  Objective:     Vitals:   Temp (24hrs), Av 6 °F (36 4 °C), Min:97 6 °F (36 4 °C), Max:97 6 °F (36 4 °C)    Temp:  [97 6 °F (36 4 °C)] 97 6 °F (36 4 °C)  HR:  [63-71] 63  Resp:  [15-18] 15  BP: (133-150)/(58-65) 139/65  SpO2:  [91 %-100 %] 99 %  Body mass index is 31 98 kg/m²  Input and Output Summary (last 24 hours):        Intake/Output Summary (Last 24 hours) at 2021 0905  Last data filed at 2021 0015  Gross per 24 hour   Intake 811 67 ml   Output 650 ml   Net 161 67 ml       Physical Exam:     Physical Exam    Additional Data:     Labs:    Results from last 7 days   Lab Units 21  0511   WBC Thousand/uL 11 68*   HEMOGLOBIN g/dL 9 5*   HEMATOCRIT % 31 2*   PLATELETS Thousands/uL 100*   NEUTROS PCT % 80*   LYMPHS PCT % 7*   MONOS PCT % 10   EOS PCT % 3     Results from last 7 days   Lab Units 21  0511  20  2152   POTASSIUM mmol/L 3 5   < > 6 1*   CHLORIDE mmol/L 97*   < > 94*   CO2 mmol/L 36*   < > 33*   BUN mg/dL 27*   < > 55*   CREATININE mg/dL 1 10   < > 1 64*   CALCIUM mg/dL 9 0   < > 10 1   ALK PHOS U/L  --   --  122*   ALT U/L  --   --  35   AST U/L  --   --  45    < > = values in this interval not displayed  * I Have Reviewed All Lab Data Listed Above  * Additional Pertinent Lab Tests Reviewed: Sheryl 66 Admission Reviewed    Imaging:    Imaging Reports Reviewed Today Include:    Imaging Personally Reviewed by Myself Includes:      Recent Cultures (last 7 days):     Results from last 7 days   Lab Units 12/29/20  0633 12/29/20  0617   BLOOD CULTURE  No Growth After 5 Days  No Growth After 5 Days  Last 24 Hours Medication List:   Current Facility-Administered Medications   Medication Dose Route Frequency Provider Last Rate    acetaminophen  325 mg Rectal Q4H PRN Preet Cleary MD      albuterol  2 puff Inhalation Q4H PRN Estefanía Sanches DO      bisacodyl  10 mg Rectal Daily Preet Cleary MD      dextrose 5 % and sodium chloride 0 45 %  50 mL/hr Intravenous Continuous Wei Andersen, DO 50 mL/hr (01/04/21 0015)    furosemide  40 mg Intravenous Daily Ranulfo Reynolds DO      metoprolol  2 5 mg Intravenous Q6H PRN Preet Cleary MD      morphine injection  1 mg Intravenous Q4H PRN Preet Cleary MD      pantoprazole  40 mg Oral Early Morning Estefanía Sanches DO          Today, Patient Was Seen By: Preet Cleary MD    ** Please Note: Dictation voice to text software may have been used in the creation of this document   **

## 2021-01-04 NOTE — PROGRESS NOTES
Progress Note - Amaya Melissa 5/5/1932, 80 y o  female MRN: [id number]    Unit/Bed#: [facility] 501-01 Encounter: [id number]    Primary Care Provider: Brian Romero MD   Date and time admitted to hospital: 12/28/2020  9:15 PM        * Toxic metabolic encephalopathy  Assessment & Plan  · Presented with increased weakness and confusion; with recent treatment for apparent UTI, low temps  · Hyponatremia and acute renal failure are noted with evidence of volume overload with specific plans for these as below  · Otherwise with no other clear infectious source at this time, will follow up results of blood cultures x2 however does have erythema LE bilaterally, hot to the touch, started on IV ancef for possible superimposed cellulitis  · Vitamin b12 and folate unremarkable  · CT head negative  · Patient is more responsive today      Goals of care, counseling/discussion  Assessment & Plan  No significant improvement from admission  She is able to control her temperature  However, not eating at all  Family informed her wishes no tube feeding  Will monitor closely   Palliative care recommendations appreciated  Erythema of lower extremity  Assessment & Plan  · Hypothermic on admission, no leukocytosis  · Bilateral LE erythema, hot to the touch    Per daughter does have some chronic erythema at baseline  · Started on IV ancef for possible superimposed cellulitis  · Venous duplex negative DVT    Hyperkalemia  Assessment & Plan  · K 6 1 on admission, improving s/p calcium gluconate and IV Lasix  · EKG without acute changes  · Likely in setting of acute kidney injury  · Improving  · Continue to monitor with BMP q 8 hours    Hyponatremia  Assessment & Plan  · Sodium 128 on POA, poor po intake and volume overload, improved  · Received total 40 mg IV lasix in the ED  · Continue diuresis per nephrology recs  · Continue fluid restriction  · On celexa, 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  · Holding all oral medications      Hypothermia  Assessment & Plan  · With hypothermia on presentation  Possibly in setting of limited oral intake, cannot exclude infection  Started on IV ancef for possible LE cellulitis  · Improved, hermes price restarted keep temp >98, requiring warming   · TSH wnl    Acute renal failure superimposed on stage 2 chronic kidney disease [facility])  Assessment & Plan  Lab Results   Component Value Date    EGFR 35 01/02/2021    EGFR 35 01/01/2021    EGFR 33 12/31/2020    CREATININE 1 36 (H) 01/02/2021    CREATININE 1 35 (H) 01/01/2021    CREATININE 1 42 (H) 12/31/2020   · POA, baseline approximately 0 9  · Possibly in setting of volume overload  · Improving with IV diuresis per nephrology recs  · UA reviewed, measure PVR and bladder scan  · Monitor with BMP  · Hold nephrotoxins avoid hypotension    Acute on chronic combined systolic and diastolic congestive heart failure (HCC)  Assessment & Plan  Wt Readings from Last 3 Encounters:   01/01/21 84 5 kg (186 lb 4 6 oz)   04/03/20 74 6 kg (164 lb 7 4 oz)   04/21/17 74 1 kg (163 lb 5 8 oz)     · Patient with lower extremity edema and CXR suspicious for pulmonary vascular congestion  · Received total 40 mg IV Lasix in ED   · Monitor daily weights, I/O  · Low-sodium, fluid-restricted diet  · Nephrology following regarding hypervolemic hyponatremia - receiving IV diuresis  · Monitor BMP          Essential hypertension  Assessment & Plan  · Continue home antihypertensives with hold parameters  · Monitor blood pressure per protocol    Atrial fibrillation ([address] )  Assessment & Plan  · Continue Coreg; digoxin listed on home medication list however daughter states patient no longer takes this  · Not on anticoagulation due to fall risk         VTE Pharmacologic Prophylaxis:   Pharmacologic: Heparin  Mechanical VTE Prophylaxis in Place: Yes    Patient Centered Rounds: I have performed bedside rounds with nursing staff today      Discussions with Specialists or Other Care Team Provider: palliative care, nephrology     Education and Discussions with Family / Patient: patient     Time Spent for Care: 45 minutes  More than 50% of total time spent on counseling and coordination of care as described above  Current Length of Stay: 5 day(s)    Current Patient Status: Inpatient   Certification Statement: The patient will continue to require additional inpatient hospital stay due to ams    Discharge Plan: pending improvement  Code Status: Level 3 - DNAR and DNI      Subjective:       Objective:     Vitals:   Temp (24hrs), Av 5 °F (36 4 °C), Min:97 1 °F (36 2 °C), Max:97 7 °F (36 5 °C)    Temp:  [97 1 °F (36 2 °C)-97 7 °F (36 5 °C)] 97 6 °F (36 4 °C)  HR:  [64-69] 68  Resp:  [13-20] 18  BP: (133-146)/(58-68) 133/58  SpO2:  [95 %-100 %] 100 %  Body mass index is 31 98 kg/m²  Input and Output Summary (last 24 hours): Intake/Output Summary (Last 24 hours) at 1/3/2021 2004  Last data filed at 1/3/2021 1730  Gross per 24 hour   Intake 1430 83 ml   Output 890 ml   Net 540 83 ml       Physical Exam:     Physical Exam  Vitals signs and nursing note reviewed  Constitutional:       General: She is not in acute distress  Appearance: Normal appearance  She is obese  She is not ill-appearing, toxic-appearing or diaphoretic  HENT:      Head: Normocephalic and atraumatic  Nose: Nose normal  No congestion or rhinorrhea  Mouth/Throat:      Mouth: Mucous membranes are dry  Pharynx: No oropharyngeal exudate or posterior oropharyngeal erythema  Eyes:      General: No scleral icterus  Right eye: No discharge  Left eye: No discharge  Extraocular Movements: Extraocular movements intact  Conjunctiva/sclera: Conjunctivae normal       Pupils: Pupils are equal, round, and reactive to light  Cardiovascular:      Rate and Rhythm: Normal rate  Pulmonary:      Effort: Pulmonary effort is normal  No respiratory distress        Breath sounds: Normal breath sounds  No stridor  No wheezing or rhonchi  Abdominal:      General: Abdomen is flat  Bowel sounds are normal       Palpations: Abdomen is soft  Musculoskeletal: Normal range of motion  General: No swelling, tenderness, deformity or signs of injury  Right lower leg: No edema  Left lower leg: No edema  Skin:     General: Skin is warm and dry  Capillary Refill: Capillary refill takes less than 2 seconds  Coloration: Skin is not jaundiced or pale  Findings: No bruising, erythema, lesion or rash  Neurological:      General: No focal deficit present  Mental Status: She is alert  Mental status is at baseline  She is disoriented  Cranial Nerves: No cranial nerve deficit  Sensory: No sensory deficit  Motor: No weakness  Coordination: Coordination normal       Gait: Gait normal       Deep Tendon Reflexes: Reflexes normal    Psychiatric:         Mood and Affect: Mood normal          Behavior: Behavior normal          Thought Content: Thought content normal          Additional Data:     Labs:    Results from last 7 days   Lab Units 12/31/20  0556   WBC Thousand/uL 8 80   HEMOGLOBIN g/dL 9 3*   HEMATOCRIT % 29 5*   PLATELETS Thousands/uL 63*   NEUTROS PCT % 83*   LYMPHS PCT % 7*   MONOS PCT % 9   EOS PCT % 1     Results from last 7 days   Lab Units 01/02/21  0507  12/28/20  2152   POTASSIUM mmol/L 4 0   < > 6 1*   CHLORIDE mmol/L 96*   < > 94*   CO2 mmol/L 34*   < > 33*   BUN mg/dL 35*   < > 55*   CREATININE mg/dL 1 36*   < > 1 64*   CALCIUM mg/dL 9 5   < > 10 1   ALK PHOS U/L  --   --  122*   ALT U/L  --   --  35   AST U/L  --   --  45    < > = values in this interval not displayed  * I Have Reviewed All Lab Data Listed Above  * Additional Pertinent Lab Tests Reviewed:  All Labs For Current Hospital Admission Reviewed    Imaging:    Imaging Reports Reviewed Today Include:    Imaging Personally Reviewed by Myself Includes:       Recent Cultures (last 7 days):     Results from last 7 days   Lab Units 12/29/20  0633 12/29/20  0617   BLOOD CULTURE  No Growth After 5 Days  No Growth After 5 Days  Last 24 Hours Medication List:   Current Facility-Administered Medications   Medication Dose Route Frequency Provider Last Rate    acetaminophen  325 mg Rectal Q4H PRN Gertrude Medrano MD      albuterol  2 puff Inhalation Q4H PRN Arnoldo Haw, DO      bisacodyl  10 mg Rectal Daily Gertrude Medrano MD      dextrose 5 % and sodium chloride 0 45 %  50 mL/hr Intravenous Continuous Wei Andersen, DO 50 mL/hr (01/03/21 7937)    furosemide  40 mg Intravenous Daily Bruno Pin, DO      metoprolol  2 5 mg Intravenous Q6H PRN Gertrude Medrano MD      morphine injection  1 mg Intravenous Q4H PRN Gertrude Medrano MD      pantoprazole  40 mg Oral Early Morning Arnoldo Haw, DO          Today, Patient Was Seen By: Gertrude Medrano MD    ** Please Note: Dictation voice to text software may have been used in the creation of this document   **

## 2021-01-04 NOTE — ASSESSMENT & PLAN NOTE
Patient has dysphagia, she was npo for days, now on trial she did not do well  Ensure patient is not eating or drinking until clear

## 2021-01-04 NOTE — PROGRESS NOTES
Progress note - Palliative and Supportive Care   Megha Zheng 80 y o  female [id number]    Assessment:  Megha Zheng is a 80 y o  female with chronic biventricular CHF, CKD2, HTN, Afib admitted from home after being found confused by family  [facility] consulted for goals of care  Patient Active Problem List   Diagnosis    Atrial fibrillation ([facility] )    Essential hypertension    Left lower lobe pneumonia    Acute on chronic combined systolic and diastolic congestive heart failure ([facility]    Cardiomyopathy ([facility] )    Mitral regurgitation    Acute renal failure superimposed on stage 2 chronic kidney disease ([facility] )    Ambulatory dysfunction    Toxic metabolic encephalopathy    Hypothermia    Thrombocytopenia (HCC)    Anemia    Hyponatremia    Hyperkalemia    Erythema of lower extremity    Elevated TSH    Goals of care, counseling/discussion    Dysphagia       Plan:  #symptom management   - management per primary team    #goals of care   - swallow study today with recommendation for mechanical soft diet   - baseline independent, lives alone, 12/26 was last known at baseline, progressively weaker over course with unwitnessed fall, and eventual ED evaluation + admission   - ultimate plan per daughter for discharge to rehab with goal to return home   - will continue to follow for supportive care    #psychosocial support   - emotional support provided   - Jose Cruz Mccall [daughter] [phone] (mobile)   - Bob Mejia is point contact   - overall five adult children      We appreciate the opportunity to participate in this patient's care  We will continue to follow  Please do not hesitate to contact our on-call provider through our clinic answering service at [phone] should you have acute symptom control concerns  Code Status: DNAR/DNI - Level 3   Decisional apparatus:  Patient is not competent on my exam today    If competence is lost, patient's mPOA would default to Keiko [daughter] and [id number] Koffi [son] by advanced documentation on file  Advance Directive / Living Will / POLST:  On File    Interval history:   - no acute events overnight   - swallow study today, [facility] soft diet   - improved mental status   - daughter states spoke with patient yesterday with noticeable improvement from admission but still not back to baseline   - patient reports intermittent back pain, aggravated by rolling in bed, recommended taking pain medication as needed   - denies nausea, vomiting, SOB; appetite intact    MEDICATIONS / ALLERGIES:     current meds:   Current Facility-Administered Medications   Medication Dose Route Frequency    acetaminophen (TYLENOL) rectal suppository 325 mg  325 mg Rectal Q4H PRN    albuterol (PROVENTIL HFA,VENTOLIN HFA) inhaler 2 puff  2 puff Inhalation Q4H PRN    bisacodyl (DULCOLAX) rectal suppository 10 mg  10 mg Rectal Daily    dextrose 5 % and sodium chloride 0 45 % infusion  50 mL/hr Intravenous Continuous    furosemide (LASIX) injection 40 mg  40 mg Intravenous Daily    metoprolol (LOPRESSOR) injection 2 5 mg  2 5 mg Intravenous Q6H PRN    morphine injection 1 mg  1 mg Intravenous Q4H PRN    pantoprazole (PROTONIX) EC tablet 40 mg  40 mg Oral Early Morning       Allergies   Allergen Reactions    Lovenox [Enoxaparin Sodium] Swelling       OBJECTIVE:    Physical Exam  Physical Exam  Constitutional:       Comments: Elderly, frail appearing in NAD  Non-toxic appearing   HENT:      Head: Normocephalic and atraumatic  Right Ear: External ear normal       Left Ear: External ear normal    Eyes:      Comments: No gaze preference   Neck:      Musculoskeletal: Normal range of motion  Cardiovascular:      Rate and Rhythm: Normal rate  Pulmonary:      Effort: No tachypnea, accessory muscle usage or respiratory distress  Comments: Equal chest rise  Completes full sentences without difficulty  Abdominal:      General: Abdomen is flat  Palpations: Abdomen is soft  Neurological:      General: No focal deficit present  Mental Status: She is alert  Comments: AAO x 3   Psychiatric:         Attention and Perception: Attention normal          Mood and Affect: Mood normal          Lab Results:   I have personally reviewed pertinent labs  , CBC:   Lab Results   Component Value Date    WBC 11 68 (H) 01/04/2021    HGB 9 5 (L) 01/04/2021    HCT 31 2 (L) 01/04/2021    MCV 98 01/04/2021     (L) 01/04/2021    MCH 29 9 01/04/2021    MCHC 30 4 (L) 01/04/2021    RDW 17 0 (H) 01/04/2021    MPV 12 5 01/04/2021    NRBC 0 01/04/2021   , CMP:   Lab Results   Component Value Date    SODIUM 134 (L) 01/04/2021    K 3 5 01/04/2021    CL 97 (L) 01/04/2021    CO2 36 (H) 01/04/2021    BUN 27 (H) 01/04/2021    CREATININE 1 10 01/04/2021    CALCIUM 9 0 01/04/2021    EGFR 45 01/04/2021   , PT/PTT:No results found for: PT, PTT  Imaging Studies: Reviewed  Procedure: Xr Chest 1 View Portable    Result Date: 12/29/2020  Narrative: CHEST INDICATION:   Weakness, CHF  COMPARISON:  Chest radiograph from 3/26/2020 and chest CT from 4/17/2017  EXAM PERFORMED/VIEWS:  XR CHEST PORTABLE FINDINGS: Mild cardiomegaly  Left subclavian pacemaker lead in right ventricle  Chronic small right pleural effusion, slightly increased  Trace left effusion  Mild right base atelectasis  No pneumothorax  Osseous structures appear within normal limits for patient age  Impression: Chronic small bilateral pleural effusions, slightly increased on the right, with mild right base atelectasis  Workstation performed: [id number]     Procedure: Ct Head Wo Contrast    Result Date: 12/29/2020  Narrative: CT BRAIN - WITHOUT CONTRAST INDICATION:   Altered mental status, encephalopathy, acute, etiology unclear  Increased confusion, increased generalized weakness, recent urinary tract infection, hyponatremia  COMPARISON:  March 30, 2020  TECHNIQUE:  CT examination of the brain was performed    In addition to axial images, sagittal and coronal 2D reformatted images were created and submitted for interpretation  Radiation dose length product (DLP) for this visit:  826 58 mGy-cm   This examination, like all CT scans performed in the [facility], was performed utilizing techniques to minimize radiation dose exposure, including the use of iterative  reconstruction and automated exposure control  IMAGE QUALITY:  Diagnostic  FINDINGS: PARENCHYMA: Decreased attenuation is noted in periventricular and subcortical white matter demonstrating an appearance that is statistically most likely to represent mild microangiopathic change; this appearance is similar when compared to most recent prior examination  Mild age-appropriate generalized atrophy appears unchanged  Small bilateral basal ganglia calcifications are present  No CT signs of acute infarction  No intracranial mass, mass effect or midline shift  No acute parenchymal hemorrhage  VENTRICLES AND EXTRA-AXIAL SPACES:  Normal for the patient's age  VISUALIZED ORBITS AND PARANASAL SINUSES:  Unremarkable  CALVARIUM AND EXTRACRANIAL SOFT TISSUES:  Normal      Impression: No acute intracranial abnormality  Mild age-appropriate generalized atrophy and mild microangiopathic changes appear similar to the prior study  Workstation performed: [id number]     Procedure: Us Kidney And Bladder    Result Date: 12/31/2020  Narrative: RENAL ULTRASOUND INDICATION:   NADEEM  Acute kidney injury  COMPARISON: None available  TECHNIQUE:   Ultrasound of the retroperitoneum was performed with a curvilinear transducer utilizing volumetric sweeps and still imaging techniques  FINDINGS: KIDNEYS: The kidneys are somewhat small  Right kidney:  8 4 x 4 x 4 5 cm  Left kidney:  9 5 x 4 7 x 3 9 cm  Right kidney There is renal cortical thinning and renal cortical scarring  Vascular calcifications are present  Renal cortical echogenicity is increased   There is a 9 x 8 x 6 mm simple appearing cyst in the lower pole right kidney  No hydronephrosis  No definite shadowing calculi  No perinephric fluid collections  Left kidney There is renal cortical thinning and renal cortical scarring  Vascular calcifications are present  Renal cortical echogenicity is increased  There is an approximately 3 3 x 3 1 x 3 cm cyst containing some moderately thick internal septations on the lower pole left kidney  There appears to be a soft tissue nodule along one of the septations (image 77 of the still images)  There is a 1 6 x 1 1 x 1 1 cm simple appearing cyst in the interpolar region left kidney  No hydronephrosis  No definite shadowing calculi  No perinephric fluid collections  URETERS: Nonvisualized  BLADDER: Normally distended  The urinary bladder wall appears diffusely thickened  Bilateral ureteral jets detected  OTHER: There is a small amount of ascites  There is cholelithiasis  The gallbladder wall is thickened, measuring approximately 5 mm thickness  The technologist who performed the examination reports a negative sonographic Aldridge's sign  There is an approximately 6 6 x 5 2 x 5 1 cm cystic structure in the right adnexal region, incompletely imaged on the present examination  Impression: The kidneys are somewhat small, right worse than left  There is bilateral renal cortical thinning and renal cortical scarring  Renal cortical echogenicity is increased, suggesting medical renal disease  There is an approximately 3 3 x 3 1 x 3 cm cyst containing some moderately thick internal septations on the lower pole left kidney  There appears to be a soft tissue nodule along one of these septations  Urology consultation and follow-up is recommended  The urinary bladder wall appears diffusely thickened  Urology consultation and follow-up is recommended  There is an approximately 6 6 x 5 2 x 5 1 cm cystic structure in the right adnexal region, incompletely imaged on the present examination    Dedicated pelvic ultrasound is recommended for further characterization  There is cholelithiasis  The gallbladder wall is thickened, measuring approximately 5 mm thickness  Clinical correlation is necessary  If there is concern for acute gallbladder pathology, nuclear medicine hepatobiliary imaging could be performed  There is a small amount of ascites  This examination demonstrates findings for which imaging follow-up is recommended and was logged as such in [address] [place] [address] Rd  The study was marked in [facility] for immediate notification  Workstation performed: [id number]     Procedure: Vas Lower Limb Venous Duplex Study, Complete Bilateral    Result Date: 12/29/2020  Narrative:  [facility] REPORT CLINICAL: Indications: PT C/O generalized weakness, confusion, recent UTI, hyponatremia, acute renal failure, volume overload, B/L LE swelling and ecchymosis  Physician wants to rule out DVT  Limited protocol performed due to COVID protocol in ED  Operative History: Right Lumpectomy Risk Factors The patient has history of HTN, HLD, CKD, CHF, CVA, breast cancer, TIA, AFIB, and CAD  She is a non-smoker  Height:  63 inches  Weight:  179 lbs  FINDINGS:  Segment  Right            Left              Impression       Impression       CFV      Normal (Patent)  Normal (Patent)     CONCLUSION:  Impression:  RIGHT LOWER LIMB: Limited No gross evidence of acute deep vein thrombosis in the CFV, FV, and Popliteal Vein  LEFT LOWER LIMB: Limited No gross evidence of acute deep vein thrombosis in the CFV, FV, and Popliteal Vein  Limited study performed in ED  Technical findings faxed to chart  SIGNATURE: Electronically Signed by: Renan Jenkins on 2020-12-29 02:28:07 PM    EKG, Pathology, and Other Studies: Reviewed    Counseling / Coordination of Care    Total floor / unit time spent today 35 minutes  Greater than 50% of total time was spent with the patient and / or family counseling and / or coordination of care   A description of the counseling / coordination of care: symptoms assessment, chart review, emotional support, discussed with patient's daughter via telephone regarding medical course/goals of care

## 2021-01-04 NOTE — PROGRESS NOTES
Progress Note - Nephrology   Brigida Doshi 80 y o  female MRN: [id number]  Unit/Bed#: [facility] 501-01 Encounter: [id number]      Assessment / Plan:    Acute kidney injury on top of stage IIIB chronic kidney disease  · Baseline creatinine of 0 9-1 2  · Workup:  Urinalysis was bland / PVR is elevated / renal ultrasound revealed right kidney to be 8 4 cm and the left 9 5 cm  There was renal cortical thinning and scarring bilaterally  Renal echogenicity was increased bilaterally as well  There was no hydronephrosis noted  // urine eosinophils 0%  · Renal function has returned to normal and today's creatinine is 1 1  · Avoiding nephrotoxins, relative hypotension   · Will check with SLIM to see if we can discontinue intravenous fluids     Hypertension  · Current blood pressure is acceptable and running in the 130s to 140s  · Currently only receiving Lasix 40 mg intravenously daily  · Blood pressure meds recently adjusted for relative hypotension    Volume status / Cardiomyopathy  · + chronic pleural effusions  · On today's exam that is not of appear to be grossly fluid overloaded  · Would like to discontinue intravenous fluids and transition to oral diuretics  · Echocardiogram-ejection fraction 35% with severe diffuse hypokinesis/right ventricle was moderately to markedly dilated with reduced systolic function/mitral valve with moderate to severe MR  · ?   Cardiology-CHF eval    Hyponatremia  · Likely related to volume overload and decreased ejection fraction/ CKD with delayed water excretion  · Workup:  Cortisol 21 3/41 1 // TSH 3 8 and 5 2 to // serum osmolality 286 // urine sodium 44 // urine oz of 343 // NT-proBNP 2511  · Continue to diurese  · Currently NPO, restart fluid restriction and sodium restriction when diet restarted    Hyperkalemia  · Resolved  · Replete potassium as it is currently 3 5 with ongoing diuresis    Urinary retention  · Urinary retention protocol initiated  · Check bladder scans  · Per primary team / Urology    Leukocytosis  · Workup and management per primary team    Other problems:  Encephalopathy // possible lower extremity cellulitis // hypothermia // acute on chronic combined systolic and diastolic CHF // atrial fibrillation // thickened bladder on ultrasound with cholelithiasis //  small amount of ascites noted on ultrasound // cystic structure in the right adnexal region // complicated left renal cyst with nodule noted along 1 of the septations in the cyst (urology has evaluated)// dysphagia      Addendum:  Spoke with Dr Josiah Han, need to continue dextrose administration due to patient not eating, NPO status and hypoglycemia  Will discontinue the 1/2 normal saline component as we are trying to diurese her    Subjective:   Patient was seen examined  She was sleeping but did awake and was answering questions slowly  She seem to comprehend what I was asking and her responses were appropriate  She was not in any distress upon my exam   She stated that she was somewhat short of breath yesterday but oxygen helped alleviate this and she denied any shortness of breath this morning  She denied any chest pain or pressure, paroxysmal nocturnal dyspnea, orthopnea, abdominal pain, vomiting, diarrhea, chills or sweats  Objective:     Vitals: Blood pressure 139/65, pulse 63, temperature 97 6 °F (36 4 °C), temperature source Oral, resp  rate 15, height 5' 4" (1 626 m), weight 84 5 kg (186 lb 4 6 oz), SpO2 99 %  ,Body mass index is 31 98 kg/m²  Temp (24hrs), Av 6 °F (36 4 °C), Min:97 6 °F (36 4 °C), Max:97 6 °F (36 4 °C)      Weight (last 2 days)     Date/Time   Weight    21       Weight: un able to weigh patient at this time at 21 0531    21       Weight: bed zeroed with patient in it, unable to weigh at 21                     Intake/Output Summary (Last 24 hours) at 2021 1014  Last data filed at 2021 0015  Gross per 24 hour   Intake 811 67 ml   Output 650 ml   Net 161 67 ml     I/O last 24 hours:   In: 900 8 [I V :900 8]  Out: 900 [Urine:900]        Physical Exam    /65   Pulse 63   Temp 97 6 °F (36 4 °C) (Oral)   Resp 15   Ht 5' 4" (1 626 m)   Wt 84 5 kg (186 lb 4 6 oz)   SpO2 99%   BMI 31 98 kg/m²   Vital signs were reviewed  Constitutional:  Patient was awake, answering questions appropriately and in no apparent distress  Cardiovascular:  No overt jugular venous distention but difficult to assess, S1-S2, no pericardial friction rub or S3 were appreciated, trace peripheral edema Pulmonary:  Lungs were clear to auscultation percussion with no rales/wheezing or rhonchi noted, decreased breath sounds due to decreased inspiratory effort  Abdominal/GI:  Soft, perhaps slightly distended, no rebound or guarding noted  Skin:  No hives were noted            Invasive Devices     Peripheral Intravenous Line            Peripheral IV 01/02/21 Right;Ventral (anterior) Hand 2 days          Drain            External Urinary Catheter 4 days                Medications:    Scheduled Meds:  Current Facility-Administered Medications   Medication Dose Route Frequency Provider Last Rate    acetaminophen  325 mg Rectal Q4H PRN Page Carlisle MD      albuterol  2 puff Inhalation Q4H PRN Mary Ann Wylie DO      bisacodyl  10 mg Rectal Daily Page Carlisle MD      dextrose 5 % and sodium chloride 0 45 %  50 mL/hr Intravenous Continuous Wei Andersen, DO 50 mL/hr (01/04/21 0015)    furosemide  40 mg Intravenous Daily Christine Reyes DO      metoprolol  2 5 mg Intravenous Q6H PRN Page Carlisle MD      morphine injection  1 mg Intravenous Q4H PRN Page Carlisle MD      pantoprazole  40 mg Oral Early Morning Mary Ann Wylie DO         PRN Meds:   acetaminophen    albuterol    metoprolol    morphine injection    Continuous Infusions:dextrose 5 % and sodium chloride 0 45 %, 50 mL/hr, Last Rate: 50 mL/hr (01/04/21 0015)            LAB RESULTS:      Results from last 7 days   Lab Units 01/04/21  0511 01/02/21  0507 01/01/21  0149 12/31/20  0556 12/30/20  2350 12/30/20  1651 12/30/20  0435  12/29/20  0421  12/28/20  2152   WBC Thousand/uL 11 68*  --   --  8 80  --   --  7 33  --  5 04  --  6 37   HEMOGLOBIN g/dL 9 5*  --   --  9 3*  --   --  9 7*  --  9 3*  --  10 6*   HEMATOCRIT % 31 2*  --   --  29 5*  --   --  31 2*  --  28 7*  --  34 1*   PLATELETS Thousands/uL 100*  --   --  63*  --   --  58*  --  57*  --  66*   NEUTROS PCT % 80*  --   --  83*  --   --  79*  --   --   --  81*   LYMPHS PCT % 7*  --   --  7*  --   --  10*  --   --   --  12*   MONOS PCT % 10  --   --  9  --   --  9  --   --   --  6   EOS PCT % 3  --   --  1  --   --  2  --   --   --  1   POTASSIUM mmol/L 3 5 4 0 4 2 4 6 4 8 4 8 5 0   < >  --    < > 6 1*   CHLORIDE mmol/L 97* 96* 93* 95* 94* 95* 93*   < >  --    < > 94*   CO2 mmol/L 36* 34* 33* 29 33* 30 31   < >  --    < > 33*   BUN mg/dL 27* 35* 35* 42* 46* 48* 48*   < >  --    < > 55*   CREATININE mg/dL 1 10 1 36* 1 35* 1 42* 1 45* 1 49* 1 56*   < >  --    < > 1 64*   CALCIUM mg/dL 9 0 9 5 9 3 9 3 10 1 9 6 10 0   < >  --    < > 10 1   ALK PHOS U/L  --   --   --   --   --   --   --   --   --   --  122*   ALT U/L  --   --   --   --   --   --   --   --   --   --  35   AST U/L  --   --   --   --   --   --   --   --   --   --  45   MAGNESIUM mg/dL  --   --   --   --   --   --  2 5  --   --   --   --    PHOSPHORUS mg/dL  --   --   --   --   --   --  3 5  --   --   --   --     < > = values in this interval not displayed  CUTURES:  Lab Results   Component Value Date    BLOODCX No Growth After 5 Days  12/29/2020    BLOODCX No Growth After 5 Days  12/29/2020    BLOODCX No Growth After 5 Days  03/26/2020    BLOODCX No Growth After 5 Days  03/26/2020    BLOODCX No Growth After 5 Days  04/16/2017    BLOODCX No Growth After 5 Days   04/16/2017    URINECX No Growth <1000 cfu/mL 03/27/2020    URINECX <10,000 cfu/ml Mixed Contaminants X2 04/17/2017    URINECX 50,000-59,000 cfu/ml Mixed Contaminants X5 08/24/2016                 PLEASE NOTE:  This encounter was completed utilizing the [employer] Direct Speech Voice Recognition Software  Grammatical errors, random word insertions, pronoun errors and incomplete sentences are occasional consequences of the system due to software limitations, ambient noise and hardware issues  These may be missed by proof reading prior to affixing electronic signature  Any questions or concerns about the content, text or information contained within the body of this dictation should be directly addressed to the physician for clarification  Please do not hesitate to call me directly if you have any any questions or concerns

## 2021-01-04 NOTE — SPEECH THERAPY NOTE
Speech Language/Pathology    Speech/Language Pathology Progress Note    Patient Name: Donovan Alfredo  OXOWO'E Date: 1/4/2021       Subjective:  "Good morning!"  Pt easily awakens, speaking  Tells me that she had a little water last night  Current Diet: npo for several days  Objective:  Pt seen for ongoing dx dysphagia tx  Trialed w/ puree, ht, nt & thin by tsp/straw/cup, soft solid  Oral care completed prior to trials  Pt w/ adequate retrieval of all, no anterior loss  Adequate manipulation of the puree/solids w/ full mastication & oral clearing  Noted swallows to be complete w/ all, w/ ?able reduced hyolaryngeal excursion & some mult swallows  Consistent gurgling/?able belching w/ all liquids & an ongoing wet vocal quality  Pt w/ wet congested coughing w/ the thin by straw following significant amts of the belching again  Assessment:  Pt much more awake, alert & participating in therapy  ?ongoing gurgling & belching with all liquids today- immed aspiration vs bottom up events cannot be distinguished bedside  Plan/Recommendations:  Continue NPO for now until follow a VBS for full recs     Oral care often

## 2021-01-04 NOTE — ASSESSMENT & PLAN NOTE
· With hypothermia on presentation  Possibly in setting of limited oral intake, cannot exclude infection    Started on IV ancef for possible LE cellulitis  · Improved, hermes price restarted keep temp >98, requiring warming   · TSH wnl

## 2021-01-04 NOTE — PROCEDURES
Video Swallow Study      Patient Name: Masha Warren  DASJC'C Date: 1/4/2021        Past Medical History  Past Medical History:   Diagnosis Date    Arthritis     Cancer [facility])     breast    Cardiac disease     afib    CHF (congestive [facility] failure) ([facility] [address] [facility]     Hyperlipidemia     Hypertension     [facility]     TIA    TIA (transient ischemic attack)         Past Surgical History  Past Surgical History:   Procedure Laterality Date    BREAST SURGERY      right lumpectomy    TOTAL HIP ARTHROPLASTY Bilateral      Video Barium Swallow Study    Summary:  Images are on PACS for review  Pt presents w/ mod oral, mild(mod) pharyngeal dysphagia w/ reduced mastication & manipulation of hard solids, reduced lingual drive & hyolaryngeal movement  Noted retropulsion from the upper esophagus into the pyriform sinuses  Material was noted in the airway following one of those episodes but not other instances  This occurred early in the study, then began to slow as trials progressed  The esophagus was poorly clearing throughout w/ reduced suspected motility  Recommendations:  Diet: Level 2 mech soft   Liquids: thin, small controlled sips   Meds: trial inpuree w/ liquids to clear  Strategies: slow po, mult swallows   UPRIGHT POSITION FOR ALL PO!!!  F/u ST tx: yes   Aspiration Precautions  Reflux Precautions    Patient's goal:  None stated      Goals:  Pt will tolerate least restrictive diet w/out s/s aspiration or oral/pharyngeal difficulties  Previous VBS:  -  Current Diet:   NPO  Premorbid diet:  Regular with thin   Dentition:  Partial natural  O2 requirement:  RA  Oral mech:  Strength and ROM: wfl    Vocal Quality/Speech:  wfl   Cognitive status:  Awake, alert, cooperative  C/o being hard of hearing  Consistencies administered: Barium laden applesauce, banana, chicken, hard solid, honey thick, nectar thick, thin liquids, 13mm barium pill with water   Liquids were administered by cup and straw  Pt was seated laterally at 90 degrees  Oral stage:    Lip closure:wfl  Mastication: severely prolonged w/ the harder material w/ difficulty managing   Bolus formation: mildly reduced  Bolus control: mild spill over  BOT at times w/ thinner material   Transfer: fair, mildly reduced lingual retraction   Residue: noted mild/mod w/ solids w/ need for cues to swallow  Pharyngeal stage:    Swallow promptness: varied delay, at times mild/mod   Spill to valleculae: with all material   Spill to pyriforms: with the thin by cup/straw  Epiglottic inversion: present   Laryngeal excursion: mod  reduced superior movement, mild reduced anterior movement   Pharyngeal constriction: fair   Vallecular retention: mild w/ the solids   Pyriform retention: mild retention, noted filling from the esophagus after nt, thin   PPW coating: throughout   Osteophytes: not noted  CP prominence:-  Retropulsion from prominence: retropulsion is from poorly clearing esophagus  Transient penetration:  Epiglottic undercoat:slight noted   Penetration:-  Aspiration: noted aspirated material in the airway following one of the episodes of retropulsion  Strategies: pt cued to mult swallow, unable to cough to clear the material in the airway  Response to aspiration:    Screening of Esophageal stage:    Noted poorly clearing upper esophagus especially in the beginning of the study w/ noted retropulsion from the area  Material reached the pyriform sinuses w/ the nt, thin  Noted subsequent coughing & at one point following an episode material was noted in the airway

## 2021-01-04 NOTE — ASSESSMENT & PLAN NOTE
· Presented with increased weakness and confusion; with recent treatment for apparent UTI, low temps  · Hyponatremia and acute renal failure are noted with evidence of volume overload with specific plans for these as below  · Otherwise with no other clear infectious source at this time, will follow up results of blood cultures x2 however does have erythema LE bilaterally, hot to the touch, started on IV ancef for possible superimposed cellulitis  · Vitamin b12 and folate unremarkable  · CT head negative  · Patient is more responsive today

## 2021-01-04 NOTE — ASSESSMENT & PLAN NOTE
· Sodium 128 on POA, poor po intake and volume overload, improved  · Received total 40 mg IV lasix in the ED  · Continue diuresis per nephrology recs  · Continue fluid restriction  · On celexa, 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  · Holding all oral medications

## 2021-01-04 NOTE — UTILIZATION REVIEW
Continued Stay Review    Date:  1/4/21 Monday                         Current Patient Class: Inpatient  Current Level of Care: Acute Med Surg    HPI:  80year old female with PMHx HTN, CVA, combined systolic and diastolic CHF, Atrial Fib not on anticoagulation secondary to fall risk, CKD Stage 2, recent UTI  Initially presented to [facility] ED 2nd 3 day history worsening confusion, weakness and decreased appetite - admitted inpatient 2nd Acute Encephalopathy, Acute on chronic Combined CHF, Hyponatremia,   Hypothermia     12/29 Nephrology   NADEEM - suspected pre-renal, r/t diuretics, recent UTI and antibiotic use, decreased oral intake  BLE edema, pleural effusion on CXR - IV fluids x 10 hrs and monitor response, pure Wick for accurate I&O, urine eos ro r/o AIN from antibiotic use, trend labs and volume status  1/2/21  Acute kidney injury on stage IIIB chronic kidney disease with encephalopathy  ? Baseline creatinine 0 9-1 2, estimated GFR in the 40s  ? voiding nephrotoxins  ? hyperkalemia has improved  ? Ongoing workup for encephalopathy  ?  off antibiotics  ? Avoid hypotension  ?  still with poor p o  intake  ? continue D5 half-normal saline started at 50 cc an titrate  ?  decreased furosemide daily     2  Blood pressure/volume overload  Continue furosemide 40 mg IV  Daily       1/4/21  Acute kidney injury on top of stage IIIB chronic kidney disease  · renal ultrasound revealed right kidney to be 8 4 cm and the left 9 5 cm      · Renal function has returned to normal and today's creatinine is 1 1  · Current BP acceptable and running in the 130s to 140s  · Currently only receiving Lasix 40 mg intravenously daily      Hyponatremia  · Likely related to volume overload and decreased ejection fraction/ CKD with delayed water excretion  · Workup:  Cortisol 21 3/41 1 // TSH 3 8 and 5 2 to // serum osmolality 286 // urine sodium 44 // urine oz of 343 // NT-proBNP 2511  · Continue to diurese  · Currently NPO, restart fluid restriction and sodium restriction when diet restarted    Volume status / Cardiomyopathy  · + chronic pleural effusions  ·  not grossly fluid overloaded  · Plan to discontinue IVF + transition to oral diuretics  · Echocardiogram shows LVEFx 35% with severe diffuse hypokinesis/right ventricle was moderately to markedly dilated with reduced systolic function/mitral valve with moderate to severe MR    Pertinent Labs/Diagnostic Results:   Results from last 7 days   Lab Units 12/28/20  2205   SARS-COV-2  Negative     Results from last 7 days   Lab Units 01/04/21  0511 12/31/20  0556 12/30/20  0435 12/29/20  0421 12/28/20  2152   WBC Thousand/uL 11 68* 8 80 7 33 5 04 6 37   HEMOGLOBIN g/dL 9 5* 9 3* 9 7* 9 3* 10 6*   HEMATOCRIT % 31 2* 29 5* 31 2* 28 7* 34 1*   PLATELETS Thousands/uL 100* 63* 58* 57* 66*   NEUTROS ABS Thousands/µL 9 32* 7 28 5 76  --  5 14     Results from last 7 days   Lab Units 01/04/21  0511 01/02/21  0507 01/01/21  0149 12/31/20  0556 12/30/20  2350  12/30/20  0435   SODIUM mmol/L 134* 134* 128* 131* 130*   < > 128*   POTASSIUM mmol/L 3 5 4 0 4 2 4 6 4 8   < > 5 0   CHLORIDE mmol/L 97* 96* 93* 95* 94*   < > 93*   CO2 mmol/L 36* 34* 33* 29 33*   < > 31   ANION GAP mmol/L 1* 4 2* 7 3*   < > 4   BUN mg/dL 27* 35* 35* 42* 46*   < > 48*   CREATININE mg/dL 1 10 1 36* 1 35* 1 42* 1 45*   < > 1 56*   EGFR ml/min/1 73sq m 45 35 35 33 32   < > 29   CALCIUM mg/dL 9 0 9 5 9 3 9 3 10 1   < > 10 0   MAGNESIUM mg/dL  --   --   --   --   --   --  2 5   PHOSPHORUS mg/dL  --   --   --   --   --   --  3 5     Results from last 7 days   Lab Units 01/02/21  0507 12/28/20  2152   AST U/L  --  45   ALT U/L  --  35   ALK PHOS U/L  --  122*   TOTAL PROTEIN g/dL  --  7 1   ALBUMIN g/dL  --  3 2*   TOTAL BILIRUBIN mg/dL  --  0 93   AMMONIA umol/L 16  --      Results from last 7 days   Lab Units 01/03/21 2026 01/03/21  6810 01/01/21 2057 01/01/21  1614 01/01/21  1104 01/01/21  0618 12/31/20 2048 20  1141 20  2350 20  2033 20  1124 20  0844   POC GLUCOSE mg/dl 102 148* 106 139 108 121 163* 125 95 83 89 61*     Results from last 7 days   Lab Units 21  0511 21  0507 21  0149 20  0556 20  2350 20  1651 20  0435 20  2358 20  1608 20  1247 20  0928 20  0248   GLUCOSE RANDOM mg/dL 101 84 88 98 77 81 56* 66 57* 73 64* 90     Results from last 7 days   Lab Units 20  1247 20  2152   TSH 3RD GENERATON uIU/mL 3 880* 5 220*     Results from last 7 days   Lab Units 20  2152   NT-PRO BNP pg/mL 2,511*     Results from last 7 days   Lab Units 21  0507   LIPASE u/L 188     Results from last 7 days   Lab Units 20  0633 20  0617   BLOOD CULTURE  No Growth After 5 Days  No Growth After 5 Days  Vital Signs:   Vitals: Blood pressure 139/65, pulse 63, temperature 97 6 °F (36 4 °C), temperature source Oral, resp  rate 15,   SpO2 99 %  height 5' 4" (1 626 m), weight 84 5 kg (186 lb 4 6 oz), Body mass index is 31 98 kg/m²  Temp (24hrs), Av 6 °F (36 4 °C), Min:97 6 °F (36 4 °C), Max:97 6 °F (36 4 °C)       Intake/Output Summary (Last 24 hours) at 2021 1014:  Gross per 24 hour   Intake 811 67 ml   Output 650 ml   Net 161 67 ml     I/O last 24 hours: In: 900 8 [I V :900 8]  Out: 900 [Urine:900]     Diet Dysphagia/Modified Consistency; Dysphagia 2-Mechanical Soft; Thin Liquid     Scheduled Medications:  bisacodyl, 10 mg, Rectal, Daily  IV furosemide, 40 mg, Intravenous, Daily  pantoprazole, 40 mg, Oral, Early Morning  IV potassium chloride, 20 mEq, Intravenous, Once    Continuous IV Infusions:  IVF dextrose, 40 mL/hr, Intravenous, Contnuous    PRN Meds:  acetaminophen, 325 mg, Rectal, Q4H PRN  albuterol, 2 puff, Inhalation, Q4H PRN  metoprolol, 2 5 mg, Intravenous, Q6H PRN  morphine injection, 1 mg, Intravenous, Q4H PRN    Discharge Plan:    To be determined   Inpatient Case Management following for all discharge needs    Network Utilization Review Department  ATTENTION: Please call with any questions or concerns to [phone] and carefully listen to the prompts so that you are directed to the right person  All voicemails are confidential   Kroi King all requests for admission clinical reviews, approved or denied determinations and any other requests to dedicated fax number below belonging to the campus where the patient is receiving treatment   List of dedicated fax numbers for the Facilities:  [address] DENIALS (Administrative/Medical Necessity) [phone]   [address] (Maternity/NICU/Pediatrics) [phone] [address] [place] [address] Dr Macie Walters [phone] (  Raymon Dalton "Meredith" 103) [address] [address] [address] C[address] 14[address] [place]) [address] [place] [address] [phone]

## 2021-01-05 LAB
ANION GAP SERPL CALCULATED.3IONS-SCNC: 1 MMOL/L (ref 4–13)
BASOPHILS # BLD AUTO: 0.03 THOUSANDS/ΜL (ref 0–0.1)
BASOPHILS NFR BLD AUTO: 0 % (ref 0–1)
BUN SERPL-MCNC: 25 MG/DL (ref 5–25)
CALCIUM SERPL-MCNC: 8.6 MG/DL (ref 8.3–10.1)
CHLORIDE SERPL-SCNC: 96 MMOL/L (ref 100–108)
CO2 SERPL-SCNC: 37 MMOL/L (ref 21–32)
CREAT SERPL-MCNC: 0.99 MG/DL (ref 0.6–1.3)
EOSINOPHIL # BLD AUTO: 0.27 THOUSAND/ΜL (ref 0–0.61)
EOSINOPHIL NFR BLD AUTO: 2 % (ref 0–6)
ERYTHROCYTE [DISTWIDTH] IN BLOOD BY AUTOMATED COUNT: 17.2 % (ref 11.6–15.1)
GFR SERPL CREATININE-BSD FRML MDRD: 51 ML/MIN/1.73SQ M
GLUCOSE SERPL-MCNC: 121 MG/DL (ref 65–140)
HCT VFR BLD AUTO: 29.4 % (ref 34.8–46.1)
HGB BLD-MCNC: 9.3 G/DL (ref 11.5–15.4)
IMM GRANULOCYTES # BLD AUTO: 0.06 THOUSAND/UL (ref 0–0.2)
IMM GRANULOCYTES NFR BLD AUTO: 1 % (ref 0–2)
LABORATORY COMMENT REPORT: ABNORMAL
LYMPHOCYTES # BLD AUTO: 0.64 THOUSANDS/ΜL (ref 0.6–4.47)
LYMPHOCYTES NFR BLD AUTO: 5 % (ref 14–44)
MAGNESIUM SERPL-MCNC: 1.8 MG/DL (ref 1.6–2.6)
MCH RBC QN AUTO: 30.8 PG (ref 26.8–34.3)
MCHC RBC AUTO-ENTMCNC: 31.6 G/DL (ref 31.4–37.4)
MCV RBC AUTO: 97 FL (ref 82–98)
MONOCYTES # BLD AUTO: 1.06 THOUSAND/ΜL (ref 0.17–1.22)
MONOCYTES NFR BLD AUTO: 9 % (ref 4–12)
NEUTROPHILS # BLD AUTO: 10.12 THOUSANDS/ΜL (ref 1.85–7.62)
NEUTS SEG NFR BLD AUTO: 83 % (ref 43–75)
NRBC BLD AUTO-RTO: 0 /100 WBCS
PHOSPHATE SERPL-MCNC: 2.4 MG/DL (ref 2.3–4.1)
PLATELET # BLD AUTO: 124 THOUSANDS/UL (ref 149–390)
PMV BLD AUTO: 11.4 FL (ref 8.9–12.7)
POTASSIUM SERPL-SCNC: 3.6 MMOL/L (ref 3.5–5.3)
PROCALCITONIN SERPL-MCNC: 0.11 NG/ML
RBC # BLD AUTO: 3.02 MILLION/UL (ref 3.81–5.12)
SODIUM SERPL-SCNC: 134 MMOL/L (ref 136–145)
TRAMADOL UR QL SCN: POSITIVE NG/ML
WBC # BLD AUTO: 12.18 THOUSAND/UL (ref 4.31–10.16)

## 2021-01-05 PROCEDURE — 99233 SBSQ HOSP IP/OBS HIGH 50: CPT | Performed by: HOSPITALIST

## 2021-01-05 PROCEDURE — 97530 THERAPEUTIC ACTIVITIES: CPT

## 2021-01-05 PROCEDURE — 92526 ORAL FUNCTION THERAPY: CPT

## 2021-01-05 PROCEDURE — 85025 COMPLETE CBC W/AUTO DIFF WBC: CPT | Performed by: INTERNAL MEDICINE

## 2021-01-05 PROCEDURE — 84100 ASSAY OF PHOSPHORUS: CPT | Performed by: INTERNAL MEDICINE

## 2021-01-05 PROCEDURE — 99232 SBSQ HOSP IP/OBS MODERATE 35: CPT | Performed by: INTERNAL MEDICINE

## 2021-01-05 PROCEDURE — 97167 OT EVAL HIGH COMPLEX 60 MIN: CPT

## 2021-01-05 PROCEDURE — 80048 BASIC METABOLIC PNL TOTAL CA: CPT | Performed by: INTERNAL MEDICINE

## 2021-01-05 PROCEDURE — 83735 ASSAY OF MAGNESIUM: CPT | Performed by: INTERNAL MEDICINE

## 2021-01-05 PROCEDURE — 84145 PROCALCITONIN (PCT): CPT | Performed by: HOSPITALIST

## 2021-01-05 RX ORDER — POTASSIUM CHLORIDE 20 MEQ/1
20 TABLET, EXTENDED RELEASE ORAL ONCE
Status: COMPLETED | OUTPATIENT
Start: 2021-01-05 | End: 2021-01-05

## 2021-01-05 RX ORDER — NOREPINEPHRINE BITARTRATE 1 MG/ML
INJECTION, SOLUTION INTRAVENOUS
Status: DISPENSED
Start: 2021-01-05 | End: 2021-01-06

## 2021-01-05 RX ORDER — TORSEMIDE 20 MG/1
20 TABLET ORAL DAILY
Status: DISCONTINUED | OUTPATIENT
Start: 2021-01-06 | End: 2021-01-08

## 2021-01-05 RX ADMIN — ATORVASTATIN CALCIUM 40 MG: 40 TABLET, FILM COATED ORAL at 17:15

## 2021-01-05 RX ADMIN — THIAMINE HCL TAB 100 MG 100 MG: 100 TAB at 10:09

## 2021-01-05 RX ADMIN — CARVEDILOL 12.5 MG: 12.5 TABLET, FILM COATED ORAL at 15:47

## 2021-01-05 RX ADMIN — Medication 1 TABLET: at 10:09

## 2021-01-05 RX ADMIN — MELATONIN 3 MG: at 21:33

## 2021-01-05 RX ADMIN — FOLIC ACID 1 MG: 1 TABLET ORAL at 10:08

## 2021-01-05 RX ADMIN — Medication 1 TABLET: at 07:03

## 2021-01-05 RX ADMIN — ASPIRIN 81 MG: 81 TABLET ORAL at 10:09

## 2021-01-05 RX ADMIN — CARVEDILOL 12.5 MG: 12.5 TABLET, FILM COATED ORAL at 07:03

## 2021-01-05 RX ADMIN — ACETAMINOPHEN 650 MG: 325 TABLET, FILM COATED ORAL at 07:03

## 2021-01-05 RX ADMIN — POTASSIUM CHLORIDE 20 MEQ: 1500 TABLET, EXTENDED RELEASE ORAL at 11:32

## 2021-01-05 RX ADMIN — CITALOPRAM HYDROBROMIDE 10 MG: 10 TABLET ORAL at 10:09

## 2021-01-05 RX ADMIN — FUROSEMIDE 40 MG: 10 INJECTION, SOLUTION INTRAMUSCULAR; INTRAVENOUS at 10:09

## 2021-01-05 RX ADMIN — PANTOPRAZOLE SODIUM 40 MG: 40 TABLET, DELAYED RELEASE ORAL at 07:03

## 2021-01-05 NOTE — ASSESSMENT & PLAN NOTE
Initially was not showing improvement in her clinical status, was not eating hence palliative Care was consulted    Family did not want feeding tube  Gradually patient seems to have recurred and now doing so much better  Now on oral diet, had good amount of oral intake this morning  Hence palliative Care is signing of as family goal is to send her to rehab and eventually home if possible

## 2021-01-05 NOTE — ASSESSMENT & PLAN NOTE
Lab Results   Component Value Date    EGFR 51 01/05/2021    EGFR 45 01/04/2021    EGFR 35 01/02/2021    CREATININE 0 99 01/05/2021    CREATININE 1 10 01/04/2021    CREATININE 1 36 (H) 01/02/2021   · POA, baseline approximately 0 9  · Possibly in setting of volume overload  · Improving with IV diuresis per nephrology recs  · UA reviewed  · NADEEM resolved  · measure PVR and bladder scan - required straight catheterization x2 so far today  · Monitor with BMP  · Hold nephrotoxins avoid hypotension

## 2021-01-05 NOTE — ASSESSMENT & PLAN NOTE
· Presented with increased weakness and confusion; with recent treatment for apparent UTI, low temps  · Hyponatremia and acute renal failure are noted with evidence of volume overload with specific plans for these as below  · Otherwise with no other clear infectious source at this time, will follow up results of blood cultures x2 however does have erythema LE bilaterally, hot to the touch, started on IV ancef for possible superimposed cellulitis - now stopped  · Vitamin b12 and folate unremarkable  · CT head negative  · Patient is more responsive today

## 2021-01-05 NOTE — PROGRESS NOTES
Progress note - Palliative and Supportive Care   Valentina Woodruff 80 y o  female [id number]    Assessment:  Gustavo Quiñonez a 80 y  o  female with chronic biventricular CHF, CKD2, HTN, Afib admitted from home after being found confused by family  [facility] consulted for goals of care  Patient Active Problem List   Diagnosis    Atrial fibrillation ([facility]    Essential hypertension    Left lower lobe pneumonia    Acute on chronic combined systolic and diastolic congestive heart failure ([facility]    Cardiomyopathy ([facility]    Mitral regurgitation    Acute renal failure superimposed on stage 2 chronic kidney disease ([facility]    Ambulatory dysfunction    Toxic metabolic encephalopathy    Hypothermia    Thrombocytopenia (HCC)    Anemia    Hyponatremia    Hyperkalemia    Erythema of lower extremity    Elevated TSH    Goals of care, counseling/discussion    Dysphagia       Plan:  #symptom management   - management per primary team    #goals of care   - treatment focused care with no limitations at this time   - spoke with Bert Salas [daughter] on telephone with continued discharge plan to focus on rehab to focus on regaining patient's independence  - will continue to follow for supportive care    #psychosocial support   - emotional support provided   - Elham Farias [daughter] [phone] (mobile)              - Bert Salas is point contact              - overall five adult children      We appreciate the opportunity to participate in this patient's care  We will continue to follow  Please do not hesitate to contact our on-call provider through our clinic answering service at [phone] should you have acute symptom control concerns  Code Status: DNAR/DNI - Level 3   Decisional apparatus:  Patient is competent on my exam today  If competence is lost, patient's mPOA would default to Keiko [daughter] and Haven Manzo by advanced documentation on file     Advance Directive / Living Will / POLST:  On File    Interval history:   - no acute events overnight   - sitting up in bedside chair today at patient's request   - shared excitement in eating breakfast this AM, tolerated without difficulty   - no reported symptoms of concern    MEDICATIONS / ALLERGIES:     current meds:   Current Facility-Administered Medications   Medication Dose Route Frequency    acetaminophen (TYLENOL) rectal suppository 325 mg  325 mg Rectal Q4H PRN    acetaminophen (TYLENOL) tablet 650 mg  650 mg Oral Q6H PRN    albuterol (PROVENTIL HFA,VENTOLIN HFA) inhaler 2 puff  2 puff Inhalation Q4H PRN    aspirin (ECOTRIN LOW STRENGTH) EC tablet 81 mg  81 mg Oral Daily    atorvastatin (LIPITOR) tablet 40 mg  40 mg Oral After Dinner    bisacodyl (DULCOLAX) rectal suppository 10 mg  10 mg Rectal Daily    calcium carbonate-vitamin D (OSCAL-D) 500 mg-200 units per tablet 1 tablet  1 tablet Oral Daily With Breakfast    carvedilol (COREG) tablet 12 5 mg  12 5 mg Oral BID With Meals    citalopram (CeleXA) tablet 10 mg  10 mg Oral Daily    dextrose 5 % infusion  40 mL/hr Intravenous Continuous    docusate sodium (COLACE) capsule 100 mg  100 mg Oral Daily    folic acid (FOLVITE) tablet 1 mg  1 mg Oral Daily    furosemide (LASIX) injection 40 mg  40 mg Intravenous Daily    melatonin tablet 3 mg  3 mg Oral HS    metoprolol (LOPRESSOR) injection 2 5 mg  2 5 mg Intravenous Q6H PRN    morphine injection 1 mg  1 mg Intravenous Q4H PRN    multivitamin-minerals (CENTRUM) tablet 1 tablet  1 tablet Oral Daily    ondansetron (ZOFRAN) injection 4 mg  4 mg Intravenous Q6H PRN    pantoprazole (PROTONIX) EC tablet 40 mg  40 mg Oral Early Morning    thiamine (VITAMIN B1) tablet 100 mg  100 mg Oral Daily       Allergies   Allergen Reactions    Lovenox [Enoxaparin Sodium] Swelling       OBJECTIVE:    Physical Exam  Physical Exam  Constitutional:       Comments: Elderly, chronically ill appearing in NAD  Sitting up in bedside chair  Non-toxic appearing HENT:      Head: Normocephalic and atraumatic  Right Ear: External ear normal       Left Ear: External ear normal    Eyes:      Comments: No gaze preference   Neck:      Musculoskeletal: Normal range of motion  Cardiovascular:      Rate and Rhythm: Normal rate  Pulmonary:      Effort: No tachypnea, accessory muscle usage or respiratory distress  Comments: Completes full sentences without difficulty  Abdominal:      General: Abdomen is flat  Palpations: Abdomen is soft  Neurological:      General: No focal deficit present  Mental Status: She is alert  Lab Results:   I have personally reviewed pertinent labs  , CBC:   Lab Results   Component Value Date    WBC 12 18 (H) 01/05/2021    HGB 9 3 (L) 01/05/2021    HCT 29 4 (L) 01/05/2021    MCV 97 01/05/2021     (L) 01/05/2021    MCH 30 8 01/05/2021    MCHC 31 6 01/05/2021    RDW 17 2 (H) 01/05/2021    MPV 11 4 01/05/2021    NRBC 0 01/05/2021   , CMP:   Lab Results   Component Value Date    SODIUM 134 (L) 01/05/2021    K 3 6 01/05/2021    CL 96 (L) 01/05/2021    CO2 37 (H) 01/05/2021    BUN 25 01/05/2021    CREATININE 0 99 01/05/2021    CALCIUM 8 6 01/05/2021    EGFR 51 01/05/2021   , PT/PTT:No results found for: PT, PTT  Imaging Studies: Reviewed  Procedure: Us Kidney And Bladder    Result Date: 12/31/2020  Narrative: RENAL ULTRASOUND INDICATION:   NADEEM  Acute kidney injury  COMPARISON: None available  TECHNIQUE:   Ultrasound of the retroperitoneum was performed with a curvilinear transducer utilizing volumetric sweeps and still imaging techniques  FINDINGS: KIDNEYS: The kidneys are somewhat small  Right kidney:  8 4 x 4 x 4 5 cm  Left kidney:  9 5 x 4 7 x 3 9 cm  Right kidney There is renal cortical thinning and renal cortical scarring  Vascular calcifications are present  Renal cortical echogenicity is increased  There is a 9 x 8 x 6 mm simple appearing cyst in the lower pole right kidney  No hydronephrosis   No definite shadowing calculi  No perinephric fluid collections  Left kidney There is renal cortical thinning and renal cortical scarring  Vascular calcifications are present  Renal cortical echogenicity is increased  There is an approximately 3 3 x 3 1 x 3 cm cyst containing some moderately thick internal septations on the lower pole left kidney  There appears to be a soft tissue nodule along one of the septations (image 77 of the still images)  There is a 1 6 x 1 1 x 1 1 cm simple appearing cyst in the interpolar region left kidney  No hydronephrosis  No definite shadowing calculi  No perinephric fluid collections  URETERS: Nonvisualized  BLADDER: Normally distended  The urinary bladder wall appears diffusely thickened  Bilateral ureteral jets detected  OTHER: There is a small amount of ascites  There is cholelithiasis  The gallbladder wall is thickened, measuring approximately 5 mm thickness  The technologist who performed the examination reports a negative sonographic Aldridge's sign  There is an approximately 6 6 x 5 2 x 5 1 cm cystic structure in the right adnexal region, incompletely imaged on the present examination  Impression: The kidneys are somewhat small, right worse than left  There is bilateral renal cortical thinning and renal cortical scarring  Renal cortical echogenicity is increased, suggesting medical renal disease  There is an approximately 3 3 x 3 1 x 3 cm cyst containing some moderately thick internal septations on the lower pole left kidney  There appears to be a soft tissue nodule along one of these septations  Urology consultation and follow-up is recommended  The urinary bladder wall appears diffusely thickened  Urology consultation and follow-up is recommended  There is an approximately 6 6 x 5 2 x 5 1 cm cystic structure in the right adnexal region, incompletely imaged on the present examination  Dedicated pelvic ultrasound is recommended for further characterization   There is cholelithiasis  The gallbladder wall is thickened, measuring approximately 5 mm thickness  Clinical correlation is necessary  If there is concern for acute gallbladder pathology, nuclear medicine hepatobiliary imaging could be performed  There is a small amount of ascites  This examination demonstrates findings for which imaging follow-up is recommended and was logged as such in [address] [place] [address] Rd  The study was marked in [facility] for immediate notification  Workstation performed: [id number]     Procedure: Vas Lower Limb Venous Duplex Study, Complete Bilateral    Result Date: 12/29/2020  Narrative:  [facility] REPORT CLINICAL: Indications: PT C/O generalized weakness, confusion, recent UTI, hyponatremia, acute renal failure, volume overload, B/L LE swelling and ecchymosis  Physician wants to rule out DVT  Limited protocol performed due to COVID protocol in ED  Operative History: Right Lumpectomy Risk Factors The patient has history of HTN, HLD, CKD, CHF, CVA, breast cancer, TIA, AFIB, and CAD  She is a non-smoker  Height:  63 inches  Weight:  179 lbs  FINDINGS:  Segment  Right            Left              Impression       Impression       CFV      Normal (Patent)  Normal (Patent)     CONCLUSION:  Impression:  RIGHT LOWER LIMB: Limited No gross evidence of acute deep vein thrombosis in the CFV, FV, and Popliteal Vein  LEFT LOWER LIMB: Limited No gross evidence of acute deep vein thrombosis in the CFV, FV, and Popliteal Vein  Limited study performed in ED  Technical findings faxed to chart  SIGNATURE: Electronically Signed by: Siddharth Aguero on 2020-12-29 02:28:07 PM    EKG, Pathology, and Other Studies: Reviewed    Counseling / Coordination of Care    Total floor / unit time spent today 10 minutes  Greater than 50% of total time was spent with the patient and / or family counseling and / or coordination of care   A description of the counseling / coordination of care: symptoms assessment, chart review, discussed with patient's daughter via telephone regarding medical update and goals of care

## 2021-01-05 NOTE — ASSESSMENT & PLAN NOTE
· Sodium 128 on POA, poor po intake and volume overload, improved  · Continue diuresis per nephrology recs  · Continue fluid restriction  · On celexa, 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  · Holding all oral medications

## 2021-01-05 NOTE — SPEECH THERAPY NOTE
Speech Language/Pathology    Speech/Language Pathology Progress Note    Patient Name: Robin Elaine  CKSTB'V Date: 1/5/2021       Subjective:  "I put the platter in the oven"  Pt then stated she feels as if her dreams are very real   Current Diet: level 2 mech altered, thin   Objective:  Pt seen for ongoing dx dysphagia tx  Trialed w/ harder level 3 items, thin by straw (8oz)  Pt w/ slow mastication, reduced oral transfers w/ the hard material   Mastication remains mildly prolonged  Pt requires mult swallows to clear the oral cavity w/ all material   Noted at times she will take long prolonged sips from the straw w/ thin- gurgling was evident & there was weak coughing afterwards  Cued to only take small sips - no overt coughing occurred but some mild belching sounds still occurred  Assessment:  Pt continues w/ mild/mod oral dysphagia with harder solids  Able to clear w/ the thin but remains impulsive w/ intake of thin       Plan/Recommendations:  Would continue the level 2 for now, pt states she ate all of her morning meal well today

## 2021-01-05 NOTE — ASSESSMENT & PLAN NOTE
Wt Readings from Last 3 Encounters:   04/03/20 74 6 kg (164 lb 7 4 oz)   04/21/17 74 1 kg (163 lb 5 8 oz)   08/24/16 71 2 kg (157 lb)     · Patient with lower extremity edema and CXR suspicious for pulmonary vascular congestion  · Status post daily 40 mg IV Lasix - plan is to start her on oral torsemide 20 mg daily from tomorrow per Nephrology  · Monitor daily weights, I/O  · Low-sodium, fluid-restricted diet  · Nephrology following  · Monitor BMP

## 2021-01-05 NOTE — PLAN OF CARE
Problem: Potential for Falls  Goal: Patient will remain free of falls  Description: INTERVENTIONS:  - Assess patient frequently for physical needs  -  Identify cognitive and physical deficits and behaviors that affect risk of falls  -  [place] fall precautions as indicated by assessment   - Educate patient/family on patient safety including physical limitations  - Instruct patient to call for assistance with activity based on assessment  - Modify environment to reduce risk of injury  - Consider OT/PT consult to assist with strengthening/mobility  1/5/2021 0006 by Larry Morley RN  Outcome: Progressing  1/5/2021 0006 by Larry Morley RN  Outcome: Progressing     Problem: Prexisting or High Potential for Compromised Skin Integrity  Goal: Skin integrity is maintained or improved  Description: INTERVENTIONS:  - Identify patients at risk for skin breakdown  - Assess and monitor skin integrity  - Assess and monitor nutrition and hydration status  - Monitor labs   - Assess for incontinence   - Turn and reposition patient  - Assist with mobility/ambulation  - Relieve pressure over bony prominences  - Avoid friction and shearing  - Provide appropriate hygiene as needed including keeping skin clean and dry  - Evaluate need for skin moisturizer/barrier cream  - Collaborate with interdisciplinary team   - Patient/family teaching  - Consider wound care consult   1/5/2021 0006 by Larry Morley RN  Outcome: Progressing  1/5/2021 0006 by Larry Morley RN  Outcome: Progressing     Problem: Nutrition/Hydration-ADULT  Goal: Nutrient/Hydration intake appropriate for improving, restoring or maintaining nutritional needs  Description: Monitor and assess patient's nutrition/hydration status for malnutrition  Collaborate with interdisciplinary team and initiate plan and interventions as ordered  Monitor patient's weight and dietary intake as ordered or per policy  Utilize nutrition screening tool and intervene as necessary  Determine patient's food preferences and provide high-protein, high-caloric foods as appropriate       INTERVENTIONS:  - Monitor oral intake, urinary output, labs, and treatment plans  - Assess nutrition and hydration status and recommend course of action  - Evaluate amount of meals eaten  - Assist patient with eating if necessary   - Allow adequate time for meals  - Recommend/ encourage appropriate diets, oral nutritional supplements, and vitamin/mineral supplements  - Order, calculate, and assess calorie counts as needed  - Recommend, monitor, and adjust tube feedings and TPN/PPN based on assessed needs  - Assess need for intravenous fluids  - Provide specific nutrition/hydration education as appropriate  - Include patient/family/caregiver in decisions related to nutrition  1/5/2021 0006 by Nicolasa Ortega RN  Outcome: Progressing  1/5/2021 0006 by Nicolasa Ortega RN  Outcome: Progressing     Problem: MUSCULOSKELETAL - ADULT  Goal: Maintain or return mobility to safest level of function  Description: INTERVENTIONS:  - Assess patient's ability to carry out ADLs; assess patient's baseline for ADL function and identify physical deficits which impact ability to perform ADLs (bathing, care of mouth/teeth, toileting, grooming, dressing, etc )  - Assess/evaluate cause of self-care deficits   - Assess range of motion  - Assess patient's mobility  - Assess patient's need for assistive devices and provide as appropriate  - Encourage maximum independence but intervene and supervise when necessary  - Involve family in performance of ADLs  - Assess for home care needs following discharge   - Consider OT consult to assist with ADL evaluation and planning for discharge  - Provide patient education as appropriate  1/5/2021 0006 by Nicolasa Ortega RN  Outcome: Progressing  1/5/2021 0006 by Nicolasa Ortega RN  Outcome: Progressing  Goal: Maintain proper alignment of affected body part  Description: INTERVENTIONS:  - Support, maintain and protect limb and body alignment  - Provide patient/ family with appropriate education  1/5/2021 0006 by Khris Worthington RN  Outcome: Progressing  1/5/2021 0006 by Khris Worthington RN  Outcome: Progressing     Problem: NEUROSENSORY - ADULT  Goal: Achieves stable or improved neurological status  Description: INTERVENTIONS  - Monitor and report changes in neurological status  - Monitor vital signs such as temperature, blood pressure, glucose, and any other labs ordered   - Initiate measures to prevent increased intracranial pressure  - Monitor for seizure activity and implement precautions if appropriate      Outcome: Progressing

## 2021-01-05 NOTE — PLAN OF CARE
Problem: OCCUPATIONAL THERAPY ADULT  Goal: Performs self-care activities at highest level of function for planned discharge setting  See evaluation for individualized goals  Description: Treatment Interventions: ADL retraining, Functional transfer training, Endurance training, Cognitive reorientation, Equipment evaluation/education, Compensatory technique education, Energy conservation, Activityengagement, Patient/family training          See flowsheet documentation for full assessment, interventions and recommendations  Note: Limitation: Decreased ADL status, Decreased Safe judgement during ADL, Decreased cognition, Decreased endurance, Decreased self-care trans, Decreased high-level ADLs  Prognosis: Fair  Assessment: Pt is an 80 y o  female admitted 12/28/20 with generalized weakness and confusion  CT head negative, pt recently treated for UTI  PMH includes Atrial fibrillation, essential HTN, acute on chronic combined systolic and diastolic CHF, acute renal failure superimposed on stage 2 CKD, TIA, Cardiomyopathy  Pt lives alone in a 3 SH w/ ramp to enter, reports having stair glide up to second floor where she used to complete bathing/dressing, however, recently children moved her bed to first floor where she completes all ADL's  Pt reports tub shower w/ shower chair and standard toilet- pt is poor historian and had difficulty reporting any other bathroom DME, does report RW at home used at baseline  Pta, pt required assistance for bathing and reports that her dtr would assist her with meal preparation and cleaning the home  Pt was not driving and was using RW for functional mobility  Currently, pt is Mod A for UB ADL's, Max A for LB ADL's, Mod Ax1 for rolling, Max Ax2 for supine to sit transfers, Min Ax2 for sit to stand transfers w/ the use of RW, and Mod Ax2 for functional mobility w/ HHA and b/l knee blocking (pt transfers from bed to chair)   Pt does have decreased cognition at this time, rq consistent V C  t/o session for increased safety awareness and hand placement on walker/therapist  Pt has poor insight to condition and is disoriented to situation and time currently  Pt is limited 2* decreased ADL/High-level ADL status, decreased endurance/activity tolerance, decreased self-care trans, decreased transfer ability, decreased safety awareness, decreased cognition, decreased home support and is a fall risk  This impacts pt's ability to complete UB and LB dressing and bathing, toileting, transfers, home and health mantainence, safe engagement in typical daily routine, functional and community mobility  From OT standpoint, pt will benefit from continued acute OT services 3-5x/wk for 7-10 days to meet goals  Recommending D/C to STR when medically stable          OT Discharge Recommendation: Post-Acute Rehabilitation Services  OT - OK to Discharge: Yes(to STR when medically stable )     Terrie Melissa OTS

## 2021-01-05 NOTE — ASSESSMENT & PLAN NOTE
· K 6 1 on admission, improving s/p calcium gluconate and IV Lasix  · EKG without acute changes  · Likely in setting of acute kidney injury  · Improving

## 2021-01-05 NOTE — PLAN OF CARE
Problem: PHYSICAL THERAPY ADULT  Goal: Performs mobility at highest level of function for planned discharge setting  See evaluation for individualized goals  Description: Treatment/Interventions: Functional transfer training, LE strengthening/ROM, Elevations, Therapeutic exercise, Endurance training, Patient/family training, Equipment eval/education, Bed mobility, Gait training, Spoke to nursing          See flowsheet documentation for full assessment, interventions and recommendations  Outcome: Progressing  Note: Prognosis: Fair  Problem List: Decreased strength, Decreased endurance, Impaired balance, Decreased mobility, Decreased safety awareness, Decreased cognition  Assessment: Pt seen for PT treatment session with focus on bed mobility, functional transfers, functional mobility  Pt making slow but steady progress toward goals this session  Pt continues to require assistance of two for functional transfers, however, pt demonstrates improvements in strength as she requires decreased assist compared to last session  Pt appears to be with increased difficulty during single leg stance during few steps to chair, results in increased risk for falls  Pt left upright in bedside chair with chair alarm intact and with all needs in reach  Pt will benefit from skilled therapy in order to address current impairments and functional limitations  PT to follow pt and recommending rehab once medically cleared  Barriers to Discharge: Inaccessible home environment, Decreased caregiver support     PT Discharge Recommendation: [id number]     PT - OK to Discharge: Yes(to rehab once medically cleared)    See flowsheet documentation for full assessment

## 2021-01-05 NOTE — PROGRESS NOTES
Progress Note - Nephrology   Giselle Morin 80 y o  female MRN: [id number]  Unit/Bed#: [facility] 501-01 Encounter: [id number]      Assessment / Plan:    Acute kidney injury on top of stage IIIB chronic kidney disease  · Baseline creatinine of 0 9-1 2  · Workup:  Urinalysis was bland / PVR is elevated / renal ultrasound revealed right kidney to be 8 4 cm and the left 9 5 cm  There was renal cortical thinning and scarring bilaterally  Renal echogenicity was increased bilaterally as well  There was no hydronephrosis noted  // urine eosinophils 0%  · Renal function has returned to normal and today's creatinine is 0 99  · Avoiding nephrotoxins, relative hypotension   · Will check with [facility] to see if we can discontinue intravenous fluids     Hypertension  · Current blood pressure is acceptable and running in the 130s to 530J (had systolic blood pressure of 110 this morning but otherwise has been stable, will trend)  · Currently only receiving Lasix 40 mg intravenously daily  · Blood pressure meds recently adjusted for relative hypotension  · Norvasc was re-initiated by [facility] on 01/04, discontinued today because of low pressure this a m      Volume status / Cardiomyopathy  · + chronic pleural effusions  · On today's exam the patient does not of appear to be grossly fluid overloaded  · Would like to discontinue intravenous fluids and transition to oral diuretics  · Echocardiogram-ejection fraction 35% with severe diffuse hypokinesis/right ventricle was moderately to markedly dilated with reduced systolic function/mitral valve with moderate to severe MR  · ?   Cardiology-CHF eval     Hyponatremia  · Likely related to volume overload and decreased ejection fraction/ CKD with delayed water excretion  · Celexa may be contributory as well  · Workup:  Cortisol 21 3/41 1 // TSH 3 8 and 5 2 to // serum osmolality 286 // urine sodium 44 // urine oz of 343 // NT-proBNP 2511  · Continue to diurese  · Restarted fluid restriction and low-sodium diet today     Hyperkalemia  · Resolved  · Replete potassium as it is currently 3 6 with ongoing diuresis     Urinary retention  · Urinary retention protocol initiated  · Check bladder scans, remain elevated at approximately 500 mL  · Per primary team / Urology     Leukocytosis  · Workup and management per primary team     Other problems:  Encephalopathy // possible lower extremity cellulitis // hypothermia // acute on chronic combined systolic and diastolic CHF // atrial fibrillation // thickened bladder on ultrasound with cholelithiasis //  small amount of ascites noted on ultrasound // cystic structure in the right adnexal region (per [facility]) // complicated left renal cyst with nodule noted along 1 of the septations in the cyst (urology has evaluated)// dysphagia        Subjective: The patient was seen examined  She was eating breakfast upon my arrival   She complains of constipation  She denied any chest pain or pressure, paroxysmal nocturnal dyspnea, orthopnea, abdominal pain, nauseousness, vomiting, his skills, sweats  Objective:     Vitals: Blood pressure (!) 110/43, pulse 67, temperature 97 8 °F (36 6 °C), resp  rate 14, height 5' 4" (1 626 m), weight 84 5 kg (186 lb 4 6 oz), SpO2 95 %  ,Body mass index is 31 98 kg/m²  Temp (24hrs), Av °F (36 7 °C), Min:97 8 °F (36 6 °C), Max:98 4 °F (36 9 °C)      Weight (last 2 days)     Date/Time   Weight    21       Weight: unable to obtain weight, will get pt out of bed to calibrate at 21       Weight: un able to weigh patient at this time at 2131    21       Weight: bed zeroed with patient in it, unable to weigh at 21                     Intake/Output Summary (Last 24 hours) at 2021 1026  Last data filed at 2021 0800  Gross per 24 hour   Intake 995 ml   Output 1067 ml   Net -72 ml     I/O last 24 hours:   In: 0 [P O :60; I V :935]  Out: 1567 [Urine:1567]        Physical Exam    BP (!) 110/43   Pulse 67   Temp 97 8 °F (36 6 °C)   Resp 14   Ht 5' 4" (1 626 m)   Wt 84 5 kg (186 lb 4 6 oz)   SpO2 95%   BMI 31 98 kg/m²   Vital signs were reviewed  Constitutional:  The patient was awake, alert and in no apparent distress  Cardiovascular:  No overt jugular venous distention was noted, S1-S2, no pericardial friction rub or S3 were appreciated, trace peripheral edema  Pulmonary:  Lungs were clear with decreased breath sounds secondary to decreased effort, no rales/rhonchi or wheezing noted  Abdominal/GI:  Soft, nontender, no rebound or guarding was noted, slightly distended  Skin:  No hives were noted            Invasive Devices     Peripheral Intravenous Line            Peripheral IV 01/02/21 Right;Ventral (anterior) Hand 3 days                Medications:    Scheduled Meds:  Current Facility-Administered Medications   Medication Dose Route Frequency Provider Last Rate    acetaminophen  325 mg Rectal Q4H PRN Bell Greer MD      acetaminophen  650 mg Oral Q6H PRN Bell Greer MD      albuterol  2 puff Inhalation Q4H PRN [place] DO      aspirin  81 mg Oral Daily Bell Greer MD      atorvastatin  40 mg Oral After Dinner Bell Greer MD      bisacodyl  10 mg Rectal Daily Bell Greer MD      calcium carbonate-vitamin D  1 tablet Oral Daily With Breakfast Bell Greer MD      carvedilol  12 5 mg Oral BID With Meals Bell Greer MD      citalopram  10 mg Oral Daily Bell Greer MD      dextrose  40 mL/hr Intravenous Continuous Bell Greer MD 40 mL/hr (01/04/21 1206)    docusate sodium  100 mg Oral Daily Bell Greer MD      folic acid  1 mg Oral Daily Bell Greer MD      furosemide  40 mg Intravenous Daily Wei Andersen DO      melatonin  3 mg Oral HS Bell Greer MD      metoprolol  2 5 mg Intravenous Q6H PRN Bell Greer MD      morphine injection  1 mg Intravenous Q4H PRN Bell Greer MD      multivitamin-minerals  1 tablet Oral Daily Bell Grere MD      ondansetron  4 mg Intravenous Q6H PRN Bell Greer MD      pantoprazole  40 mg Oral Early Morning [place] DO      thiamine  100 mg Oral Daily Bell Greer MD         PRN Meds:   acetaminophen    acetaminophen    albuterol    metoprolol    morphine injection    ondansetron    Continuous Infusions:dextrose, 40 mL/hr, Last Rate: 40 mL/hr (01/04/21 1206)            LAB RESULTS:      Results from last 7 days   Lab Units 01/05/21  0436 01/04/21  0511 01/02/21  0507 01/01/21  0149 12/31/20  0556 12/30/20  2350 12/30/20  1651 12/30/20  0435   WBC Thousand/uL 12 18* 11 68*  --   --  8 80  --   --  7 33   HEMOGLOBIN g/dL 9 3* 9 5*  --   --  9 3*  --   --  9 7*   HEMATOCRIT % 29 4* 31 2*  --   --  29 5*  --   --  31 2*   PLATELETS Thousands/uL 124* 100*  --   --  63*  --   --  58*   NEUTROS PCT % 83* 80*  --   --  83*  --   --  79*   LYMPHS PCT % 5* 7*  --   --  7*  --   --  10*   MONOS PCT % 9 10  --   --  9  --   --  9   EOS PCT % 2 3  --   --  1  --   --  2   POTASSIUM mmol/L 3 6 3 5 4 0 4 2 4 6 4 8 4 8 5 0   CHLORIDE mmol/L 96* 97* 96* 93* 95* 94* 95* 93*   CO2 mmol/L 37* 36* 34* 33* 29 33* 30 31   BUN mg/dL 25 27* 35* 35* 42* 46* 48* 48*   CREATININE mg/dL 0 99 1 10 1 36* 1 35* 1 42* 1 45* 1 49* 1 56*   CALCIUM mg/dL 8 6 9 0 9 5 9 3 9 3 10 1 9 6 10 0   MAGNESIUM mg/dL 1 8  --   --   --   --   --   --  2 5   PHOSPHORUS mg/dL 2 4  --   --   --   --   --   --  3 5       CUTURES:  Lab Results   Component Value Date    BLOODCX No Growth After 5 Days  12/29/2020    BLOODCX No Growth After 5 Days  12/29/2020    BLOODCX No Growth After 5 Days  03/26/2020    BLOODCX No Growth After 5 Days  03/26/2020    BLOODCX No Growth After 5 Days  04/16/2017    BLOODCX No Growth After 5 Days   04/16/2017    URINECX No Growth <1000 cfu/mL 03/27/2020    URINECX <10,000 cfu/ml Mixed Contaminants X2 04/17/2017 URINECX 50,000-59,000 cfu/ml Mixed Contaminants X5 08/24/2016                 PLEASE NOTE:  This encounter was completed utilizing the [employer] Direct Speech Voice Recognition Software  Grammatical errors, random word insertions, pronoun errors and incomplete sentences are occasional consequences of the system due to software limitations, ambient noise and hardware issues  These may be missed by proof reading prior to affixing electronic signature  Any questions or concerns about the content, text or information contained within the body of this dictation should be directly addressed to the physician for clarification  Please do not hesitate to call me directly if you have any any questions or concerns

## 2021-01-05 NOTE — ASSESSMENT & PLAN NOTE
· Hypothermic on admission, no leukocytosis  · Bilateral LE erythema, hot to the touch    Per daughter does have some chronic erythema at baseline  · Started on IV ancef for possible superimposed cellulitis - now stopped  · Venous duplex negative DVT

## 2021-01-05 NOTE — OCCUPATIONAL THERAPY NOTE
Occupational Therapy Evaluation     Patient Name: Katherine Posey  PSQWV'E Date: 1/5/2021  Problem List  Principal Problem:    Toxic metabolic encephalopathy  Active Problems:    Atrial fibrillation ([facility] [address] )    Essential hypertension    Acute on chronic combined systolic and diastolic congestive heart failure (HCC)    Acute renal failure superimposed on stage 2 chronic kidney disease (HCC)    Hypothermia    Thrombocytopenia (HCC)    Hyponatremia    Hyperkalemia    Erythema of lower extremity    Elevated TSH    Goals of care, counseling/discussion    Dysphagia    Past Medical History  Past Medical History:   Diagnosis Date    Arthritis     Cancer [facility])     breast    Cardiac disease     afib    CHF (congestive heart failure) ([facility])     Hyperlipidemia     Hypertension     Stroke ([facility] [address] )     TIA    TIA (transient ischemic attack)      Past Surgical History  Past Surgical History:   Procedure Laterality Date    BREAST SURGERY      right lumpectomy    TOTAL HIP ARTHROPLASTY Bilateral         01/05/21 1019   OT Last Visit   OT Visit Date 01/05/21   Note Type   Note type Evaluation   Restrictions/Precautions   Weight Bearing Precautions Per Order No   Other Precautions Cognitive; Fall Risk;Bed Alarm; Chair Alarm  (bed alarm on at end of session )   Pain Assessment   Pain Assessment Tool 0-10   Pain Score No Pain   Home Living   Type of [address] [place] [address] Multi-level;Bed/bath upstairs   Bathroom Shower/Tub Tub/shower unit   Bathroom Toilet Standard   Bathroom Equipment Shower chair   Bathroom Accessibility Accessible   Home Equipment Walker;Stair glide   Additional Comments pt is poor historian, some info above obtained from EMR   Prior Function   Level of [place] Needs assistance with ADLs and functional mobility; Needs assistance with IADLs   Lives With Alone   Receives Help From Family   ADL Assistance Needs assistance   IADLs Needs assistance   Falls in the last 6 months 0   Vocational Retired   Comments pt is poor historian, states that she gets sponge bathed at baseline    Lifestyle   Autonomy pta, pt reports rq assistance for bathing/cooking, reports performing functional mobility w/ walker, was not driving   Reciprocal Relationships pt reports 5 children, local dtr who comes to give her shower/help w/ meal prep  goes grocery shopping for her  Service to Others retired   Intrinsic Gratification none stated at this time  Psychosocial   Psychosocial (WDL) WDL   Subjective   Subjective "I have to go to the bathroom "   ADL   Where Assessed Chair   Eating Assistance 5  Supervision/Setup   Grooming Assistance 4  Minimal Assistance   UB Bathing Assistance 3  Moderate Assistance   LB Bathing Assistance 2  Maximal [address] 3  Moderate Assistance   LB Dressing Assistance 2  Maximal [address]  2  Maximal Assistance   Bed Mobility   Rolling R 3  Moderate assistance   Additional items Assist x 1;HOB elevated; Increased time required;LE management;Verbal cues  (V C  for hand placement, initially Max 1 but subsequent Mod )   Supine to Sit 2  Maximal assistance   Additional items Assist x 2; Increased time required;Verbal cues;LE management  (V C  for hand placement, assistance for LE's  )   Additional Comments pt initial roll Max 1, subsequent rolls Mod 1 with V C  for hand placement and assistance for LE mgmt  Pt is able to tolerate sidelying for perineal hygiene w/ Mod A  Pt had bowel mvmt and required placement of bedpan to complete toileting, therefore rq rolling for perineal hygiene  Transfers   Sit to Stand 4  Minimal assistance   Additional items Assist x 2; Increased time required;Verbal cues  (V C  for hand placement )   Stand to Sit 4  Minimal assistance   Additional items Assist x 2; Increased time required;Verbal cues  (V C  to reach back for chair)   Additional Comments pt transfers w/ HHAx2 and b/l knee blocking   Pt did not have knee buckling in today's session and was able to take 3-4 lateral steps to chair w/ Min A x2 from therapist  Pt does rq V  C  for hand placement and safety awareness when completing transfers  Functional Mobility   Functional Mobility 3  Moderate assistance   Additional Comments Mod Ax2, b/l HHA  Was able to take about 3 lateral steps to chair, required assistance for maintaining upright position and had decreased safety awareness t/o task  V C  for side stepping and overall sequencing of task  Additional items Hand hold assistance   Balance   Static Sitting Fair -   Dynamic Sitting Poor   Static Standing Poor -   Dynamic Standing Poor -   Ambulatory Poor -   Activity Tolerance   Activity Tolerance Patient limited by fatigue;Treatment limited secondary to medical complications (Comment)  (cognitive deficits)   Medical Staff Made Aware OTR Velasquez Diop, PT Abdiel [address] ok to see per RN   RUE Assessment   RUE Assessment WFL   LUE Assessment   LUE Assessment WFL   Hand Function   Gross Motor Coordination Functional   Fine Motor Coordination Functional   Cognition   Overall Cognitive Status Impaired   Arousal/Participation Alert; Responsive; Cooperative   Attention Attends with cues to redirect   Orientation Level Oriented to person;Oriented to place; Disoriented to time;Disoriented to situation   Memory Decreased recall of precautions;Decreased recall of recent events;Decreased recall of biographical information   Following Commands Follows one step commands with increased time or repetition   Comments pt is pleasasnt and cooperative, has fair attn to task but does require consistent V C  to maintain attn to task  Pt is tangential, however is easy to redirect with 1-2 V C  Pt is able to follow commands w/ increased time/repetition, appears to have slightly decreased processing speeds but is able to follow simple one-step commands   Pt has decreased safety awareness/insight to condition, as seen t/o session today as pt rq consistent v c  for safety awareness t/o transfers and was unaware of her overall deficits  Assessment   Limitation Decreased ADL status; Decreased Safe judgement during ADL;Decreased cognition;Decreased endurance;Decreased self-care trans;Decreased high-level ADLs   Prognosis Fair   Assessment Pt is an 80 y o  female admitted 12/28/20 with generalized weakness and confusion  CT head negative, pt recently treated for UTI  PMH includes Atrial fibrillation, essential HTN, acute on chronic combined systolic and diastolic CHF, acute renal failure superimposed on stage 2 CKD, TIA, Cardiomyopathy  Pt lives alone in a 3 SH w/ ramp to enter, reports having stair glide up to second floor where she used to complete bathing/dressing, however, recently children moved her bed to first floor where she completes all ADL's  Pt reports tub shower w/ shower chair and standard toilet- pt is poor historian and had difficulty reporting any other bathroom DME, does report RW at home used at baseline  Pta, pt required assistance for bathing and reports that her dtr would assist her with meal preparation and cleaning the home  Pt was not driving and was using RW for functional mobility  Currently, pt is Mod A for UB ADL's, Max A for LB ADL's, Mod Ax1 for rolling, Max Ax2 for supine to sit transfers, Min Ax2 for sit to stand transfers w/ the use of RW, and Mod Ax2 for functional mobility w/ HHA and b/l knee blocking (pt transfers from bed to chair)  Pt does have decreased cognition at this time, rq consistent V C  t/o session for increased safety awareness and hand placement on walker/therapist  Pt has poor insight to condition and is disoriented to situation and time currently  Pt is limited 2* decreased ADL/High-level ADL status, decreased endurance/activity tolerance, decreased self-care trans, decreased transfer ability, decreased safety awareness, decreased cognition, decreased home support and is a fall risk   This impacts pt's ability to complete UB and LB dressing and bathing, toileting, transfers, home and health mantainence, safe engagement in typical daily routine, functional and community mobility  From OT standpoint, pt will benefit from continued acute OT services 3-5x/wk for 7-10 days to meet goals  Recommending D/C to STR when medically stable  Goals   Patient Goals To feel better  LTG Time Frame 7-10   Plan   Treatment Interventions ADL retraining;Functional transfer training; Endurance training;Cognitive reorientation;Equipment evaluation/education; Compensatory technique education; Energy conservation; Activityengagement;Patient/family training   Goal Expiration Date 01/15/21   OT Frequency 3-5x/wk   Recommendation   OT Discharge Recommendation Post-Acute Rehabilitation Services   OT - OK to Discharge Yes  (to STR when medically stable )   Barthel Index   Feeding 5   Bathing 0   Grooming Score 0   Dressing Score 5   Bladder Score 5   Bowels Score 0   Toilet Use Score 5   Transfers (Bed/Chair) Score 5   Mobility (Level Surface) Score 0   Stairs Score 5   Barthel Index Score 30   Modified [place] Scale   Modified [place] Scale 4     Goals:     Pt will complete functional mobility with supervision using appropriate DME as needed  Pt will complete UB dressing and bathing with supervision using appropriate DME as needed  Pt will complete LB dressing and bathing with supervision using appropriate DME as needed  Pt will complete transfers with supervision using appropriate DME as needed  Pt will complete toileting with supervision using appropriate DME as needed  Pt will complete home maintenance task with supervision using appropriate DME as needed  Pt will utilize energy conservation techniques throughout functional activity/ADL s/p skilled education  Pt will demonstrate increased safety awareness during functional tasks/ADL's s/p skilled education       Pt will increase activity tolerance to 30 minutes in order to complete ADL's/ functional tasks, using appropriate DME as needed  PT will engage in ongoing cog assessment w/ G participation to assist with safe d/c planning       [place] OTS

## 2021-01-05 NOTE — PHYSICAL THERAPY NOTE
PHYSICAL THERAPY NOTE          Patient Name: Breann Weinstein  DCENR'V Date: 1/5/2021 01/05/21 1020   PT Last Visit   PT Visit Date 01/05/21   Note Type   Note Type Treatment   Pain Assessment   Pain Assessment Tool Pain Assessment not indicated - pt denies pain   Restrictions/Precautions   Weight Bearing Precautions Per Order No   Other Precautions Cognitive; Chair Alarm; Bed Alarm; Fall Risk   General   Chart Reviewed Yes   Response to Previous Treatment Patient with no complaints from previous session  Family/Caregiver Present No   Cognition   Overall Cognitive Status Impaired   Arousal/Participation Alert   Attention Attends with cues to redirect   Orientation Level Oriented to person;Oriented to place; Disoriented to time;Disoriented to situation   Memory Decreased recall of precautions;Decreased recall of recent events;Decreased short term memory;Decreased recall of biographical information   Following Commands Follows one step commands with increased time or repetition   Comments Pt with decreased safety awareness and insight into deficits, requires cues for safety throughout session  Pt requires repetative cues in order to follow commands  Pt is [place]  Subjective   Subjective "I am glad I am sitting in the chair "   Bed Mobility   Rolling R 3  Moderate assistance   Additional items Assist x 1; Increased time required;Verbal cues; Bedrails   Supine to Sit   (Mod A for trunk, Min A of another for LE)   Additional items HOB elevated; Bedrails; Increased time required;Verbal cues;LE management   Additional Comments Supine in bed upon PT arrival   Pt rolled to R 2x for placement of bed pan and toilet hygiene  Pt left upright in bedside chair with chair alarm intact and all needs in reach at end of therapy session  Transfers   Sit to Stand 4  Minimal assistance   Additional items Assist x 2; Increased time required;Verbal cues   Stand to Sit 4  Minimal assistance   Additional items Assist x 2; Increased time required;Verbal cues   Additional Comments Transfers with b/l HHA  VC for hand placement, sequencing, safety  Ambulation/Elevation   Gait pattern Excessively slow; Step to;Short stride; Foward flexed;Decreased foot clearance; Improper Weight shift; Poor UE support  (difficulty with SLS)   Gait Assistance 3  Moderate assist   Additional items Assist x 2;Verbal cues; Tactile cues   Assistive Device Other (Comment)  (b/l HHA)   Distance 3 ft from bed to chair   Balance   Static Sitting Fair -   Dynamic Sitting Poor   Static Standing Poor -   Dynamic Standing Poor -   Ambulatory Poor -   Endurance Deficit   Endurance Deficit Yes   Endurance Deficit Description fatigue, weakness   Activity Tolerance   Activity Tolerance Patient limited by fatigue   Medical Staff Made Aware OT, OTS   Nurse Made Aware RN cleared pt to be seen by PT   Assessment   Prognosis Fair   Problem List Decreased strength;Decreased endurance; Impaired balance;Decreased mobility; Decreased safety awareness;Decreased cognition   Assessment Pt seen for PT treatment session with focus on bed mobility, functional transfers, functional mobility  Pt making slow but steady progress toward goals this session  Pt continues to require assistance of two for functional transfers, however, pt demonstrates improvements in strength as she requires decreased assist compared to last session  Pt appears to be with increased difficulty during single leg stance during few steps to chair, results in increased risk for falls  Pt left upright in bedside chair with chair alarm intact and with all needs in reach  Pt will benefit from skilled therapy in order to address current impairments and functional limitations  PT to follow pt and recommending rehab once medically cleared     Barriers to Discharge Inaccessible home environment;Decreased caregiver support   Goals   Patient Goals to get into chair   STG Expiration Date 01/11/20   Plan   Treatment/Interventions Functional transfer training;LE strengthening/ROM; Therapeutic exercise; Endurance training;Patient/family training;Equipment eval/education;Cognitive reorientation; Bed mobility;Gait training;Spoke to nursing   Progress Progressing toward goals   PT Frequency Other (Comment)  (3-5x/wk)   Recommendation   PT Discharge Recommendation Post-Acute Rehabilitation Services   PT - OK to Discharge Yes  (to rehab once medically cleared)     Chata Medrano, PT, DPT

## 2021-01-05 NOTE — PROGRESS NOTES
Progress Note - Llili Lino 5/5/1932, 80 y o  female MRN: [id number]    Unit/Bed#: [facility] 501[address]01 Encounter: [id number]    Primary Care Provider: Fernanda Interiano MD   Date and time admitted to hospital: 12/28/2020  9:15 PM        Dysphagia  Assessment & Plan  Patient has dysphagia, she was npo for days  Now on DD2 diet  Monitor PO intake, stop IV D5     Goals of care, counseling/discussion  Assessment & Plan  Initially was not showing improvement in her clinical status, was not eating hence palliative Care was consulted  Family did not want feeding tube  Gradually patient seems to have recurred and now doing so much better  Now on oral diet, had good amount of oral intake this morning  Hence palliative Care is signing of as family goal is to send her to rehab and eventually home if possible     Erythema of lower extremity  Assessment & Plan  · Hypothermic on admission, no leukocytosis  · Bilateral LE erythema, hot to the touch  Per daughter does have some chronic erythema at baseline  · Started on IV ancef for possible superimposed cellulitis - now stopped  · Venous duplex negative DVT    Hyperkalemia  Assessment & Plan  · K 6 1 on admission, improving s/p calcium gluconate and IV Lasix  · EKG without acute changes  · Likely in setting of acute kidney injury  · Improving    Hyponatremia  Assessment & Plan  · Sodium 128 on POA, poor po intake and volume overload, improved  · Continue diuresis per nephrology recs  · Continue fluid restriction  · On celexa, 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  · Holding all oral medications      Thrombocytopenia (HCC)  Assessment & Plan  · POA  · Unclear etio  · improving    Hypothermia  Assessment & Plan  · With hypothermia on presentation    Possibly in setting of limited oral intake  · s/p IV ancef for possible LE cellulitis  · Improved  · TSH wnl    Acute renal failure superimposed on stage 2 chronic kidney disease [facility])  Assessment & Plan  Lab Results   Component Value Date EGFR 51 01/05/2021    EGFR 45 01/04/2021    EGFR 35 01/02/2021    CREATININE 0 99 01/05/2021    CREATININE 1 10 01/04/2021    CREATININE 1 36 (H) 01/02/2021   · POA, baseline approximately 0 9  · Possibly in setting of volume overload  · Improving with IV diuresis per nephrology recs  · UA reviewed  · NADEEM resolved  · measure PVR and bladder scan - required straight catheterization x2 so far today  · Monitor with BMP  · Hold nephrotoxins avoid hypotension    Acute on chronic combined systolic and diastolic congestive heart failure (HCC)  Assessment & Plan  Wt Readings from Last 3 Encounters:   04/03/20 74 6 kg (164 lb 7 4 oz)   04/21/17 74 1 kg (163 lb 5 8 oz)   08/24/16 71 2 kg (157 lb)     · Patient with lower extremity edema and CXR suspicious for pulmonary vascular congestion  · Status post daily 40 mg IV Lasix - plan is to start her on oral torsemide 20 mg daily from tomorrow per Nephrology  · Monitor daily weights, I/O  · Low-sodium, fluid-restricted diet  · Nephrology following  · Monitor BMP          Essential hypertension  Assessment & Plan  · Continue Coreg, Lasix being changed to oral torsemide tomorrow    Atrial fibrillation ([facility] [address] )  Assessment & Plan  · Continue Coreg; digoxin listed on home medication list however daughter states patient no longer takes this  · Not on anticoagulation due to fall risk    * Toxic metabolic encephalopathy  Assessment & Plan  · Presented with increased weakness and confusion; with recent treatment for apparent UTI, low temps  · Hyponatremia and acute renal failure are noted with evidence of volume overload with specific plans for these as below  · Otherwise with no other clear infectious source at this time, will follow up results of blood cultures x2 however does have erythema LE bilaterally, hot to the touch, started on IV ancef for possible superimposed cellulitis - now stopped  · Vitamin b12 and folate unremarkable  · CT head negative  · Patient is more responsive today        [address] Internal Medicine Progress Note  Patient: Primus Clarke 80 y o  female   MRN: [id number]  PCP: Tulio Valle MD  Unit/Bed#: [facility]01 Encounter: [id number]  Date Of Visit: 21    Assessment:    Principal Problem:    Toxic metabolic encephalopathy  Active Problems:    Atrial fibrillation ([facility] [address] )    Essential hypertension    Acute on chronic combined systolic and diastolic congestive heart failure ([facility] [address] )    Acute renal failure superimposed on stage 2 chronic kidney disease (HCC)    Hypothermia    Thrombocytopenia (HCC)    Hyponatremia    Hyperkalemia    Erythema of lower extremity    Elevated TSH    Goals of care, counseling/discussion    Dysphagia      Plan:       VTE Pharmacologic Prophylaxis:   Pharmacologic: Pharmacologic VTE Prophylaxis contraindicated due to thrombocytopenia  Mechanical VTE Prophylaxis in Place: Yes    Patient Centered Rounds: I have performed bedside rounds with nursing staff today  Discussions with Specialists or Other Care Team Provider: nephro - stop IVF, PO torsemide from tomorrow    Education and Discussions with Family / Patient: daughter    Time Spent for Care: 45 minutes  More than 50% of total time spent on counseling and coordination of care as described above  Current Length of Stay: 7 day(s)    Current Patient Status: Inpatient   Certification Statement: The patient will continue to require additional inpatient hospital stay due to monitor Cr, lytes & PO intake    Discharge Plan / Estimated Discharge Date: about 2 days to reb    Code Status: Level 3 - DNAR and DNI      Subjective:   Feels ok, intermittent SOB, wants to get OOB    Objective:     Vitals:   Temp (24hrs), Av 1 °F (36 7 °C), Min:97 8 °F (36 6 °C), Max:98 4 °F (36 9 °C)    Temp:  [97 8 °F (36 6 °C)-98 4 °F (36 9 °C)] 97 8 °F (36 6 °C)  HR:  [67-70] 67  Resp:  [14] 14  BP: (110-131)/(43-58) 110/43  SpO2:  [95 %-98 %] 95 %  Body mass index is 31 98 kg/m²       Input and Output Summary (last 24 hours): Intake/Output Summary (Last 24 hours) at 1/5/2021 1523  Last data filed at 1/5/2021 1200  Gross per 24 hour   Intake 1058 67 ml   Output 1067 ml   Net -8 33 ml       Physical Exam:     Physical Exam  Vitals signs reviewed  HENT:      Head: Normocephalic and atraumatic  Cardiovascular:      Rate and Rhythm: Normal rate and regular rhythm  Heart sounds: Normal heart sounds  No murmur  No gallop  Pulmonary:      Effort: Pulmonary effort is normal  No respiratory distress  Breath sounds: Normal breath sounds  No wheezing  Abdominal:      General: There is no distension  Palpations: Abdomen is soft  Tenderness: There is no abdominal tenderness  Neurological:      Mental Status: She is alert and oriented to person, place, and time  Additional Data:     Labs:    Results from last 7 days   Lab Units 01/05/21  0436   WBC Thousand/uL 12 18*   HEMOGLOBIN g/dL 9 3*   HEMATOCRIT % 29 4*   PLATELETS Thousands/uL 124*   NEUTROS PCT % 83*   LYMPHS PCT % 5*   MONOS PCT % 9   EOS PCT % 2     Results from last 7 days   Lab Units 01/05/21  0436   POTASSIUM mmol/L 3 6   CHLORIDE mmol/L 96*   CO2 mmol/L 37*   BUN mg/dL 25   CREATININE mg/dL 0 99   CALCIUM mg/dL 8 6           * I Have Reviewed All Lab Data Listed Above  * Additional Pertinent Lab Tests Reviewed:  All Labs Within Last 24 Hours Reviewed    Imaging:    Imaging Reports Reviewed Today Include:   Imaging Personally Reviewed by Myself Includes:      Recent Cultures (last 7 days):           Last 24 Hours Medication List:   Current Facility-Administered Medications   Medication Dose Route Frequency Provider Last Rate    acetaminophen  325 mg Rectal Q4H PRN Michael Cintron MD      acetaminophen  650 mg Oral Q6H PRN Michael Cintron MD      albuterol  2 puff Inhalation Q4H PRN Arcenio Myles DO      aspirin  81 mg Oral Daily Michael Cintron MD      atorvastatin  40 mg Oral After Kerrie Chery MD  bisacodyl  10 mg Rectal Daily Carlos Calle MD      calcium carbonate-vitamin D  1 tablet Oral Daily With Breakfast Carlos Calle MD      carvedilol  12 5 mg Oral BID With Meals Carlos Calle MD      citalopram  10 mg Oral Daily Carlos Calle MD      docusate sodium  100 mg Oral Daily Carlos Calle MD      folic acid  1 mg Oral Daily Carlos Calle MD      melatonin  3 mg Oral HS Carlos Calle MD      metoprolol  2 5 mg Intravenous Q6H PRN Carlos Calle MD      morphine injection  1 mg Intravenous Q4H PRN Carlos Calle MD      multivitamin-minerals  1 tablet Oral Daily Carlos Calle MD      norepinephrine           ondansetron  4 mg Intravenous Q6H PRN Carlos Calle MD      pantoprazole  40 mg Oral Early Morning Odessa Das DO      thiamine  100 mg Oral Daily MD Óscar Marvin ON 1/6/2021] torsemide  20 mg Oral Daily Celeste Dong MD          Today, Patient Was Seen By: Jr Blanton MD    ** Please Note: This note has been constructed using a voice recognition system   **

## 2021-01-06 LAB
ANION GAP SERPL CALCULATED.3IONS-SCNC: -1 MMOL/L (ref 4–13)
ATRIAL RATE: 81 BPM
BASOPHILS # BLD AUTO: 0.03 THOUSANDS/ΜL (ref 0–0.1)
BASOPHILS NFR BLD AUTO: 0 % (ref 0–1)
BUN SERPL-MCNC: 29 MG/DL (ref 5–25)
CALCIUM SERPL-MCNC: 8.7 MG/DL (ref 8.3–10.1)
CHLORIDE SERPL-SCNC: 101 MMOL/L (ref 100–108)
CO2 SERPL-SCNC: 38 MMOL/L (ref 21–32)
CREAT SERPL-MCNC: 1.11 MG/DL (ref 0.6–1.3)
EOSINOPHIL # BLD AUTO: 0.36 THOUSAND/ΜL (ref 0–0.61)
EOSINOPHIL NFR BLD AUTO: 3 % (ref 0–6)
ERYTHROCYTE [DISTWIDTH] IN BLOOD BY AUTOMATED COUNT: 17.2 % (ref 11.6–15.1)
FLUAV RNA RESP QL NAA+PROBE: NEGATIVE
FLUBV RNA RESP QL NAA+PROBE: NEGATIVE
GFR SERPL CREATININE-BSD FRML MDRD: 44 ML/MIN/1.73SQ M
GLUCOSE SERPL-MCNC: 90 MG/DL (ref 65–140)
HCT VFR BLD AUTO: 29 % (ref 34.8–46.1)
HGB BLD-MCNC: 9.1 G/DL (ref 11.5–15.4)
IMM GRANULOCYTES # BLD AUTO: 0.05 THOUSAND/UL (ref 0–0.2)
IMM GRANULOCYTES NFR BLD AUTO: 0 % (ref 0–2)
LYMPHOCYTES # BLD AUTO: 0.98 THOUSANDS/ΜL (ref 0.6–4.47)
LYMPHOCYTES NFR BLD AUTO: 9 % (ref 14–44)
MCH RBC QN AUTO: 30.4 PG (ref 26.8–34.3)
MCHC RBC AUTO-ENTMCNC: 31.4 G/DL (ref 31.4–37.4)
MCV RBC AUTO: 97 FL (ref 82–98)
MONOCYTES # BLD AUTO: 0.91 THOUSAND/ΜL (ref 0.17–1.22)
MONOCYTES NFR BLD AUTO: 8 % (ref 4–12)
NEUTROPHILS # BLD AUTO: 9.03 THOUSANDS/ΜL (ref 1.85–7.62)
NEUTS SEG NFR BLD AUTO: 80 % (ref 43–75)
NRBC BLD AUTO-RTO: 0 /100 WBCS
PLATELET # BLD AUTO: 139 THOUSANDS/UL (ref 149–390)
PMV BLD AUTO: 10.8 FL (ref 8.9–12.7)
POTASSIUM SERPL-SCNC: 4 MMOL/L (ref 3.5–5.3)
PROCALCITONIN SERPL-MCNC: 0.07 NG/ML
QRS AXIS: 60 DEGREES
QRSD INTERVAL: 148 MS
QT INTERVAL: 422 MS
QTC INTERVAL: 468 MS
RBC # BLD AUTO: 2.99 MILLION/UL (ref 3.81–5.12)
RSV RNA RESP QL NAA+PROBE: NEGATIVE
SARS-COV-2 RNA RESP QL NAA+PROBE: NEGATIVE
SODIUM SERPL-SCNC: 138 MMOL/L (ref 136–145)
T WAVE AXIS: -51 DEGREES
VENTRICULAR RATE: 74 BPM
WBC # BLD AUTO: 11.36 THOUSAND/UL (ref 4.31–10.16)

## 2021-01-06 PROCEDURE — 99232 SBSQ HOSP IP/OBS MODERATE 35: CPT | Performed by: INTERNAL MEDICINE

## 2021-01-06 PROCEDURE — 84145 PROCALCITONIN (PCT): CPT | Performed by: HOSPITALIST

## 2021-01-06 PROCEDURE — 0241U HB NFCT DS VIR RESP RNA 4 TRGT: CPT | Performed by: HOSPITALIST

## 2021-01-06 PROCEDURE — 99232 SBSQ HOSP IP/OBS MODERATE 35: CPT | Performed by: HOSPITALIST

## 2021-01-06 PROCEDURE — 93005 ELECTROCARDIOGRAM TRACING: CPT

## 2021-01-06 PROCEDURE — 93010 ELECTROCARDIOGRAM REPORT: CPT | Performed by: INTERNAL MEDICINE

## 2021-01-06 PROCEDURE — 97530 THERAPEUTIC ACTIVITIES: CPT

## 2021-01-06 PROCEDURE — 85025 COMPLETE CBC W/AUTO DIFF WBC: CPT | Performed by: HOSPITALIST

## 2021-01-06 PROCEDURE — 80048 BASIC METABOLIC PNL TOTAL CA: CPT | Performed by: HOSPITALIST

## 2021-01-06 RX ORDER — FONDAPARINUX SODIUM 5 MG/.4ML
5 INJECTION SUBCUTANEOUS EVERY 24 HOURS
Status: DISCONTINUED | OUTPATIENT
Start: 2021-01-06 | End: 2021-01-08 | Stop reason: HOSPADM

## 2021-01-06 RX ADMIN — ATORVASTATIN CALCIUM 40 MG: 40 TABLET, FILM COATED ORAL at 18:12

## 2021-01-06 RX ADMIN — FONDAPARINUX SODIUM 5 MG: 5 INJECTION SUBCUTANEOUS at 21:16

## 2021-01-06 RX ADMIN — MELATONIN 3 MG: at 21:16

## 2021-01-06 RX ADMIN — CARVEDILOL 12.5 MG: 12.5 TABLET, FILM COATED ORAL at 18:12

## 2021-01-06 RX ADMIN — CITALOPRAM HYDROBROMIDE 10 MG: 10 TABLET ORAL at 08:47

## 2021-01-06 RX ADMIN — PANTOPRAZOLE SODIUM 40 MG: 40 TABLET, DELAYED RELEASE ORAL at 05:26

## 2021-01-06 RX ADMIN — FOLIC ACID 1 MG: 1 TABLET ORAL at 08:47

## 2021-01-06 RX ADMIN — DOCUSATE SODIUM 100 MG: 100 CAPSULE, LIQUID FILLED ORAL at 08:47

## 2021-01-06 RX ADMIN — Medication 1 TABLET: at 08:47

## 2021-01-06 RX ADMIN — ASPIRIN 81 MG: 81 TABLET ORAL at 08:47

## 2021-01-06 RX ADMIN — TORSEMIDE 20 MG: 20 TABLET ORAL at 08:47

## 2021-01-06 RX ADMIN — THIAMINE HCL TAB 100 MG 100 MG: 100 TAB at 08:47

## 2021-01-06 NOTE — ASSESSMENT & PLAN NOTE
· Presented with increased weakness and confusion; with recent treatment for apparent UTI, low temps  · Hyponatremia and acute renal failure are noted with evidence of volume overload with specific plans for these as below  · Otherwise with no other clear infectious source at this time, will follow up results of blood cultures x2 however does have erythema LE bilaterally, hot to the touch, started on IV ancef for possible superimposed cellulitis - now stopped  · Vitamin b12 and folate unremarkable  · CT head negative  · Patient is more responsive

## 2021-01-06 NOTE — PLAN OF CARE
Problem: PHYSICAL THERAPY ADULT  Goal: Performs mobility at highest level of function for planned discharge setting  See evaluation for individualized goals  Description: Treatment/Interventions: Functional transfer training, LE strengthening/ROM, Elevations, Therapeutic exercise, Endurance training, Patient/family training, Equipment eval/education, Bed mobility, Gait training, Spoke to nursing          See flowsheet documentation for full assessment, interventions and recommendations  Outcome: Progressing  Note: Prognosis: Fair  Problem List: Decreased strength, Decreased endurance, Impaired balance, Decreased mobility, Decreased cognition, Decreased safety awareness  Assessment: Pt seen for PT treatment session with focus on bed mobility, functional transfers, functional mobility  Pt making slow progress toward goals 2/2 decreased activity tolerance  Pt continues to require assist of two for all mobility 2/2 remaining strength and balance deficits  Pt presents with increased SOB this session compared to last, decreased endurance noted  Pt left upright in bedside chair with chair alarm intact and with all needs in reach  Pt will benefit from skilled therapy in order to address current impairments and functional limitations  PT to follow pt and recommending rehab once medically cleared  Barriers to Discharge: Inaccessible home environment, Decreased caregiver support     PT Discharge Recommendation: [id number]     PT - OK to Discharge: Yes(to rehab once medically cleared)    See flowsheet documentation for full assessment

## 2021-01-06 NOTE — PLAN OF CARE
Problem: OCCUPATIONAL THERAPY ADULT  Goal: Performs self-care activities at highest level of function for planned discharge setting  See evaluation for individualized goals  Description: Treatment Interventions: ADL retraining, Functional transfer training, Endurance training, Cognitive reorientation, Equipment evaluation/education, Compensatory technique education, Energy conservation, Activityengagement, Patient/family training          See flowsheet documentation for full assessment, interventions and recommendations  Outcome: Progressing  Note: Limitation: Decreased ADL status, Decreased Safe judgement during ADL, Decreased cognition, Decreased endurance, Decreased self-care trans, Decreased high-level ADLs  Prognosis: Fair  Assessment: Patient participated in Skilled OT session this date with interventions consisting of ADL re training with the use of correct body mechnaics, Energy Conservation techniques, deep breathing technique, safety awareness and fall prevention techniques,  therapeutic activities to: increase activity tolerance, increase dynamic sit/ stand balance during functional activity  and increase OOB/ sitting tolerance   Patient agreeable to OT treatment session, upon arrival patient was found supine in bed  In comparison to previous session, patient with improvements in bed mobility and functional transfers/mobility  Please refer to chart for functional levels  Patient requiring frequent re direction, verbal cues for safety, verbal cues for correct technique, verbal cues for pacing thru activity steps, cognitive assistance to anticipate next step, one step directives and frequent rest periods  Patient continues to be functioning below baseline level, occupational performance remains limited secondary to factors listed above and increased risk for falls and injury  From OT standpoint, recommendation at time of d/c would be Short Term Rehab     Patient to benefit from continued Occupational Therapy treatment while in the hospital to address deficits as defined above and maximize level of functional independence with ADLs and functional mobility        OT Discharge Recommendation: Post-Acute Rehabilitation Services  OT - OK to Discharge: Yes(when medically cleared)

## 2021-01-06 NOTE — PLAN OF CARE
Problem: Potential for Falls  Goal: Patient will remain free of falls  Description: INTERVENTIONS:  - Assess patient frequently for physical needs  -  Identify cognitive and physical deficits and behaviors that affect risk of falls  -  Reydon fall precautions as indicated by assessment   - Educate patient/family on patient safety including physical limitations  - Instruct patient to call for assistance with activity based on assessment  - Modify environment to reduce risk of injury  - Consider OT/PT consult to assist with strengthening/mobility  Outcome: Progressing     Problem: Prexisting or High Potential for Compromised Skin Integrity  Goal: Skin integrity is maintained or improved  Description: INTERVENTIONS:  - Identify patients at risk for skin breakdown  - Assess and monitor skin integrity  - Assess and monitor nutrition and hydration status  - Monitor labs   - Assess for incontinence   - Turn and reposition patient  - Assist with mobility/ambulation  - Relieve pressure over bony prominences  - Avoid friction and shearing  - Provide appropriate hygiene as needed including keeping skin clean and dry  - Evaluate need for skin moisturizer/barrier cream  - Collaborate with interdisciplinary team   - Patient/family teaching  - Consider wound care consult   Outcome: Progressing     Problem: Nutrition/Hydration-ADULT  Goal: Nutrient/Hydration intake appropriate for improving, restoring or maintaining nutritional needs  Description: Monitor and assess patient's nutrition/hydration status for malnutrition  Collaborate with interdisciplinary team and initiate plan and interventions as ordered  Monitor patient's weight and dietary intake as ordered or per policy  Utilize nutrition screening tool and intervene as necessary  Determine patient's food preferences and provide high-protein, high-caloric foods as appropriate       INTERVENTIONS:  - Monitor oral intake, urinary output, labs, and treatment plans  - Assess nutrition and hydration status and recommend course of action  - Evaluate amount of meals eaten  - Assist patient with eating if necessary   - Allow adequate time for meals  - Recommend/ encourage appropriate diets, oral nutritional supplements, and vitamin/mineral supplements  - Order, calculate, and assess calorie counts as needed  - Recommend, monitor, and adjust tube feedings and TPN/PPN based on assessed needs  - Assess need for intravenous fluids  - Provide specific nutrition/hydration education as appropriate  - Include patient/family/caregiver in decisions related to nutrition  Outcome: Progressing     Problem: MUSCULOSKELETAL - ADULT  Goal: Maintain or return mobility to safest level of function  Description: INTERVENTIONS:  - Assess patient's ability to carry out ADLs; assess patient's baseline for ADL function and identify physical deficits which impact ability to perform ADLs (bathing, care of mouth/teeth, toileting, grooming, dressing, etc )  - Assess/evaluate cause of self-care deficits   - Assess range of motion  - Assess patient's mobility  - Assess patient's need for assistive devices and provide as appropriate  - Encourage maximum independence but intervene and supervise when necessary  - Involve family in performance of ADLs  - Assess for home care needs following discharge   - Consider OT consult to assist with ADL evaluation and planning for discharge  - Provide patient education as appropriate  Outcome: Progressing  Goal: Maintain proper alignment of affected body part  Description: INTERVENTIONS:  - Support, maintain and protect limb and body alignment  - Provide patient/ family with appropriate education  Outcome: Progressing     Problem: NEUROSENSORY - ADULT  Goal: Achieves stable or improved neurological status  Description: INTERVENTIONS  - Monitor and report changes in neurological status  - Monitor vital signs such as temperature, blood pressure, glucose, and any other labs ordered   - Initiate measures to prevent increased intracranial pressure  - Monitor for seizure activity and implement precautions if appropriate      Outcome: Progressing

## 2021-01-06 NOTE — CASE MANAGEMENT
Spoke to melvi at [facility]  Patient is approved and they will have bed available Thursday 1/7 pending covid test and ins auth  SNF will submit for ins auth  Request made for covid test  Request made for PT and OT to see patient today for ins auth

## 2021-01-06 NOTE — OCCUPATIONAL THERAPY NOTE
Occupational Therapy Treatment Note      Shanelle [place]    1/6/2021    Principal Problem:    Toxic metabolic encephalopathy  Active Problems:    Atrial fibrillation (HCC)    Essential hypertension    Acute on chronic combined systolic and diastolic congestive heart failure (HCC)    Acute renal failure superimposed on stage 2 chronic kidney disease (HCC)    Hypothermia    Thrombocytopenia (HCC)    Hyponatremia    Hyperkalemia    Erythema of lower extremity    Elevated TSH    Goals of care, counseling/discussion    Dysphagia      Past Medical History:   Diagnosis Date    Arthritis     Cancer ([facility] )     breast    Cardiac disease     afib    CHF (congestive heart failure) (HCC)     Hyperlipidemia     Hypertension     Stroke ([facility] )     TIA    TIA (transient ischemic attack)        Past Surgical History:   Procedure Laterality Date    BREAST SURGERY      right lumpectomy    TOTAL HIP ARTHROPLASTY Bilateral         01/06/21 1410   OT Last Visit   OT Visit Date 01/06/21   Note Type   Note Type Treatment for insurance authorization   Restrictions/Precautions   Weight Bearing Precautions Per Order No   Other Precautions Cognitive; Chair Alarm; Bed Alarm;Multiple lines;Telemetry;Pain; Fall Risk;Hard of hearing   Lifestyle   Autonomy pta, pt reports rq assistance for bathing/cooking, reports performing functional mobility w/ walker, was not driving   Reciprocal Relationships pt reports 5 children, local dtr who comes to give her shower/help w/ meal prep  goes grocery shopping for her  Service to Others retired   Intrinsic Gratification none stated at this time  Pain Assessment   Pain Assessment Tool Pain Assessment not indicated - pt denies pain   Bed Mobility   Supine to Sit 3  Moderate assistance   Additional items Assist x 2; Increased time required;Verbal cues;LE management; Bedrails;HOB elevated   Sit to Supine Unable to assess   Additional Comments Pt laying supine in bed upoon OT arrival  Pt seated OOB in chair w/ chair alarm activated and all needs in reach s/p OT session  Transfers   Sit to Stand 3  Moderate assistance   Additional items Assist x 2; Increased time required;Verbal cues;Armrests   Stand to Sit 3  Moderate assistance   Additional items Assist x 2; Increased time required;Verbal cues;Armrests   Additional Comments Transfers w/ B/L HHAx2 and B/L knee blocking during initial STS  Pt required VC and TC for all safety, hand placement, and sequencing  Functional Mobility   Functional Mobility 3  Moderate assistance  (x2)   Additional Comments Pt demonstrated short distance household mobility EOB>chair w/ HHAx2 and Mod Ax2 level  Pt required VC and TC for all safety, sequencing, and weight shifting to advance B/L feet during mobility  Pt grossly incontinent of bladder upon OT arrival and during bed mobility  Additional items Hand hold assistance   Cognition   Overall Cognitive Status Impaired   Arousal/Participation Alert; Cooperative   Attention Attends with cues to redirect   Orientation Level Oriented to place;Oriented to person;Disoriented to situation;Disoriented to time  ("I don't know how I was found or how I got here")   Memory Decreased recall of precautions;Decreased recall of recent events;Decreased short term memory   Following Commands Follows one step commands with increased time or repetition   Comments Pt is pleasant and cooperative to participate in therapy this day  Pt continues to present w/ decreased insight into deficits and decreased safety awareness  Pt required at times increased time for processing and frequent repeat of commands for increased follow through of functional tasks      Activity Tolerance   Activity Tolerance Patient limited by fatigue;Patient limited by pain;Treatment limited secondary to medical complications (Comment)   Medical Staff Made Aware RN cleared Pt for OT session   Assessment   Assessment Patient participated in Skilled OT session this date with interventions consisting of ADL re training with the use of correct body mechnaics, Energy Conservation techniques, deep breathing technique, safety awareness and fall prevention techniques,  therapeutic activities to: increase activity tolerance, increase dynamic sit/ stand balance during functional activity  and increase OOB/ sitting tolerance   Patient agreeable to OT treatment session, upon arrival patient was found supine in bed  In comparison to previous session, patient with improvements in bed mobility and functional transfers/mobility  Please refer to chart for functional levels  Patient requiring frequent re direction, verbal cues for safety, verbal cues for correct technique, verbal cues for pacing thru activity steps, cognitive assistance to anticipate next step, one step directives and frequent rest periods  Patient continues to be functioning below baseline level, occupational performance remains limited secondary to factors listed above and increased risk for falls and injury  From OT standpoint, recommendation at time of d/c would be Short Term Rehab  Patient to benefit from continued Occupational Therapy treatment while in the hospital to address deficits as defined above and maximize level of functional independence with ADLs and functional mobility  Plan   Treatment Interventions ADL retraining;Functional transfer training; Endurance training; Activityengagement; Energy conservation;Patient/family training; Compensatory technique education;Equipment evaluation/education   Goal Expiration Date 01/15/21   OT Frequency 3-5x/wk   Recommendation   OT Discharge Recommendation Post-Acute Rehabilitation Services   OT - OK to Discharge Yes  (when medically cleared)   Modified Abbey Scale   Modified Abbey Scale 4         Bailey Pickens MS, OTR/L

## 2021-01-06 NOTE — PROGRESS NOTES
Progress Note - Nephrology   Masha Warren 80 y o  female MRN: [id number]  Unit/Bed#: [facility] 501-01 Encounter: [id number]      Assessment / Plan:    Acute kidney injury on top of stage IIIB chronic kidney disease  · Baseline creatinine of 0 9-1 2  · Workup:  Urinalysis was bland / PVR is elevated / renal ultrasound revealed right kidney to be 8 4 cm and the left 9 5 cm   There was renal cortical thinning and scarring bilaterally   Renal echogenicity was increased bilaterally as well   There was no hydronephrosis noted  // urine eosinophils 0%  · Renal function has returned to normal and today's creatinine is 1 11  · Avoiding nephrotoxins, relative hypotension      Hypertension  · Current blood pressure is acceptable and running in the 120s to 130s   · Currently on p o   Torsemide  · Blood pressure meds recently adjusted for relative hypotension     Volume status / Cardiomyopathy  · + chronic pleural effusions  · On today's exam the patient does not of appear to be grossly fluid overloaded  · If creatinine increases again on 01/07 (slight increase over last 24 hours), will hold oral torsemide  · Echocardiogram-ejection fraction 35% with severe diffuse hypokinesis/right ventricle was moderately to markedly dilated with reduced systolic function/mitral valve with moderate to severe MR  · ?  Cardiology-CHF eval     Hyponatremia  · Likely related to volume overload and decreased ejection fraction/ CKD with delayed water excretion  · Celexa may be contributory as well  · Workup:  Cortisol 21 3/41 1 // TSH 3 8 and 5 2 to // serum osmolality 286 // urine sodium 44 // urine oz of 343 // NT-proBNP 2511  · Continue to diurese -- sodium has normalized with this  · Restarted fluid restriction and low-sodium diet      Hyperkalemia  · Resolved  · Replete potassium as it is currently 3 6 with ongoing diuresis     Urinary retention  · Urinary retention protocol initiated  · Check bladder scans, remain elevated at approximately 500 mL  · Per primary team / Urology     Leukocytosis  · Workup and management per primary team     Other problems:  Encephalopathy // possible lower extremity cellulitis // hypothermia // acute on chronic combined systolic and diastolic CHF // atrial fibrillation // thickened bladder on ultrasound with cholelithiasis //  small amount of ascites noted on ultrasound // cystic structure in the right adnexal region (per SLIM) // complicated left renal cyst with nodule noted along 1 of the septations in the cyst (urology has evaluated)// dysphagia    Spoke with Dr Cristian Gonzales yesterday to review above    Subjective: The patient was seen examined  She was awake and in no apparent distress  She denied any shortness of breath, chest pain or pressure, paroxysmal nocturnal dyspnea, orthopnea, abdominal pain, vomiting, diarrhea, sweats or chills  Objective:     Vitals: Blood pressure 125/52, pulse 70, temperature (!) 97 1 °F (36 2 °C), resp  rate 18, height 5' 4" (1 626 m), weight 83 8 kg (184 lb 11 9 oz), SpO2 95 %  ,Body mass index is 31 71 kg/m²  Temp (24hrs), Av 6 °F (36 4 °C), Min:97 1 °F (36 2 °C), Max:98 °F (36 7 °C)      Weight (last 2 days)     Date/Time   Weight    21 0600   83 8 (184 75)    21 1200   83 (182 98)    21 0530       Weight: unable to obtain weight, will get pt out of bed to calibrate at 21 0530    21 0531       Weight: un able to weigh patient at this time at 21 0531                     Intake/Output Summary (Last 24 hours) at 2021 0942  Last data filed at 2021 7459  Gross per 24 hour   Intake 647 67 ml   Output 177 ml   Net 470 67 ml     I/O last 24 hours: In: 647 7 [P O :370;  I V :277 7]  Out: 677 [Urine:677]        Physical Exam    /52   Pulse 70   Temp (!) 97 1 °F (36 2 °C)   Resp 18   Ht 5' 4" (1 626 m)   Wt 83 8 kg (184 lb 11 9 oz)   SpO2 95%   BMI 31 71 kg/m²   Vital signs were reviewed  Constitutional:  The patient was awake, alert, answering questions appropriately, no apparent distress  Cardiovascular:  No overt jugular venous distention was noted, S1-S2, no pericardial friction rub or S3 were appreciated, 0 to trace peripheral edema bilaterally  Pulmonary:  Decreased inspiratory effort causing decreased breath sounds at bases, no rales/rhonchi wheezing noted  Abdominal/GI:  Soft, nontender, no rebound or guarding was noted  Skin:  No hives were noted            Invasive Devices     Peripheral Intravenous Line            Peripheral IV 01/06/21 Right Forearm less than 1 day                Medications:    Scheduled Meds:  Current Facility-Administered Medications   Medication Dose Route Frequency Provider Last Rate    acetaminophen  325 mg Rectal Q4H PRN Sarah Norris MD      acetaminophen  650 mg Oral Q6H PRN Sarah Norris MD      albuterol  2 puff Inhalation Q4H PRN Shailesh Landing, DO      aspirin  81 mg Oral Daily Sarah Norris MD      atorvastatin  40 mg Oral After Tanya Canada MD      bisacodyl  10 mg Rectal Daily Sarah Norris MD      calcium carbonate-vitamin D  1 tablet Oral Daily With Breakfast Sarah Norris MD      carvedilol  12 5 mg Oral BID With Meals Sarah Norris MD      citalopram  10 mg Oral Daily Sarah Norris MD      docusate sodium  100 mg Oral Daily Sarah Norris MD      folic acid  1 mg Oral Daily Sarah Norris MD      melatonin  3 mg Oral HS Sarah Norris MD      metoprolol  2 5 mg Intravenous Q6H PRN Sarah Norris MD      multivitamin-minerals  1 tablet Oral Daily Sarah Norris MD      ondansetron  4 mg Intravenous Q6H PRN Sarah Norris MD      pantoprazole  40 mg Oral Early Morning Shailesh Landing, DO      thiamine  100 mg Oral Daily Sarah Norris MD      torsemide  20 mg Oral Daily Serenity Giraldo MD         PRN Meds:   acetaminophen    acetaminophen    albuterol    metoprolol    ondansetron    Continuous Infusions: LAB RESULTS:      Results from last 7 days   Lab Units 01/06/21  0354 01/05/21  0436 01/04/21  0511 01/02/21  0507 01/01/21  0149 12/31/20  0556 12/30/20  2350   WBC Thousand/uL 11 36* 12 18* 11 68*  --   --  8 80  --    HEMOGLOBIN g/dL 9 1* 9 3* 9 5*  --   --  9 3*  --    HEMATOCRIT % 29 0* 29 4* 31 2*  --   --  29 5*  --    PLATELETS Thousands/uL 139* 124* 100*  --   --  63*  --    NEUTROS PCT % 80* 83* 80*  --   --  83*  --    LYMPHS PCT % 9* 5* 7*  --   --  7*  --    MONOS PCT % 8 9 10  --   --  9  --    EOS PCT % 3 2 3  --   --  1  --    POTASSIUM mmol/L 4 0 3 6 3 5 4 0 4 2 4 6 4 8   CHLORIDE mmol/L 101 96* 97* 96* 93* 95* 94*   CO2 mmol/L 38* 37* 36* 34* 33* 29 33*   BUN mg/dL 29* 25 27* 35* 35* 42* 46*   CREATININE mg/dL 1 11 0 99 1 10 1 36* 1 35* 1 42* 1 45*   CALCIUM mg/dL 8 7 8 6 9 0 9 5 9 3 9 3 10 1   MAGNESIUM mg/dL  --  1 8  --   --   --   --   --    PHOSPHORUS mg/dL  --  2 4  --   --   --   --   --        CUTURES:  Lab Results   Component Value Date    BLOODCX No Growth After 5 Days  12/29/2020    BLOODCX No Growth After 5 Days  12/29/2020    BLOODCX No Growth After 5 Days  03/26/2020    BLOODCX No Growth After 5 Days  03/26/2020    BLOODCX No Growth After 5 Days  04/16/2017    BLOODCX No Growth After 5 Days  04/16/2017    URINECX No Growth <1000 cfu/mL 03/27/2020    URINECX <10,000 cfu/ml Mixed Contaminants X2 04/17/2017    URINECX 50,000-59,000 cfu/ml Mixed Contaminants X5 08/24/2016                 PLEASE NOTE:  This encounter was completed utilizing the [employer] Direct Speech Voice Recognition Software  Grammatical errors, random word insertions, pronoun errors and incomplete sentences are occasional consequences of the system due to software limitations, ambient noise and hardware issues  These may be missed by proof reading prior to affixing electronic signature   Any questions or concerns about the content, text or information contained within the body of this dictation should be directly addressed to the physician for clarification  Please do not hesitate to call me directly if you have any any questions or concerns

## 2021-01-06 NOTE — ASSESSMENT & PLAN NOTE
Lab Results   Component Value Date    EGFR 44 01/06/2021    EGFR 51 01/05/2021    EGFR 45 01/04/2021    CREATININE 1 11 01/06/2021    CREATININE 0 99 01/05/2021    CREATININE 1 10 01/04/2021   · POA, baseline approximately 0 9  · Possibly in setting of volume overload  · Improving with IV diuresis per nephrology recs  · UA reviewed  · NADEEM resolved  · measure PVR and bladder scan  · Monitor with BMP  · Hold nephrotoxins avoid hypotension

## 2021-01-06 NOTE — PHYSICAL THERAPY NOTE
PHYSICAL THERAPY NOTE          Patient Name: Giselle Morin  [facility] Date: 1/6/2021 01/06/21 1409   PT Last Visit   PT Visit Date 01/06/21   Note Type   Note Type Treatment for insurance authorization   Pain Assessment   Pain Assessment Tool Pain Assessment not indicated - pt denies pain   Restrictions/Precautions   Weight Bearing Precautions Per Order No   Other Precautions Cognitive; Chair Alarm; Bed Alarm; Fall Risk;Multiple lines   General   Chart Reviewed Yes   Response to Previous Treatment Patient with no complaints from previous session  Family/Caregiver Present No   Cognition   Overall Cognitive Status Impaired   Arousal/Participation Alert   Attention Attends with cues to redirect   Memory Decreased recall of recent events;Decreased recall of precautions   Following Commands Follows one step commands with increased time or repetition   Comments Pt with decreased safety awareness and insight into deficits, requires cues for safety throughout session  Pt is [place]  Subjective   Subjective Pt pleasant and agreeable to participate in therapy though reporting difficulty breathing  Bed Mobility   Supine to Sit 3  Moderate assistance   Additional items Assist x 2; Increased time required;Verbal cues;LE management;HOB elevated   Additional Comments Supine in bed upon PT arrival   Pt left upright in bedside chair with chair alarm intact and all needs in reach at end of therapy session  Transfers   Sit to Stand 3  Moderate assistance   Additional items Assist x 2; Increased time required;Verbal cues   Stand to Sit 3  Moderate assistance   Additional items Assist x 2; Increased time required;Verbal cues   Additional Comments Transfers with b/l HHA  VC for hand placement and safety  Ambulation/Elevation   Gait pattern Excessively slow; Step to;Short stride; Foward flexed; Improper Weight shift;Decreased foot clearance  (difficulty with SLS)   Gait Assistance 3  Moderate assist   Additional items Assist x 2;Verbal cues;Tactile cues   Assistive Device Other (Comment)  (b/l HHA)   Distance 3 ft from bed to chair   Balance   Static Sitting Fair -   Dynamic Sitting Fair -   Static Standing Poor +   Dynamic Standing Poor   Ambulatory Poor -   Endurance Deficit   Endurance Deficit Yes   Endurance Deficit Description fatigue, SOB, SpO2 desats to 88% with activity and recovers to mid 90's   Activity Tolerance   Activity Tolerance Patient limited by fatigue; Other (Comment)  (SOB)   Medical Staff Made Aware OT Ruma   Nurse Made Aware RN cleared pt to be seen by PT   Assessment   Prognosis Fair   Problem List Decreased strength;Decreased endurance; Impaired balance;Decreased mobility; Decreased cognition;Decreased safety awareness   Assessment Pt seen for PT treatment session with focus on bed mobility, functional transfers, functional mobility  Pt making slow progress toward goals 2/2 decreased activity tolerance  Pt continues to require assist of two for all mobility 2/2 remaining strength and balance deficits  Pt presents with increased SOB this session compared to last, decreased endurance noted  Pt left upright in bedside chair with chair alarm intact and with all needs in reach  Pt will benefit from skilled therapy in order to address current impairments and functional limitations  PT to follow pt and recommending rehab once medically cleared  Barriers to Discharge Inaccessible home environment;Decreased caregiver support   Goals   Patient Goals to breathe easier   STG Expiration Date 01/11/20   Plan   Treatment/Interventions Functional transfer training;LE strengthening/ROM; Therapeutic exercise; Endurance training;Patient/family training;Equipment eval/education; Bed mobility;Gait training;Spoke to nursing;Cognitive reorientation   Progress Slow progress, decreased activity tolerance   PT Frequency Other (Comment)  (3-5x/wk)   Recommendation   PT Discharge Recommendation Post-Acute Rehabilitation Services   PT - OK to Discharge Yes  (to rehab once medically cleared)     Kiko Head, PT, DPT

## 2021-01-06 NOTE — ASSESSMENT & PLAN NOTE
Wt Readings from Last 3 Encounters:   01/06/21 83 8 kg (184 lb 11 9 oz)   04/03/20 74 6 kg (164 lb 7 4 oz)   04/21/17 74 1 kg (163 lb 5 8 oz)     · Patient with lower extremity edema and CXR suspicious for pulmonary vascular congestion  · Status post daily 40 mg IV Lasix - plan is to start her on oral torsemide 20 mg daily from today per Nephrology  · Monitor daily weights, I/O  · Low-sodium, fluid-restricted diet  · Nephrology following  · Monitor BMP

## 2021-01-06 NOTE — PROGRESS NOTES
Progress Note - Adonis Salinas 5/5/1932, 80 y o  female MRN: [id number]    Unit/Bed#: [facility] 501-01 Encounter: [id number]    Primary Care Provider: Gwyn Whitlock MD   Date and time admitted to hospital: 12/28/2020  9:15 PM        Dysphagia  Assessment & Plan  Patient has dysphagia, she was npo for days  Now on DD2 diet  Monitor PO intake, stop IV D5     Goals of care, counseling/discussion  Assessment & Plan  Initially was not showing improvement in her clinical status, was not eating hence palliative Care was consulted  Family did not want feeding tube  Gradually patient seems to have recurred and now doing so much better  Now on oral diet, had good amount of oral intake this morning  Hence palliative Care is signing of as family goal is to send her to rehab and eventually home if possible     Erythema of lower extremity  Assessment & Plan  · Hypothermic on admission, no leukocytosis  · Bilateral LE erythema, hot to the touch  Per daughter does have some chronic erythema at baseline  · Started on IV ancef for possible superimposed cellulitis - now stopped  · Venous duplex negative DVT    Hyponatremia  Assessment & Plan  · Sodium 128 on POA, poor po intake and volume overload, improved  · Continue diuresis per nephrology recs  · Continue fluid restriction  · On celexa, 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  · Holding all oral medications      Thrombocytopenia (HCC)  Assessment & Plan  · POA  · Unclear etio  · improving    Hypothermia  Assessment & Plan  · With hypothermia on presentation    Possibly in setting of limited oral intake  · s/p IV ancef for possible LE cellulitis  · Improved  · TSH wnl    Acute renal failure superimposed on stage 2 chronic kidney disease [facility])  Assessment & Plan  Lab Results   Component Value Date    EGFR 44 01/06/2021    EGFR 51 01/05/2021    EGFR 45 01/04/2021    CREATININE 1 11 01/06/2021    CREATININE 0 99 01/05/2021    CREATININE 1 10 01/04/2021   · POA, baseline approximately 0 9  · Possibly in setting of volume overload  · Improving with IV diuresis per nephrology recs  · UA reviewed  · NADEEM resolved  · measure PVR and bladder scan  · Monitor with BMP  · Hold nephrotoxins avoid hypotension    Acute on chronic combined systolic and diastolic congestive heart failure (HCC)  Assessment & Plan  Wt Readings from Last 3 Encounters:   01/06/21 83 8 kg (184 lb 11 9 oz)   04/03/20 74 6 kg (164 lb 7 4 oz)   04/21/17 74 1 kg (163 lb 5 8 oz)     · Patient with lower extremity edema and CXR suspicious for pulmonary vascular congestion  · Status post daily 40 mg IV Lasix - plan is to start her on oral torsemide 20 mg daily from today per Nephrology  · Monitor daily weights, I/O  · Low-sodium, fluid-restricted diet  · Nephrology following  · Monitor BMP          Essential hypertension  Assessment & Plan  · Continue Coreg, oral torsemide from tomorrow    Atrial fibrillation ([facility] [address] )  Assessment & Plan  · Continue Coreg; digoxin listed on home medication list however daughter states patient no longer takes this  · Not on anticoagulation due to fall risk    * Toxic metabolic encephalopathy  Assessment & Plan  · Presented with increased weakness and confusion; with recent treatment for apparent UTI, low temps  · Hyponatremia and acute renal failure are noted with evidence of volume overload with specific plans for these as below  · Otherwise with no other clear infectious source at this time, will follow up results of blood cultures x2 however does have erythema LE bilaterally, hot to the touch, started on IV ancef for possible superimposed cellulitis - now stopped  · Vitamin b12 and folate unremarkable  · CT head negative  · Patient is more responsive        [facility] Progress Note  Patient: Amaya Melissa 80 y o  female   MRN: [id number]  PCP: Brian Romero MD  Unit/Bed#: [facility] 501-01 Encounter: [id number]  Date Of Visit: 01/06/21    Assessment:    Principal Problem:    Toxic metabolic encephalopathy  Active Problems:    Atrial fibrillation (HCC)    Essential hypertension    Acute on chronic combined systolic and diastolic congestive heart failure (HCC)    Acute renal failure superimposed on stage 2 chronic kidney disease (HCC)    Hypothermia    Thrombocytopenia (HCC)    Hyponatremia    Hyperkalemia    Erythema of lower extremity    Elevated TSH    Goals of care, counseling/discussion    Dysphagia      Plan:         VTE Pharmacologic Prophylaxis:   Pharmacologic: Fondaparinux (Arixtra)  Mechanical VTE Prophylaxis in Place: Yes    Patient Centered Rounds: I have performed bedside rounds with nursing staff today  Discussions with Specialists or Other Care Team Provider:     Education and Discussions with Family / Patient: patient, left VM for daughter    Time Spent for Care: 30 minutes  More than 50% of total time spent on counseling and coordination of care as described above  Current Length of Stay: 8 day(s)    Current Patient Status: Inpatient   Certification Statement: The patient will continue to require additional inpatient hospital stay due to monitor today    Discharge Plan / Estimated Discharge Date: prob tomorrow    Code Status: Level 3 - DNAR and DNI      Subjective:   No major complains    Objective:     Vitals:   Temp (24hrs), Av 9 °F (36 6 °C), Min:97 1 °F (36 2 °C), Max:99 °F (37 2 °C)    Temp:  [97 1 °F (36 2 °C)-99 °F (37 2 °C)] 99 °F (37 2 °C)  HR:  [65-81] 81  Resp:  [16-22] 16  BP: (123-129)/(49-53) 123/53  SpO2:  [92 %-98 %] 92 %  Body mass index is 31 71 kg/m²  Input and Output Summary (last 24 hours): Intake/Output Summary (Last 24 hours) at 2021 1552  Last data filed at 2021 1416  Gross per 24 hour   Intake 285 ml   Output 917 ml   Net -632 ml       Physical Exam:     Physical Exam  Vitals signs reviewed  HENT:      Head: Normocephalic and atraumatic  Cardiovascular:      Rate and Rhythm: Normal rate and regular rhythm        Heart sounds: Normal heart sounds  No murmur  No gallop  Pulmonary:      Effort: Pulmonary effort is normal  No respiratory distress  Breath sounds: Normal breath sounds  No wheezing  Abdominal:      General: There is no distension  Palpations: Abdomen is soft  Tenderness: There is no abdominal tenderness  Neurological:      Mental Status: She is alert  Additional Data:     Labs:    Results from last 7 days   Lab Units 01/06/21  0354   WBC Thousand/uL 11 36*   HEMOGLOBIN g/dL 9 1*   HEMATOCRIT % 29 0*   PLATELETS Thousands/uL 139*   NEUTROS PCT % 80*   LYMPHS PCT % 9*   MONOS PCT % 8   EOS PCT % 3     Results from last 7 days   Lab Units 01/06/21  0354   POTASSIUM mmol/L 4 0   CHLORIDE mmol/L 101   CO2 mmol/L 38*   BUN mg/dL 29*   CREATININE mg/dL 1 11   CALCIUM mg/dL 8 7           * I Have Reviewed All Lab Data Listed Above  * Additional Pertinent Lab Tests Reviewed:  All Labs Within Last 24 Hours Reviewed    Imaging:    Imaging Reports Reviewed Today Include:   Imaging Personally Reviewed by Myself Includes:      Recent Cultures (last 7 days):           Last 24 Hours Medication List:   Current Facility-Administered Medications   Medication Dose Route Frequency Provider Last Rate    acetaminophen  325 mg Rectal Q4H PRN Mikaela Rose MD      acetaminophen  650 mg Oral Q6H PRN Mikaela Rose MD      albuterol  2 puff Inhalation Q4H PRN Nicole Fisher DO      aspirin  81 mg Oral Daily Mikaela Rose MD      atorvastatin  40 mg Oral After Sultana Pelayo MD      bisacodyl  10 mg Rectal Daily Mikaela Rose MD      calcium carbonate-vitamin D  1 tablet Oral Daily With Breakfast Mikaela Rose MD      carvedilol  12 5 mg Oral BID With Meals Mikaela Rose MD      citalopram  10 mg Oral Daily Mikaela Rose MD      docusate sodium  100 mg Oral Daily Mikaela Rose MD      folic acid  1 mg Oral Daily Mikaela Rose MD      fondaparinux  5 mg Subcutaneous Q24H Angel Vallecillo MD      melatonin  3 mg Oral HS Dennis Winslow MD      metoprolol  2 5 mg Intravenous Q6H PRN Dennis Winslow MD      multivitamin-minerals  1 tablet Oral Daily Dennis Winslow MD      ondansetron  4 mg Intravenous Q6H PRN Dennis Winslow MD      pantoprazole  40 mg Oral Early Morning Joey Robison DO      thiamine  100 mg Oral Daily Dennis Winslow MD      torsemide  20 mg Oral Daily Vianey Ayon MD          Today, Patient Was Seen By: Angel Vallecillo MD    ** Please Note: This note has been constructed using a voice recognition system   **

## 2021-01-07 ENCOUNTER — TELEPHONE (OUTPATIENT)
Dept: NEPHROLOGY | Facility: CLINIC | Age: 86
End: 2021-01-07

## 2021-01-07 ENCOUNTER — APPOINTMENT (INPATIENT)
Dept: RADIOLOGY | Facility: HOSPITAL | Age: 86
DRG: 291 | End: 2021-01-07
Payer: COMMERCIAL

## 2021-01-07 LAB
ANION GAP SERPL CALCULATED.3IONS-SCNC: 2 MMOL/L (ref 4–13)
BUN SERPL-MCNC: 29 MG/DL (ref 5–25)
CALCIUM SERPL-MCNC: 9 MG/DL (ref 8.3–10.1)
CHLORIDE SERPL-SCNC: 101 MMOL/L (ref 100–108)
CO2 SERPL-SCNC: 35 MMOL/L (ref 21–32)
CREAT SERPL-MCNC: 0.97 MG/DL (ref 0.6–1.3)
GFR SERPL CREATININE-BSD FRML MDRD: 52 ML/MIN/1.73SQ M
GLUCOSE SERPL-MCNC: 91 MG/DL (ref 65–140)
POTASSIUM SERPL-SCNC: 3.8 MMOL/L (ref 3.5–5.3)
SODIUM SERPL-SCNC: 138 MMOL/L (ref 136–145)

## 2021-01-07 PROCEDURE — 99233 SBSQ HOSP IP/OBS HIGH 50: CPT | Performed by: HOSPITALIST

## 2021-01-07 PROCEDURE — 80048 BASIC METABOLIC PNL TOTAL CA: CPT | Performed by: HOSPITALIST

## 2021-01-07 PROCEDURE — 99232 SBSQ HOSP IP/OBS MODERATE 35: CPT | Performed by: INTERNAL MEDICINE

## 2021-01-07 PROCEDURE — 71045 X-RAY EXAM CHEST 1 VIEW: CPT

## 2021-01-07 RX ORDER — FUROSEMIDE 10 MG/ML
40 INJECTION INTRAMUSCULAR; INTRAVENOUS ONCE
Status: COMPLETED | OUTPATIENT
Start: 2021-01-07 | End: 2021-01-07

## 2021-01-07 RX ORDER — CARVEDILOL 6.25 MG/1
6.25 TABLET ORAL 2 TIMES DAILY WITH MEALS
Status: DISCONTINUED | OUTPATIENT
Start: 2021-01-07 | End: 2021-01-08 | Stop reason: HOSPADM

## 2021-01-07 RX ADMIN — DOCUSATE SODIUM 100 MG: 100 CAPSULE, LIQUID FILLED ORAL at 09:25

## 2021-01-07 RX ADMIN — FONDAPARINUX SODIUM 5 MG: 5 INJECTION SUBCUTANEOUS at 17:57

## 2021-01-07 RX ADMIN — PANTOPRAZOLE SODIUM 40 MG: 40 TABLET, DELAYED RELEASE ORAL at 05:04

## 2021-01-07 RX ADMIN — FOLIC ACID 1 MG: 1 TABLET ORAL at 09:28

## 2021-01-07 RX ADMIN — Medication 1 TABLET: at 09:25

## 2021-01-07 RX ADMIN — CARVEDILOL 12.5 MG: 12.5 TABLET, FILM COATED ORAL at 09:25

## 2021-01-07 RX ADMIN — CARVEDILOL 6.25 MG: 6.25 TABLET, FILM COATED ORAL at 17:44

## 2021-01-07 RX ADMIN — FUROSEMIDE 40 MG: 10 INJECTION, SOLUTION INTRAMUSCULAR; INTRAVENOUS at 11:52

## 2021-01-07 RX ADMIN — CITALOPRAM HYDROBROMIDE 10 MG: 10 TABLET ORAL at 09:25

## 2021-01-07 RX ADMIN — BISACODYL 10 MG: 10 SUPPOSITORY RECTAL at 09:26

## 2021-01-07 RX ADMIN — ATORVASTATIN CALCIUM 40 MG: 40 TABLET, FILM COATED ORAL at 17:44

## 2021-01-07 RX ADMIN — TORSEMIDE 20 MG: 20 TABLET ORAL at 09:26

## 2021-01-07 RX ADMIN — THIAMINE HCL TAB 100 MG 100 MG: 100 TAB at 09:25

## 2021-01-07 RX ADMIN — ASPIRIN 81 MG: 81 TABLET ORAL at 09:25

## 2021-01-07 NOTE — PROGRESS NOTES
Progress Note - Gayla Hunter 5/5/1932, 80 y o  female MRN: [id number]    Unit/Bed#: [facility] 501-01 Encounter: [id number]    Primary Care Provider: Uday Mitchell MD   Date and time admitted to hospital: 12/28/2020  9:15 PM        Dysphagia  Assessment & Plan  Patient has dysphagia, she was npo for days  Now on DD2 diet    Goals of care, counseling/discussion  Assessment & Plan  Initially was not showing improvement in her clinical status, was not eating hence palliative Care was consulted  Family did not want feeding tube  Gradually patient seems to have recurred and now doing so much better  Now on oral diet, had good amount of oral intake this morning  Hence palliative Care is signing off as family goal is to send her to rehab and eventually home if possible     Erythema of lower extremity  Assessment & Plan  · Hypothermic on admission, no leukocytosis  · Bilateral LE erythema, hot to the touch  Per daughter does have some chronic erythema at baseline  · Started on IV ancef for possible superimposed cellulitis - now stopped  · Venous duplex negative DVT    Hyponatremia  Assessment & Plan  · Sodium 128 on POA, poor po intake and volume overload, improved  · Continue diuresis per nephrology recs  · Continue fluid restriction  · On celexa, 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  · Holding all oral medications      Thrombocytopenia (HCC)  Assessment & Plan  · POA  · Unclear etio  · improving    Hypothermia  Assessment & Plan  · With hypothermia on presentation    Possibly in setting of limited oral intake  · s/p IV ancef for possible LE cellulitis  · Improved  · TSH wnl    Acute renal failure superimposed on stage 2 chronic kidney disease [facility])  Assessment & Plan  Lab Results   Component Value Date    EGFR 52 01/07/2021    EGFR 44 01/06/2021    EGFR 51 01/05/2021    CREATININE 0 97 01/07/2021    CREATININE 1 11 01/06/2021    CREATININE 0 99 01/05/2021   · POA, baseline approximately 0 9  · Possibly in setting of volume overload  · Improving with IV diuresis per nephrology recs  · UA reviewed  · NADEEM resolved  · measure PVR and bladder scan  · Monitor with BMP  · Hold nephrotoxins avoid hypotension    Acute on chronic combined systolic and diastolic congestive heart failure (HCC)  Assessment & Plan  Wt Readings from Last 3 Encounters:   01/07/21 82 4 kg (181 lb 10 5 oz)   04/03/20 74 6 kg (164 lb 7 4 oz)   04/21/17 74 1 kg (163 lb 5 8 oz)     · Patient with lower extremity edema and CXR suspicious for pulmonary vascular congestion  · Status post daily 40 mg IV Lasix - now on oral torsemide 20 mg daily from 1/6/21 per Nephrology  · Monitor daily weights, I/O  · Low-sodium, fluid-restricted diet  · Nephrology following  · Monitor BMP  · Slight concerns about SOB, orthopnea, was 90-91% on RA at rest & some subjective SOB - d/w nephro - will check CXR, give 40 IV lasix, decrease dose of PO coreg          Essential hypertension  Assessment & Plan  · Continue Coreg, oral torsemide    Atrial fibrillation (HCC)  Assessment & Plan  · Continue Coreg; digoxin listed on home medication list however daughter states patient no longer takes this  · Not on anticoagulation due to fall risk    * Toxic metabolic encephalopathy  Assessment & Plan  · Presented with increased weakness and confusion; with recent treatment for apparent UTI, low temps  · Hyponatremia and acute renal failure are noted with evidence of volume overload with specific plans for these as below  · Otherwise with no other clear infectious source at this time, will follow up results of blood cultures x2 however does have erythema LE bilaterally, hot to the touch, started on IV ancef for possible superimposed cellulitis - now stopped  · Vitamin b12 and folate unremarkable  · CT head negative  · Patient is more responsive        [facility] Internal Medicine Progress Note  Patient: Amaya Melissa 80 y o  female   MRN: [id number]  PCP: Brian Romero MD  Unit/Bed#: [facility] 083-22 Encounter: [id number]  Date Of Visit: 21    Assessment:    Principal Problem:    Toxic metabolic encephalopathy  Active Problems:    Atrial fibrillation (HCC)    Essential hypertension    Acute on chronic combined systolic and diastolic congestive heart failure (HCC)    Acute renal failure superimposed on stage 2 chronic kidney disease (HCC)    Hypothermia    Thrombocytopenia (HCC)    Hyponatremia    Hyperkalemia    Erythema of lower extremity    Elevated TSH    Goals of care, counseling/discussion    Dysphagia      Plan:         VTE Pharmacologic Prophylaxis:   Pharmacologic: Fondaparinux (Arixtra)  Mechanical VTE Prophylaxis in Place: Yes    Patient Centered Rounds: I have performed bedside rounds with nursing staff today  Discussions with Specialists or Other Care Team Provider: nephro    Education and Discussions with Family / Patient: patient, daughter    Time Spent for Care: 45 minutes  More than 50% of total time spent on counseling and coordination of care as described above  Current Length of Stay: 9 day(s)    Current Patient Status: Inpatient   Certification Statement: The patient will continue to require additional inpatient hospital stay due to IV diuresis, monitor breathing    Discharge Plan / Estimated Discharge Date: soon    Code Status: Level 3 - DNAR and DNI      Subjective:   She deosnt feel much good today, didn't sleep last night much  Breathing is not so good    Objective:     Vitals:   Temp (24hrs), Av 3 °F (36 8 °C), Min:98 °F (36 7 °C), Max:99 °F (37 2 °C)    Temp:  [98 °F (36 7 °C)-99 °F (37 2 °C)] 98 °F (36 7 °C)  HR:  [64-81] 69  Resp:  [16-19] 19  BP: (123-137)/(52-57) 137/57  SpO2:  [92 %-94 %] 92 %  Body mass index is 31 18 kg/m²  Input and Output Summary (last 24 hours):        Intake/Output Summary (Last 24 hours) at 2021 1353  Last data filed at 2021 0900  Gross per 24 hour   Intake 470 ml   Output 1306 ml   Net -836 ml       Physical Exam:     Physical Exam  Vitals signs reviewed  HENT:      Head: Normocephalic and atraumatic  Mouth/Throat:      Mouth: Mucous membranes are moist    Cardiovascular:      Rate and Rhythm: Normal rate and regular rhythm  Heart sounds: Normal heart sounds  No murmur  Pulmonary:      Effort: Pulmonary effort is normal  No respiratory distress  Breath sounds: No wheezing  Comments: Diminished at bases  Abdominal:      General: There is no distension  Palpations: Abdomen is soft  Tenderness: There is no abdominal tenderness  Neurological:      Mental Status: She is alert  Additional Data:     Labs:    Results from last 7 days   Lab Units 01/06/21  0354   WBC Thousand/uL 11 36*   HEMOGLOBIN g/dL 9 1*   HEMATOCRIT % 29 0*   PLATELETS Thousands/uL 139*   NEUTROS PCT % 80*   LYMPHS PCT % 9*   MONOS PCT % 8   EOS PCT % 3     Results from last 7 days   Lab Units 01/07/21  0500   POTASSIUM mmol/L 3 8   CHLORIDE mmol/L 101   CO2 mmol/L 35*   BUN mg/dL 29*   CREATININE mg/dL 0 97   CALCIUM mg/dL 9 0           * I Have Reviewed All Lab Data Listed Above  * Additional Pertinent Lab Tests Reviewed:  All Labs Within Last 24 Hours Reviewed    Imaging:    Imaging Reports Reviewed Today Include:   Imaging Personally Reviewed by Myself Includes:      Recent Cultures (last 7 days):           Last 24 Hours Medication List:   Current Facility-Administered Medications   Medication Dose Route Frequency Provider Last Rate    acetaminophen  325 mg Rectal Q4H PRN Sarah Norris MD      acetaminophen  650 mg Oral Q6H PRN Sarah Norris MD      albuterol  2 puff Inhalation Q4H PRN Shailesh Landing, DO      aspirin  81 mg Oral Daily Sarah Norris MD      atorvastatin  40 mg Oral After Tanya Canada MD      bisacodyl  10 mg Rectal Daily Sarah Norris MD      calcium carbonate-vitamin D  1 tablet Oral Daily With Breakfast Sarah Norris MD      carvedilol  6 25 mg Oral BID With Meals Faustina Solis MD      citalopram  10 mg Oral Daily Cedric Finney MD      docusate sodium  100 mg Oral Daily Cedric Finney MD      folic acid  1 mg Oral Daily Cedric Finney MD      fondaparinux  5 mg Subcutaneous Q24H Romel Bobby MD      melatonin  3 mg Oral HS Cedric Finney MD      metoprolol  2 5 mg Intravenous Q6H PRN Cedric Finney MD      multivitamin-minerals  1 tablet Oral Daily Cedric Finney MD      ondansetron  4 mg Intravenous Q6H PRN Cedric Finney MD      pantoprazole  40 mg Oral Early Morning Chase Kruger DO      thiamine  100 mg Oral Daily Cedric Finney MD      torsemide  20 mg Oral Daily Hesham Burks MD          Today, Patient Was Seen By: Romel Bobby MD    ** Please Note: This note has been constructed using a voice recognition system   **

## 2021-01-07 NOTE — ASSESSMENT & PLAN NOTE
· With hypothermia on presentation    Possibly in setting of limited oral intake  · s/p IV ancef for possible LE cellulitis  · Improved  · TSH wnl

## 2021-01-07 NOTE — ASSESSMENT & PLAN NOTE
Wt Readings from Last 3 Encounters:   01/07/21 82 4 kg (181 lb 10 5 oz)   04/03/20 74 6 kg (164 lb 7 4 oz)   04/21/17 74 1 kg (163 lb 5 8 oz)     · Patient with lower extremity edema and CXR suspicious for pulmonary vascular congestion  · Status post daily 40 mg IV Lasix - now on oral torsemide 20 mg daily from 1/6/21 per Nephrology  · Monitor daily weights, I/O  · Low-sodium, fluid-restricted diet  · Nephrology following  · Monitor BMP  · Slight concerns about SOB, orthopnea, was 90-91% on RA at rest & some subjective SOB - d/w nephro - will check CXR, give 40 IV lasix, decrease dose of PO coreg

## 2021-01-07 NOTE — TELEPHONE ENCOUNTER
----- Message from Ambrocio Kunz MD sent at 1/7/2021  9:52 AM EST -----  Hi,   This pt should have f/u next week in the [facility] office   + NADEEM on CKD  She also should be having a BMP Q Monday x 4, please track this down  Thanks          Progress Note - Nephrology   Eliza Duran 80 y o  female MRN: [id number]  Unit/Bed#: [facility] 501-01 Encounter: [id number]      Assessment / Plan:    Acute kidney injury on top of stage IIIB chronic kidney disease  Baseline creatinine of 0 9-1 2  Workup:  Urinalysis was bland / PVR is elevated / renal ultrasound revealed right kidney to be 8 4 cm and the left 9 5 cm   There was renal cortical thinning and scarring bilaterally   Renal echogenicity was increased bilaterally as well   There was no hydronephrosis noted  // urine eosinophils 0%  Renal function has returned to normal and today's creatinine is 0 97  Avoiding nephrotoxins, relative hypotension      Hypertension  Current blood pressure is  running in the 120s to 130s   If the patient is symptomatic or if the renal function worsens as an outpatient with no clear reason, would consider decreasing carvedilol dose as long as heart rate is controlled in the setting of atrial fibrillation  Currently on p o  Torsemide  Blood pressure meds recently adjusted for relative hypotension     Volume status / Cardiomyopathy  + chronic pleural effusions  On today's exam the patient does not of appear to be grossly fluid overloaded  Bicarbonate level is up somewhat but her renal function is stable  She does have chronic effusion so may need to keep her on the drier side    Will monitor BMP is an outpatient  Echocardiogram-ejection fraction 35% with severe diffuse hypokinesis/right ventricle was moderately to markedly dilated with reduced systolic function/mitral valve with moderate to severe MR     Hyponatremia  Likely related to volume overload and decreased ejection fraction/ CKD with delayed water excretion  Celexa/Protonix could be contributory as well but less likely as the sodium has normalized with diuresis  Workup:  Cortisol 21 3/41 1 // TSH 3 8 and 5 2 to // serum osmolality 286 // urine sodium 44 // urine oz of 343 // NT-proBNP 2511  Continue to diurese -- sodium has normalized with this  Continue fluid restriction and low-sodium diet      Hyperkalemia  Resolved  Replete potassium as needed     Urinary retention  Urinary retention protocol initiated  Check bladder scans, remain elevated at approximately 500 mL  Per primary team / Urology     Leukocytosis  Workup and management per primary team     Other problems:  Encephalopathy // possible lower extremity cellulitis // hypothermia // acute on chronic combined systolic and diastolic CHF // atrial fibrillation // thickened bladder on ultrasound with cholelithiasis //  small amount of ascites noted on ultrasound // cystic structure in the right adnexal region (per SLIM) // complicated left renal cyst with nodule noted along 1 of the septations in the cyst (urology has evaluated)// dysphagia    Disposition:  Spoke with Dr Griffin Soriano yesterday  The patient is to be discharged today  From a renal standpoint would recommend the following:  Please order follow-up BMP for this Monday and then Q Monday x4  I will notify our [place] office to arrange for follow-up  The patient should avoid nonsteroidals as an outpatient  She should be weighed every day and if her weight increases or decreases by more than 3 lb, Dr Vanna Xiong should be notified for adjustments in her diuretics  Would recommend that Dr Vanna Xiong follow-up on a patient has leukocytosis and have a follow-up plan for the cystic structure in the right adnexal region  Given her age, the family may elect not to follow this but this should be addressed    The patient should have bladder scans checked in the nursing home to make sure she is not retaining urine

## 2021-01-07 NOTE — CASE MANAGEMENT
Brando has obtained authorization for rehab  BLS transport with [facility] ambulance arranged for 1pm today  LM for daughter, Jimi Phillips to notify her of same  **Addendum: d/c held 2ndary to SOB and 02 desaturation  [address] [place] [address] notified  They can hold her bed until tomorrow only    Message left for daughter, Jimi Phillips

## 2021-01-07 NOTE — ASSESSMENT & PLAN NOTE
Initially was not showing improvement in her clinical status, was not eating hence palliative Care was consulted    Family did not want feeding tube  Gradually patient seems to have recurred and now doing so much better  Now on oral diet, had good amount of oral intake this morning  Hence palliative Care is signing off as family goal is to send her to rehab and eventually home if possible

## 2021-01-07 NOTE — PROGRESS NOTES
Progress Note - Nephrology   Khai Grijalva 80 y o  female MRN: [id number]  Unit/Bed#: [facility] 501-01 Encounter: [id number]      Assessment / Plan:    Acute kidney injury on top of stage IIIB chronic kidney disease  · Baseline creatinine of 0 9-1 2  · Workup:  Urinalysis was bland / PVR is elevated / renal ultrasound revealed right kidney to be 8 4 cm and the left 9 5 cm   There was renal cortical thinning and scarring bilaterally   Renal echogenicity was increased bilaterally as well   There was no hydronephrosis noted  // urine eosinophils 0%  · Renal function has returned to normal and today's creatinine is 0 97  · Avoiding nephrotoxins, relative hypotension      Hypertension  · Current blood pressure is  running in the 120s to 130s   · If the patient is symptomatic or if the renal function worsens as an outpatient with no clear reason, would consider decreasing carvedilol dose as long as heart rate is controlled in the setting of atrial fibrillation  · Currently on p o  Torsemide  · Blood pressure meds recently adjusted for relative hypotension     Volume status / Cardiomyopathy  · + chronic pleural effusions  · On today's exam the patient does not of appear to be grossly fluid overloaded  · Bicarbonate level is up somewhat but her renal function is stable  She does have chronic effusion so may need to keep her on the drier side    Will monitor BMP is an outpatient  · Echocardiogram-ejection fraction 35% with severe diffuse hypokinesis/right ventricle was moderately to markedly dilated with reduced systolic function/mitral valve with moderate to severe MR     Hyponatremia  · Likely related to volume overload and decreased ejection fraction/ CKD with delayed water excretion  · Celexa/Protonix could be contributory as well but less likely as the sodium has normalized with diuresis  · Workup:  Cortisol 21 3/41 1 // TSH 3 8 and 5 2 to // serum osmolality 286 // urine sodium 44 // urine oz of 343 // NT-proBNP 2511  · Continue to diurese -- sodium has normalized with this  · Continue fluid restriction and low-sodium diet      Hyperkalemia  · Resolved  · Replete potassium as needed     Urinary retention  · Urinary retention protocol initiated  · Check bladder scans, remain elevated at approximately 500 mL  · Per primary team / Urology     Leukocytosis  · Workup and management per primary team     Other problems:  Encephalopathy // possible lower extremity cellulitis // hypothermia // acute on chronic combined systolic and diastolic CHF // atrial fibrillation // thickened bladder on ultrasound with cholelithiasis //  small amount of ascites noted on ultrasound // cystic structure in the right adnexal region (per SLIM) // complicated left renal cyst with nodule noted along 1 of the septations in the cyst (urology has evaluated)// dysphagia    Disposition:  Spoke with Dr Paula Jones yesterday  The patient is to be discharged today  From a renal standpoint would recommend the following:  · Please order follow-up BMP for this Monday and then Q Monday x4  · [facility]  · The patient should avoid nonsteroidals as an outpatient  · She should be weighed every day and if her weight increases or decreases by more than 3 lb, Dr Carmen Morton should be notified for adjustments in her diuretics  · Would recommend that Dr Carmen Morton follow-up on a patient has leukocytosis and have a follow-up plan for the cystic structure in the right adnexal region  Given her age, the family may elect not to follow this but this should be addressed  · The patient should have bladder scans checked in the nursing home to make sure she is not retaining urine          Subjective: The patient was seen and examined  She was awake, answering questions appropriately and denied any shortness of breath, paroxysmal nocturnal dyspnea, orthopnea, abdominal pain, vomiting, diarrhea, sweats, chills        Objective: Vitals: Blood pressure 137/57, pulse 69, temperature 98 2 °F (36 8 °C), temperature source Oral, resp  rate 19, height 5' 4" (1 626 m), weight 82 4 kg (181 lb 10 5 oz), SpO2 92 %  ,Body mass index is 31 18 kg/m²  Temp (24hrs), Av 4 °F (36 9 °C), Min:98 °F (36 7 °C), Max:99 °F (37 2 °C)      Weight (last 2 days)     Date/Time   Weight    21 0500   82 4 (181 66)    21 0600   83 8 (184 75)    21 1200   83 (182 98)    21 0530       Weight: unable to obtain weight, will get pt out of bed to calibrate at 21 0530                     Intake/Output Summary (Last 24 hours) at 2021 0944  Last data filed at 2021 0517  Gross per 24 hour   Intake 350 ml   Output 1061 ml   Net -711 ml     I/O last 24 hours:   In: 56 [P O :630]  Out: 1211 [Urine:1211]        Physical Exam    /57   Pulse 69   Temp 98 2 °F (36 8 °C) (Oral)   Resp 19   Ht 5' 4" (1 626 m)   Wt 82 4 kg (181 lb 10 5 oz)   SpO2 92%   BMI 31 18 kg/m²   Vital signs were reviewed  Constitutional:  The patient was awake, alert, cooperative and in no apparent distress  Cardiovascular:  No overt jugular venous distention was noted, S1-S2, no pericardial friction rub or S3 were appreciated, 0 to trace peripheral edema  Pulmonary:  Lungs with decreased breath sounds at the bases, partly due to inspiratory effort, no rales/rhonchi or wheezing noted  Abdominal/GI:  Soft, nontender, no rebound or guarding was noted  Skin:  No hives were noted            Invasive Devices     Peripheral Intravenous Line            Peripheral IV 21 Right Forearm 1 day                Medications:    Scheduled Meds:  Current Facility-Administered Medications   Medication Dose Route Frequency Provider Last Rate    acetaminophen  325 mg Rectal Q4H PRN Alan Ortega MD      acetaminophen  650 mg Oral Q6H PRN Alan Ortega MD      albuterol  2 puff Inhalation Q4H PRN Lemon Hopping, DO      aspirin  81 mg Oral Daily Alan Ortega MD      atorvastatin  40 mg Oral After Dinner Sarah Norris MD      bisacodyl  10 mg Rectal Daily Sarah Norris MD      calcium carbonate-vitamin D  1 tablet Oral Daily With Breakfast Sarah Norris MD      carvedilol  12 5 mg Oral BID With Meals Sarah Norris MD      citalopram  10 mg Oral Daily Sarah Norris MD      docusate sodium  100 mg Oral Daily Sarah Norris MD      folic acid  1 mg Oral Daily Sarah Norris MD      fondaparinux  5 mg Subcutaneous Q24H Argenis Palomo MD      melatonin  3 mg Oral HS Sarah Norris MD      metoprolol  2 5 mg Intravenous Q6H PRN Sarah Norris MD      multivitamin-minerals  1 tablet Oral Daily Sarah Norris MD      ondansetron  4 mg Intravenous Q6H PRN Sarah Norris MD      pantoprazole  40 mg Oral Early Morning Shailesh Landing, DO      thiamine  100 mg Oral Daily Sarah Norris MD      torsemide  20 mg Oral Daily Serenity Giraldo MD         PRN Meds:   acetaminophen    acetaminophen    albuterol    metoprolol    ondansetron    Continuous Infusions:         LAB RESULTS:      Results from last 7 days   Lab Units 01/07/21  0500 01/06/21  0354 01/05/21  0436 01/04/21  0511 01/02/21  0507 01/01/21  0149   WBC Thousand/uL  --  11 36* 12 18* 11 68*  --   --    HEMOGLOBIN g/dL  --  9 1* 9 3* 9 5*  --   --    HEMATOCRIT %  --  29 0* 29 4* 31 2*  --   --    PLATELETS Thousands/uL  --  139* 124* 100*  --   --    NEUTROS PCT %  --  80* 83* 80*  --   --    LYMPHS PCT %  --  9* 5* 7*  --   --    MONOS PCT %  --  8 9 10  --   --    EOS PCT %  --  3 2 3  --   --    POTASSIUM mmol/L 3 8 4 0 3 6 3 5 4 0 4 2   CHLORIDE mmol/L 101 101 96* 97* 96* 93*   CO2 mmol/L 35* 38* 37* 36* 34* 33*   BUN mg/dL 29* 29* 25 27* 35* 35*   CREATININE mg/dL 0 97 1 11 0 99 1 10 1 36* 1 35*   CALCIUM mg/dL 9 0 8 7 8 6 9 0 9 5 9 3   MAGNESIUM mg/dL  --   --  1 8  --   --   --    PHOSPHORUS mg/dL  --   --  2 4  --   --   --        CUTURES:  Lab Results Component Value Date    BLOODCX No Growth After 5 Days  12/29/2020    BLOODCX No Growth After 5 Days  12/29/2020    BLOODCX No Growth After 5 Days  03/26/2020    BLOODCX No Growth After 5 Days  03/26/2020    BLOODCX No Growth After 5 Days  04/16/2017    BLOODCX No Growth After 5 Days  04/16/2017    URINECX No Growth <1000 cfu/mL 03/27/2020    URINECX <10,000 cfu/ml Mixed Contaminants X2 04/17/2017    URINECX 50,000-59,000 cfu/ml Mixed Contaminants X5 08/24/2016                 PLEASE NOTE:  This encounter was completed utilizing the [employer]Fluency Direct Speech Voice Recognition Software  Grammatical errors, random word insertions, pronoun errors and incomplete sentences are occasional consequences of the system due to software limitations, ambient noise and hardware issues  These may be missed by proof reading prior to affixing electronic signature  Any questions or concerns about the content, text or information contained within the body of this dictation should be directly addressed to the physician for clarification  Please do not hesitate to call me directly if you have any any questions or concerns

## 2021-01-07 NOTE — TRANSPORTATION MEDICAL NECESSITY
Section I - General Information    Name of Patient: Ava Shaffer                 : 1932    Medicare #: [id number]  Transport Date: 21 (PCS is valid for round trips on this date and for all repetitive trips in the 60-day range as noted below )  Origin: [address] [facility]                                                         Destination: [address] [place] [address],[address]  Is the pt's stay covered under Medicare Part A (PPS/DRG)   []     Closest appropriate facility? If no, why is transport to more distant facility required? Yes  If hospice pt, is this transport related to pt's terminal illness? No       Section II - Medical Necessity Questionnaire  Ambulance transportation is medically necessary only if other means of transport are contraindicated or would be potentially harmful to the patient  To meet this requirement, the patient must either be "bed confined" or suffer from a condition such that transport by means other than ambulance is contraindicated by the patient's condition  The following questions must be answered by the medical professional signing below for this form to be valid:    1)  Describe the MEDICAL CONDITION (physical and/or mental) of this patient AT [address] [place] [address] that requires the patient to be transported in an ambulance and why transport by other means is contraindicated by the patient's condition:Oxygen 2lnc, decrease cogition, decreased safety awareness, Fatigue, assist x2    2) Is the patient "bed confined" as defined below? No  To be "be confined" the patient must satisfy all three of the following conditions: (1) unable to get up from bed without Assistance; AND (2) unable to ambulate; AND (3) unable to sit in a chair or wheelchair  3) Can this patient safely be transported by car or wheelchair van (i e , seated during transport without a medical attendant or monitoring)?    No    4) In addition to completing questions 1-3 above, please check any of the following conditions that apply*:   *Note: supporting documentation for any boxes checked must be maintained in the patient's medical records  If hosp-hosp transfer, describe services needed at 2nd facility not available at 1st facility? Medical attendant required   Requires oxygen-unable to self administer  Unable to tolerate seated position for time needed to transport   Other(specify) decreased cognition      Section III - Signature of Physician or Healthcare Professional  I certify that the above information is true and correct based on my evaluation of this patient, and represent that the patient requires transport by ambulance and that other forms of transport are contraindicated  I understand that this information will be used by the Centers for Medicare and Medicaid Services (CMS) to support the determination of medical necessity for ambulance services, and I represent that I have personal knowledge of the patient's condition at time of transport  [x]  If this box is checked, I also certify that the patient is physically or mentally incapable of signing the ambulance service's claim and that the institution with which I am affiliated has furnished care, services, or assistance to the patient  My signature below is made on behalf of the patient pursuant to 42 CFR §424 36(b)(4)   In accordance with 42 CFR §424 37, the specific reason(s) that the patient is physically or mentally incapable of signing the claim form is as follows: Poor cognition    Signature of Physician* or Healthcare Professional______________________________________________________________  Signature Date 01/07/21 (For scheduled repetitive transports, this form is not valid for transports performed more than 60 days after this date)    Printed Name & Credentials of Physician or Healthcare Professional (MD, DO, RN, etc )_Brittney Villagomez RN__  *Form must be signed by patient's attending physician for scheduled, repetitive transports   For non-repetitive, unscheduled ambulance transports, if unable to obtain the signature of the attending physician, any of the following may sign (choose appropriate option below)  [] Physician Assistant []  Clinical Nurse Specialist [x]  Registered Nurse  []  Nurse Practitioner  [x] Discharge Planner

## 2021-01-07 NOTE — ASSESSMENT & PLAN NOTE
Lab Results   Component Value Date    EGFR 52 01/07/2021    EGFR 44 01/06/2021    EGFR 51 01/05/2021    CREATININE 0 97 01/07/2021    CREATININE 1 11 01/06/2021    CREATININE 0 99 01/05/2021   · POA, baseline approximately 0 9  · Possibly in setting of volume overload  · Improving with IV diuresis per nephrology recs  · UA reviewed  · NADEEM resolved  · measure PVR and bladder scan  · Monitor with BMP  · Hold nephrotoxins avoid hypotension

## 2021-01-08 VITALS
RESPIRATION RATE: 18 BRPM | BODY MASS INDEX: 31.13 KG/M2 | SYSTOLIC BLOOD PRESSURE: 138 MMHG | OXYGEN SATURATION: 97 % | DIASTOLIC BLOOD PRESSURE: 51 MMHG | HEIGHT: 64 IN | WEIGHT: 182.32 LBS | TEMPERATURE: 97.9 F | HEART RATE: 71 BPM

## 2021-01-08 LAB
ANION GAP SERPL CALCULATED.3IONS-SCNC: 1 MMOL/L (ref 4–13)
BASOPHILS # BLD AUTO: 0.03 THOUSANDS/ΜL (ref 0–0.1)
BASOPHILS NFR BLD AUTO: 0 % (ref 0–1)
BUN SERPL-MCNC: 24 MG/DL (ref 5–25)
CALCIUM SERPL-MCNC: 9.3 MG/DL (ref 8.3–10.1)
CHLORIDE SERPL-SCNC: 101 MMOL/L (ref 100–108)
CO2 SERPL-SCNC: 37 MMOL/L (ref 21–32)
CREAT SERPL-MCNC: 0.88 MG/DL (ref 0.6–1.3)
EOSINOPHIL # BLD AUTO: 0.31 THOUSAND/ΜL (ref 0–0.61)
EOSINOPHIL NFR BLD AUTO: 3 % (ref 0–6)
ERYTHROCYTE [DISTWIDTH] IN BLOOD BY AUTOMATED COUNT: 16.8 % (ref 11.6–15.1)
GFR SERPL CREATININE-BSD FRML MDRD: 59 ML/MIN/1.73SQ M
GLUCOSE SERPL-MCNC: 76 MG/DL (ref 65–140)
HCT VFR BLD AUTO: 31.8 % (ref 34.8–46.1)
HGB BLD-MCNC: 9.8 G/DL (ref 11.5–15.4)
IMM GRANULOCYTES # BLD AUTO: 0.05 THOUSAND/UL (ref 0–0.2)
IMM GRANULOCYTES NFR BLD AUTO: 1 % (ref 0–2)
LYMPHOCYTES # BLD AUTO: 0.85 THOUSANDS/ΜL (ref 0.6–4.47)
LYMPHOCYTES NFR BLD AUTO: 8 % (ref 14–44)
MCH RBC QN AUTO: 30.2 PG (ref 26.8–34.3)
MCHC RBC AUTO-ENTMCNC: 30.8 G/DL (ref 31.4–37.4)
MCV RBC AUTO: 98 FL (ref 82–98)
MONOCYTES # BLD AUTO: 0.74 THOUSAND/ΜL (ref 0.17–1.22)
MONOCYTES NFR BLD AUTO: 7 % (ref 4–12)
NEUTROPHILS # BLD AUTO: 8.99 THOUSANDS/ΜL (ref 1.85–7.62)
NEUTS SEG NFR BLD AUTO: 81 % (ref 43–75)
NRBC BLD AUTO-RTO: 0 /100 WBCS
PLATELET # BLD AUTO: 170 THOUSANDS/UL (ref 149–390)
PMV BLD AUTO: 10.3 FL (ref 8.9–12.7)
POTASSIUM SERPL-SCNC: 4 MMOL/L (ref 3.5–5.3)
RBC # BLD AUTO: 3.25 MILLION/UL (ref 3.81–5.12)
SODIUM SERPL-SCNC: 139 MMOL/L (ref 136–145)
WBC # BLD AUTO: 10.97 THOUSAND/UL (ref 4.31–10.16)

## 2021-01-08 PROCEDURE — 97110 THERAPEUTIC EXERCISES: CPT

## 2021-01-08 PROCEDURE — 97535 SELF CARE MNGMENT TRAINING: CPT

## 2021-01-08 PROCEDURE — 97530 THERAPEUTIC ACTIVITIES: CPT

## 2021-01-08 PROCEDURE — 85025 COMPLETE CBC W/AUTO DIFF WBC: CPT | Performed by: HOSPITALIST

## 2021-01-08 PROCEDURE — 99239 HOSP IP/OBS DSCHRG MGMT >30: CPT | Performed by: HOSPITALIST

## 2021-01-08 PROCEDURE — 80048 BASIC METABOLIC PNL TOTAL CA: CPT | Performed by: INTERNAL MEDICINE

## 2021-01-08 PROCEDURE — 99232 SBSQ HOSP IP/OBS MODERATE 35: CPT | Performed by: INTERNAL MEDICINE

## 2021-01-08 RX ORDER — TORSEMIDE 20 MG/1
10 TABLET ORAL ONCE
Status: COMPLETED | OUTPATIENT
Start: 2021-01-08 | End: 2021-01-08

## 2021-01-08 RX ORDER — CARVEDILOL 6.25 MG/1
6.25 TABLET ORAL 2 TIMES DAILY WITH MEALS
Refills: 0
Start: 2021-01-08 | End: 2021-03-22 | Stop reason: HOSPADM

## 2021-01-08 RX ORDER — ACETAMINOPHEN 325 MG/1
650 TABLET ORAL EVERY 6 HOURS PRN
Refills: 0
Start: 2021-01-08

## 2021-01-08 RX ORDER — TORSEMIDE 10 MG/1
30 TABLET ORAL DAILY
Refills: 0
Start: 2021-01-09 | End: 2021-03-11 | Stop reason: CLARIF

## 2021-01-08 RX ORDER — TORSEMIDE 20 MG/1
30 TABLET ORAL DAILY
Status: DISCONTINUED | OUTPATIENT
Start: 2021-01-09 | End: 2021-01-08 | Stop reason: HOSPADM

## 2021-01-08 RX ORDER — BISACODYL 10 MG
10 SUPPOSITORY, RECTAL RECTAL DAILY
Qty: 12 SUPPOSITORY | Refills: 0
Start: 2021-01-09

## 2021-01-08 RX ORDER — LANOLIN ALCOHOL/MO/W.PET/CERES
3 CREAM (GRAM) TOPICAL
Refills: 0
Start: 2021-01-08

## 2021-01-08 RX ADMIN — CITALOPRAM HYDROBROMIDE 10 MG: 10 TABLET ORAL at 09:34

## 2021-01-08 RX ADMIN — TORSEMIDE 10 MG: 20 TABLET ORAL at 12:41

## 2021-01-08 RX ADMIN — THIAMINE HCL TAB 100 MG 100 MG: 100 TAB at 09:34

## 2021-01-08 RX ADMIN — Medication 1 TABLET: at 09:34

## 2021-01-08 RX ADMIN — CARVEDILOL 6.25 MG: 6.25 TABLET, FILM COATED ORAL at 09:34

## 2021-01-08 RX ADMIN — FOLIC ACID 1 MG: 1 TABLET ORAL at 09:34

## 2021-01-08 RX ADMIN — PANTOPRAZOLE SODIUM 40 MG: 40 TABLET, DELAYED RELEASE ORAL at 05:37

## 2021-01-08 RX ADMIN — ASPIRIN 81 MG: 81 TABLET ORAL at 09:34

## 2021-01-08 RX ADMIN — TORSEMIDE 20 MG: 20 TABLET ORAL at 09:34

## 2021-01-08 NOTE — DISCHARGE SUMMARY
Discharge- Katherine Posey 5/5/1932, 80 y o  female MRN: [id number]    Unit/Bed#: [facility] 501-01 Encounter: [id number]    Primary Care Provider: Jose Sandoval MD   Date and time admitted to hospital: 12/28/2020  9:15 PM        Dysphagia  Assessment & Plan  Patient has dysphagia, she was npo for days  Now on DD2 diet    Goals of care, counseling/discussion  Assessment & Plan  Initially was not showing improvement in her clinical status, was not eating hence palliative Care was consulted  Family did not want feeding tube  Gradually patient seems to have recurred and now doing so much better  Now on oral diet, had good amount of oral intake this morning  Hence palliative Care is signing off as family goal is to send her to rehab and eventually home if possible     Erythema of lower extremity  Assessment & Plan  · Hypothermic on admission, no leukocytosis  · Bilateral LE erythema, hot to the touch  Per daughter does have some chronic erythema at baseline  · Started on IV ancef for possible superimposed cellulitis - now stopped  · Venous duplex negative DVT    Hyperkalemia  Assessment & Plan  · K 6 1 on admission, improving s/p calcium gluconate and IV Lasix  · EKG without acute changes  · Likely in setting of acute kidney injury  · resolved    Hyponatremia  Assessment & Plan  · Sodium 128 on POA, poor po intake and volume overload, improved  · Continue diuresis per nephrology recs  · Continue fluid restriction  · On celexa, 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)  Assessment & Plan  Lab Results   Component Value Date    EGFR 59 01/08/2021    EGFR 52 01/07/2021    EGFR 44 01/06/2021 CREATININE 0 88 01/08/2021    CREATININE 0 97 01/07/2021    CREATININE 1 11 01/06/2021   · POA, baseline approximately 0 9  · Possibly in setting of volume overload  · Improving with IV diuresis  · Hold nephrotoxins  · avoid hypotension    Acute on chronic combined systolic and diastolic congestive heart failure (HCC)  Assessment & Plan  Wt Readings from Last 3 Encounters:   01/08/21 82 7 kg (182 lb 5 1 oz)   04/03/20 74 6 kg (164 lb 7 4 oz)   04/21/17 74 1 kg (163 lb 5 8 oz)     · Patient with lower extremity edema and CXR suspicious for pulmonary vascular congestion  · Status post daily 40 mg IV Lasix - now on oral torsemide 20 mg daily from 1/6/21 per Nephrology - increased to 30 mg on discharge today  · Monitor daily weights, I/O  · Low-sodium, fluid-restricted diet  · Nephrology following  · Monitor BMP  · F/u OP with cardiologist at [address] [place] [address] [address]          Essential hypertension  Assessment & Plan  · Continue Coreg, oral torsemide    Atrial fibrillation ([facility] [address] )  Assessment & Plan  · Continue Coreg; digoxin listed on home medication list however daughter states patient no longer takes this  · Not on anticoagulation due to fall risk    * Toxic metabolic encephalopathy  Assessment & Plan  · Presented with increased weakness and confusion; with recent treatment for apparent UTI, low temps  · Hyponatremia and acute renal failure are noted with evidence of volume overload with specific plans for these as below  · Otherwise with no other clear infectious source at this time, will follow up results of blood cultures x2 however does have erythema LE bilaterally, hot to the touch, started on IV ancef for possible superimposed cellulitis - now stopped  · Vitamin b12 and folate unremarkable  · CT head negative  · Patient is more responsive            Transition of Care Discharge Summary - [facility]    Patient Information: Rajat Howell 80 y o  female MRN: [id number]  Unit/Bed#: [facility] 501-01 Encounter: [id number]    Discharging Physician / Practitioner: Brody Pettit MD  PCP: Taco Valero MD  Admission Date: 12/28/2020  Discharge Date: 01/08/21    Disposition:      Short Term Rehab or SNF at [facility]    For Discharges to [facility] SNF:   · Not Applicable to this Patient - Not Applicable to this Patient    Reason for Admission: Generalized weakness and confusion    Discharge Diagnoses:     Principal Problem:    Acute on chronic combined systolic and diastolic congestive heart failure (HCC)  Active Problems:    Atrial fibrillation ([address] )    Essential hypertension    Acute renal failure superimposed on stage 2 chronic kidney disease (HCC)    Toxic metabolic encephalopathy    Hypothermia    Thrombocytopenia (HCC)    Hyponatremia    Hyperkalemia    Erythema of lower extremity    Elevated TSH    Goals of care, counseling/discussion    Dysphagia  Resolved Problems:    * No resolved hospital problems  *      Consultations During Hospital Stay:  · Nephrology  · Endocrinology  · Urology  · Palliative care    Procedures Performed:         Medication Adjustments and Discharge Medications:  · Summary of Medication Adjustments made as a result of this hospitalization: Torsemide increased to 30 mg, Coreg decreased to 6 25 mg b i d   · Medication Dosing Tapers - Please refer to Discharge Medication List for details on any medication dosing tapers (if applicable to patient)  · Medications being temporarily held (include recommended restart time): none  · Discharge Medication List: See after visit summary for reconciled discharge medications  Wound Care Recommendations:  When applicable, please see wound care section of After Visit Summary      Diet Recommendations at Discharge:  Diet -        Diet Orders   (From admission, onward)             Start     Ordered    01/08/21 1327  Dietary nutrition supplements  Once     Question Answer Comment   Select Supplement: Ensure Compact-Vanilla    Frequency Lunch 01/08/21 1327    01/08/21 1327  Dietary nutrition supplements  Once     Question Answer Comment   Select Supplement: Ensure Compact-Chocolate    Frequency Dinner        01/08/21 1327    01/05/21 1032  Diet Dysphagia/Modified Consistency; Dysphagia 2-Mechanical Soft; Thin Liquid; Fluid Restriction 1500 ML, Sodium 2 GM  Diet effective now     Question Answer Comment   Diet Type Dysphagia/Modified Consistency    Dysphagia/Modified Consistency Dysphagia 2-Mechanical Soft    Liquid Modifier Thin Liquid    Other Restriction(s): Fluid Restriction 1500 ML    Other Restriction(s): Sodium 2 GM    RD to adjust diet per protocol? Yes        01/05/21 1039              Fluid Restriction - New Fluid Restriction at Discharge: 1500 ml/day  Instructions for any Catheters / Lines Present at Discharge (including removal date, if applicable): none    Significant Findings / Test Results:     XR chest portable   Final Result by Suzy Koo DO (01/07 1440)      Cardiomegaly with element of chronic pulmonary venous hypertension and small pleural effusions suspected  No significant change from prior study  Workstation performed: [id number]         FL barium swallow video w speech   Final Result by SYSTEMGENERAJAZMYN, WOJCIECH (01/04 1147)      US kidney and bladder   Final Result by True Reyes MD (12/31 1191)      The kidneys are somewhat small, right worse than left  There is bilateral renal cortical thinning and renal cortical scarring  Renal cortical echogenicity is increased, suggesting medical renal disease  There is an approximately 3 3 x 3 1 x 3 cm cyst containing some moderately thick internal septations on the lower pole left kidney  There appears to be a soft tissue nodule along one of these septations  Urology consultation and follow-up is    recommended  The urinary bladder wall appears diffusely thickened  Urology consultation and follow-up is recommended        There is an approximately 6 6 x 5 2 x 5 1 cm cystic structure in the right adnexal region, incompletely imaged on the present examination  Dedicated pelvic ultrasound is recommended for further characterization  There is cholelithiasis  The gallbladder wall is thickened, measuring approximately 5 mm thickness  Clinical correlation is necessary  If there is concern for acute gallbladder pathology, nuclear medicine hepatobiliary imaging could be performed  There is a small amount of ascites  This examination demonstrates findings for which imaging follow-up is recommended and was logged as such in [address] [place] [address] Rd  The study was marked in [facility] for immediate notification  Workstation performed: [id number]         VAS lower limb venous duplex study, complete bilateral   Final Result by Manuel Garcia DO (12/29 7385)      CT head wo contrast   Final Result by Deshawn Joya MD (12/29 4524)      No acute intracranial abnormality  Mild age-appropriate generalized atrophy and mild microangiopathic changes appear similar to the prior study  Workstation performed: [id number]         XR chest 1 view portable   Final Result by Sherine Bess MD (12/29 8312)      Chronic small bilateral pleural effusions, slightly increased on the right, with mild right base atelectasis  Workstation performed: [id number]               Incidental Findings:   ·      Test Results Pending at Discharge (will require follow up):   ·      Outpatient Tests Requested:  · BMP in 3 days & then weekly    Complications:      Hospital Course:     Hank Retana is a 80 y o  female patient who originally presented to the hospital on 12/28/2020 with past medical history significant for combined systolic and diastolic CHF, stage 2 chronic kidney disease, AFib not on anticoagulation secondary to fall risk, history of prior CVA   Presented with increasing generalized weakness and confusion, markedly worsening over the past 3 days, described as worsening ability to ambulate to where patient could not even get out of bed  Additionally was noted with decrease in appetite  The patient's daughter reported that patient had recently been treated for apparent UTI; no fevers/chills, focal weakness, headache, or new rashes/wounds were noted  On day presentation, she had outpatient workup which revealed acute renal failure with hyperkalemia and CXR with bilateral pleural effusions for which patient was advised to present to ED for further evaluation    Condition at Discharge: stable     Discharge Day Visit / Exam:     Subjective:  Feels ok, breathing is ok    Vitals: Blood Pressure: 138/51 (01/08/21 0655)  Pulse: 71 (01/08/21 0655)  Temperature: 97 9 °F (36 6 °C) (01/07/21 2321)  Temp Source: Oral (01/07/21 2321)  Respirations: 18 (01/07/21 2321)  Height: 5' 4" (162 6 cm) (12/29/20 1827)  Weight - Scale: 82 7 kg (182 lb 5 1 oz) (01/08/21 0540)  SpO2: 97 % (01/08/21 0655)  Exam:   Physical Exam  Vitals signs reviewed  HENT:      Head: Normocephalic and atraumatic  Mouth/Throat:      Mouth: Mucous membranes are moist    Cardiovascular:      Rate and Rhythm: Normal rate and regular rhythm  Heart sounds: Normal heart sounds  No murmur  Pulmonary:      Effort: Pulmonary effort is normal  No respiratory distress  Breath sounds: Normal breath sounds  No wheezing  Abdominal:      General: There is no distension  Palpations: Abdomen is soft  Tenderness: There is no abdominal tenderness  Musculoskeletal:      Right lower leg: No edema  Left lower leg: No edema  Neurological:      Mental Status: She is alert  Discussion with Family: left  for daughter    Discharge instructions/Information to patient and family:   See after visit summary section titled Discharge Instructions for information provided to patient and family        Planned Readmission: no      Discharge Statement:  I spent 45 minutes discharging the patient  This time was spent on the day of discharge  I had direct contact with the patient on the day of discharge  Greater than 50% of the total time was spent examining patient, answering all patient questions, arranging and discussing plan of care with patient as well as directly providing post-discharge instructions  Additional time then spent on discharge activities      ** Please Note: This note has been constructed using a voice recognition system **

## 2021-01-08 NOTE — OCCUPATIONAL THERAPY NOTE
OccupationalTherapy Progress Note     Patient Name: Falguni Underwood  NWGTV'V Date: 1/8/2021  Problem List  Principal Problem:    Toxic metabolic encephalopathy  Active Problems:    Atrial fibrillation ([address] )    Essential hypertension    Acute on chronic combined systolic and diastolic congestive heart failure (HCC)    Acute renal failure superimposed on stage 2 chronic kidney disease (HCC)    Hypothermia    Thrombocytopenia (HCC)    Hyponatremia    Hyperkalemia    Erythema of lower extremity    Elevated TSH    Goals of care, counseling/discussion    Dysphagia          01/08/21 1054   OT Last Visit   OT Visit Date 01/08/21   Note Type   Note Type Treatment   Restrictions/Precautions   Weight Bearing Precautions Per Order No   Other Precautions Cognitive; Chair Alarm; Bed Alarm; Fall Risk;Hard of hearing;Multiple lines;Telemetry   Pain Assessment   Pain Assessment Tool 0-10   Pain Score No Pain   Bed Mobility   Rolling R 4  Minimal assistance   Additional items Bedrails   Rolling L 4  Minimal assistance   Additional items Bedrails   Supine to Sit 3  Moderate assistance   Additional items Assist x 1;HOB elevated; Bedrails   Sit to Supine Unable to assess   Transfers   Sit to Stand 4  Minimal assistance   Additional items Assist x 2   Stand to Sit 4  Minimal assistance   Additional items Assist x 2   Stand pivot 4  Minimal assistance   Additional items Assist x 2   Additional Comments Level of assist decreasd with trials   Completed sit <> stand 6 times, last trial required MIN A x 1    Functional Mobility   Functional Mobility 4  Minimal assistance   Additional Comments Ax2, HHA   Therapeutic Excerise-Strength   UE Strength Yes   Right Upper Extremity- Strength   R Weight/Reps/Sets chest press 1x5, unable to complete more, biceps 2x/10    RUE Strength Comment Completed bicep curl and chest press exercises, pt able to perform against MIN A    Left Upper Extremity-Strength   L Weights/Reps/Sets chest press 1x5, unable to complete more, biceps 2x/10    LUE Strength Comment Completed bicep curl and chest press exercises, pt able to perform against MIN A    Cognition   Overall Cognitive Status Impaired   Arousal/Participation Alert; Responsive; Cooperative   Attention Attends with cues to redirect   Orientation Level Oriented to person;Oriented to place; Disoriented to time;Disoriented to situation  (correct month and year )   Memory Decreased short term memory;Decreased recall of recent events;Decreased recall of precautions   Following Commands Follows one step commands with increased time or repetition   Comments Pt is very pleasant and engages in tasks    Activity Tolerance   Activity Tolerance Patient limited by fatigue;Patient limited by pain   Medical Staff Made Aware PT Barry Valero    Assessment   Assessment Patient participated in Skilled OT session this date with interventions consisting of ADL re training with the use of correct body mechnaics, Energy Conservation techniques, deep breathing technique, safety awareness and fall prevention techniques, therapeutic exercise to: increase functional use of BUEs, increase BUE muscle strength , increase dynamic sit/ stand balance during functional activity , increase postural control, increase trunk control and increase OOB/ sitting tolerance   Patient agreeable to OT treatment session, upon arrival patient was found supine in bed  In comparison to previous session, patient with improvements in transfers, BM, and UE strength   Patient requiring verbal cues for safety, verbal cues for correct technique, verbal cues for pacing thru activity steps and one step directives  Pt educated on deep breathing tech and reminders to continue using this tech and to keep breathing  Pt has tendency to hold breath when moving in bed  O2 levels decreased to ~87 with activity but quickly improved when using breathing tech  Pt participated in therex activities, limited strength and limited by fatigue  Completed sit <> stand trials, pt performance improved t/o  Patient continues to be functioning below baseline level, occupational performance remains limited secondary to factors listed above and increased risk for falls and injury  From OT standpoint, recommendation at time of d/c would be STR  Patient to benefit from continued Occupational Therapy treatment while in the hospital to address deficits as defined above and maximize level of functional independence with ADLs and functional mobility  Pt left in room in recliner with alarm on and all current needs met  Plan   Treatment Interventions ADL retraining;Functional transfer training;UE strengthening/ROM; Endurance training;Cognitive reorientation;Patient/family training;Equipment evaluation/education; Compensatory technique education;Continued evaluation; Energy conservation; Activityengagement   Goal Expiration Date 01/15/21   OT Treatment Day 2   OT Frequency 3-5x/wk   Modified [place] Scale   Modified [place] Scale 4       Damari Lujan MS, OTR/L

## 2021-01-08 NOTE — ASSESSMENT & PLAN NOTE
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

## 2021-01-08 NOTE — PLAN OF CARE
Problem: Potential for Falls  Goal: Patient will remain free of falls  Description: INTERVENTIONS:  - Assess patient frequently for physical needs  -  Identify cognitive and physical deficits and behaviors that affect risk of falls  -  Mode fall precautions as indicated by assessment   - Educate patient/family on patient safety including physical limitations  - Instruct patient to call for assistance with activity based on assessment  - Modify environment to reduce risk of injury  - Consider OT/PT consult to assist with strengthening/mobility  Outcome: Progressing     Problem: Prexisting or High Potential for Compromised Skin Integrity  Goal: Skin integrity is maintained or improved  Description: INTERVENTIONS:  - Identify patients at risk for skin breakdown  - Assess and monitor skin integrity  - Assess and monitor nutrition and hydration status  - Monitor labs   - Assess for incontinence   - Turn and reposition patient  - Assist with mobility/ambulation  - Relieve pressure over bony prominences  - Avoid friction and shearing  - Provide appropriate hygiene as needed including keeping skin clean and dry  - Evaluate need for skin moisturizer/barrier cream  - Collaborate with interdisciplinary team   - Patient/family teaching  - Consider wound care consult   Outcome: Progressing     Problem: Nutrition/Hydration-ADULT  Goal: Nutrient/Hydration intake appropriate for improving, restoring or maintaining nutritional needs  Description: Monitor and assess patient's nutrition/hydration status for malnutrition  Collaborate with interdisciplinary team and initiate plan and interventions as ordered  Monitor patient's weight and dietary intake as ordered or per policy  Utilize nutrition screening tool and intervene as necessary  Determine patient's food preferences and provide high-protein, high-caloric foods as appropriate       INTERVENTIONS:  - Monitor oral intake, urinary output, labs, and treatment plans  - Assess nutrition and hydration status and recommend course of action  - Evaluate amount of meals eaten  - Assist patient with eating if necessary   - Allow adequate time for meals  - Recommend/ encourage appropriate diets, oral nutritional supplements, and vitamin/mineral supplements  - Order, calculate, and assess calorie counts as needed  - Recommend, monitor, and adjust tube feedings and TPN/PPN based on assessed needs  - Assess need for intravenous fluids  - Provide specific nutrition/hydration education as appropriate  - Include patient/family/caregiver in decisions related to nutrition  Outcome: Progressing     Problem: MUSCULOSKELETAL - ADULT  Goal: Maintain or return mobility to safest level of function  Description: INTERVENTIONS:  - Assess patient's ability to carry out ADLs; assess patient's baseline for ADL function and identify physical deficits which impact ability to perform ADLs (bathing, care of mouth/teeth, toileting, grooming, dressing, etc )  - Assess/evaluate cause of self-care deficits   - Assess range of motion  - Assess patient's mobility  - Assess patient's need for assistive devices and provide as appropriate  - Encourage maximum independence but intervene and supervise when necessary  - Involve family in performance of ADLs  - Assess for home care needs following discharge   - Consider OT consult to assist with ADL evaluation and planning for discharge  - Provide patient education as appropriate  Outcome: Progressing  Goal: Maintain proper alignment of affected body part  Description: INTERVENTIONS:  - Support, maintain and protect limb and body alignment  - Provide patient/ family with appropriate education  Outcome: Progressing     Problem: NEUROSENSORY - ADULT  Goal: Achieves stable or improved neurological status  Description: INTERVENTIONS  - Monitor and report changes in neurological status  - Monitor vital signs such as temperature, blood pressure, glucose, and any other labs ordered   - Initiate measures to prevent increased intracranial pressure  - Monitor for seizure activity and implement precautions if appropriate      Outcome: Progressing

## 2021-01-08 NOTE — ASSESSMENT & PLAN NOTE
Wt Readings from Last 3 Encounters:   01/08/21 82 7 kg (182 lb 5 1 oz)   04/03/20 74 6 kg (164 lb 7 4 oz)   04/21/17 74 1 kg (163 lb 5 8 oz)     · Patient with lower extremity edema and CXR suspicious for pulmonary vascular congestion  · Status post daily 40 mg IV Lasix - now on oral torsemide 20 mg daily from 1/6/21 per Nephrology - increased to 30 mg on discharge today  · Monitor daily weights, I/O  · Low-sodium, fluid-restricted diet  · Nephrology following  · Monitor BMP  · F/u OP with cardiologist at [facility]

## 2021-01-08 NOTE — PROGRESS NOTES
Progress Note - Nephrology   Aylin Roa 80 y o  female MRN: [id number]  Unit/Bed#: [facility] 501-01 Encounter: [id number]      Assessment / Plan:    Acute kidney injury on top of stage IIIB chronic kidney disease  · Baseline creatinine of 0 9-1 2  · Workup:  Urinalysis was bland / PVR was elevated / Renal ultrasound revealed right kidney to be 8 4 cm and the left 9 5 cm   There was renal cortical thinning and scarring bilaterally   Renal echogenicity was increased bilaterally as well   There was no hydronephrosis noted  // urine eosinophils 0%  · Renal function has returned to normal and today's creatinine is 0 97  · Avoiding nephrotoxins, relative hypotension   · Last PVR was 21 mL     Hypertension  · Current blood pressure is  running in the 120s to 130s but has improved and is mostly running in the 130s after carvedilol dose was decreased on 01/07  · Currently on p o  Torsemide  · Blood pressure meds recently adjusted for relative hypotension     Volume status / Cardiomyopathy  · + chronic pleural effusions  · On today's exam the patient does not of appear to be grossly fluid overloaded  · Bicarbonate level is up somewhat but her renal function is stable and has improved over last 24 hours  She does have chronic effusion so may need to keep her on the drier side to prevent recurrent CHF    Will monitor BMP is an outpatient  · Echocardiogram-ejection fraction 35% with severe diffuse hypokinesis/right ventricle was moderately to markedly dilated with reduced systolic function/mitral valve with moderate to severe MR     Hyponatremia  · Likely related to volume overload and decreased ejection fraction/ CKD with delayed water excretion  · Celexa/Protonix could be contributory as well but less likely as the sodium has normalized with diuresis  · Workup:  Cortisol 21 3/41 1 // TSH 3 8 and 5 2 to // serum osmolality 286 // urine sodium 44 // urine oz of 343 // NT-proBNP 2511  · Continue fluid restriction and low-sodium diet      Hyperkalemia  · Resolved  · Replete potassium as needed     Urinary retention  · Urinary retention protocol initiated  · Check bladder scans, last PVR was acceptable at 27 mL  · Per primary team / Urology     Leukocytosis  · Workup and management per primary team     Other problems:  Encephalopathy // possible lower extremity cellulitis // hypothermia // acute on chronic combined systolic and diastolic CHF // atrial fibrillation // thickened bladder on ultrasound with cholelithiasis //  small amount of ascites noted on ultrasound // cystic structure in the right adnexal region (per [facility]) // complicated left renal cyst with nodule noted along 1 of the septations in the cyst (urology has evaluated)// dysphagia     Disposition:  Spoke with Dr Ezio Gan  today  The patient is to be discharged today  From a renal standpoint would recommend the following:  · Please order follow-up BMP for this Monday and then Q Monday x4  · I notified our [place] office to arrange for follow-up  · The patient should avoid nonsteroidals as an outpatient  · She should be weighed every day and if her weight increases or decreases by more than 3 lb, Dr Marisa López should be notified for adjustments in her diuretics  · Would recommend that Dr Marisa López follow-up on a patient has leukocytosis and have a follow-up plan for the cystic structure in the right adnexal region  Given her age, the family may elect not to follow this but this should be addressed  · The patient should have bladder scans checked in the nursing home to make sure she is not retaining urine  · Would send out on torsemide 30 mg daily which is an increase from her outpatient dose, this to prevent recurrent CHF        Subjective: The patient was seen examined early this morning  She was awake, alert and in no apparent distress    She denied shortness of breath, chest pain or pressure, abdominal pain, vomiting, diarrhea, sweats, chills, paroxysmal nocturnal dyspnea orthopnea  Objective:     Vitals: Blood pressure 138/51, pulse 71, temperature 97 9 °F (36 6 °C), temperature source Oral, resp  rate 18, height 5' 4" (1 626 m), weight 82 7 kg (182 lb 5 1 oz), SpO2 97 %  ,Body mass index is 31 3 kg/m²  Temp (24hrs), Av 9 °F (36 6 °C), Min:97 8 °F (36 6 °C), Max:97 9 °F (36 6 °C)      Weight (last 2 days)     Date/Time   Weight    21 0540   82 7 (182 32)    21 0500   82 4 (181 66)    21 0600   83 8 (184 75)                     Intake/Output Summary (Last 24 hours) at 2021 1225  Last data filed at 2021 0800  Gross per 24 hour   Intake 240 ml   Output 574 ml   Net -334 ml     I/O last 24 hours:   In: 360 [P O :360]  Out: 819 [Urine:819]        Physical Exam    /51   Pulse 71   Temp 97 9 °F (36 6 °C) (Oral)   Resp 18   Ht 5' 4" (1 626 m)   Wt 82 7 kg (182 lb 5 1 oz)   SpO2 97%   BMI 31 30 kg/m²   Vital signs were reviewed  Constitutional:  The patient was awake, alert, cooperative and in no apparent distress  Cardiovascular:  S1-S2, no pericardial friction rub or S3 were appreciated, no overt jugular venous distention was noted, no edema noted  Pulmonary:  Lungs were clear station with question with decreased breath sounds secondary to decreased inspiratory effort, no rales/rhonchi wheezing noted  Abdominal/GI:  Soft, nontender, no rebound or guarding was noted  Skin:  No hives were noted            Invasive Devices     Peripheral Intravenous Line            Peripheral IV 21 Right Forearm 2 days                Medications:    Scheduled Meds:  Current Facility-Administered Medications   Medication Dose Route Frequency Provider Last Rate    acetaminophen  325 mg Rectal Q4H PRN Mikaela Rose MD      acetaminophen  650 mg Oral Q6H PRN Mikaela Rose MD      albuterol  2 puff Inhalation Q4H PRN Nicole Fisher DO      aspirin  81 mg Oral Daily Mikaela Rose MD      atorvastatin  40 mg Oral After Sultana Pelayo MD  bisacodyl  10 mg Rectal Daily Crystal Chávez MD      calcium carbonate-vitamin D  1 tablet Oral Daily With Breakfast Crystal Chávez MD      carvedilol  6 25 mg Oral BID With Meals Rafiq Ferris MD      citalopram  10 mg Oral Daily Crystal Chávez MD      docusate sodium  100 mg Oral Daily Crystal Chávez MD      folic acid  1 mg Oral Daily Crystal Chávez MD      fondaparinux  5 mg Subcutaneous Q24H Rafiq Ferris MD      melatonin  3 mg Oral HS Crystal Chávez MD      metoprolol  2 5 mg Intravenous Q6H PRN Crystal Chávez MD      multivitamin-minerals  1 tablet Oral Daily Crystal Cáhvez MD      ondansetron  4 mg Intravenous Q6H PRN Crystal Chávez MD      pantoprazole  40 mg Oral Early Morning Drake Torres DO      thiamine  100 mg Oral Daily Crystal Chávez MD      torsemide  10 mg Oral Once MD Jen Sykes Ok [START ON 1/9/2021] torsemide  30 mg Oral Daily Rafiq Ferris MD         PRN Meds:   acetaminophen    acetaminophen    albuterol    metoprolol    ondansetron    Continuous Infusions:         LAB RESULTS:      Results from last 7 days   Lab Units 01/08/21  0537 01/07/21  0500 01/06/21  0354 01/05/21  0436 01/04/21  0511 01/02/21  0507   WBC Thousand/uL 10 97*  --  11 36* 12 18* 11 68*  --    HEMOGLOBIN g/dL 9 8*  --  9 1* 9 3* 9 5*  --    HEMATOCRIT % 31 8*  --  29 0* 29 4* 31 2*  --    PLATELETS Thousands/uL 170  --  139* 124* 100*  --    NEUTROS PCT % 81*  --  80* 83* 80*  --    LYMPHS PCT % 8*  --  9* 5* 7*  --    MONOS PCT % 7  --  8 9 10  --    EOS PCT % 3  --  3 2 3  --    POTASSIUM mmol/L 4 0 3 8 4 0 3 6 3 5 4 0   CHLORIDE mmol/L 101 101 101 96* 97* 96*   CO2 mmol/L 37* 35* 38* 37* 36* 34*   BUN mg/dL 24 29* 29* 25 27* 35*   CREATININE mg/dL 0 88 0 97 1 11 0 99 1 10 1 36*   CALCIUM mg/dL 9 3 9 0 8 7 8 6 9 0 9 5   MAGNESIUM mg/dL  --   --   --  1 8  --   --    PHOSPHORUS mg/dL  --   --   --  2 4  --   --        CUTURES:  Lab Results Component Value Date    BLOODCX No Growth After 5 Days  12/29/2020    BLOODCX No Growth After 5 Days  12/29/2020    BLOODCX No Growth After 5 Days  03/26/2020    BLOODCX No Growth After 5 Days  03/26/2020    BLOODCX No Growth After 5 Days  04/16/2017    BLOODCX No Growth After 5 Days  04/16/2017    URINECX No Growth <1000 cfu/mL 03/27/2020    URINECX <10,000 cfu/ml Mixed Contaminants X2 04/17/2017    URINECX 50,000-59,000 cfu/ml Mixed Contaminants X5 08/24/2016                 PLEASE NOTE:  This encounter was completed utilizing the [employer]aming[employer]/Fluency Direct Speech Voice Recognition Software  Grammatical errors, random word insertions, pronoun errors and incomplete sentences are occasional consequences of the system due to software limitations, ambient noise and hardware issues  These may be missed by proof reading prior to affixing electronic signature  Any questions or concerns about the content, text or information contained within the body of this dictation should be directly addressed to the physician for clarification  Please do not hesitate to call me directly if you have any any questions or concerns

## 2021-01-08 NOTE — ASSESSMENT & PLAN NOTE
· K 6 1 on admission, improving s/p calcium gluconate and IV Lasix  · EKG without acute changes  · Likely in setting of acute kidney injury  · resolved

## 2021-01-08 NOTE — ASSESSMENT & PLAN NOTE
Lab Results   Component Value Date    EGFR 59 01/08/2021    EGFR 52 01/07/2021    EGFR 44 01/06/2021    CREATININE 0 88 01/08/2021    CREATININE 0 97 01/07/2021    CREATININE 1 11 01/06/2021   · POA, baseline approximately 0 9  · Possibly in setting of volume overload  · Improving with IV diuresis  · Hold nephrotoxins  · avoid hypotension

## 2021-01-08 NOTE — INCIDENTAL FINDINGS
The following findings require follow up:  Radiographic finding   Finding: There is an approximately 6 6 x 5 2 x 5 1 cm cystic structure in the right adnexal region, incompletely imaged on the present examination    Dedicated pelvic ultrasound is recommended for further characterization   Follow up required: yes   Follow up should be done within 1 month(s)    Please notify the following clinician to assist with the follow up: PCP

## 2021-01-08 NOTE — DISCHARGE INSTRUCTIONS
· Please check BMP for this Monday and then Q Monday x4  · Follow up with [facility] nephrology office  · The patient should avoid nonsteroidals as an outpatient  · She should be weighed every day and if her weight increases or decreases by more than 3 lb, Dr Lisa Collier should be notified for adjustments in her diuretics  · Would recommend that Dr Lisa Collier follow-up on a patient as she has leukocytosis and have a follow-up plan for the cystic structure in the right adnexal region  Given her age, the family may elect not to follow this but this should be addressed    · The patient should have bladder scans checked in the nursing home to make sure she is not retaining urine    · Follow up with her cardiologist at [facility] Mayela VIEYRA

## 2021-01-08 NOTE — PHYSICAL THERAPY NOTE
PHYSICAL THERAPY NOTE          Patient Name: Shannan Vera  IPVVK'I Date: 1/8/2021 01/08/21 1053   PT Last Visit   PT Visit Date 01/08/21   Note Type   Note Type Treatment   Pain Assessment   Pain Assessment Tool Pain Assessment not indicated - pt denies pain   Restrictions/Precautions   Weight Bearing Precautions Per Order No   Other Precautions Cognitive; Chair Alarm; Bed Alarm; Fall Risk;Hard of hearing   General   Chart Reviewed Yes   Response to Previous Treatment Patient with no complaints from previous session  Family/Caregiver Present No   Cognition   Overall Cognitive Status Impaired   Arousal/Participation Alert   Attention Attends with cues to redirect   Orientation Level Oriented to person;Oriented to place; Disoriented to situation;Disoriented to time  (knows month and year)   Memory Decreased recall of precautions;Decreased recall of recent events;Decreased short term memory   Following Commands Follows one step commands with increased time or repetition   Comments Pt with decreased safety awareness and insight into decicits  Pt with poor carryover of deep breathing techniques  Subjective   Subjective Pt pleasant and agreeable to participate in therapy session  Bed Mobility   Rolling R 4  Minimal assistance   Additional items Assist x 1;Bedrails; Increased time required;Verbal cues   Rolling L 4  Minimal assistance   Additional items Assist x 1; Increased time required;Verbal cues; Bedrails   Supine to Sit 3  Moderate assistance   Additional items Assist x 1; Increased time required;Verbal cues;LE management;HOB elevated   Additional Comments Supine in bed upon PT arrival   Pt left upright in bedside chair with chair alarm intact and all needs in reach at end of therapy session  Transfers   Sit to Stand 4  Minimal assistance   Additional items Assist x 2; Increased time required;Verbal cues   Stand to Sit 4  Minimal assistance   Additional items Assist x 2; Increased time required;Verbal cues Additional Comments Transfers with b/l  HHA  STS performed 6x throughout session, on last trial, pt completed with Min A x 1  Ambulation/Elevation   Gait pattern Excessively slow; Short stride; Foward flexed; Step to;Decreased foot clearance; Improper Weight shift; Shuffling  (difficulty with SLS)   Gait Assistance 4  Minimal assist   Additional items Assist x 2;Verbal cues; Tactile cues   Assistive Device Other (Comment)  (b/l HHA)   Distance 3 ft from bed to chair   Balance   Static Sitting Fair   Dynamic Sitting Fair -   Static Standing Poor +   Dynamic Standing Poor   Ambulatory Poor   Endurance Deficit   Endurance Deficit Yes   Endurance Deficit Description fatigue, SOB, SpO2 desats to ~88% with activity, recovers to low-mid 90's   Activity Tolerance   Activity Tolerance Patient limited by fatigue; Other (Comment)  (SOB)   Medical Staff Made Aware OT Nikolas   Nurse Made Aware RN cleared pt to be seen by PT   Exercises   Hip Flexion Sitting;10 reps;Bilateral  (x2)   Knee AROM Long Arc Quad Sitting;10 reps;Bilateral  (x2)   Ankle Pumps Sitting;10 reps;Bilateral  (x2)   Balance training  STS 5x with Min A x 2 with use of b/l armrests with focus on unweighting and endurance  Assessment   Prognosis Good   Problem List Decreased strength;Decreased endurance;Decreased mobility; Impaired balance;Decreased cognition;Decreased safety awareness; Impaired hearing   Assessment Pt seen for PT treatment session with focus on bed mobility, functional transfers, functional mobility, therex  Pt making steady progress toward goals this session  Pt demonstrates improvements in strength evident by ability to complete bed mobility and functional mobility with decreased assist this session  Pt continues to present with decreased endurance as SOB/BOYER is observed and SpO2 destats to high 80's with activity  Pt tolerates LE therex, however, does need prolong rest breaks due to fatigue and SOB    Pt left upright in bedside chair with chair alarm intact with all needs in reach  Pt will benefit from skilled therapy in order to address current impairments and functional limitations  PT to follow pt and recommending rehab once medically cleared  Barriers to Discharge Inaccessible home environment;Decreased caregiver support   Goals   Patient Goals to get better   STG Expiration Date 01/11/20   Plan   Treatment/Interventions Functional transfer training;LE strengthening/ROM; Therapeutic exercise; Endurance training;Cognitive reorientation;Patient/family training;Equipment eval/education; Bed mobility;Gait training;Spoke to nursing   Progress Progressing toward goals   PT Frequency Other (Comment)  (3-5x/wk)   Recommendation   PT Discharge Recommendation Post-Acute Rehabilitation Services   PT - OK to Discharge Yes  (to rehab once medically cleared)     Chata Medrano, PT, DPT

## 2021-01-08 NOTE — PLAN OF CARE
Problem: PHYSICAL THERAPY ADULT  Goal: Performs mobility at highest level of function for planned discharge setting  See evaluation for individualized goals  Description: Treatment/Interventions: Functional transfer training, LE strengthening/ROM, Elevations, Therapeutic exercise, Endurance training, Patient/family training, Equipment eval/education, Bed mobility, Gait training, Spoke to nursing          See flowsheet documentation for full assessment, interventions and recommendations  Outcome: Progressing  Note: Prognosis: Good  Problem List: Decreased strength, Decreased endurance, Decreased mobility, Impaired balance, Decreased cognition, Decreased safety awareness, Impaired hearing  Assessment: Pt seen for PT treatment session with focus on bed mobility, functional transfers, functional mobility, therex  Pt making steady progress toward goals this session  Pt demonstrates improvements in strength evident by ability to complete bed mobility and functional mobility with decreased assist this session  Pt continues to present with decreased endurance as SOB/BOYER is observed and SpO2 destats to high 80's with activity  Pt tolerates LE therex, however, does need prolong rest breaks due to fatigue and SOB  Pt left upright in bedside chair with chair alarm intact with all needs in reach  Pt will benefit from skilled therapy in order to address current impairments and functional limitations  PT to follow pt and recommending rehab once medically cleared  Barriers to Discharge: Inaccessible home environment, Decreased caregiver support     PT Discharge Recommendation: [address]     PT - OK to Discharge: Yes(to rehab once medically cleared)    See flowsheet documentation for full assessment

## 2021-01-08 NOTE — CASE MANAGEMENT
Cm informed by provider pt is stable for d/c today  BLS transport scheduled with [facility] for 1400  Daughter Marky Guevara made aware and facility

## 2021-01-08 NOTE — PLAN OF CARE
Problem: OCCUPATIONAL THERAPY ADULT  Goal: Performs self-care activities at highest level of function for planned discharge setting  See evaluation for individualized goals  Description: Treatment Interventions: ADL retraining, Functional transfer training, Endurance training, Cognitive reorientation, Equipment evaluation/education, Compensatory technique education, Energy conservation, Activityengagement, Patient/family training          See flowsheet documentation for full assessment, interventions and recommendations  Outcome: Progressing  Note: Limitation: Decreased ADL status, Decreased Safe judgement during ADL, Decreased cognition, Decreased endurance, Decreased self-care trans, Decreased high-level ADLs  Prognosis: Fair  Assessment: Patient participated in Skilled OT session this date with interventions consisting of ADL re training with the use of correct body mechnaics, Energy Conservation techniques, deep breathing technique, safety awareness and fall prevention techniques, therapeutic exercise to: increase functional use of BUEs, increase BUE muscle strength , increase dynamic sit/ stand balance during functional activity , increase postural control, increase trunk control and increase OOB/ sitting tolerance   Patient agreeable to OT treatment session, upon arrival patient was found supine in bed  In comparison to previous session, patient with improvements in transfers, BM, and UE strength   Patient requiring verbal cues for safety, verbal cues for correct technique, verbal cues for pacing thru activity steps and one step directives  Pt educated on deep breathing tech and reminders to continue using this tech and to keep breathing  Pt has tendency to hold breath when moving in bed  O2 levels decreased to ~87 with activity but quickly improved when using breathing tech  Pt participated in therex activities, limited strength and limited by fatigue   Completed sit <> stand trials, pt performance improved t/o  Patient continues to be functioning below baseline level, occupational performance remains limited secondary to factors listed above and increased risk for falls and injury  From OT standpoint, recommendation at time of d/c would be STR  Patient to benefit from continued Occupational Therapy treatment while in the hospital to address deficits as defined above and maximize level of functional independence with ADLs and functional mobility  Pt left in room in recliner with alarm on and all current needs met        OT Discharge Recommendation: Post-Acute Rehabilitation Services  OT - OK to Discharge: Yes(when medically cleared)

## 2021-01-11 NOTE — UTILIZATION REVIEW
Notification of Discharge  This is a Notification of Discharge from our facility [address]  Please be advised that this patient has been discharge from our facility  Below you will find the admission and discharge date and time including the patients disposition  PRESENTATION DATE: 12/28/2020  9:15 PM  OBS ADMISSION DATE:   IP ADMISSION DATE: 12/29/20 0020   DISCHARGE DATE: 1/8/2021  4:10 PM  DISPOSITION: Non [facility] SN[facility]/TCU/SNU Non [facility] SNF/TCU/SNU   Admission Orders listed below:  Admission Orders (From admission, onward)     Ordered        12/29/20 0020  Inpatient Admission  Once                   Please contact the UR Department if additional information is required to close this patient's authorization/case  [address] Utilization Review Department  Main: [phone] x carefully listen to the prompts  All voicemails are confidential   [email or web address]  Send all requests for admission clinical reviews, approved or denied determinations and any other requests to dedicated fax number below belonging to the campus where the patient is receiving treatment   List of dedicated fax numbers:  [address] DENIALS (Administrative/Medical Necessity) [phone]   [address] (Maternity/NICU/Pediatrics) [phone]   Myrna Schmitt [phone]   Charles Delvalle [phone]   [facility] [phone]   [facility] [address] [phone]   [facility]  [phone]   [address] [facility] [address]  [address] [facility] [address] [facility] [phone]

## 2021-01-12 ENCOUNTER — TELEPHONE (OUTPATIENT)
Dept: PALLIATIVE MEDICINE | Facility: CLINIC | Age: 86
End: 2021-01-12

## 2021-01-12 NOTE — TELEPHONE ENCOUNTER
I spoke to her daughter Bert Salas she stated her mom is in rehab and is doing really well from when in the hospital, she doesn't believe palliative is needed at this time  I gave her our information in case down the line we are needed for supportive care   [facility]

## 2021-02-04 ENCOUNTER — HOSPITAL ENCOUNTER (EMERGENCY)
Facility: HOSPITAL | Age: 86
Discharge: HOME | End: 2021-02-05
Attending: EMERGENCY MEDICINE
Payer: COMMERCIAL

## 2021-02-04 ENCOUNTER — APPOINTMENT (EMERGENCY)
Dept: CT IMAGING | Facility: HOSPITAL | Age: 86
End: 2021-02-04
Payer: COMMERCIAL

## 2021-02-04 ENCOUNTER — APPOINTMENT (EMERGENCY)
Dept: RADIOLOGY | Facility: HOSPITAL | Age: 86
End: 2021-02-04
Payer: COMMERCIAL

## 2021-02-04 DIAGNOSIS — S00.03XA SCALP HEMATOMA, INITIAL ENCOUNTER: ICD-10-CM

## 2021-02-04 DIAGNOSIS — M79.631 RIGHT FOREARM PAIN: ICD-10-CM

## 2021-02-04 DIAGNOSIS — S02.2XXA NASAL BONE FRACTURES: Primary | ICD-10-CM
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
## 2021-02-04 LAB
ANION GAP SERPL CALCULATED.3IONS-SCNC: 5 MMOL/L (ref 4–13)
BASOPHILS # BLD AUTO: 0.06 THOUSANDS/ΜL (ref 0–0.1)
BASOPHILS NFR BLD AUTO: 1 % (ref 0–1)
BUN SERPL-MCNC: 30 MG/DL (ref 5–25)
CALCIUM SERPL-MCNC: 9.6 MG/DL (ref 8.3–10.1)
CHLORIDE SERPL-SCNC: 102 MMOL/L (ref 100–108)
CO2 SERPL-SCNC: 33 MMOL/L (ref 21–32)
CREAT SERPL-MCNC: 1.29 MG/DL (ref 0.6–1.3)
EOSINOPHIL # BLD AUTO: 0.22 THOUSAND/ΜL (ref 0–0.61)
EOSINOPHIL NFR BLD AUTO: 3 % (ref 0–6)
ERYTHROCYTE [DISTWIDTH] IN BLOOD BY AUTOMATED COUNT: 17.3 % (ref 11.6–15.1)
GFR SERPL CREATININE-BSD FRML MDRD: 37 ML/MIN/1.73SQ M
GLUCOSE SERPL-MCNC: 160 MG/DL (ref 65–140)
HCT VFR BLD AUTO: 30.8 % (ref 34.8–46.1)
HGB BLD-MCNC: 9.6 G/DL (ref 11.5–15.4)
IMM GRANULOCYTES # BLD AUTO: 0.04 THOUSAND/UL (ref 0–0.2)
IMM GRANULOCYTES NFR BLD AUTO: 1 % (ref 0–2)
LYMPHOCYTES # BLD AUTO: 0.88 THOUSANDS/ΜL (ref 0.6–4.47)
LYMPHOCYTES NFR BLD AUTO: 11 % (ref 14–44)
MCH RBC QN AUTO: 30.2 PG (ref 26.8–34.3)
MCHC RBC AUTO-ENTMCNC: 31.2 G/DL (ref 31.4–37.4)
MCV RBC AUTO: 97 FL (ref 82–98)
MONOCYTES # BLD AUTO: 0.79 THOUSAND/ΜL (ref 0.17–1.22)
MONOCYTES NFR BLD AUTO: 9 % (ref 4–12)
NEUTROPHILS # BLD AUTO: 6.38 THOUSANDS/ΜL (ref 1.85–7.62)
NEUTS SEG NFR BLD AUTO: 75 % (ref 43–75)
NRBC BLD AUTO-RTO: 0 /100 WBCS
PLATELET # BLD AUTO: 229 THOUSANDS/UL (ref 149–390)
PMV BLD AUTO: 10.2 FL (ref 8.9–12.7)
POTASSIUM SERPL-SCNC: 4.7 MMOL/L (ref 3.5–5.3)
RBC # BLD AUTO: 3.18 MILLION/UL (ref 3.81–5.12)
SODIUM SERPL-SCNC: 140 MMOL/L (ref 136–145)
WBC # BLD AUTO: 8.37 THOUSAND/UL (ref 4.31–10.16)

## 2021-02-04 PROCEDURE — 73090 X-RAY EXAM OF FOREARM: CPT

## 2021-02-04 PROCEDURE — 70486 CT MAXILLOFACIAL W/O DYE: CPT

## 2021-02-04 PROCEDURE — 93005 ELECTROCARDIOGRAM TRACING: CPT

## 2021-02-04 PROCEDURE — 85025 COMPLETE CBC W/AUTO DIFF WBC: CPT | Performed by: EMERGENCY MEDICINE

## 2021-02-04 PROCEDURE — 80048 BASIC METABOLIC PNL TOTAL CA: CPT | Performed by: EMERGENCY MEDICINE

## 2021-02-04 PROCEDURE — G1004 CDSM NDSC: HCPCS

## 2021-02-04 PROCEDURE — 36415 COLL VENOUS BLD VENIPUNCTURE: CPT | Performed by: EMERGENCY MEDICINE

## 2021-02-04 PROCEDURE — 99284 EMERGENCY DEPT VISIT MOD MDM: CPT

## 2021-02-04 PROCEDURE — 73030 X-RAY EXAM OF SHOULDER: CPT

## 2021-02-04 PROCEDURE — 99285 EMERGENCY DEPT VISIT HI MDM: CPT | Performed by: EMERGENCY MEDICINE

## 2021-02-04 PROCEDURE — 72125 CT NECK SPINE W/O DYE: CPT

## 2021-02-04 PROCEDURE — 70450 CT HEAD/BRAIN W/O DYE: CPT

## 2021-02-05 VITALS
WEIGHT: 187.39 LBS | BODY MASS INDEX: 32.17 KG/M2 | SYSTOLIC BLOOD PRESSURE: 160 MMHG | RESPIRATION RATE: 16 BRPM | TEMPERATURE: 97.4 F | DIASTOLIC BLOOD PRESSURE: 87 MMHG | OXYGEN SATURATION: 98 % | HEART RATE: 71 BPM

## 2021-02-05 LAB
ATRIAL RATE: 394 BPM
QRS AXIS: 76 DEGREES
QRSD INTERVAL: 138 MS
QT INTERVAL: 434 MS
QTC INTERVAL: 484 MS
T WAVE AXIS: -6 DEGREES
VENTRICULAR RATE: 75 BPM

## 2021-02-05 PROCEDURE — 93010 ELECTROCARDIOGRAM REPORT: CPT | Performed by: INTERNAL MEDICINE

## 2021-02-05 NOTE — ED PROVIDER NOTES
History  Chief Complaint   Patient presents with    Fall     Pt reports living at home and reports no memory of how she fell  Pt noted to have bloody nose  Pt complaining of neck pain and right shoulder pain  Pt placed in C-collar at this time  Pt reports no blood thinners and no LOC     80 y o  F wh/o CVA, afib, HTN, HLD p/w fall  Pt states she was getting blankets and fell  Denies any symptoms beforehand  Denies LOC  States she struck her nose on the floor  Has right-sided nose bleed that has resolved and right forearm pain  Also having neck "stiffness" which pt relates to laying on the floor  She denies HA, CP, abd pain, focal deficits  History provided by:  Patient   used: No    Fall  Mechanism of injury: fall    Injury location:  Face  Facial injury location:  Nose  Incident location:  Home  Arrived directly from scene: yes    Suspicion of alcohol use: no    Suspicion of drug use: no    Prior to arrival data:     Loss of consciousness: no      Amnesic to event: no      Immobilization:  C-collar  Associated symptoms: no abdominal pain, no back pain, no chest pain, no headaches, no nausea, no neck pain and no vomiting    Risk factors: anticoagulation therapy (ASA)        Prior to Admission Medications   Prescriptions Last Dose Informant Patient Reported? Taking? Multiple Vitamins-Minerals (CENTRUM SILVER PO)   Yes No   Sig: Take 1 tablet by mouth daily  acetaminophen (TYLENOL) 325 mg tablet   No No   Sig: Take 2 tablets (650 mg total) by mouth every 6 (six) hours as needed for mild pain, headaches or fever   albuterol (PROVENTIL HFA,VENTOLIN HFA) 90 mcg/act inhaler   Yes No   Sig: Inhale 2 puffs every 4 (four) hours as needed for wheezing   aspirin 81 MG tablet   Yes Yes   Sig: Take 81 mg by mouth daily     atorvastatin (LIPITOR) 40 mg tablet   Yes No   Sig: Take 40 mg by mouth daily   bisacodyl (DULCOLAX) 10 mg suppository   No No   Sig: Insert 1 suppository (10 mg total) into the rectum daily   calcium carbonate-vitamin D (OSCAL-D) 500 mg-200 units per tablet   Yes No   Sig: Take 1 tablet by mouth daily with breakfast   carvedilol (COREG) 6 25 mg tablet   No No   Sig: Take 1 tablet (6 25 mg total) by mouth 2 (two) times a day with meals   citalopram (CeleXA) 10 mg tablet   Yes No   Sig: Take 10 mg by mouth daily  docusate sodium (COLACE) 100 mg capsule   Yes No   Sig: Take 100 mg by mouth daily   folic acid (FOLVITE) 1 mg tablet   Yes No   Sig: Take 1 mg by mouth daily  melatonin 3 mg   No No   Sig: Take 1 tablet (3 mg total) by mouth daily at bedtime   omeprazole (PriLOSEC) 20 mg delayed release capsule   Yes No   Sig: Take 20 mg by mouth Sunday, Tues, Thurs   psyllium (METAMUCIL) 0 52 G capsule   Yes No   Sig: Take 0 52 g by mouth daily  thiamine (VITAMIN B1) 100 mg tablet   Yes No   Sig: Take 100 mg by mouth daily  torsemide (DEMADEX) 10 mg tablet   No No   Sig: Take 3 tablets (30 mg total) by mouth daily      Facility-Administered Medications: None       Past Medical History:   Diagnosis Date    Arthritis     Cancer ([facility] )     breast    Cardiac disease     afib    CHF (congestive heart failure) (HCC)     Hyperlipidemia     Hypertension     Stroke ([facility] )     TIA    TIA (transient ischemic attack)        Past Surgical History:   Procedure Laterality Date    BREAST SURGERY      right lumpectomy    TOTAL HIP ARTHROPLASTY Bilateral        Family History   Problem Relation Age of Onset    Cancer Sister      I have reviewed and agree with the history as documented  E-Cigarette/Vaping    E-Cigarette Use Never User      E-Cigarette/Vaping Substances    Nicotine No     THC No     Flavoring No     Other No     Unknown No      Social History     Tobacco Use    Smoking status: Never Smoker    Smokeless tobacco: Never Used   Substance Use Topics    Alcohol use: Not Currently    Drug use: No       Review of Systems   HENT: Positive for nosebleeds   Negative for ear discharge and facial swelling  Eyes: Negative for photophobia and pain  Respiratory: Negative for chest tightness, shortness of breath and stridor  Cardiovascular: Negative for chest pain  Gastrointestinal: Negative for abdominal pain, nausea and vomiting  Musculoskeletal: Negative for back pain, joint swelling and neck pain  Right forearm pain, neck stiffness   Neurological: Negative for dizziness, facial asymmetry, speech difficulty, weakness, light-headedness, numbness and headaches  All other systems reviewed and are negative  Physical Exam  Physical Exam  Vitals signs and nursing note reviewed  Constitutional:       General: She is not in acute distress  Appearance: Normal appearance  She is well-developed  She is not ill-appearing, toxic-appearing or diaphoretic  Interventions: Cervical collar in place  HENT:      Head: Normocephalic and atraumatic  No raccoon eyes, Mackay's sign, abrasion, contusion or laceration  Right Ear: Tympanic membrane and external ear normal  No laceration or drainage  No hemotympanum  Left Ear: Tympanic membrane and external ear normal  No laceration or drainage  No hemotympanum  Nose: Nasal deformity (swelling) present  Right Nostril: Epistaxis present  No septal hematoma  Left Nostril: No epistaxis or septal hematoma  Mouth/Throat:      Pharynx: No oropharyngeal exudate  Eyes:      General:         Right eye: No discharge  Left eye: No discharge  Conjunctiva/sclera:      Right eye: No hemorrhage  Left eye: No hemorrhage  Pupils: Pupils are equal, round, and reactive to light  Neck:      Musculoskeletal: Normal range of motion  No spinous process tenderness or muscular tenderness  Cardiovascular:      Rate and Rhythm: Normal rate and regular rhythm  Heart sounds: Normal heart sounds  No murmur  No friction rub  No gallop      Pulmonary:      Effort: Pulmonary effort is normal  No respiratory distress or retractions  Breath sounds: Normal breath sounds  No stridor  No decreased breath sounds, wheezing, rhonchi or rales  Chest:      Chest wall: No lacerations, deformity, swelling, tenderness, crepitus or edema  Abdominal:      General: Bowel sounds are normal  There is no distension  Palpations: Abdomen is soft  Abdomen is not rigid  There is no mass  Tenderness: There is no abdominal tenderness  There is no guarding or rebound  Musculoskeletal: Normal range of motion  General: No tenderness or deformity  Cervical back: She exhibits no bony tenderness  Thoracic back: She exhibits no bony tenderness  Lumbar back: She exhibits no bony tenderness  Skin:     General: Skin is warm and dry  Coloration: Skin is not pale  Findings: Bruising present  No abrasion or laceration  Neurological:      Mental Status: She is alert and oriented to person, place, and time  Cranial Nerves: No cranial nerve deficit  Sensory: No sensory deficit           Vital Signs  ED Triage Vitals [02/04/21 2128]   Temperature Pulse Respirations Blood Pressure SpO2   (!) 97 4 °F (36 3 °C) 83 16 168/67 99 %      Temp Source Heart Rate Source Patient Position - Orthostatic VS BP Location FiO2 (%)   Oral Monitor Lying Left arm --      Pain Score       3           Vitals:    02/04/21 2128   BP: 168/67   Pulse: 83   Patient Position - Orthostatic VS: Lying         Visual Acuity      ED Medications  Medications - No data to display    Diagnostic Studies  Results Reviewed     Procedure Component Value Units Date/Time    Basic metabolic panel [[id number]]  (Abnormal) Collected: 02/04/21 2211    Lab Status: Final result Specimen: Blood from Arm, Right Updated: 02/04/21 2237     Sodium 140 mmol/L      Potassium 4 7 mmol/L      Chloride 102 mmol/L      CO2 33 mmol/L      ANION GAP 5 mmol/L      BUN 30 mg/dL      Creatinine 1 29 mg/dL      Glucose 160 mg/dL Calcium 9 6 mg/dL      eGFR 37 ml/min/1 73sq m     Narrative:      National Kid[employer] Disease Foundation guidelines for Chronic Kidney Disease (CKD):     Stage 1 with normal or high GFR (GFR > 90 mL/min/1 73 square meters)    Stage 2 Mild CKD (GFR = 60-89 mL/min/1 73 square meters)    Stage 3A Moderate CKD (GFR = 45-59 mL/min/1 73 square meters)    Stage 3B Moderate CKD (GFR = 30-44 mL/min/1 73 square meters)    Stage 4 Severe CKD (GFR = 15-29 mL/min/1 73 square meters)    Stage 5 End Stage CKD (GFR <15 mL/min/1 73 square meters)  Note: GFR calculation is accurate only with a steady state creatinine    CBC and differential [[id number]]  (Abnormal) Collected: 02/04/21 2211    Lab Status: Final result Specimen: Blood from Arm, Right Updated: 02/04/21 2217     WBC 8 37 Thousand/uL      RBC 3 18 Million/uL      Hemoglobin 9 6 g/dL      Hematocrit 30 8 %      MCV 97 fL      MCH 30 2 pg      MCHC 31 2 g/dL      RDW 17 3 %      MPV 10 2 fL      Platelets 130 Thousands/uL      nRBC 0 /100 WBCs      Neutrophils Relative 75 %      Immat GRANS % 1 %      Lymphocytes Relative 11 %      Monocytes Relative 9 %      Eosinophils Relative 3 %      Basophils Relative 1 %      Neutrophils Absolute 6 38 Thousands/µL      Immature Grans Absolute 0 04 Thousand/uL      Lymphocytes Absolute 0 88 Thousands/µL      Monocytes Absolute 0 79 Thousand/µL      Eosinophils Absolute 0 22 Thousand/µL      Basophils Absolute 0 06 Thousands/µL                  CT head without contrast   Final Result by Ethel Dennison DO (02/04 2330)      No acute intracranial abnormality  Workstation performed: [id number]         CT spine cervical without contrast   Final Result by Ethel Dennison DO (02/04 2342)      No cervical spine fracture or traumatic malalignment                     Workstation performed: [id number]         CT facial bones without contrast   Final Result by Ethel Dennison DO (02/04 2335)      Bilateral comminuted displaced nasal bone fractures with overlying soft tissue swelling  The study was marked in [facility] for immediate notification  Workstation performed: [id number]         XR shoulder 2+ views RIGHT   ED Interpretation by Johnny Jay DO (02/04 2208)   No fracture      XR forearm 2 views RIGHT   ED Interpretation by Johnny Jay DO (02/04 2208)   No fracture                 Procedures  ECG 12 Lead Documentation Only    Date/Time: 2/4/2021 10:19 PM  Performed by: Johnny Jay DO  Authorized by: Johnny Jay DO     Indications / Diagnosis:  Fall  ECG reviewed by me, the ED Provider: yes    Patient location:  Bedside  Previous ECG:     Previous ECG:  Compared to current    Comparison ECG info:  1/6/2021  Rate:     ECG rate:  75    ECG rate assessment: normal    Rhythm:     Rhythm: atrial fibrillation    Ectopy:     Ectopy: none    QRS:     QRS axis:  Normal    QRS intervals:  Normal  ST segments:     ST segments:  Normal             ED Course  ED Course as of Feb 04 2352   Thu Feb 04, 2021   2219 baseline   Hemoglobin(!): 9 6   2343 Updated pt and daughter and labs/CT results and instructed her to f/u with ENT for her nasal bone fractures                                                MDM  Number of Diagnoses or Management Options     Amount and/or Complexity of Data Reviewed  Clinical lab tests: ordered and reviewed  Tests in the radiology section of CPT®: ordered and reviewed  Tests in the medicine section of CPT®: ordered and reviewed        Disposition  Final diagnoses:   Scalp hematoma, initial encounter   Right forearm pain   Fall   Nasal bone fractures     Time reflects when diagnosis was documented in both MDM as applicable and the Disposition within this note     Time User Action Codes Description Comment    2/4/2021 11:05 PM Jerica Pierson [S00 03XA] Scalp hematoma, initial encounter     2/4/2021 11:29 PM Jerica Pierson [M79 631] Right forearm pain     2/4/2021 11:30 PM Caden Pierson [W19 XXXA] Fall     2/4/2021 11:37 PM Jerica Pierson Add [S02  2XXA] Nasal bone fractures     2/4/2021 11:40 PM Jerica Pierson Modify [S00 03XA] Scalp hematoma, initial encounter     2/4/2021 11:40 PM GARRETT Pierson [S02  2XXA] Nasal bone fractures       ED Disposition     ED Disposition Condition Date/Time Comment    Discharge Stable Thu Feb 4, 2021 11:43 PM Bianca Rain discharge to home/self care  Follow-up Information     Follow up With Specialties Details Why [address] [facility] Schedule an appointment as soon as possible for a visit  For your broken nose  [address] [place] [address] Road  [phone]            Patient's Medications   Discharge Prescriptions    No medications on file     No discharge procedures on file      PDMP Review       Value Time User    PDMP Reviewed  Yes 12/29/2020  1:56 AM Arnoldo Leung DO          ED Provider  Electronically Signed by           Daniele Garibay DO  02/04/21 8104

## 2021-03-11 ENCOUNTER — APPOINTMENT (EMERGENCY)
Dept: RADIOLOGY | Facility: HOSPITAL | Age: 86
DRG: 493 | End: 2021-03-11
Payer: COMMERCIAL

## 2021-03-11 ENCOUNTER — APPOINTMENT (EMERGENCY)
Dept: CT IMAGING | Facility: HOSPITAL | Age: 86
DRG: 493 | End: 2021-03-11
Payer: COMMERCIAL

## 2021-03-11 ENCOUNTER — APPOINTMENT (INPATIENT)
Dept: RADIOLOGY | Facility: HOSPITAL | Age: 86
DRG: 493 | End: 2021-03-11
Payer: COMMERCIAL

## 2021-03-11 ENCOUNTER — ANESTHESIA EVENT (INPATIENT)
Dept: PERIOP | Facility: HOSPITAL | Age: 86
DRG: 493 | End: 2021-03-11
Payer: COMMERCIAL

## 2021-03-11 ENCOUNTER — HOSPITAL ENCOUNTER (INPATIENT)
Facility: HOSPITAL | Age: 86
LOS: 5 days | Discharge: NURSING, REHAB OR LONG-TERM CARE | DRG: 493 | End: 2021-03-16
Attending: EMERGENCY MEDICINE | Admitting: STUDENT IN AN ORGANIZED HEALTH CARE EDUCATION/TRAINING PROGRAM
Payer: COMMERCIAL

## 2021-03-11 ENCOUNTER — ANESTHESIA (INPATIENT)
Dept: PERIOP | Facility: HOSPITAL | Age: 86
DRG: 493 | End: 2021-03-11
Payer: COMMERCIAL

## 2021-03-11 DIAGNOSIS — D64.9 ANEMIA: ICD-10-CM

## 2021-03-11 DIAGNOSIS — I48.21 PERMANENT ATRIAL FIBRILLATION (HCC): ICD-10-CM

## 2021-03-11 DIAGNOSIS — I50.43 ACUTE ON CHRONIC COMBINED SYSTOLIC AND DIASTOLIC CONGESTIVE HEART FAILURE (HCC): ICD-10-CM
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
## 2021-03-11 DIAGNOSIS — D62 ACUTE BLOOD LOSS ANEMIA: ICD-10-CM

## 2021-03-11 DIAGNOSIS — S82.891A CLOSED FRACTURE OF RIGHT ANKLE, INITIAL ENCOUNTER: ICD-10-CM

## 2021-03-11 DIAGNOSIS — S82.841A CLOSED BIMALLEOLAR FRACTURE OF RIGHT ANKLE, INITIAL ENCOUNTER: Primary | ICD-10-CM

## 2021-03-11 PROBLEM — I49.5 SICK SINUS SYNDROME (HCC): Status: ACTIVE | Noted: 2021-03-11

## 2021-03-11 PROBLEM — N18.32 STAGE 3B CHRONIC KIDNEY DISEASE (HCC): Status: ACTIVE | Noted: 2021-03-11

## 2021-03-11 PROBLEM — R47.81 SLURRED SPEECH: Status: ACTIVE | Noted: 2021-03-11

## 2021-03-11 PROBLEM — I50.22 CHRONIC SYSTOLIC HEART FAILURE (HCC): Status: ACTIVE | Noted: 2021-03-11

## 2021-03-11 LAB
ANION GAP SERPL CALCULATED.3IONS-SCNC: 9 MMOL/L (ref 4–13)
APTT PPP: 41 SECONDS (ref 23–37)
ATRIAL RATE: 267 BPM
BASOPHILS # BLD AUTO: 0.02 THOUSANDS/ΜL (ref 0–0.1)
BASOPHILS NFR BLD AUTO: 0 % (ref 0–1)
BUN SERPL-MCNC: 41 MG/DL (ref 5–25)
CALCIUM SERPL-MCNC: 10.6 MG/DL (ref 8.3–10.1)
CHLORIDE SERPL-SCNC: 100 MMOL/L (ref 100–108)
CO2 SERPL-SCNC: 29 MMOL/L (ref 21–32)
CREAT SERPL-MCNC: 1.4 MG/DL (ref 0.6–1.3)
EOSINOPHIL # BLD AUTO: 0.11 THOUSAND/ΜL (ref 0–0.61)
EOSINOPHIL NFR BLD AUTO: 2 % (ref 0–6)
ERYTHROCYTE [DISTWIDTH] IN BLOOD BY AUTOMATED COUNT: 18.8 % (ref 11.6–15.1)
GFR SERPL CREATININE-BSD FRML MDRD: 34 ML/MIN/1.73SQ M
GLUCOSE SERPL-MCNC: 98 MG/DL (ref 65–140)
HCT VFR BLD AUTO: 30.8 % (ref 34.8–46.1)
HGB BLD-MCNC: 9.8 G/DL (ref 11.5–15.4)
IMM GRANULOCYTES # BLD AUTO: 0.02 THOUSAND/UL (ref 0–0.2)
IMM GRANULOCYTES NFR BLD AUTO: 0 % (ref 0–2)
INR PPP: 1.17 (ref 0.84–1.19)
LYMPHOCYTES # BLD AUTO: 0.79 THOUSANDS/ΜL (ref 0.6–4.47)
LYMPHOCYTES NFR BLD AUTO: 15 % (ref 14–44)
MAGNESIUM SERPL-MCNC: 2.2 MG/DL (ref 1.6–2.6)
MCH RBC QN AUTO: 31.4 PG (ref 26.8–34.3)
MCHC RBC AUTO-ENTMCNC: 31.8 G/DL (ref 31.4–37.4)
MCV RBC AUTO: 99 FL (ref 82–98)
MONOCYTES # BLD AUTO: 0.52 THOUSAND/ΜL (ref 0.17–1.22)
MONOCYTES NFR BLD AUTO: 10 % (ref 4–12)
NEUTROPHILS # BLD AUTO: 3.75 THOUSANDS/ΜL (ref 1.85–7.62)
NEUTS SEG NFR BLD AUTO: 73 % (ref 43–75)
NRBC BLD AUTO-RTO: 0 /100 WBCS
PLATELET # BLD AUTO: 112 THOUSANDS/UL (ref 149–390)
PMV BLD AUTO: 11.3 FL (ref 8.9–12.7)
POTASSIUM SERPL-SCNC: 4.9 MMOL/L (ref 3.5–5.3)
PROTHROMBIN TIME: 14.7 SECONDS (ref 11.6–14.5)
QRS AXIS: 46 DEGREES
QRSD INTERVAL: 152 MS
QT INTERVAL: 366 MS
QTC INTERVAL: 417 MS
RBC # BLD AUTO: 3.12 MILLION/UL (ref 3.81–5.12)
SODIUM SERPL-SCNC: 138 MMOL/L (ref 136–145)
T WAVE AXIS: -40 DEGREES
TROPONIN I SERPL-MCNC: <0.02 NG/ML
TSH SERPL DL<=0.05 MIU/L-ACNC: 3.15 UIU/ML (ref 0.36–3.74)
VENTRICULAR RATE: 78 BPM
WBC # BLD AUTO: 5.21 THOUSAND/UL (ref 4.31–10.16)

## 2021-03-11 PROCEDURE — C1713 ANCHOR/SCREW BN/BN,TIS/BN: HCPCS | Performed by: PODIATRIST

## 2021-03-11 PROCEDURE — 99223 1ST HOSP IP/OBS HIGH 75: CPT | Performed by: INTERNAL MEDICINE

## 2021-03-11 PROCEDURE — 84443 ASSAY THYROID STIM HORMONE: CPT | Performed by: PHYSICIAN ASSISTANT

## 2021-03-11 PROCEDURE — 83735 ASSAY OF MAGNESIUM: CPT | Performed by: PHYSICIAN ASSISTANT

## 2021-03-11 PROCEDURE — 93005 ELECTROCARDIOGRAM TRACING: CPT

## 2021-03-11 PROCEDURE — 73600 X-RAY EXAM OF ANKLE: CPT

## 2021-03-11 PROCEDURE — 0QSJ04Z REPOSITION RIGHT FIBULA WITH INTERNAL FIXATION DEVICE, OPEN APPROACH: ICD-10-PCS | Performed by: PODIATRIST

## 2021-03-11 PROCEDURE — 93010 ELECTROCARDIOGRAM REPORT: CPT | Performed by: INTERNAL MEDICINE

## 2021-03-11 PROCEDURE — 70450 CT HEAD/BRAIN W/O DYE: CPT

## 2021-03-11 PROCEDURE — 73610 X-RAY EXAM OF ANKLE: CPT

## 2021-03-11 PROCEDURE — G1004 CDSM NDSC: HCPCS

## 2021-03-11 PROCEDURE — 99285 EMERGENCY DEPT VISIT HI MDM: CPT

## 2021-03-11 PROCEDURE — 85730 THROMBOPLASTIN TIME PARTIAL: CPT | Performed by: PHYSICIAN ASSISTANT

## 2021-03-11 PROCEDURE — 99285 EMERGENCY DEPT VISIT HI MDM: CPT | Performed by: PHYSICIAN ASSISTANT

## 2021-03-11 PROCEDURE — 72125 CT NECK SPINE W/O DYE: CPT

## 2021-03-11 PROCEDURE — 96375 TX/PRO/DX INJ NEW DRUG ADDON: CPT

## 2021-03-11 PROCEDURE — 80048 BASIC METABOLIC PNL TOTAL CA: CPT | Performed by: PHYSICIAN ASSISTANT

## 2021-03-11 PROCEDURE — 99223 1ST HOSP IP/OBS HIGH 75: CPT | Performed by: STUDENT IN AN ORGANIZED HEALTH CARE EDUCATION/TRAINING PROGRAM

## 2021-03-11 PROCEDURE — 85610 PROTHROMBIN TIME: CPT | Performed by: PHYSICIAN ASSISTANT

## 2021-03-11 PROCEDURE — 36415 COLL VENOUS BLD VENIPUNCTURE: CPT | Performed by: PHYSICIAN ASSISTANT

## 2021-03-11 PROCEDURE — 85025 COMPLETE CBC W/AUTO DIFF WBC: CPT | Performed by: PHYSICIAN ASSISTANT

## 2021-03-11 PROCEDURE — 0QSG3ZZ REPOSITION RIGHT TIBIA, PERCUTANEOUS APPROACH: ICD-10-PCS | Performed by: PODIATRIST

## 2021-03-11 PROCEDURE — 84484 ASSAY OF TROPONIN QUANT: CPT | Performed by: PHYSICIAN ASSISTANT

## 2021-03-11 PROCEDURE — 71045 X-RAY EXAM CHEST 1 VIEW: CPT

## 2021-03-11 PROCEDURE — 96374 THER/PROPH/DIAG INJ IV PUSH: CPT

## 2021-03-11 DEVICE — MONOAX LOCKING SCREW, 2.5 X 12MM, SS
Type: IMPLANTABLE DEVICE | Site: ANKLE | Status: FUNCTIONAL
Brand: ANTHEM

## 2021-03-11 DEVICE — CAPTIVATE 4.0 X 42MM CANNULATED SCREW, LONG THREAD, SS
Type: IMPLANTABLE DEVICE | Site: ANKLE | Status: FUNCTIONAL
Brand: CAPTIVATE

## 2021-03-11 DEVICE — MONOAX LOCKING SCREW, 2.5 X 14MM, SS
Type: IMPLANTABLE DEVICE | Site: ANKLE | Status: FUNCTIONAL
Brand: ANTHEM

## 2021-03-11 DEVICE — NON-LOCKING SCREW, 3.5 X 14MM, SS
Type: IMPLANTABLE DEVICE | Site: ANKLE | Status: FUNCTIONAL
Brand: ANTHEM

## 2021-03-11 DEVICE — ANTHEM LATERAL DISTAL FIBULA PLATE, RIGHT, 5 HOLE, 101MM, SS
Type: IMPLANTABLE DEVICE | Site: ANKLE | Status: FUNCTIONAL
Brand: ANTHEM

## 2021-03-11 DEVICE — NON-LOCKING SCREW, 3.5 X 18MM, SS
Type: IMPLANTABLE DEVICE | Site: ANKLE | Status: FUNCTIONAL
Brand: ANTHEM

## 2021-03-11 DEVICE — MONOAX LOCKING SCREW, 3.5 X 12MM, SS
Type: IMPLANTABLE DEVICE | Site: ANKLE | Status: FUNCTIONAL
Brand: ANTHEM

## 2021-03-11 DEVICE — MONOAX LOCKING SCREW, 2.5 X 10MM, SS
Type: IMPLANTABLE DEVICE | Status: FUNCTIONAL
Brand: ANTHEM

## 2021-03-11 DEVICE — CAPTIVATE WASHER FOR 4.0MM CANNULATED SCREW, SS
Type: IMPLANTABLE DEVICE | Site: ANKLE | Status: FUNCTIONAL
Brand: CAPTIVATE

## 2021-03-11 DEVICE — 1.6MM PLATE HOLDING K-WIRE, THREADED TROCAR TIP, 75MM: Type: IMPLANTABLE DEVICE | Status: FUNCTIONAL

## 2021-03-11 DEVICE — MONOAX LOCKING SCREW, 3.5 X 14MM, SS
Type: IMPLANTABLE DEVICE | Status: FUNCTIONAL
Brand: ANTHEM

## 2021-03-11 DEVICE — CAPTIVATE 4.0 X 38MM CANNULATED SCREW, LONG THREAD, SS
Type: IMPLANTABLE DEVICE | Site: ANKLE | Status: FUNCTIONAL
Brand: CAPTIVATE

## 2021-03-11 DEVICE — 1.6MM K-WIRE, TROCAR TIP, 150MM: Type: IMPLANTABLE DEVICE | Status: FUNCTIONAL

## 2021-03-11 DEVICE — 1.4MM K-WIRE, TROCAR TIP, 150MM: Type: IMPLANTABLE DEVICE | Status: FUNCTIONAL

## 2021-03-11 RX ORDER — ACETAMINOPHEN 325 MG/1
975 TABLET ORAL EVERY 6 HOURS SCHEDULED
Status: DISCONTINUED | OUTPATIENT
Start: 2021-03-12 | End: 2021-03-16 | Stop reason: HOSPADM

## 2021-03-11 RX ORDER — PROPOFOL 10 MG/ML
INJECTION, EMULSION INTRAVENOUS CONTINUOUS PRN
Status: DISCONTINUED | OUTPATIENT
Start: 2021-03-11 | End: 2021-03-11

## 2021-03-11 RX ORDER — FUROSEMIDE 40 MG/1
40 TABLET ORAL DAILY
Status: DISCONTINUED | OUTPATIENT
Start: 2021-03-12 | End: 2021-03-12

## 2021-03-11 RX ORDER — METHOCARBAMOL 500 MG/1
500 TABLET, FILM COATED ORAL EVERY 6 HOURS PRN
Status: DISCONTINUED | OUTPATIENT
Start: 2021-03-11 | End: 2021-03-11

## 2021-03-11 RX ORDER — CITALOPRAM 20 MG/1
10 TABLET ORAL DAILY
Status: DISCONTINUED | OUTPATIENT
Start: 2021-03-11 | End: 2021-03-16 | Stop reason: HOSPADM

## 2021-03-11 RX ORDER — B-COMPLEX WITH VITAMIN C
1 TABLET ORAL
Status: DISCONTINUED | OUTPATIENT
Start: 2021-03-12 | End: 2021-03-16 | Stop reason: HOSPADM

## 2021-03-11 RX ORDER — PANTOPRAZOLE SODIUM 20 MG/1
20 TABLET, DELAYED RELEASE ORAL
Status: DISCONTINUED | OUTPATIENT
Start: 2021-03-12 | End: 2021-03-16 | Stop reason: HOSPADM

## 2021-03-11 RX ORDER — HEPARIN SODIUM 5000 [USP'U]/ML
5000 INJECTION, SOLUTION INTRAVENOUS; SUBCUTANEOUS EVERY 8 HOURS SCHEDULED
Status: DISCONTINUED | OUTPATIENT
Start: 2021-03-11 | End: 2021-03-11 | Stop reason: SINTOL

## 2021-03-11 RX ORDER — ROPIVACAINE HYDROCHLORIDE 5 MG/ML
INJECTION, SOLUTION EPIDURAL; INFILTRATION; PERINEURAL
Status: COMPLETED | OUTPATIENT
Start: 2021-03-11 | End: 2021-03-11

## 2021-03-11 RX ORDER — DIAZEPAM 5 MG/ML
2.5 INJECTION, SOLUTION INTRAMUSCULAR; INTRAVENOUS EVERY 6 HOURS PRN
Status: DISCONTINUED | OUTPATIENT
Start: 2021-03-11 | End: 2021-03-13

## 2021-03-11 RX ORDER — LANOLIN ALCOHOL/MO/W.PET/CERES
100 CREAM (GRAM) TOPICAL DAILY
Status: DISCONTINUED | OUTPATIENT
Start: 2021-03-12 | End: 2021-03-16 | Stop reason: HOSPADM

## 2021-03-11 RX ORDER — CEFAZOLIN SODIUM 2 G/50ML
2000 SOLUTION INTRAVENOUS ONCE
Status: COMPLETED | OUTPATIENT
Start: 2021-03-11 | End: 2021-03-11

## 2021-03-11 RX ORDER — OXYCODONE HYDROCHLORIDE 5 MG/1
2.5 TABLET ORAL EVERY 4 HOURS PRN
Status: DISCONTINUED | OUTPATIENT
Start: 2021-03-11 | End: 2021-03-12

## 2021-03-11 RX ORDER — SODIUM CHLORIDE 9 MG/ML
75 INJECTION, SOLUTION INTRAVENOUS CONTINUOUS
Status: CANCELLED | OUTPATIENT
Start: 2021-03-11

## 2021-03-11 RX ORDER — ALBUTEROL SULFATE 90 UG/1
2 AEROSOL, METERED RESPIRATORY (INHALATION) EVERY 4 HOURS PRN
Status: DISCONTINUED | OUTPATIENT
Start: 2021-03-11 | End: 2021-03-16 | Stop reason: HOSPADM

## 2021-03-11 RX ORDER — CARVEDILOL 3.12 MG/1
3.12 TABLET ORAL 2 TIMES DAILY WITH MEALS
Status: DISCONTINUED | OUTPATIENT
Start: 2021-03-11 | End: 2021-03-13

## 2021-03-11 RX ORDER — ONDANSETRON 2 MG/ML
4 INJECTION INTRAMUSCULAR; INTRAVENOUS EVERY 6 HOURS PRN
Status: DISCONTINUED | OUTPATIENT
Start: 2021-03-11 | End: 2021-03-16 | Stop reason: HOSPADM

## 2021-03-11 RX ORDER — FENTANYL CITRATE/PF 50 MCG/ML
25 SYRINGE (ML) INJECTION
Status: DISCONTINUED | OUTPATIENT
Start: 2021-03-11 | End: 2021-03-11 | Stop reason: HOSPADM

## 2021-03-11 RX ORDER — FENTANYL CITRATE 50 UG/ML
INJECTION, SOLUTION INTRAMUSCULAR; INTRAVENOUS AS NEEDED
Status: DISCONTINUED | OUTPATIENT
Start: 2021-03-11 | End: 2021-03-11

## 2021-03-11 RX ORDER — OXYCODONE HYDROCHLORIDE 5 MG/1
5 TABLET ORAL EVERY 6 HOURS PRN
Status: DISCONTINUED | OUTPATIENT
Start: 2021-03-11 | End: 2021-03-12

## 2021-03-11 RX ORDER — ACETAMINOPHEN 325 MG/1
650 TABLET ORAL EVERY 6 HOURS PRN
Status: DISCONTINUED | OUTPATIENT
Start: 2021-03-11 | End: 2021-03-16 | Stop reason: HOSPADM

## 2021-03-11 RX ORDER — LIDOCAINE HYDROCHLORIDE 10 MG/ML
30 INJECTION, SOLUTION EPIDURAL; INFILTRATION; INTRACAUDAL; PERINEURAL ONCE
Status: COMPLETED | OUTPATIENT
Start: 2021-03-11 | End: 2021-03-11

## 2021-03-11 RX ORDER — MAGNESIUM HYDROXIDE 1200 MG/15ML
LIQUID ORAL AS NEEDED
Status: DISCONTINUED | OUTPATIENT
Start: 2021-03-11 | End: 2021-03-11 | Stop reason: HOSPADM

## 2021-03-11 RX ORDER — FOLIC ACID 1 MG/1
1 TABLET ORAL DAILY
Status: DISCONTINUED | OUTPATIENT
Start: 2021-03-12 | End: 2021-03-16 | Stop reason: HOSPADM

## 2021-03-11 RX ORDER — BISACODYL 10 MG
10 SUPPOSITORY, RECTAL RECTAL DAILY
Status: DISCONTINUED | OUTPATIENT
Start: 2021-03-12 | End: 2021-03-16 | Stop reason: HOSPADM

## 2021-03-11 RX ORDER — PROPOFOL 10 MG/ML
INJECTION, EMULSION INTRAVENOUS AS NEEDED
Status: DISCONTINUED | OUTPATIENT
Start: 2021-03-11 | End: 2021-03-11

## 2021-03-11 RX ORDER — FUROSEMIDE 40 MG/1
40 TABLET ORAL DAILY
COMMUNITY

## 2021-03-11 RX ORDER — MIDAZOLAM HYDROCHLORIDE 2 MG/2ML
INJECTION, SOLUTION INTRAMUSCULAR; INTRAVENOUS AS NEEDED
Status: DISCONTINUED | OUTPATIENT
Start: 2021-03-11 | End: 2021-03-11

## 2021-03-11 RX ORDER — FENTANYL CITRATE 50 UG/ML
25 INJECTION, SOLUTION INTRAMUSCULAR; INTRAVENOUS ONCE
Status: COMPLETED | OUTPATIENT
Start: 2021-03-11 | End: 2021-03-11

## 2021-03-11 RX ORDER — ONDANSETRON 2 MG/ML
4 INJECTION INTRAMUSCULAR; INTRAVENOUS ONCE AS NEEDED
Status: DISCONTINUED | OUTPATIENT
Start: 2021-03-11 | End: 2021-03-11 | Stop reason: HOSPADM

## 2021-03-11 RX ORDER — METHOCARBAMOL 500 MG/1
1000 TABLET, FILM COATED ORAL ONCE
Status: DISCONTINUED | OUTPATIENT
Start: 2021-03-11 | End: 2021-03-11

## 2021-03-11 RX ORDER — SODIUM CHLORIDE, SODIUM LACTATE, POTASSIUM CHLORIDE, CALCIUM CHLORIDE 600; 310; 30; 20 MG/100ML; MG/100ML; MG/100ML; MG/100ML
INJECTION, SOLUTION INTRAVENOUS CONTINUOUS PRN
Status: DISCONTINUED | OUTPATIENT
Start: 2021-03-11 | End: 2021-03-11

## 2021-03-11 RX ORDER — DOCUSATE SODIUM 100 MG/1
100 CAPSULE, LIQUID FILLED ORAL DAILY
Status: DISCONTINUED | OUTPATIENT
Start: 2021-03-12 | End: 2021-03-16 | Stop reason: HOSPADM

## 2021-03-11 RX ORDER — ASPIRIN 81 MG/1
81 TABLET, CHEWABLE ORAL DAILY
Status: DISCONTINUED | OUTPATIENT
Start: 2021-03-12 | End: 2021-03-16 | Stop reason: HOSPADM

## 2021-03-11 RX ORDER — LANOLIN ALCOHOL/MO/W.PET/CERES
3 CREAM (GRAM) TOPICAL
Status: DISCONTINUED | OUTPATIENT
Start: 2021-03-11 | End: 2021-03-16 | Stop reason: HOSPADM

## 2021-03-11 RX ORDER — ATORVASTATIN CALCIUM 40 MG/1
40 TABLET, FILM COATED ORAL
Status: DISCONTINUED | OUTPATIENT
Start: 2021-03-11 | End: 2021-03-16 | Stop reason: HOSPADM

## 2021-03-11 RX ORDER — SODIUM CHLORIDE 9 MG/ML
INJECTION, SOLUTION INTRAVENOUS CONTINUOUS PRN
Status: DISCONTINUED | OUTPATIENT
Start: 2021-03-11 | End: 2021-03-11

## 2021-03-11 RX ADMIN — FENTANYL CITRATE 25 MCG: 50 INJECTION, SOLUTION INTRAMUSCULAR; INTRAVENOUS at 17:25

## 2021-03-11 RX ADMIN — SODIUM CHLORIDE, SODIUM LACTATE, POTASSIUM CHLORIDE, AND CALCIUM CHLORIDE: .6; .31; .03; .02 INJECTION, SOLUTION INTRAVENOUS at 17:11

## 2021-03-11 RX ADMIN — PROPOFOL 20 MCG/KG/MIN: 10 INJECTION, EMULSION INTRAVENOUS at 17:45

## 2021-03-11 RX ADMIN — PHENYLEPHRINE HYDROCHLORIDE 50 MCG: 10 INJECTION INTRAVENOUS at 18:51

## 2021-03-11 RX ADMIN — FENTANYL CITRATE 50 MCG: 50 INJECTION, SOLUTION INTRAMUSCULAR; INTRAVENOUS at 17:45

## 2021-03-11 RX ADMIN — ONDANSETRON 4 MG: 2 INJECTION INTRAMUSCULAR; INTRAVENOUS at 17:35

## 2021-03-11 RX ADMIN — PROPOFOL 25 MG: 10 INJECTION, EMULSION INTRAVENOUS at 17:45

## 2021-03-11 RX ADMIN — MIDAZOLAM 0.5 MG: 1 INJECTION INTRAMUSCULAR; INTRAVENOUS at 17:20

## 2021-03-11 RX ADMIN — PHENYLEPHRINE HYDROCHLORIDE 100 MCG: 10 INJECTION INTRAVENOUS at 17:54

## 2021-03-11 RX ADMIN — CEFAZOLIN SODIUM 2000 MG: 2 SOLUTION INTRAVENOUS at 17:11

## 2021-03-11 RX ADMIN — MIDAZOLAM 1 MG: 1 INJECTION INTRAMUSCULAR; INTRAVENOUS at 17:45

## 2021-03-11 RX ADMIN — MIDAZOLAM 0.5 MG: 1 INJECTION INTRAMUSCULAR; INTRAVENOUS at 17:30

## 2021-03-11 RX ADMIN — DIAZEPAM 2.5 MG: 10 INJECTION, SOLUTION INTRAMUSCULAR; INTRAVENOUS at 11:19

## 2021-03-11 RX ADMIN — FENTANYL CITRATE 25 MCG: 50 INJECTION INTRAMUSCULAR; INTRAVENOUS at 08:08

## 2021-03-11 RX ADMIN — ROPIVACAINE HYDROCHLORIDE 15 ML: 5 INJECTION, SOLUTION EPIDURAL; INFILTRATION; PERINEURAL at 17:05

## 2021-03-11 RX ADMIN — FENTANYL CITRATE 25 MCG: 50 INJECTION, SOLUTION INTRAMUSCULAR; INTRAVENOUS at 17:20

## 2021-03-11 RX ADMIN — LIDOCAINE HYDROCHLORIDE 30 ML: 10 INJECTION, SOLUTION EPIDURAL; INFILTRATION; INTRACAUDAL; PERINEURAL at 08:43

## 2021-03-11 RX ADMIN — FENTANYL CITRATE 25 MCG: 50 INJECTION, SOLUTION INTRAMUSCULAR; INTRAVENOUS at 17:40

## 2021-03-11 RX ADMIN — PHENYLEPHRINE HYDROCHLORIDE 100 MCG: 10 INJECTION INTRAVENOUS at 17:50

## 2021-03-11 RX ADMIN — MIDAZOLAM 0.5 MG: 1 INJECTION INTRAMUSCULAR; INTRAVENOUS at 17:25

## 2021-03-11 RX ADMIN — FENTANYL CITRATE 25 MCG: 50 INJECTION, SOLUTION INTRAMUSCULAR; INTRAVENOUS at 17:30

## 2021-03-11 RX ADMIN — MIDAZOLAM 0.5 MG: 1 INJECTION INTRAMUSCULAR; INTRAVENOUS at 17:40

## 2021-03-11 NOTE — ASSESSMENT & PLAN NOTE
Wt Readings from Last 3 Encounters:   03/11/21 83 7 kg (184 lb 8 4 oz)   02/08/21 84 8 kg (187 lb)   02/04/21 85 kg (187 lb 6 3 oz)     Euvolemic on exam, continue coreg  Hold diuretic lasix 40mg recently changed by PCP  Possibly resume in 1-2 days  Continue coreg

## 2021-03-11 NOTE — CONSULTS
Consult - Cardiology   Shannan Vera 80 y o  female MRN: [id number]  Unit/Bed#: ED 10 Encounter: [id number]        Reason For Consult:  Perioperative risk stratification - podiatric surgery                 Assessment:  Mechanical fall with right malleolar fracture   CAD:   Hx PCI/MARLYN -Cfx 11/2006 (residual disease - 90% stenosis of distal RCA)   Lexiscan stress 4/2017: small apical scar without iscemia, NL LVEF  Permanent Atrial fibrillation:   No anticoagulant because of hx of recurrent falls  Hx Tachy-Ranulfo Syn  -- S/p Single chamber SJM PPM (9/2016, [facility])   Device check per 1' cardiologist 2/2021: "normal function"   valvular heart disease:       Echo 12/2020: Moderate to severe MR and TR (moderate pulmonary hypertension-PASP 55)  Hypertension   Coreg 3 215 bid, lasix 40mg/d  Dyslipidemia:   Atorvastatin 40 mg  PAD   Hx Lt popliteal stenting -6/2012, PTA 8/2014  Lower extremity venous insufficiency  Hx Rt breast CA - Tomoxifen  TIA 2003 & 2016    Discussion / Plan:  ·  right ankle fracture recurrent dislocation -  Status post reduction/ casting by Podiatry with plan for ORIF later today  ·  Patient's current comorbidities place her at intermediate risk for her planned procedure with benefits exceeding risk  Favorably, she has been overall stable with regard to chronic dysrhythmia without recent sign/Sx of decompensated CHF nor coronary insufficiency  ·  no additional cardiac testing currently advised in advance of surgery     --in light of cardiomyopathy and regurgitant valvulopathy advise crystalloid be given with prudence with goal hemoglobin not less than 8   --suggest perioperative telemetry  And that ECG be obtained for any complaint of chest discomfort   --suggest continuation of beta-blocker with advisement to initiate routine enteral therapy if the patient is to be NPO greater than 12-24 hours        History Of Present Illness:  Shannan Vera is an 80year old patient of Patrick Luis (PCP) and Nabil ([facility])  This patient has history is highlighted by those things enumerated above  Ms Tanisha Carranza was recently seen by her cardiologist on 3/1/2021 with his note suggesting the patient to be overall stable with regard to her cardiac problems  She had reports of modest exercise tolerance but without: chest pain, symptoms of heart failure, presyncope or syncope  She has chronic atrial fibrillation with controlled ventricular rates on carvedilol with previous discontinuation of anticoagulant because of a history of recurrent falls  She previously had relatively hypotensive blood pressures on a higher dose of carvedilol but on 3 125 mg b i d she was reportedly doing well with systolic blood pressure trend in the 110's  She has recently had a device interrogation in February of this year with reports of normal device function  the patient is currently hospitalized having earlier today sustained a nonsyncopal fall resulting in right ankle fracture  This is reduced and casted with plan for ORIF by Podiatry later this evening     In anticipation of this cardiology consultation is requested for operative risk stratification  At the time my visit the patient reiterates her general deconditioning and mild effort fatigue without any clear dyspnea  She denies orthopnea, PND, chest pain, or recent problems with tachycardia  At baseline she uses a walker to ambulate about her residence  She has mild intermittent recurrent lower extremity edema with no recent worsening        Past Medical History:        Past Medical History:   Diagnosis Date    Arthritis     Cancer [facility])     breast    Cardiac disease     afib    [facility]     Hyperlipidemia     Hypertension     Stroke ([facility] [address] )     TIA    TIA (transient ischemic attack)       Past Surgical History:   Procedure Laterality Date    BREAST SURGERY      right lumpectomy    CARDIAC PACEMAKER PLACEMENT      CORONARY STENT PLACEMENT      TOTAL HIP ARTHROPLASTY Bilateral         Allergy:        Allergies   Allergen Reactions    Lovenox [Enoxaparin Sodium] Swelling       Medications:       Prior to Admission medications    Medication Sig Start Date End Date Taking? Authorizing Provider   furosemide (LASIX) 40 mg tablet Take 40 mg by mouth daily   Yes Historical Provider, MD   acetaminophen (TYLENOL) 325 mg tablet Take 2 tablets (650 mg total) by mouth every 6 (six) hours as needed for mild pain, headaches or fever  Patient not taking: Reported on 2/8/2021 1/8/21   Alice Colvin MD   albuterol (PROVENTIL HFA,VENTOLIN HFA) 90 mcg/act inhaler Inhale 2 puffs every 4 (four) hours as needed for wheezing    Historical Provider, MD   aspirin 81 MG tablet Take 81 mg by mouth daily  Historical Provider, MD   atorvastatin (LIPITOR) 40 mg tablet Take 40 mg by mouth daily    Historical Provider, MD   bisacodyl (DULCOLAX) 10 mg suppository Insert 1 suppository (10 mg total) into the rectum daily  Patient not taking: Reported on 2/8/2021 1/9/21   Alice Colvin MD   calcium carbonate-vitamin D (OSCAL-D) 500 mg-200 units per tablet Take 1 tablet by mouth daily with breakfast    Historical Provider, MD   carvedilol (COREG) 6 25 mg tablet Take 1 tablet (6 25 mg total) by mouth 2 (two) times a day with meals  Patient taking differently: Take 3 125 mg by mouth 2 (two) times a day with meals  1/8/21   Alice Colvin MD   citalopram (CeleXA) 10 mg tablet Take 10 mg by mouth daily  Historical Provider, MD   docusate sodium (COLACE) 100 mg capsule Take 100 mg by mouth daily    Historical Provider, MD   folic acid (FOLVITE) 1 mg tablet Take 1 mg by mouth daily  Historical Provider, MD   melatonin 3 mg Take 1 tablet (3 mg total) by mouth daily at bedtime  Patient not taking: Reported on 2/8/2021 1/8/21   Alice Colvin MD   Multiple Vitamins-Minerals (CENTRUM SILVER PO) Take 1 tablet by mouth daily      Historical Provider, MD   omeprazole (PriLOSEC) 20 mg delayed release capsule Take 20 mg by mouth Sunday, Tues, Thurs    Historical Provider, MD   psyllium (METAMUCIL) 0 52 G capsule Take 0 52 g by mouth daily  Historical Provider, MD   thiamine (VITAMIN B1) 100 mg tablet Take 100 mg by mouth daily  Historical Provider, MD   torsemide (DEMADEX) 10 mg tablet Take 3 tablets (30 mg total) by mouth daily  Patient not taking: Reported on 2/8/2021 1/9/21 3/11/21  Lovell Phoenix, MD       Family History:     Family History   Problem Relation Age of Onset    Cancer Sister     Stroke Family     Arthritis Family     Heart disease Family     Kidney disease Family         Social History:       Social History     Socioeconomic History    Marital status:       Spouse name: None    Number of children: None    Years of education: None    Highest education level: None   Occupational History    None   Social Needs    Financial resource strain: None    Food insecurity     Worry: None     Inability: None    Transportation needs     Medical: None     Non-medical: None   Tobacco Use    Smoking status: Never Smoker    Smokeless tobacco: Never Used   Substance and Sexual Activity    Alcohol use: Not Currently    Drug use: No    Sexual activity: Not Currently   Lifestyle    Physical activity     Days per week: None     Minutes per session: None    Stress: None   Relationships    Social connections     Talks on phone: None     Gets together: None     Attends Ca[employer] service: None     Active member of club or organization: None     Attends meetings of clubs or organizations: None     Relationship status: None    Intimate partner violence     Fear of current or ex partner: None     Emotionally abused: None     Physically abused: None     Forced sexual activity: None   Other Topics Concern    None   Social History Narrative    None       ROS:  Symptoms per HPI  Hard of hearing  The remainder of the review of systems is negative    Exam:  General:  Alert, normally conversant, comfortable appearing  Head: Normocephalic, atraumatic  Eyes:  EOMI  Pupils - equal, round, reactive to accomodation  No icterus  Normal Conjunctiva  Oropharynx: Moist without lesion  Neck:  No gross bruit, JVD, thyromegaly, or lymphadenopathy  Heart:   irregular with controlled rate  Somewhat distant heart tones with soft murmur at low left sternal border and apex  Lungs: Moderate excursion air exchange  Clear without rales/rhonchi/wheeze  Abdomen:  Soft and nontender with normal bowel sounds  No organomegaly or mass  Lower Limbs:  No edema  Pulses[de-identified]  RLE - DP:  1-2+                 LLE - DP:  Casted  Musculoskeletal: Independent movement of limbs observed, Formal ROM and strength eval not performed  Neurologic:    Oriented to: person, place, situation  Cranial Nerves: grossly intact - vision, smell, taste, and hearing were not tested       Motor function: grossly normal, symmetric   Sensation: Was not tested    Vitals:    03/11/21 0741 03/11/21 0845 03/11/21 1120   BP: 140/74 139/93 158/87   BP Location: Right arm Right arm Right arm   Pulse: 73 75 76   Resp: 18 18 18   Temp: 98 2 °F (36 8 °C)     TempSrc: Oral     SpO2: 93% 100% 100%   Weight: 83 7 kg (184 lb 8 4 oz)             DATA:      ECG:    Demand pacemaker; interpretation is based on intrinsic rhythm  Atrial fibrillation with premature ventricular or aberrantly conducted complexes  Right bundle branch block  T wave abnormality, consider inferior ischemia or digitalis effect  Abnormal ECG  When compared with ECG of 04-FEB-2021 22:11,  Electronic demand pacing is now Present  Inverted T waves have replaced nonspecific T wave abnormality in Anterior leads  QT has shortened                         -----------------------------------------------------------------------------------------------------------------------------------------------  Echocardiogram:         SUMMARY     LEFT VENTRICLE:  Systolic function was moderately to markedly reduced  Ejection fraction was estimated to be 35 %  There were no regional wall motion abnormalities  There was severe diffuse hypokinesis with regional variations  The changes were consistent with eccentric hypertrophy      VENTRICULAR SEPTUM:  There was dyssynergic motion  These changes are consistent with a conduction abnormality or paced rhythm      RIGHT VENTRICLE:  The ventricle was moderately to markedly dilated  Systolic function was mildly reduced      LEFT ATRIUM:  The atrium was markedly dilated      RIGHT ATRIUM:  The atrium was markedly dilated      MITRAL VALVE:  There was moderate to marked annular calcification  There was moderate to severe regurgitation      AORTIC VALVE:  There was mild regurgitation      TRICUSPID VALVE:  There was moderate to severe regurgitation  Estimated peak PA pressure was 55 mmHg  The findings suggest moderate pulmonary hypertension      PULMONIC VALVE:  There was trace regurgitation      IVC, HEPATIC VEINS:  The inferior vena cava was dilated  -----------------------------------------------------------------------------------------------------------------------------------------------  Ischemic Testing:  Lexiscan My[facility]iew [facility]  FINDINGS:  Image Quality: suboptimal  Scar: dense, localized, apical  Cannot exclude non transmural inferior infarct vs  attenuation artifact  Ischemia: none  Quantitative analysis: highlights the above findings  Summed Stress Score: 8  Left ventricular size: normal   Left ventricular ejection fraction 50 %  Global left ventricular systolic function: preserved  Hypokinesis: very mild apical    CONCLUSIONS:  Scar:dense, localized, apical   Cannot exclude non transmural inferior infarct vs  attenuation artifact  Ischemia: none  Preserved global systolic left ventricular function    Electronically signed by ANN Hill  on 04/26/2017    -----------------------------------------------------------------------------------------------------------------------------------------------  Weights: Wt Readings from Last 20 Encounters:   03/11/21 83 7 kg (184 lb 8 4 oz)   02/08/21 84 8 kg (187 lb)   02/04/21 85 kg (187 lb 6 3 oz)   01/08/21 82 7 kg (182 lb 5 1 oz)   04/03/20 74 6 kg (164 lb 7 4 oz)   04/21/17 74 1 kg (163 lb 5 8 oz)   08/24/16 71 2 kg (157 lb)   , Body mass index is 31 67 kg/m²           Lab Studies:    Results from last 7 days   Lab Units 03/11/21  0807   TROPONIN I ng/mL <0 02            Results from last 7 days   Lab Units 03/11/21  0807   WBC Thousand/uL 5 21   HEMOGLOBIN g/dL 9 8*   HEMATOCRIT % 30 8*   PLATELETS Thousands/uL 112*   ,   Results from last 7 days   Lab Units 03/11/21  0807   POTASSIUM mmol/L 4 9   CHLORIDE mmol/L 100   CO2 mmol/L 29   BUN mg/dL 41*   CREATININE mg/dL 1 40*   CALCIUM mg/dL 10 6*

## 2021-03-11 NOTE — ASSESSMENT & PLAN NOTE
Lab Results   Component Value Date    EGFR 34 03/11/2021    EGFR 37 02/04/2021    EGFR 59 01/08/2021    CREATININE 1 40 (H) 03/11/2021    CREATININE 1 29 02/04/2021    CREATININE 0 88 01/08/2021     Slightly elevated creatinine  Monitor renal fxns  Hold lasix at this time

## 2021-03-11 NOTE — ASSESSMENT & PLAN NOTE
Hx of dysphagia  ST consulted  Per daughter, slurred speech worsening over past week  CT head WNL  Consider MRI brain to rule out any pathology tmr

## 2021-03-11 NOTE — CONSULTS
Podiatry - Consultation    Patient Information:   Giselle Morin 80 y o  female MRN: [id number]  Unit/Bed#: ED 10 Encounter: [id number]  PCP: Leila Parada DO  Date of Admission:  3/11/2021  Date of Consultation: 03/11/21  Requesting Physician: Mario Casanova DO      ASSESSMENT:    Giselle Morin is a 80 y o  female with:    1  Right ankle fracture  2  CHF  3  CKD stage 3  4  Afib      PLAN:    · Podiatry a planning and taken to the patient to the operating room for ORIF of right ankle fracture later today with Dr Catalino Burch as ankle fracture is severely unstable  · Multiple x-rays of right lower extremity were reviewed  · Prior to closed reduction of right ankle hematoma block was performed utilizing 20 mL of 1% lidocaine  The skin was prepped prior to administration of hematoma block utilizing Betadine and alcohol  · Multiple attempts at reduction were tried of right lower ankle with various splinting and traction techniques which ultimately provided inadequate reduction as piece in is spastic and continuously mamadou lower extremity muscles  It was decided that a circumferential plaster cast would be placed to hold the foot in reduction  · Final post reduction x-rays after application of plaster cast were reviewed and noted to provide adequate reduction with no skin tenting  · Patient was administered lorazepam to help reduce muscle spasms relax the patient  · Appreciate preoperative clearance from Internal Medicine and Cardiology Services  · Keep patient NPO  · Rest of Medical care per primary team   · Will discuss this plan with my attending and update as needed  SUBJECTIVE:    History of Present Illness:    Giselle Morin is a 80 y o  female who is originally seen in the emergency department 3/11/2021 due to right ankle fracture  Patient has a past medical history of atrial fibrillation, hypertension, ambulatory dysfunction, systolic heart failure, stage IIIB chronic kidney disease  Patient and daughter together provide history  They report that she is minimally ambulatory at home from bed and couch to the bathroom  Patient lives at home alone  Since Monday patient has had 3 falls  The 3rd fall being this morning in the bathroom  Patient fell onto the lateral side of her body and immediately was unable to bear weight to right lower extremity  Patient has no further podiatric complaints at this time  Review of Systems:    Constitutional: Negative  HENT: Negative  Eyes: Negative  Respiratory: Negative  Cardiovascular: Negative  Gastrointestinal: Negative  Musculoskeletal:  Right foot pain   Skin:  Skin tenting right medial leg   Neurological:  Peripheral neuropathy   Psych: Negative  Past Medical and Surgical History:     Past Medical History:   Diagnosis Date    Arthritis     Cancer [facility])     breast    Cardiac disease     afib    [facility]     Hyperlipidemia     Hypertension     Stroke ([facility] )     TIA    TIA (transient ischemic attack)        Past Surgical History:   Procedure Laterality Date    BREAST SURGERY      right lumpectomy    CARDIAC PACEMAKER PLACEMENT      CORONARY STENT PLACEMENT      TOTAL HIP ARTHROPLASTY Bilateral        Meds/Allergies:    (Not in a hospital admission)      Allergies   Allergen Reactions    Lovenox [Enoxaparin Sodium] Swelling       Social History:     Marital Status:      Substance Use History:   Social History     Substance and Sexual Activity   Alcohol Use Not Currently     Social History     Tobacco Use   Smoking Status Never Smoker   Smokeless Tobacco Never Used     Social History     Substance and Sexual Activity   Drug Use No       Family History:    Family History   Problem Relation Age of Onset    Cancer Sister     Stroke Family     Arthritis Family     Heart disease Family     Kidney disease Family          OBJECTIVE:    Vitals:   Blood Pressure: 139/93 (03/11/21 0845)  Pulse: 75 (03/11/21 0845)  Temperature: 98 2 °F (36 8 °C) (03/11/21 0741)  Temp Source: Oral (03/11/21 0741)  Respirations: 18 (03/11/21 0845)  Weight - Scale: 83 7 kg (184 lb 8 4 oz) (03/11/21 0741)  SpO2: 100 % (03/11/21 0845)    Physical Exam:    General Appearance: Alert, cooperative, no distress  HEENT: Head normocephalic, atraumatic, without obvious abnormality  Heart: Normal rate and rhythm  Lungs: Non-labored breathing  No respiratory distress  Abdomen: Without distension  Psychiatric: AAOx3  Lower Extremity:  Vascular:   Right DP pulse is present  Left DP and PT pulses are present  CRT < 3 seconds at the digits  +2/4 edema noted at bilateral lower extremities  Pedal hair is absent  Skin temperature is WNL bilaterally  Patient is fortunately vascularly intact  Musculoskeletal:  MMT is unable to be performed secondary to ankle instability and pain and right lower extremity  ROM at the 1st MPJ and ankle joint are reduced to LLE  Pain on palpation of right lower extremity at the level of the ankle circumferentially  Gross dislocation proximal lateral of the foot on the leg noted  Dermatological:  Medial skin tenting noted  Skin of bilateral lower extremities is thin and atrophic  No open lesions appreciated  Neurological:  Gross sensation is intact  Protective sensation is diminished  Patient Reports numbness and/or paresthesias  Patient is at neurologic baseline      Clinical Images 03/11/21:    Right ankle    Right ankle      Additional data:     Lab Results: I have personally reviewed pertinent labs including:    Results from last 7 days   Lab Units 03/11/21  0807   WBC Thousand/uL 5 21   HEMOGLOBIN g/dL 9 8*   HEMATOCRIT % 30 8*   PLATELETS Thousands/uL 112*   NEUTROS PCT % 73   LYMPHS PCT % 15   MONOS PCT % 10   EOS PCT % 2     Results from last 7 days   Lab Units 03/11/21  0807   POTASSIUM mmol/L 4 9   CHLORIDE mmol/L 100   CO2 mmol/L 29   BUN mg/dL 41*   CREATININE mg/dL 1 40* CALCIUM mg/dL 10 6*     Results from last 7 days   Lab Units 03/11/21  0807   INR  1 17       Cultures: I have personally reviewed pertinent cultures including:              Imaging: I have personally reviewed pertinent reports in PACS  EKG, Pathology, and Other Studies: I have personally reviewed pertinent reports  ** Please Note: Portions of the record may have been created with voice recognition software  Occasional wrong word or "sound a like" substitutions may have occurred due to the inherent limitations of voice recognition software  Read the chart carefully and recognize, using context, where substitutions have occurred   **

## 2021-03-11 NOTE — ANESTHESIA PREPROCEDURE EVALUATION
Procedure:  OPEN REDUCTION W/ INTERNAL FIXATION (ORIF) RIGHT ANKLE (Right Ankle)    Relevant Problems   CARDIO   (+) Acute on chronic combined systolic and diastolic congestive heart failure (HCC)   (+) Atrial fibrillation (HCC)   (+) Essential hypertension   (+) Mitral regurgitation   (+) Sick sinus syndrome (HCC)      GI/HEPATIC   (+) Dysphagia      /RENAL   (+) Acute renal failure superimposed on stage 2 chronic kidney disease (HCC)   (+) Stage 3b chronic kidney disease      HEMATOLOGY   (+) Anemia   (+) Thrombocytopenia (HCC)      Other   (+) Closed right ankle fracture      Physical Exam    Airway    Mallampati score: IV  TM Distance: >3 FB  Neck ROM: limited     Dental   No notable dental hx     Cardiovascular  Rhythm: regular, Rate: normal, Cardiovascular exam normal    Pulmonary  Pulmonary exam normal     Other Findings     CAD:              Hx PCI/MARLYN -Cfx 11/2006 (residual disease - 90% stenosis of distal RCA)              Lexiscan stress 4/2017: small apical scar without iscemia, NL LVEF  Permanent Atrial fibrillation:              No anticoagulant because of hx of recurrent falls  Hx Tachy-Ranulfo Syn  -- S/p Single chamber SJM PPM (9/2016, [facility])              Device check per 1' cardiologist 2/2021: "normal function"   valvular heart disease:                  Echo 12/2020: Moderate to severe MR and TR (moderate pulmonary hypertension-PASP 55)    Systolic function was moderately to markedly reduced  Ejection fraction was estimated to be 35 %  There were no regional wall motion abnormalities  There was severe diffuse hypokinesis with regional variations  The changes were consistent with eccentric hypertrophy  Anesthesia Plan  ASA Score- 4 Emergent    Anesthesia Type- regional, general and IV sedation with anesthesia with ASA Monitors  Additional Monitors:   Airway Plan:           Plan Factors-Exercise tolerance (METS): <4 METS  Chart reviewed  Existing labs reviewed   Patient summary reviewed  Patient is not a current smoker  Patient did not smoke on day of surgery  Induction- intravenous  Postoperative Plan-     Informed Consent- Anesthetic plan and risks discussed with patient

## 2021-03-11 NOTE — H&P
Jeffery 48  H&P- Umesh Dopp 5/5/1932, 80 y o  female MRN: [id number]  Unit/Bed#: ED 10 Encounter: [id number]  Primary Care Provider: Bel Interiano DO   Date and time admitted to hospital: 3/11/2021  7:39 AM    * Closed right ankle fracture  Assessment & Plan  Presented status post fall found to have right malleolar fracture  Podiatry consulted, reduced in the ED but planning taken to OR today   history of systolic heart failure were right bundle maria c block   cardiology consulted for  Cardiac clearance   PT and OT consulted  Will likely need rehab and or placement  NPO  Morphine prn    Stage 3b chronic kidney disease  Assessment & Plan  Lab Results   Component Value Date    EGFR 34 03/11/2021    EGFR 37 02/04/2021    EGFR 59 01/08/2021    CREATININE 1 40 (H) 03/11/2021    CREATININE 1 29 02/04/2021    CREATININE 0 88 01/08/2021     Slightly elevated creatinine  Monitor renal fxns  Hold lasix at this time    Chronic systolic heart failure (HCC)  Assessment & Plan  Wt Readings from Last 3 Encounters:   03/11/21 83 7 kg (184 lb 8 4 oz)   02/08/21 84 8 kg (187 lb)   02/04/21 85 kg (187 lb 6 3 oz)     Euvolemic on exam, continue coreg  Hold diuretic lasix 40mg recently changed by PCP  Possibly resume in 1-2 days  Continue coreg        Slurred speech  Assessment & Plan  Hx of dysphagia  ST consulted  Per daughter, slurred speech worsening over past week  CT head WNL  Consider MRI brain to rule out any pathology tmr    Ambulatory dysfunction  Assessment & Plan  PT/OT consulted, will likely need placement  Lives at home alone, but daughter reports that they may move her in with them    Essential hypertension  Assessment & Plan  Currently normotensive, continue coreg    Atrial fibrillation ([facility] [address] )  Assessment & Plan  EKG reveals patient in afib, paced  S/P PPM  Not on anticoagulation due to falls, c/w aspirin 81mg  Continue coreg 3 125 BID      VTE Prophylaxis: Heparin  / sequential compression device   Code Status: DNR/DNI  POLST: There is no POLST form on file for this patient (pre-hospital)  Discussion with family: daughter at bedside    Anticipated Length of Stay:  Patient will be admitted on an Inpatient basis with an anticipated length of stay of 2 midnights  Justification for Hospital Stay: right ankle fracture    Total Time for Visit, including Counseling / Coordination of Care: 45 minutes  Greater than 50% of this total time spent on direct patient counseling and coordination of care  Chief Complaint:   Right Foot Pain    History of Present Illness:    Jarett Eng is a 80 y o  female who presents with  Fall  Past medical history of atrial fibrillation, congestive heart failure, hypertension and hyperlipidemia  Patient presented after having a fall this week, according to daughter at bedside reports that she had several falls this past week alone  Reported significant pain and right lower leg where x-ray showed right malleolar fracture  Podiatry was consulted in the ED and was able to reduce the fracture but with repeat imaging there was concern that it may not have been completely resolved  Possibly planning for surgery today  According to daughter, patient has had multiple falls declining  Previous admission GOC were discussed and previously contemplated hospice but had significant improvement  Slurred speech ongoing with known dysphagia for past several weeks  Review of Systems:    Review of Systems   Constitutional: Negative for activity change, appetite change, chills and fever  HENT: Negative for congestion, facial swelling, sinus pain and sore throat  Respiratory: Negative for cough, shortness of breath and wheezing  Cardiovascular: Negative for chest pain, palpitations and leg swelling  Gastrointestinal: Negative for abdominal distention, abdominal pain, constipation, diarrhea, nausea and vomiting     Endocrine: Negative for cold intolerance, heat intolerance, polydipsia, polyphagia and polyuria  Genitourinary: Negative for dysuria, flank pain and urgency  Skin: Negative for color change and pallor  Neurological: Negative for dizziness, syncope, weakness, light-headedness and headaches  Psychiatric/Behavioral: Negative for agitation, confusion and dysphoric mood  Past Medical and Surgical History:     Past Medical History:   Diagnosis Date    Arthritis     Cancer [facility])     breast    Cardiac disease     afib    [facility]est[facility] failure) [facility] )     Hyperlipidemia     Hypertension     Stroke ([facility] )     TIA    TIA (transient ischemic attack)        Past Surgical History:   Procedure Laterality Date    BREAST SURGERY      right lumpectomy    CARDIAC PACEMAKER PLACEMENT      CORONARY STENT PLACEMENT      TOTAL HIP ARTHROPLASTY Bilateral        Meds/Allergies:    Prior to Admission medications    Medication Sig Start Date End Date Taking? Authorizing Provider   furosemide (LASIX) 40 mg tablet Take 40 mg by mouth daily   Yes Historical Provider, MD   acetaminophen (TYLENOL) 325 mg tablet Take 2 tablets (650 mg total) by mouth every 6 (six) hours as needed for mild pain, headaches or fever  Patient not taking: Reported on 2/8/2021 1/8/21   Rosalina Hayes MD   albuterol (PROVENTIL HFA,VENTOLIN HFA) 90 mcg/act inhaler Inhale 2 puffs every 4 (four) hours as needed for wheezing    Historical Provider, MD   aspirin 81 MG tablet Take 81 mg by mouth daily      Historical Provider, MD   atorvastatin (LIPITOR) 40 mg tablet Take 40 mg by mouth daily    Historical Provider, MD   bisacodyl (DULCOLAX) 10 mg suppository Insert 1 suppository (10 mg total) into the rectum daily  Patient not taking: Reported on 2/8/2021 1/9/21   Rosalina Hayes MD   calcium carbonate-vitamin D (OSCAL-D) 500 mg-200 units per tablet Take 1 tablet by mouth daily with breakfast    Historical Provider, MD   carvedilol (COREG) 6 25 mg tablet Take 1 tablet (6 25 mg total) by mouth 2 (two) times a day with meals  Patient taking differently: Take 3 125 mg by mouth 2 (two) times a day with meals  1/8/21   Romel Bobby MD   citalopram (CeleXA) 10 mg tablet Take 10 mg by mouth daily  Historical Provider, MD   docusate sodium (COLACE) 100 mg capsule Take 100 mg by mouth daily    Historical Provider, MD   folic acid (FOLVITE) 1 mg tablet Take 1 mg by mouth daily  Historical Provider, MD   melatonin 3 mg Take 1 tablet (3 mg total) by mouth daily at bedtime  Patient not taking: Reported on 2/8/2021 1/8/21   Romel Bobby MD   Multiple Vitamins-Minerals (CENTRUM SILVER PO) Take 1 tablet by mouth daily  Historical Provider, MD   omeprazole (PriLOSEC) 20 mg delayed release capsule Take 20 mg by mouth Sunday, Tues, Thurs    Historical Provider, MD   psyllium (METAMUCIL) 0 52 G capsule Take 0 52 g by mouth daily  Historical Provider, MD   thiamine (VITAMIN B1) 100 mg tablet Take 100 mg by mouth daily  Historical Provider, MD   torsemide (DEMADEX) 10 mg tablet Take 3 tablets (30 mg total) by mouth daily  Patient not taking: Reported on 2/8/2021 1/9/21 3/11/21  Romel Bobby MD     I have reviewed home medications with patient personally  Allergies: Allergies   Allergen Reactions    Lovenox [Enoxaparin Sodium] Swelling       Social History:     Marital Status:     Occupation: Retired  Patient Pre-hospital Living Situation: Home alone  Patient Pre-hospital Level of Mobility: Ambulatory with RW  Patient Pre-hospital Diet Restrictions: None  Substance Use History:   Social History     Substance and Sexual Activity   Alcohol Use Not Currently     Social History     Tobacco Use   Smoking Status Never Smoker   Smokeless Tobacco Never Used     Social History     Substance and Sexual Activity   Drug Use No       Family History:    Family History   Problem Relation Age of Onset    Cancer Sister     Stroke Family     Arthritis Family     Heart disease Family     Kidney disease Family        Physical Exam:     Vitals:   Blood Pressure: 158/87 (03/11/21 1120)  Pulse: 76 (03/11/21 1120)  Temperature: 98 2 °F (36 8 °C) (03/11/21 0741)  Temp Source: Oral (03/11/21 0741)  Respirations: 18 (03/11/21 1120)  Weight - Scale: 83 7 kg (184 lb 8 4 oz) (03/11/21 0741)  SpO2: 100 % (03/11/21 1120)    Physical Exam  Vitals signs and nursing note reviewed  Constitutional:       Appearance: She is ill-appearing  HENT:      Head: Normocephalic and atraumatic  Eyes:      Conjunctiva/sclera: Conjunctivae normal    Cardiovascular:      Rate and Rhythm: Normal rate  Heart sounds: Normal heart sounds  Pulmonary:      Breath sounds: Rales present  No wheezing or rhonchi  Abdominal:      General: Bowel sounds are normal  There is no distension  Palpations: Abdomen is soft  Tenderness: There is no abdominal tenderness  Musculoskeletal: Normal range of motion  General: No swelling  Comments: Right leg wrapped in dressing, on sling elevated   Skin:     General: Skin is warm and dry  Neurological:      Mental Status: She is alert  Mental status is at baseline  Additional Data:     Lab Results: I have personally reviewed pertinent reports  Results from last 7 days   Lab Units 03/11/21  0807   WBC Thousand/uL 5 21   HEMOGLOBIN g/dL 9 8*   HEMATOCRIT % 30 8*   PLATELETS Thousands/uL 112*   NEUTROS PCT % 73   LYMPHS PCT % 15   MONOS PCT % 10   EOS PCT % 2     Results from last 7 days   Lab Units 03/11/21  0807   SODIUM mmol/L 138   POTASSIUM mmol/L 4 9   CHLORIDE mmol/L 100   CO2 mmol/L 29   BUN mg/dL 41*   CREATININE mg/dL 1 40*   ANION GAP mmol/L 9   CALCIUM mg/dL 10 6*   GLUCOSE RANDOM mg/dL 98     Results from last 7 days   Lab Units 03/11/21  0807   INR  1 17                   Imaging: I have personally reviewed pertinent reports        XR ankle 3+ vw right   Final Result by Ct Bynum MD (03/11 1208)   Splinted and partially reduced right ankle bimalleolar fracture-dislocation, unchanged in alignment  Workstation performed: [id number]      XR ankle 2 vw right   Final Result by Celine Myers MD (03/11 1200)   Interval improvement in alignment of bimalleolar fracture dislocation postreduction  Workstation performed: [id number]      CT head without contrast   Final Result by Celine Myers MD (03/11 3640)   No acute intracranial abnormality  Workstation performed: [id number]      CT spine cervical without contrast   Final Result by Celine Myers MD (03/11 0507)   No cervical spine fracture or traumatic malalignment  Workstation performed: [id number]      XR chest 1 view portable   Final Result by Padmini Block MD (02/56 4877)   Cardiomegaly  Slight atypical vascular congestion  Trace pleural effusions  Workstation performed: [id number]      XR ankle 2 vw right   Final Result by Stas Myers MD (03/11 1109)   Acute, displaced and angulated right ankle fracture-dislocation  This report is concordant with Remy Rowell PA-C's preliminary interpretation that is documented in the electronic medical record (EPIC)  Workstation performed: [id number]      FL < 1 hour    (Results Pending)       EKG, Pathology, and Other Studies Reviewed on Admission:   · EKG: Afib, PVCS, paced    Allscripts / Epic Records Reviewed: Yes     ** Please Note: This note has been constructed using a voice recognition system   **

## 2021-03-11 NOTE — ED PROVIDER NOTES
History  Chief Complaint   Patient presents with    Ankle Injury - Major     Pt  brought in via EMS  Pt  fell in bathroom getting her right foot stuck  Pt  has a derformity noted to right ankle  PT  has hx of falls  [place] Tony is an 79 yo F, history of CHF, HTN, HLD, previous stroke, presenting after right ankle injury sustained just prior to arrival at home  Patient is not entirely sure how she fell, but believes she slipped while entering the bathtub and got her foot/ankle stuck between bathtub and wall  Gross deformity noted to ankle  Patient does not believe she struck her head and denies passing out  Reports no pain anywhere else in her body at this time  Denies current visual changes, numbness, tingling, or weakness in face/extremities  No chest pain, dyspnea, or palpitations  Patient notes she does not have her glasses or her hearing aids with her  No current anticoagulation  History provided by:  Patient  History limited by: impaired hearing   used: No    Fall  Mechanism of injury: fall    Injury location:  Leg  Leg injury location:  R ankle  Incident location:  Home  Time since incident:  30 minutes  Arrived directly from scene: yes    Fall:     Fall occurred: slipped entering shower  Height of fall:  Standing    Impact surface:  Hard floor  Prior to arrival data:     Loss of consciousness: no      Amnesic to event: no    Associated symptoms: no abdominal pain, no back pain, no chest pain, no headaches, no loss of consciousness, no nausea, no neck pain and no vomiting    Risk factors: CAD and CHF    Risk factors: no anticoagulation therapy        Prior to Admission Medications   Prescriptions Last Dose Informant Patient Reported? Taking? Multiple Vitamins-Minerals (CENTRUM SILVER PO)   Yes No   Sig: Take 1 tablet by mouth daily     acetaminophen (TYLENOL) 325 mg tablet   No No   Sig: Take 2 tablets (650 mg total) by mouth every 6 (six) hours as needed for mild pain, headaches or fever   Patient not taking: Reported on 2/8/2021   albuterol (PROVENTIL HFA,VENTOLIN HFA) 90 mcg/act inhaler   Yes No   Sig: Inhale 2 puffs every 4 (four) hours as needed for wheezing   aspirin 81 MG tablet   Yes No   Sig: Take 81 mg by mouth daily  atorvastatin (LIPITOR) 40 mg tablet   Yes No   Sig: Take 40 mg by mouth daily   bisacodyl (DULCOLAX) 10 mg suppository   No No   Sig: Insert 1 suppository (10 mg total) into the rectum daily   Patient not taking: Reported on 2/8/2021   calcium carbonate-vitamin D (OSCAL-D) 500 mg-200 units per tablet   Yes No   Sig: Take 1 tablet by mouth daily with breakfast   carvedilol (COREG) 6 25 mg tablet   No No   Sig: Take 1 tablet (6 25 mg total) by mouth 2 (two) times a day with meals   Patient taking differently: Take 3 125 mg by mouth 2 (two) times a day with meals    citalopram (CeleXA) 10 mg tablet   Yes No   Sig: Take 10 mg by mouth daily  docusate sodium (COLACE) 100 mg capsule   Yes No   Sig: Take 100 mg by mouth daily   folic acid (FOLVITE) 1 mg tablet   Yes No   Sig: Take 1 mg by mouth daily  furosemide (LASIX) 40 mg tablet   Yes Yes   Sig: Take 40 mg by mouth daily   melatonin 3 mg   No No   Sig: Take 1 tablet (3 mg total) by mouth daily at bedtime   Patient not taking: Reported on 2/8/2021   omeprazole (PriLOSEC) 20 mg delayed release capsule   Yes No   Sig: Take 20 mg by mouth Sunday, Tues, Thurs   psyllium (METAMUCIL) 0 52 G capsule   Yes No   Sig: Take 0 52 g by mouth daily  thiamine (VITAMIN B1) 100 mg tablet   Yes No   Sig: Take 100 mg by mouth daily        Facility-Administered Medications: None       Past Medical History:   Diagnosis Date    Arthritis     Cancer [facility])     breast    Cardiac disease     afib    CHF (congestive heart failure) (HCC)     Hyperlipidemia     Hypertension     Stroke ([facility] [address] )     TIA    TIA (transient ischemic attack)        Past Surgical History:   Procedure Laterality Date    BREAST SURGERY      right lumpectomy    CARDIAC PACEMAKER PLACEMENT      CORONARY STENT PLACEMENT      TOTAL HIP ARTHROPLASTY Bilateral        Family History   Problem Relation Age of Onset    Cancer Sister     Stroke Family     Arthritis Family     Heart disease Family     Kidney disease Family      I have reviewed and agree with the history as documented  E-Cigarette/Vaping    E-Cigarette Use Never User      E-Cigarette/Vaping Substances    Nicotine No     THC No     Flavoring No     Other No     Unknown No      Social History     Tobacco Use    Smoking status: Never Smoker    Smokeless tobacco: Never Used   Substance Use Topics    Alcohol use: Not Currently    Drug use: No       Review of Systems   Constitutional: Negative for chills and fever  HENT: Negative for congestion, rhinorrhea and sore throat  Eyes: Negative for photophobia, pain and visual disturbance  Respiratory: Negative for cough, chest tightness, shortness of breath and wheezing  Cardiovascular: Negative for chest pain and palpitations  Gastrointestinal: Negative for abdominal pain, diarrhea, nausea and vomiting  Genitourinary: Negative for dysuria, frequency and urgency  Musculoskeletal: Positive for arthralgias and joint swelling  Negative for back pain, neck pain and neck stiffness  Skin: Negative for rash and wound  Neurological: Negative for dizziness, loss of consciousness, syncope, weakness, light-headedness, numbness and headaches  Physical Exam  Physical Exam  Constitutional:       General: She is not in acute distress  Appearance: She is well-developed  She is not diaphoretic  HENT:      Head: Normocephalic and atraumatic  Right Ear: External ear normal       Left Ear: External ear normal    Eyes:      Conjunctiva/sclera: Conjunctivae normal       Pupils: Pupils are equal, round, and reactive to light  Neck:      Musculoskeletal: Normal range of motion and neck supple     Cardiovascular:      Rate and Rhythm: Normal rate and regular rhythm  Heart sounds: Normal heart sounds  No murmur  No friction rub  No gallop  Pulmonary:      Effort: Pulmonary effort is normal  No respiratory distress  Breath sounds: Normal breath sounds  No wheezing  Abdominal:      General: There is no distension  Palpations: Abdomen is soft  Tenderness: There is no abdominal tenderness  There is no guarding  Musculoskeletal:         General: Tenderness and deformity present  Comments: Gross deformity to R ankle  Capillary refill <2 seconds, foot is pink, warm, and dry  Impaired sensation however appears to be symmetric to lower extremities, patient notes is chronic and unchanged  No midline C/T/L TTP  No further bony TTP, decreased ROM, or deformity to b/l UE and LE  No chest wall TTP or pelvic instability  Lymphadenopathy:      Cervical: No cervical adenopathy  Skin:     General: Skin is warm and dry  Capillary Refill: Capillary refill takes less than 2 seconds  Findings: No erythema or rash  Neurological:      Mental Status: She is alert and oriented to person, place, and time  Motor: No abnormal muscle tone  Coordination: Coordination normal    Psychiatric:         Behavior: Behavior normal          Thought Content:  Thought content normal          Judgment: Judgment normal          Vital Signs  ED Triage Vitals   Temperature Pulse Respirations Blood Pressure SpO2   03/11/21 0741 03/11/21 0741 03/11/21 0741 03/11/21 0741 03/11/21 0741   98 2 °F (36 8 °C) 73 18 140/74 93 %      Temp Source Heart Rate Source Patient Position - Orthostatic VS BP Location FiO2 (%)   03/11/21 0741 03/11/21 0741 03/11/21 0741 03/11/21 0741 --   Oral Monitor Lying Right arm       Pain Score       03/11/21 0808       8           Vitals:    03/13/21 2346 03/14/21 0103 03/14/21 0700 03/14/21 0934   BP: 95/51 121/62 94/62 110/55   Pulse: 68 81 72    Patient Position - Orthostatic VS: Lying  Lying Sitting         Visual Acuity      ED Medications  Medications   acetaminophen (TYLENOL) tablet 650 mg (650 mg Oral Given 3/12/21 2145)   ondansetron (ZOFRAN) injection 4 mg ( Intravenous MAR Unhold 3/11/21 2135)   albuterol (PROVENTIL HFA,VENTOLIN HFA) inhaler 2 puff ( Inhalation MAR Unhold 3/11/21 2135)   aspirin chewable tablet 81 mg (81 mg Oral Given 3/14/21 0923)   atorvastatin (LIPITOR) tablet 40 mg (40 mg Oral Given 3/13/21 1700)   citalopram (CeleXA) tablet 10 mg (10 mg Oral Given 3/14/21 0924)   pantoprazole (PROTONIX) EC tablet 20 mg (20 mg Oral Given 3/14/21 0554)   thiamine tablet 100 mg (100 mg Oral Given 5/44/19 9673)   folic acid (FOLVITE) tablet 1 mg (1 mg Oral Given 3/14/21 0923)   calcium carbonate-vitamin D (OSCAL-D) 500 mg-200 units per tablet 1 tablet (1 tablet Oral Given 3/14/21 0924)   docusate sodium (COLACE) capsule 100 mg (100 mg Oral Given 3/14/21 0923)   bisacodyl (DULCOLAX) rectal suppository 10 mg (10 mg Rectal Given 3/14/21 0923)   melatonin tablet 3 mg (3 mg Oral Not Given 3/13/21 2119)   acetaminophen (TYLENOL) tablet 975 mg (975 mg Oral Given 3/14/21 0554)   polyethylene glycol (MIRALAX) packet 17 g (17 g Oral Given 3/14/21 0923)   furosemide (LASIX) tablet 40 mg (40 mg Oral Given 3/14/21 0935)   iron sucrose (VENOFER) 200 mg in sodium chloride 0 9 % 100 mL IVPB (200 mg Intravenous New Bag 3/14/21 0923)   ferrous sulfate tablet 325 mg (has no administration in time range)   fentanyl citrate (PF) 100 MCG/2ML 25 mcg (25 mcg Intravenous Given 3/11/21 0808)   lidocaine (PF) (XYLOCAINE-MPF) 1 % injection 30 mL (30 mL Infiltration Given by Other 3/11/21 0843)   ceFAZolin (ANCEF) IVPB (premix in dextrose) 2,000 mg 50 mL ( Intravenous MAR Unhold 3/11/21 5252)   furosemide (LASIX) injection 20 mg (20 mg Intravenous Given 3/12/21 0915)   furosemide (LASIX) injection 20 mg (20 mg Intravenous Given 3/12/21 1533)   albumin human (FLEXBUMIN) 5 % injection 12 5 g (0 g Intravenous Stopped 3/13/21 0038)   traMADol Maranthony Higgins) tablet 50 mg (50 mg Oral Given 3/13/21 0041)   traMADol (ULTRAM) tablet 25 mg (25 mg Oral Given 3/13/21 6604)       Diagnostic Studies  Results Reviewed     Procedure Component Value Units Date/Time    CBC and differential [[id number]]  (Abnormal) Collected: 03/12/21 0432    Lab Status: Final result Specimen: Blood from Arm, Right Updated: 03/12/21 0512     WBC 6 25 Thousand/uL      RBC 2 56 Million/uL      Hemoglobin 7 8 g/dL      Hematocrit 26 2 %       fL      MCH 30 5 pg      MCHC 29 8 g/dL      RDW 18 7 %      MPV 12 5 fL      Platelets 84 Thousands/uL      nRBC 0 /100 WBCs      Neutrophils Relative 65 %      Immat GRANS % 0 %      Lymphocytes Relative 16 %      Monocytes Relative 16 %      Eosinophils Relative 2 %      Basophils Relative 1 %      Neutrophils Absolute 4 08 Thousands/µL      Immature Grans Absolute 0 02 Thousand/uL      Lymphocytes Absolute 0 97 Thousands/µL      Monocytes Absolute 1 02 Thousand/µL      Eosinophils Absolute 0 13 Thousand/µL      Basophils Absolute 0 03 Thousands/µL     Basic metabolic panel [[id number]]  (Abnormal) Collected: 03/12/21 0432    Lab Status: Final result Specimen: Blood from Arm, Right Updated: 03/12/21 0509     Sodium 142 mmol/L      Potassium 4 8 mmol/L      Chloride 105 mmol/L      CO2 30 mmol/L      ANION GAP 7 mmol/L      BUN 41 mg/dL      Creatinine 1 22 mg/dL      Glucose 67 mg/dL      Calcium 9 7 mg/dL      eGFR 40 ml/min/1 73sq m     Narrative:      [place] guidelines for Chronic Kidney Disease (CKD):     Stage 1 with normal or high GFR (GFR > 90 mL/min/1 73 square meters)    Stage 2 Mild CKD (GFR = 60-89 mL/min/1 73 square meters)    Stage 3A Moderate CKD (GFR = 45-59 mL/min/1 73 square meters)    Stage 3B Moderate CKD (GFR = 30-44 mL/min/1 73 square meters)    Stage 4 Severe CKD (GFR = 15-29 mL/min/1 73 square meters)    Stage 5 End Stage CKD (GFR <15 mL/min/1 73 square meters)  Note: GFR calculation is accurate only with a steady state creatinine    Basic metabolic panel [[id number]]  (Abnormal) Collected: 03/11/21 0807    Lab Status: Final result Specimen: Blood from Arm, Right Updated: 03/11/21 0847     Sodium 138 mmol/L      Potassium 4 9 mmol/L      Chloride 100 mmol/L      CO2 29 mmol/L      ANION GAP 9 mmol/L      BUN 41 mg/dL      Creatinine 1 40 mg/dL      Glucose 98 mg/dL      Calcium 10 6 mg/dL      eGFR 34 ml/min/1 73sq m     Narrative:      [place] guidelines for Chronic Kidney Disease (CKD):     Stage 1 with normal or high GFR (GFR > 90 mL/min/1 73 square meters)    Stage 2 Mild CKD (GFR = 60-89 mL/min/1 73 square meters)    Stage 3A Moderate CKD (GFR = 45-59 mL/min/1 73 square meters)    Stage 3B Moderate CKD (GFR = 30-44 mL/min/1 73 square meters)    Stage 4 Severe CKD (GFR = 15-29 mL/min/1 73 square meters)    Stage 5 End Stage CKD (GFR <15 mL/min/1 73 square meters)  Note: GFR calculation is accurate only with a steady state creatinine    Magnesium [[id number]]  (Normal) Collected: 03/11/21 0807    Lab Status: Final result Specimen: Blood from Arm, Right Updated: 03/11/21 0847     Magnesium 2 2 mg/dL     TSH, 3rd generation with Free T4 reflex [[id number]]  (Normal) Collected: 03/11/21 0807    Lab Status: Final result Specimen: Blood from Arm, Right Updated: 03/11/21 0847     TSH 3RD GENERATON 3 145 uIU/mL     Narrative:      Patients undergoing fluorescein dye angiography may retain small amounts of fluorescein in the body for 48-72 hours post procedure  Samples containing fluorescein can produce falsely depressed TSH values  If the patient had this procedure,a specimen should be resubmitted post fluorescein clearance        Protime-INR [[id number]]  (Abnormal) Collected: 03/11/21 0807    Lab Status: Final result Specimen: Blood from Arm, Right Updated: 03/11/21 0842     Protime 14 7 seconds      INR 1 17    APTT [[id number]]  (Abnormal) Collected: 03/11/21 0807    Lab Status: Final result Specimen: Blood from Arm, Right Updated: 03/11/21 0842     PTT 41 seconds     Troponin I [[id number]]  (Normal) Collected: 03/11/21 0807    Lab Status: Final result Specimen: Blood from Arm, Right Updated: 03/11/21 0841     Troponin I <0 02 ng/mL     CBC and differential [[id number]]  (Abnormal) Collected: 03/11/21 0807    Lab Status: Final result Specimen: Blood from Arm, Right Updated: 03/11/21 0824     WBC 5 21 Thousand/uL      RBC 3 12 Million/uL      Hemoglobin 9 8 g/dL      Hematocrit 30 8 %      MCV 99 fL      MCH 31 4 pg      MCHC 31 8 g/dL      RDW 18 8 %      MPV 11 3 fL      Platelets 572 Thousands/uL      nRBC 0 /100 WBCs      Neutrophils Relative 73 %      Immat GRANS % 0 %      Lymphocytes Relative 15 %      Monocytes Relative 10 %      Eosinophils Relative 2 %      Basophils Relative 0 %      Neutrophils Absolute 3 75 Thousands/µL      Immature Grans Absolute 0 02 Thousand/uL      Lymphocytes Absolute 0 79 Thousands/µL      Monocytes Absolute 0 52 Thousand/µL      Eosinophils Absolute 0 11 Thousand/µL      Basophils Absolute 0 02 Thousands/µL                  XR chest portable   Final Result by Reva Keenan DO (03/13 1431)   Persistent right hilar fullness  There is a new opacity within the right lung base which may represent atelectasis or pneumonia  Possible small right basilar effusion  This examination was marked "immediate notification" in Epic in order to begin the standard process by which the radiology reading room liaison alerts the referring practitioner  Workstation performed: [id number]      XR ankle 3+ vw right   Final Result by Guero Gibson MD (03/12 7267)   Limited study due to overlying artifact  Status post bimalleolar ORIF  Workstation performed: [id number]      XR ankle 2 vw right   Final Result by Jacey Fox MD (03/12 6638)   Fluoroscopic guidance provided for procedure guidance    Please refer to the separate procedure notes for additional details  Workstation performed: [id number]      XR ankle 3+ vw right   Final Result by Angela Nagy MD (03/11 2039)   Fractures of the distal right tibia and fibula  Improved alignment of the talus with in the ankle mortise since an exam from earlier today at 0948 hours  Workstation performed: [id number]      XR ankle 3+ vw right   Final Result by Mariana Snow MD (03/11 1208)   Splinted and partially reduced right ankle bimalleolar fracture-dislocation, unchanged in alignment  Workstation performed: [id number]      XR ankle 2 vw right   Final Result by Gaudencio Donahue MD (03/11 1200)   Interval improvement in alignment of bimalleolar fracture dislocation postreduction  Workstation performed: [id number]      CT head without contrast   Final Result by Gaudencio Donahue MD (03/11 6546)   No acute intracranial abnormality  Workstation performed: [id number]      CT spine cervical without contrast   Final Result by Gaudencio Donahue MD (03/11 8006)   No cervical spine fracture or traumatic malalignment  Workstation performed: [id number]      XR chest 1 view portable   Final Result by Chavez Loaiza MD (01/12 2292)   Cardiomegaly  Slight atypical vascular congestion  Trace pleural effusions  Workstation performed: [id number]      XR ankle 2 vw right   Final Result by Mariana Snow MD (03/11 1109)   Acute, displaced and angulated right ankle fracture-dislocation  This report is concordant with Maria M Oliver PA-C's preliminary interpretation that is documented in the electronic medical record (EPIC)  Workstation performed: [id number]                 Procedures  Procedures         ED Course  ED Course as of Mar 14 1041   Thu Mar 11, 2021   7963 Appears to have intact cap refill in affected foot  Decreased sensation to bilateral feet limiting neurologic exam        0825 Podiatry paged  0872 XR reveal fibular, tibial fractures as well as ankle mortise dislocation         [address] [facility] Reduction performed by podiatry with improved but sub-optimal realignment  Will likely require operative repair per podiatry  Will discuss with SLIM for admission to medicine with podiatry in consult  MDM  Number of Diagnoses or Management Options  Closed fracture of right ankle, initial encounter:   Fall, initial encounter:   Diagnosis management comments: Gross deformity to R ankle after apparently mechanical fall prior to arrival  Cap refill/circulation appears intact at arrival  Decreased sensation to foot but is symmetric and reportedly chronically diminished  Will check basic labs to rule out metabolic cause of fall, trop/EKG for ischemia/ectopy, CT head/c-spine given trauma, limited history, XR chest, XR R ankle  Consult to podiatry for reduction vs possible ORIF  Amount and/or Complexity of Data Reviewed  Clinical lab tests: ordered  Tests in the radiology section of CPT®: ordered    Patient Progress  Patient progress: improved      Disposition  Final diagnoses:   Closed fracture of right ankle, initial encounter   Fall, initial encounter     Time reflects when diagnosis was documented in both MDM as applicable and the Disposition within this note     Time User Action Codes Description Comment    3/11/2021 10:05 AM Yonathan Burdick Add [X13 588E] Closed bimalleolar fracture of right ankle, initial encounter     3/11/2021 12:13 PM Zada Severance Add [G54 648A] Closed fracture of right ankle, initial encounter     3/11/2021 12:13 PM Sanchez Guerra Lucy Coyle Lemhi Fall, initial encounter     3/11/2021 12:57 PM Austine Sill Add [I50 43] Acute on chronic combined systolic and diastolic congestive heart failure ([facility] [address] )     3/11/2021 12:57 PM Austine Sill Add [I48 21] Permanent atrial fibrillation ([facility] [address] )     3/13/2021  7:01 PM Austine Sill Add [D62] Acute blood loss anemia       ED Disposition     ED Disposition Condition Date/Time Comment    Admit Stable Thu Mar 11, 2021 12:13 PM Case was discussed with Khai Mtz and the patient's admission status was agreed to be Admission Status: inpatient status to the service of Dr Angeles Kaplan  Follow-up Information     Follow up With Specialties Details Why Contact Info    Amy Christie DPM Podiatry, Wound Care Follow up in 1 week(s)  [address]  [address] [facility] [address], [address]  Follow up  [address] [facility] [address] [phone]          Current Discharge Medication List      CONTINUE these medications which have NOT CHANGED    Details   furosemide (LASIX) 40 mg tablet Take 40 mg by mouth daily      acetaminophen (TYLENOL) 325 mg tablet Take 2 tablets (650 mg total) by mouth every 6 (six) hours as needed for mild pain, headaches or fever  Qty:  , Refills: 0    Associated Diagnoses: Acute on chronic combined systolic and diastolic congestive heart failure (HCC)      albuterol (PROVENTIL HFA,VENTOLIN HFA) 90 mcg/act inhaler Inhale 2 puffs every 4 (four) hours as needed for wheezing    Comments: Substitution to a formulary equivalent within the same pharmaceutical class is authorized  aspirin 81 MG tablet Take 81 mg by mouth daily  atorvastatin (LIPITOR) 40 mg tablet Take 40 mg by mouth daily      bisacodyl (DULCOLAX) 10 mg suppository Insert 1 suppository (10 mg total) into the rectum daily  Qty: 12 suppository, Refills: 0    Associated Diagnoses: Acute on chronic combined systolic and diastolic congestive heart failure (HCC)      calcium carbonate-vitamin D (OSCAL-D) 500 mg-200 units per tablet Take 1 tablet by mouth daily with breakfast      carvedilol (COREG) 6 25 mg tablet Take 1 tablet (6 25 mg total) by mouth 2 (two) times a day with meals  Qty:  , Refills: 0    Associated Diagnoses: Acute on chronic combined systolic and diastolic congestive heart failure (HCC)      citalopram (CeleXA) 10 mg tablet Take 10 mg by mouth daily        docusate sodium (COLACE) 100 mg capsule Take 100 mg by mouth daily      folic acid (FOLVITE) 1 mg tablet Take 1 mg by mouth daily  melatonin 3 mg Take 1 tablet (3 mg total) by mouth daily at bedtime  Refills: 0    Associated Diagnoses: Acute on chronic combined systolic and diastolic congestive heart failure (HCC)      Multiple Vitamins-Minerals (CENTRUM SILVER PO) Take 1 tablet by mouth daily  omeprazole (PriLOSEC) 20 mg delayed release capsule Take 20 mg by mouth Sunday, Tues, Thurs      psyllium (METAMUCIL) 0 52 G capsule Take 0 52 g by mouth daily  thiamine (VITAMIN B1) 100 mg tablet Take 100 mg by mouth daily  No discharge procedures on file      PDMP Review       Value Time User    PDMP Reviewed  Yes 12/29/2020  1:56 AM Leta Marie DO          ED Provider  Electronically Signed by           Zulema Manuel PA-C  03/14/21 1047

## 2021-03-11 NOTE — ED NOTES
Tried to take patient to floor, admitting at bedside     Jerardo Coates  03/11/21 175 Joyce Goodwin  03/11/21 2584

## 2021-03-11 NOTE — PLAN OF CARE
Problem: Prexisting or High Potential for Compromised Skin Integrity  Goal: Skin integrity is maintained or improved  Description: INTERVENTIONS:  - Identify patients at risk for skin breakdown  - Assess and monitor skin integrity  - Assess and monitor nutrition and hydration status  - Monitor labs   - Assess for incontinence   - Turn and reposition patient  - Assist with mobility/ambulation  - Relieve pressure over bony prominences  - Avoid friction and shearing  - Provide appropriate hygiene as needed including keeping skin clean and dry  - Evaluate need for skin moisturizer/barrier cream  - Collaborate with interdisciplinary team   - Patient/family teaching  - Consider wound care consult   Outcome: Progressing     Problem: Potential for Falls  Goal: Patient will remain free of falls  Description: INTERVENTIONS:  - Assess patient frequently for physical needs  -  Identify cognitive and physical deficits and behaviors that affect risk of falls    -  [place] fall precautions as indicated by assessment   - Educate patient/family on patient safety including physical limitations  - Instruct patient to call for assistance with activity based on assessment  - Modify environment to reduce risk of injury  - Consider OT/PT consult to assist with strengthening/mobility  Outcome: Progressing     Problem: PAIN - ADULT  Goal: Verbalizes/displays adequate comfort level or baseline comfort level  Description: Interventions:  - Encourage patient to monitor pain and request assistance  - Assess pain using appropriate pain scale  - Administer analgesics based on type and severity of pain and evaluate response  - Implement non-pharmacological measures as appropriate and evaluate response  - Consider cultural and social influences on pain and pain management  - Notify physician/advanced practitioner if interventions unsuccessful or patient reports new pain  Outcome: Progressing     Problem: SAFETY ADULT  Goal: Patient will remain free of falls  Description: INTERVENTIONS:  - Assess patient frequently for physical needs  -  Identify cognitive and physical deficits and behaviors that affect risk of falls    -  [place] fall precautions as indicated by assessment   - Educate patient/family on patient safety including physical limitations  - Instruct patient to call for assistance with activity based on assessment  - Modify environment to reduce risk of injury  - Consider OT/PT consult to assist with strengthening/mobility  Outcome: Progressing  Goal: Maintain or return to baseline ADL function  Description: INTERVENTIONS:  -  Assess patient's ability to carry out ADLs; assess patient's baseline for ADL function and identify physical deficits which impact ability to perform ADLs (bathing, care of mouth/teeth, toileting, grooming, dressing, etc )  - Assess/evaluate cause of self-care deficits   - Assess range of motion  - Assess patient's mobility; develop plan if impaired  - Assess patient's need for assistive devices and provide as appropriate  - Encourage maximum independence but intervene and supervise when necessary  - Involve family in performance of ADLs  - Assess for home care needs following discharge   - Consider OT consult to assist with ADL evaluation and planning for discharge  - Provide patient education as appropriate  Outcome: Progressing  Goal: Maintain or return mobility status to optimal level  Description: INTERVENTIONS:  - Assess patient's baseline mobility status (ambulation, transfers, stairs, etc )    - Identify cognitive and physical deficits and behaviors that affect mobility  - Identify mobility aids required to assist with transfers and/or ambulation (gait belt, sit-to-stand, lift, walker, cane, etc )  - [place] fall precautions as indicated by assessment  - Record patient progress and toleration of activity level on Mobility SBAR; progress patient to next Phase/Stage  - Instruct patient to call for assistance with activity based on assessment  - Consider rehabilitation consult to assist with strengthening/weightbearing, etc   Outcome: Progressing     Problem: Knowledge Deficit  Goal: Patient/family/caregiver demonstrates understanding of disease process, treatment plan, medications, and discharge instructions  Description: Complete learning assessment and assess knowledge base    Interventions:  - Provide teaching at level of understanding  - Provide teaching via preferred learning methods  Outcome: Progressing

## 2021-03-11 NOTE — ANESTHESIA PROCEDURE NOTES
Peripheral Block    Reason for block: post-op pain management  Staffing  Anesthesiologist: Robert Rayo DO  Performed: anesthesiologist   Preanesthetic Checklist  Completed: patient identified, site marked, surgical consent, pre-op evaluation, timeout performed, IV checked, risks and benefits discussed and monitors and equipment checked  Peripheral Block  Patient position: left lateral decubitus  Prep: ChloraPrep  Patient monitoring: heart rate, continuous pulse ox and frequent blood pressure checks  Block type: popliteal  Laterality: right  Injection technique: single-shot  Procedures: ultrasound guided, Ultrasound guidance required for the procedure to increase accuracy and safety of medication placement and decrease risk of complications    Ultrasound permanent image savedropivacaine (NAROPIN) 0 5 % perineural infiltration, 15 mL  Needle  Needle type: Stimuplex   Needle gauge: 22 G  Needle length: 10 cm  Needle localization: ultrasound guidance  Assessment  Injection assessment: incremental injection, local visualized surrounding nerve on ultrasound, negative aspiration for heme and no paresthesia on injection  Paresthesia pain: none  Heart rate change: no  Slow fractionated injection: yes  Post-procedure:  site cleaned  patient tolerated the procedure well with no immediate complications

## 2021-03-11 NOTE — ASSESSMENT & PLAN NOTE
PT/OT consulted, will likely need placement  Lives at home alone, but daughter reports that they may move her in with them

## 2021-03-11 NOTE — ANESTHESIA PROCEDURE NOTES
Peripheral Block    Patient location during procedure: holding area  Start time: 3/11/2021 5:00 PM  Reason for block: post-op pain management  Staffing  Anesthesiologist: Robert Rayo DO  Performed: anesthesiologist   Preanesthetic Checklist  Completed: patient identified, site marked, surgical consent, pre-op evaluation, timeout performed, IV checked, risks and benefits discussed and monitors and equipment checked  Peripheral Block  Patient position: supine  Prep: ChloraPrep  Patient monitoring: heart rate, continuous pulse ox and frequent blood pressure checks  Block type: femoral  Laterality: right  Injection technique: single-shot  Procedures: ultrasound guided, Ultrasound guidance required for the procedure to increase accuracy and safety of medication placement and decrease risk of complications  ropivacaine (NAROPIN) 0 5 % perineural infiltration, 15 mL  Needle  Needle type: Stimuplex   Needle gauge: 22 G  Needle length: 10 cm  Needle localization: ultrasound guidance  Assessment  Injection assessment: incremental injection, local visualized surrounding nerve on ultrasound, negative aspiration for heme and no paresthesia on injection  Paresthesia pain: none  Heart rate change: no  Slow fractionated injection: yes  Post-procedure:  site cleaned  patient tolerated the procedure well with no immediate complications

## 2021-03-11 NOTE — ASSESSMENT & PLAN NOTE
EKG reveals patient in afib, paced  S/P PPM  Not on anticoagulation due to falls, c/w aspirin 81mg  Continue coreg 3 125 BID

## 2021-03-11 NOTE — ASSESSMENT & PLAN NOTE
Presented status post fall found to have right malleolar fracture  Podiatry consulted, reduced in the ED but planning taken to OR today   history of systolic heart failure were right bundle maria c block   cardiology consulted for  Cardiac clearance   PT and OT consulted  Will likely need rehab and or placement  NPO  Morphine prn

## 2021-03-12 LAB
ANION GAP SERPL CALCULATED.3IONS-SCNC: 7 MMOL/L (ref 4–13)
BASOPHILS # BLD AUTO: 0.03 THOUSANDS/ΜL (ref 0–0.1)
BASOPHILS NFR BLD AUTO: 1 % (ref 0–1)
BUN SERPL-MCNC: 41 MG/DL (ref 5–25)
CALCIUM SERPL-MCNC: 9.7 MG/DL (ref 8.3–10.1)
CHLORIDE SERPL-SCNC: 105 MMOL/L (ref 100–108)
CO2 SERPL-SCNC: 30 MMOL/L (ref 21–32)
CREAT SERPL-MCNC: 1.22 MG/DL (ref 0.6–1.3)
EOSINOPHIL # BLD AUTO: 0.13 THOUSAND/ΜL (ref 0–0.61)
EOSINOPHIL NFR BLD AUTO: 2 % (ref 0–6)
ERYTHROCYTE [DISTWIDTH] IN BLOOD BY AUTOMATED COUNT: 18.7 % (ref 11.6–15.1)
GFR SERPL CREATININE-BSD FRML MDRD: 40 ML/MIN/1.73SQ M
GLUCOSE SERPL-MCNC: 67 MG/DL (ref 65–140)
HCT VFR BLD AUTO: 24 % (ref 34.8–46.1)
HCT VFR BLD AUTO: 26.2 % (ref 34.8–46.1)
HGB BLD-MCNC: 7.4 G/DL (ref 11.5–15.4)
HGB BLD-MCNC: 7.8 G/DL (ref 11.5–15.4)
IMM GRANULOCYTES # BLD AUTO: 0.02 THOUSAND/UL (ref 0–0.2)
IMM GRANULOCYTES NFR BLD AUTO: 0 % (ref 0–2)
LYMPHOCYTES # BLD AUTO: 0.97 THOUSANDS/ΜL (ref 0.6–4.47)
LYMPHOCYTES NFR BLD AUTO: 16 % (ref 14–44)
MCH RBC QN AUTO: 30.5 PG (ref 26.8–34.3)
MCHC RBC AUTO-ENTMCNC: 29.8 G/DL (ref 31.4–37.4)
MCV RBC AUTO: 102 FL (ref 82–98)
MONOCYTES # BLD AUTO: 1.02 THOUSAND/ΜL (ref 0.17–1.22)
MONOCYTES NFR BLD AUTO: 16 % (ref 4–12)
NEUTROPHILS # BLD AUTO: 4.08 THOUSANDS/ΜL (ref 1.85–7.62)
NEUTS SEG NFR BLD AUTO: 65 % (ref 43–75)
NRBC BLD AUTO-RTO: 0 /100 WBCS
PLATELET # BLD AUTO: 84 THOUSANDS/UL (ref 149–390)
PMV BLD AUTO: 12.5 FL (ref 8.9–12.7)
POTASSIUM SERPL-SCNC: 4.8 MMOL/L (ref 3.5–5.3)
RBC # BLD AUTO: 2.56 MILLION/UL (ref 3.81–5.12)
SODIUM SERPL-SCNC: 142 MMOL/L (ref 136–145)
WBC # BLD AUTO: 6.25 THOUSAND/UL (ref 4.31–10.16)

## 2021-03-12 PROCEDURE — 85014 HEMATOCRIT: CPT | Performed by: PHYSICIAN ASSISTANT

## 2021-03-12 PROCEDURE — 85025 COMPLETE CBC W/AUTO DIFF WBC: CPT | Performed by: STUDENT IN AN ORGANIZED HEALTH CARE EDUCATION/TRAINING PROGRAM

## 2021-03-12 PROCEDURE — 97163 PT EVAL HIGH COMPLEX 45 MIN: CPT

## 2021-03-12 PROCEDURE — 97535 SELF CARE MNGMENT TRAINING: CPT

## 2021-03-12 PROCEDURE — 97530 THERAPEUTIC ACTIVITIES: CPT

## 2021-03-12 PROCEDURE — 99232 SBSQ HOSP IP/OBS MODERATE 35: CPT | Performed by: STUDENT IN AN ORGANIZED HEALTH CARE EDUCATION/TRAINING PROGRAM

## 2021-03-12 PROCEDURE — 92610 EVALUATE SWALLOWING FUNCTION: CPT

## 2021-03-12 PROCEDURE — 80048 BASIC METABOLIC PNL TOTAL CA: CPT | Performed by: STUDENT IN AN ORGANIZED HEALTH CARE EDUCATION/TRAINING PROGRAM

## 2021-03-12 PROCEDURE — 97167 OT EVAL HIGH COMPLEX 60 MIN: CPT

## 2021-03-12 PROCEDURE — 85018 HEMOGLOBIN: CPT | Performed by: PHYSICIAN ASSISTANT

## 2021-03-12 RX ORDER — FUROSEMIDE 10 MG/ML
20 INJECTION INTRAMUSCULAR; INTRAVENOUS ONCE
Status: COMPLETED | OUTPATIENT
Start: 2021-03-12 | End: 2021-03-12

## 2021-03-12 RX ORDER — POLYETHYLENE GLYCOL 3350 17 G/17G
17 POWDER, FOR SOLUTION ORAL DAILY
Status: DISCONTINUED | OUTPATIENT
Start: 2021-03-12 | End: 2021-03-16 | Stop reason: HOSPADM

## 2021-03-12 RX ORDER — FUROSEMIDE 10 MG/ML
20 INJECTION INTRAMUSCULAR; INTRAVENOUS ONCE
Status: DISCONTINUED | OUTPATIENT
Start: 2021-03-12 | End: 2021-03-12

## 2021-03-12 RX ORDER — OXYCODONE HYDROCHLORIDE 5 MG/1
2.5 TABLET ORAL EVERY 4 HOURS PRN
Status: DISCONTINUED | OUTPATIENT
Start: 2021-03-12 | End: 2021-03-13

## 2021-03-12 RX ORDER — ALBUMIN, HUMAN INJ 5% 5 %
12.5 SOLUTION INTRAVENOUS ONCE
Status: COMPLETED | OUTPATIENT
Start: 2021-03-12 | End: 2021-03-13

## 2021-03-12 RX ORDER — FUROSEMIDE 40 MG/1
40 TABLET ORAL DAILY
Status: DISCONTINUED | OUTPATIENT
Start: 2021-03-13 | End: 2021-03-13

## 2021-03-12 RX ADMIN — PANTOPRAZOLE SODIUM 20 MG: 20 TABLET, DELAYED RELEASE ORAL at 05:55

## 2021-03-12 RX ADMIN — ACETAMINOPHEN 975 MG: 325 TABLET, COATED ORAL at 17:47

## 2021-03-12 RX ADMIN — ATORVASTATIN CALCIUM 40 MG: 40 TABLET, FILM COATED ORAL at 16:58

## 2021-03-12 RX ADMIN — OXYCODONE HYDROCHLORIDE 2.5 MG: 5 TABLET ORAL at 05:55

## 2021-03-12 RX ADMIN — FUROSEMIDE 20 MG: 10 INJECTION, SOLUTION INTRAMUSCULAR; INTRAVENOUS at 15:33

## 2021-03-12 RX ADMIN — ALBUMIN (HUMAN) 12.5 G: 12.5 INJECTION, SOLUTION INTRAVENOUS at 22:55

## 2021-03-12 RX ADMIN — ACETAMINOPHEN 975 MG: 325 TABLET, COATED ORAL at 13:51

## 2021-03-12 RX ADMIN — ACETAMINOPHEN 650 MG: 325 TABLET ORAL at 21:45

## 2021-03-12 RX ADMIN — FUROSEMIDE 20 MG: 10 INJECTION, SOLUTION INTRAMUSCULAR; INTRAVENOUS at 09:15

## 2021-03-12 RX ADMIN — ACETAMINOPHEN 975 MG: 325 TABLET, COATED ORAL at 05:55

## 2021-03-12 NOTE — ASSESSMENT & PLAN NOTE
Hx of dysphagia  ST consulted  Per daughter, slurred speech worsening over past week  CT head WNL  Will need rehab, family discussing moving her in for supervision and she was previously independent

## 2021-03-12 NOTE — OP NOTE
OPERATIVE REPORT - Podiatry  PATIENT NAME: Jaswinder Freeman    :  1932  MRN: [id number]  Pt Location: [facility] OR ROOM 01    SURGERY DATE: 3/11/2021    Surgeon(s) and Role:     * Pebbles Kong, DPM - Primary     * Nicole Good, ELA - Assisting     * Madhuri Bland DPM - Assisting    Pre-op Diagnosis:  Closed bimalleolar fracture of right ankle, initial encounter [S82 461A]    Post-Op Diagnosis Codes:     * Closed bimalleolar fracture of right ankle, initial encounter [S82 841A]    Procedure(s) (LRB):  1)OPEN REDUCTION W/ INTERNAL FIXATION (ORIF) RIGHT ANKLE bimalleolar fracture 2) ORIF right tibiofibular syndesmosis (Right)    Specimen(s):  * No specimens in log *    Estimated Blood Loss:   50 mL    Drains:  * No LDAs found *    Anesthesia Type:   LMA with popliteal block    Hemostasis:  Anatomic dissection; Pneumatic thigh tourniquet at 350mmHg for 97 minutes    Materials:  Implant Name Type Inv  Item Serial No   Lot No  LRB No  Used Action   SCREW LCK 2 5 X 10MM SS MONOAX - [id number]  SCREW LCK 2 5 X 10MM SS MONOAX  [employer]  Right 3 Implanted   SCREW LCK 2 5 X 12MM SS MONOAX - [id number]  SCREW LCK 2 5 X 12MM SS MONOAX  [employer]  Right 1 Implanted   SCREW LCK 2 5 X 14MM SS MONOAX - [id number]  SCREW LCK 2 5 X 14MM SS MONOAX  [employer]  Right 2 Implanted   SCREW LCK 3 5 X 12MM MONOAX - [id number]  SCREW LCK 3 5 X 12MM MONOAX  [employer]  Right 1 Implanted       Operative Findings:  Consistent with diagnosis    Complications:   None    Procedure and Technique:     Under mild sedation, the patient was brought into the operating room and placed on the operating room table in the supine position  IV sedation was achieved by anesthesia team and a universal timeout was performed where all parties are in agreement of correct patient, correct procedure and correct site  A pneumatic tourniquet was then placed over the patient's right lower extremity with ample padding   The foot was then prepped and draped in the usual aseptic manner  An esmarch bandage was used to exsangunate the foot and the pneumatic tourniquet was then inflated to 350mmHg  A linear incision was made over the lateral aspect of the fibula, dissection carried down using blunt and sharp instrumentation  Care was taken to retract all vital neural structures  The periosteum was then lifted off the fracture line and it was visible  The fracture was reduced via manual reduction and temporary fixation was obtained using a K-wire  A fibula anatomic locking plate was then applied  Locking screws were inserted distally and a combination of locking and nonlocking screws were inserted proximally  The ankle was then stressed and the syndesmosis noted to be disrupted  Two syndesmotic screws were then inserted bicortically  The medial malleolus was then reduced percutaneously  A 4 0 mm screw was then inserted with a washer  Final fixation and reduction of the ankle fracture was confirmed using fluoroscopy  The surgical site was then irrigated with copious amounts of normal sterile saline  The deep tissue was closed using 2-0 Vicryl and skin was repaired using skin staples  The incision sites were then dressed with Xeroform and dry sterile dressing  A plaster posterior splint was applied  The tourniquet was deflated at approximately 97 min and normal hyperemic response was noted to all digits  The patient tolerated the procedure and anesthesia well without immediate complications and transferred to PACU with vital signs stable  Dr Paresh Main was present during the entire procedure and participated in all key aspects  Lance Madrid DPM  DATE: March 11, 2021  TIME: 7:46 PM      Portions of the record may have been created with voice recognition software  Occasional wrong word or "sound a like" substitutions may have occurred due to the inherent limitations of voice recognition software   Read the chart carefully and recognize, using context, where substitutions have occurred

## 2021-03-12 NOTE — QUICK NOTE
Notified by RN that pt's daughters asking pt to have any ac as she has rxn with ac including swelling      2/2 rxn conveyed will d/c subq heparin now

## 2021-03-12 NOTE — ASSESSMENT & PLAN NOTE
Presented status post fall found to have right malleolar fracture  S/p right malleolar reduction 03/11/2021  PT and OT consulted  Will likely need rehab and or placement  Dysphagia diet

## 2021-03-12 NOTE — UTILIZATION REVIEW
Initial Clinical Review    Admission: Date/Time/Statement:   Admission Orders (From admission, onward)     Ordered        03/11/21 1215  Inpatient Admission  Once                   Orders Placed This Encounter   Procedures    Inpatient Admission     Standing Status:   Standing     Number of Occurrences:   1     Order Specific Question:   Level of Care     Answer:   Med Surg [16]     Order Specific Question:   Estimated length of stay     Answer:   More than 2 Midnights     Order Specific Question:   Certification     Answer:   I certify that inpatient services are medically necessary for this patient for a duration of greater than two midnights  See H&P and MD Progress Notes for additional information about the patient's course of treatment  ED Arrival Information     Expected Arrival Acuity Means of Arrival Escorted By Service Admission Type    - 3/11/2021 07:38 [facility] ([address]) [address] Complaint    fall        Chief Complaint   Patient presents with    Ankle Injury - Major     Pt  brought in via EMS  Pt  fell in bathroom getting her right foot stuck  Pt  has a derformity noted to right ankle  PT  has hx of falls  Assessment/Plan: 79 y/o female presents to ED via EMS admitted inpatient with R malleolar fracture  Pt with PMhx of  Of A-fib, CHF, A-fib s/p PPM (no coumadin d/t falls), HTN, HLD  Pt had a fall this week per daughter pt had several falls this past week alone  Currently reports significant pain and RLE  XR showed R malleolar fracture  Podiatry consulted, fx reduced in the ED but repeat imaging showed concern may not have been completely resolved  Possibly planning for surgery today  Pt with known dysphagia for past several wks  Previously contemplated hospice  On exam pt ill-appearing, rales present  R leg wrapped in dressing, on sling elevated  Npo  Analgesics prn  PT/OT  Cr slightly elevated  Hold lasix currently  Monitor renal function  Continue coreg  MRI brain  OPERATIVE REPORT - Podiatry  SURGERY DATE: 3/11/2021   Procedure(s) (LRB):  1)OPEN REDUCTION W/ INTERNAL FIXATION (ORIF) RIGHT ANKLE bimalleolar fracture 2) ORIF right tibiofibular syndesmosis (Right)  Anesthesia Type:   LMA with popliteal block   Operative Findings:  Consistent with diagnosis    3/12 -- pt reports mild-moderate pain  Post op dressings/splint are to remain clean, dry and intact  Continue current pain management regimen  Elevate extremity with at least two pillows  Strict NWB RLE  Appears volume overloaded  20 mg IV lasix given, will repeat IF diuresis this afternoon  Continue to monitor with possibly resume po tomorrow        ED Triage Vitals   Temperature Pulse Respirations Blood Pressure SpO2   03/11/21 0741 03/11/21 0741 03/11/21 0741 03/11/21 0741 03/11/21 0741   98 2 °F (36 8 °C) 73 18 140/74 93 %      Temp Source Heart Rate Source Patient Position - Orthostatic VS BP Location FiO2 (%)   03/11/21 0741 03/11/21 0741 03/11/21 0741 03/11/21 0741 --   Oral Monitor Lying Right arm       Pain Score       03/11/21 0808       8          Wt Readings from Last 1 Encounters:   03/12/21 84 8 kg (186 lb 15 2 oz)     Additional Vital Signs:   Date/Time  Temp  Pulse  Resp  BP  MAP (mmHg)  SpO2  Calculated FIO2 (%) - Nasal Cannula  Nasal Cannula O2 Flow Rate (L/min)  O2 Device   03/12/21 1200  96 3 °F (35 7 °C)Abnormal   74  14  104/57  66  98 %  36  4 L/min  Nasal cannula   03/12/21 0825  --  62  12  101/48Abnormal   70  96 %  36  4 L/min  Nasal cannula   03/12/21 0824  --  60  12  96/46Abnormal   60  82 %Abnormal   28  2 L/min  Nasal cannula   03/12/21 0726  97 9 °F (36 6 °C)  78  19  111/52  --  100 %  --  --  --   03/11/21 2230  96 5 °F (35 8 °C)Abnormal   80  20  112/54  --  95 %  28  2 L/min  Nasal cannula   03/11/21 2200  96 6 °F (35 9 °C)Abnormal   78  20  102/53  --  96 %  28  2 L/min  Nasal cannula   03/11/21 2130  97 7 °F (36 5 °C)  72  20 107/47Abnormal   --  97 %  --  --  Nasal cannula   03/11/21 2108  97 3 °F (36 3 °C)Abnormal   78  14  107/52  --  99 %  32  3 L/min  Nasal cannula   03/11/21 2000  --  82  13  121/58  --  100 %  44  6 L/min  Simple mask   03/11/21 1959  --  76  14  --  --  86 %Abnormal    36  4 L/min  Nasal cannula   SpO2: pt snoring, mouth breather   placed on 6L simple face mask at 03/11/21 1959 03/11/21 1945  97 3 °F (36 3 °C)Abnormal   74  12  114/55  --  100 %  44  6 L/min  Simple mask   03/11/21 1600  --  69  --  162/92  --  94 %  32  3 L/min  Nasal cannula   03/11/21 0845  --  75  18  139/93  --  100 %  32  3 L/min  Nasal cannula   03/11/21 0741  98 2 °F (36 8 °C)  73  18  140/74  --  93 %  --  --  None (Room air)       Pertinent Labs/Diagnostic Test Results:   EKG -- A-fib, paced      Results from last 7 days   Lab Units 03/12/21  0432 03/11/21  0807   WBC Thousand/uL 6 25 5 21   HEMOGLOBIN g/dL 7 8* 9 8*   HEMATOCRIT % 26 2* 30 8*   PLATELETS Thousands/uL 84* 112*   NEUTROS ABS Thousands/µL 4 08 3 75     Results from last 7 days   Lab Units 03/12/21  0432 03/11/21  0807   SODIUM mmol/L 142 138   POTASSIUM mmol/L 4 8 4 9   CHLORIDE mmol/L 105 100   CO2 mmol/L 30 29   ANION GAP mmol/L 7 9   BUN mg/dL 41* 41*   CREATININE mg/dL 1 22 1 40*   EGFR ml/min/1 73sq m 40 34   CALCIUM mg/dL 9 7 10 6*   MAGNESIUM mg/dL  --  2 2     Results from last 7 days   Lab Units 03/12/21  0432 03/11/21  0807   GLUCOSE RANDOM mg/dL 67 98     Results from last 7 days   Lab Units 03/11/21  0807   TROPONIN I ng/mL <0 02     Results from last 7 days   Lab Units 03/11/21  0807   PROTIME seconds 14 7*   INR  1 17   PTT seconds 41*     Results from last 7 days   Lab Units 03/11/21  0807   TSH 3RD GENERATON uIU/mL 3 145       ED Treatment:   Medication Administration from 03/11/2021 0738 to 03/11/2021 1458       Date/Time Order Dose Route Action     03/11/2021 0808 fentanyl citrate (PF) 100 MCG/2ML 25 mcg 25 mcg Intravenous Given     03/11/2021 0843 lidocaine (PF) (XYLOCAINE-MPF) 1 % injection 30 mL 30 mL Infiltration Given by Other     03/11/2021 1119 diazepam (VALIUM) injection 2 5 mg 2 5 mg Intravenous Given     Past Medical History:   Diagnosis Date    Arthritis     Cancer ([facility] [address] )     breast    Cardiac disease     afib    [facility]congestive [facility] failure) ([facility])     Hyperlipidemia     Hypertension     Stroke ([facility] [address] )     TIA    TIA (transient ischemic attack)      Present on Admission:   Closed right ankle fracture   Atrial fibrillation (HCC)   Essential hypertension   Ambulatory dysfunction      Admitting Diagnosis: Permanent atrial fibrillation (HCC) [I48 21]  Acute on chronic combined systolic and diastolic congestive heart failure (HCC) [I50 43]  Closed fracture of right ankle, initial encounter [S84 751A]  Closed bimalleolar fracture of right ankle, initial encounter [M93 311K]  Age/Sex: 80 y o  female  Admission Orders:  Scheduled Medications:  acetaminophen, 975 mg, Oral, Q6H [facility]  aspirin, 81 mg, Oral, Daily  atorvastatin, 40 mg, Oral, Daily With Dinner  bisacodyl, 10 mg, Rectal, Daily  calcium carbonate-vitamin D, 1 tablet, Oral, Daily With Breakfast  carvedilol, 3 125 mg, Oral, BID With Meals  citalopram, 10 mg, Oral, Daily  docusate sodium, 100 mg, Oral, Daily  folic acid, 1 mg, Oral, Daily  furosemide, 20 mg, Intravenous, Once  [START ON 3/13/2021] furosemide, 40 mg, Oral, Daily  melatonin, 3 mg, Oral, HS  pantoprazole, 20 mg, Oral, Early Morning  polyethylene glycol, 17 g, Oral, Daily  thiamine, 100 mg, Oral, Daily    Continuous IV Infusions:     PRN Meds:  acetaminophen, 650 mg, Oral, Q6H PRN  albuterol, 2 puff, Inhalation, Q4H PRN  diazepam, 2 5 mg, Intravenous, Q6H PRN  naloxone, 0 04 mg, Intravenous, Q1MIN PRN  ondansetron, 4 mg, Intravenous, Q6H PRN 3/11 x1  oxyCODONE, 2 5 mg, Oral, Q4H PRN 3/12 x1        IP CONSULT TO PODIATRY  IP CONSULT TO CARDIOLOGY    Network Utilization Review Department  ATTENTION: Please call with any questions or concerns to [phone] and carefully listen to the prompts so that you are directed to the right person  All voicemails are confidential   Megan Castro all requests for admission clinical reviews, approved or denied determinations and any other requests to dedicated fax number below belonging to the campus where the patient is receiving treatment   List of dedicated fax numbers for the Facilities:  [address] DENIALS (Administrative/Medical Necessity) [phone]   [address] (Maternity/NICU/Pediatrics) [phone]   [address] [place] [address] Dr Macie Walters 5855 (  Raymon Dalton "Meredith" 103) [address] [place] [address] [address] [place]) [facility] [place] [address] [phone]

## 2021-03-12 NOTE — ASSESSMENT & PLAN NOTE
Wt Readings from Last 3 Encounters:   03/12/21 84 8 kg (186 lb 15 2 oz)   02/08/21 84 8 kg (187 lb)   02/04/21 85 kg (187 lb 6 3 oz)     Appears volume overloaded  Diuresed with IV lasix 20mg, will repeat lasix this afternoon  Resume PO tmr, continue to evaluated if further IV diuresis required

## 2021-03-12 NOTE — SOCIAL WORK
CM saw daughter at bedside and attempted to go into room to complete social work assessment and  discuss SNF rehab  By the time CM returned to room, daughter had left and pt was sleeping  CM left list of STR at bedside  CM was told pt is confused and it is better to speak with pt's family re: d/c planning  CM left VM for pt's daughter Jeremy Points [phone]     Later, Beth called back  Link Christina is POA  Pt lives alone in a row home in [place]; with first floor set up; has Ramp access  Pt has a lot of support from family, and has VNA through [facility] [employer], [place] and OT  PCP is Aide Fountain DO  Pt uses [facility] on [address] in [place]    [facility] was at [facility] after a fall around [place] time for about 3 weeks  A post acute care recommendation was made by your care team for STR  Discussed Freedom of Choice with POA  List of facilities given to POA via in person  POA aware the list is custom filtered for them by preference  and that [facility] post acute providers are designated  She asked that ref be sent to [facility] and will look over list for additional choices with siblings  CM following

## 2021-03-12 NOTE — ASSESSMENT & PLAN NOTE
EKG reveals patient in afib, paced  S/P PPM  Not on anticoagulation due to falls, c/w aspirin 81mg  Resume BB when BP improves, monitor while diuresing

## 2021-03-12 NOTE — PHYSICAL THERAPY NOTE
PT EVALUATION (10:18-10:40= 22 min)    Pt  Name: Ashley Samaniego  Pt  Age: 80 y o  MRN: [id number]  LENGTH OF STAY: 1    Patient Active Problem List   Diagnosis    Atrial fibrillation ([facility] )    Essential hypertension    Left lower lobe pneumonia    Acute on chronic combined systolic and diastolic congestive heart failure ([facility] )    Cardiomyopathy ([place] [address] )    Mitral regurgitation    Acute renal failure superimposed on stage 2 chronic kidney disease (HCC)    Ambulatory dysfunction    Toxic metabolic encephalopathy    Hypothermia    Thrombocytopenia ([facility])    Anemia    Hyponatremia    Hyperkalemia    Erythema of lower extremity    Elevated TSH    Goals of care, counseling/discussion    Dysphagia    Closed right ankle fracture    Slurred speech    Chronic systolic heart failure (HCC)    Stage 3b chronic kidney disease    Sick sinus syndrome ([facility])       Admitting Diagnoses:   Permanent atrial fibrillation ([facility]) [I48 21]  Acute on chronic combined systolic and diastolic congestive heart failure (HCC) [I50 43]  Closed fracture of right ankle, initial encounter [S89 921A]  Closed bimalleolar fracture of right ankle, initial encounter [Q96 962E]    Past Medical History:   Diagnosis Date    Arthritis     Cancer ([place] [address] )     breast    Cardiac disease     afib    CHF (congestive heart failure) ([facility])     Hyperlipidemia     Hypertension     Stroke ([place] [address] )     TIA    TIA (transient ischemic attack)        Past Surgical History:   Procedure Laterality Date    BREAST SURGERY      right lumpectomy    CARDIAC PACEMAKER PLACEMENT      CORONARY STENT PLACEMENT      TOTAL HIP ARTHROPLASTY Bilateral        Imaging Studies:  XR ankle 2 vw right   Final Result by Martin Raymundo MD (03/12 7339)   Fluoroscopic guidance provided for procedure guidance  Please refer to the separate procedure notes for additional details         Workstation performed: [id number]      XR ankle 3+ vw right   Final Result by [facility] Lele Rosales MD (03/11 2039)   Fractures of the distal right tibia and fibula  Improved alignment of the talus with in the ankle mortise since an exam from earlier today at 0948 hours  Workstation performed: [id number]      XR ankle 3+ vw right   Final Result by Eva Omalley MD (03/11 1208)   Splinted and partially reduced right ankle bimalleolar fracture-dislocation, unchanged in alignment  Workstation performed: [id number]      XR ankle 2 vw right   Final Result by María Berg MD (03/11 1200)   Interval improvement in alignment of bimalleolar fracture dislocation postreduction  Workstation performed: [id number]      CT head without contrast   Final Result by María Berg MD (03/11 3083)   No acute intracranial abnormality  Workstation performed: [id number]      CT spine cervical without contrast   Final Result by María Berg MD (03/11 3205)   No cervical spine fracture or traumatic malalignment  Workstation performed: [id number]      XR chest 1 view portable   Final Result by Beatriz Zhu MD (04/42 4322)   Cardiomegaly  Slight atypical vascular congestion  Trace pleural effusions  Workstation performed: [id number]      XR ankle 2 vw right   Final Result by Eva Omalley MD (03/11 1109)   Acute, displaced and angulated right ankle fracture-dislocation  This report is concordant with Sid Barba PA-C's preliminary interpretation that is documented in the electronic medical record (EPIC)  Workstation performed: [id number]      XR ankle 3+ vw right    (Results Pending)        03/12/21 1110   PT Last Visit   PT Visit Date 03/12/21   Note Type   Note type Evaluation   Pain Assessment   Pain Assessment Tool 0-10   Pain Score 4   Pain Location/Orientation Orientation: Right;Location: [facility]   Hospital Pain Intervention(s) Repositioned; Ambulation/increased activity; Elevated;Rest;Emotional support   Home Living   Type of [facility] Multi-level;Performs ADLs on one level;Able to live on main level with bedroom/bathroom  (4-[address] [place]; 1st floor setup)   Bathroom Shower/Tub Tub/shower unit   [employer] Raised   Bathroom Equipment Grab bars in shower; Shower chair;Grab bars around toilet   [address] [place];Life alert   Additional Comments Pt poor historian 2* inc lethargy; ?accuracy of information provided   Prior Function   Level of [place] Independent with ADLs and functional mobility  (w/ RW)   Lives With Alone   Receives Help From Family   ADL Assistance Needs assistance   Falls in the last 6 months 1 to 4  (Pt reports 3 falls in 3 days)   Vocational Retired   Comments Pt poor historian 2* inc lethargy; ?accuracy of information provided; (-) ; Pt reported having PT/OT 2x/wk at home   Restrictions/Precautions   Weight Bearing Precautions Per Order Yes   RLE Weight Bearing Per Order NWB   Braces or Orthoses Splint  (Posterior splint)   Other Precautions Cognitive; Chair Alarm; Bed Alarm;WBS;O2;Fall Risk;Pain  (NWB RLE; 4L O2 NC)   General   Family/Caregiver Present No   Cognition   Overall Cognitive Status Impaired   Arousal/Participation Lethargic   Orientation Level Oriented to person;Disoriented to time;Disoriented to situation  (oriented to general place)   Following Commands Follows one step commands with increased time or repetition   Comments Pt difficult to arouse; required consistent sternal rub to attend to cues;  Pt appeared more awake following one sit to stand transfer   RUE Assessment   RUE Assessment   (refer to OT)   LUE Assessment   LUE Assessment   (refer to OT)   RLE Assessment   RLE Assessment X   Strength RLE   R Hip Flexion 3+/5   R Knee Flexion 3+/5   R Knee Extension 3+/5   R Ankle Dorsiflexion   (Unable to test 2* posterior splint and WB status)   R Ankle Plantar Flexion   (Unable to test 2* posterior splint and WB status)   LLE Assessment   LLE Assessment X   Strength LLE   L Hip Flexion 3+/5   L Knee Flexion 3+/5   L Knee Extension 4-/5   L Ankle Dorsiflexion 4-/5   L Ankle Plantar Flexion 3+/5   Bed Mobility   Supine to Sit 3  Moderate assistance   Additional items Assist x 2;HOB elevated; Bedrails; Increased time required;Verbal cues;LE management   Additional Comments Cues for technique and safety   Transfers   Sit to Stand 2  Maximal assistance   Additional items Assist x 2; Increased time required;Verbal cues   Stand to Sit 2  Maximal assistance   Additional items Assist x 2;Armrests; Increased time required;Verbal cues   Additional Comments Pt unable to follow NWB on RLE during standing; cues for techique and safety; consistent VC required for weight bearing status; Ambulation/Elevation   Gait pattern Not appropriate  (Pt unable to follow NWB on RLE during standing)   Balance   Static Sitting Fair   Dynamic Sitting Fair -   Static Standing Poor  (w/ RW)   Dynamic Standing Poor -  (w/ RW)   Ambulatory Zero  (w/ RW)   Endurance Deficit   Endurance Deficit Yes   Endurance Deficit Description pain; fatigue; weakness   Activity Tolerance   Activity Tolerance Patient limited by pain; Patient limited by fatigue;Treatment limited secondary to medical complications (Comment)   Medical Staff Made Aware OT Radha   Nurse Made Aware RN Xenia   Assessment   Prognosis Fair   Problem List Decreased strength;Decreased range of motion;Decreased endurance; Impaired balance;Decreased mobility; Decreased cognition; Impaired judgement;Decreased safety awareness;Orthopedic restrictions;Pain  (NWB RLE)   Assessment Pt  88 y  o female presents with w/ right foot pain s/p fall  Pt admitted for Closed right ankle fracture w/ frequent falls,Closed bimalleolar fracture of right ankle  S/p open reducation w/ internal fixation R ankle bimalleolar fx and ORIF R tibiofubular syndesmosis on 3/11/21  Pt referred to PT for functional mobility evaluation & D/C planning w/ orders of up and OOB as tolerated and NWB on RLE   Upon arrival pt on 4L O2 NC  Pt demonstrated dec cognition and was only oriented to person and general place  Pt poor historian and ?accuracy of information provided for home environment and PLOF 2* inc lethargy and dec cognition  Consistent sternal rub required to inc arousal  Pt states she does not use home O2  PTA, pt reports being I w/ RW  Pt reports previous rehab and states she was only home for 4 days prior to this admission  Prior to evaluation of functional mobility, pt was educated on weight bearing status  During evaluation, deficits included dec mobility, balance, ambulation  Pt demonstrated dec endurance and tolerance to activity  Evaluation of functional mobility required modAx2 for supine to sit; maxAx2 for transfers  Pt was unable to maintain NWB on RLE during standing when attempting to amb therefore amb was not appropriate at this time  Pt demonstrated inc arousal following sit to stand transfer  Denies reports of dizziness t/o session  At end of session, pt back in bed in stable condition, call bell & phone in reach, bed alarm activated  Pt was educated on fall precautions and reinforced w/ good understanding  Pt would benefit from continued PT to address deficits as defined above and maximize level of independence with functional mobility and safety  The patient's AM-PAC Basic Mobility Inpatient Short Form Low Function Raw Score 12 , Standardized Score is 18 33  A standardized score less 42 9 suggests the patient may benefit from discharge to post-acute rehab services  Please also refer to the recommendation of the Physical Therapist for safe discharge planning  From PT/mobility standpoint recommendation for D/C STR, when medically cleared based on objective measures above, current function, dec family/caregiver support and dec cognition  CM to follow  Nsg staff to continue to mobilized pt w/ onur lift OOB in chair if appropriate as tolerated to prevent further decline in function  Nsg notified   After IE, pt performed further functional mobility  Please see PT treatment note below for details  Barriers to Discharge Inaccessible home environment;Decreased caregiver support   Barriers to Discharge Comments Stairs; lives alone   Goals   Patient Goals None stated 2* lethargy   STG Expiration Date 03/26/21   Short Term Goal #1 1) Inc overall LE strength by 1/2 MMT grade to improve functional mobility; 2) Pt will demonstrate improved bed mobility with S to dec caregiver burden; 3) Pt will demonstrate improved transfers w/ modAx1 for inc safety; 4) PT to see for ambulation when appropriate; 5) PT for ongoing patient and caregiver education; 6) Pt will maintain NWB on RLE t/o PT   PT Treatment Day 0   Plan   Treatment/Interventions Functional transfer training;LE strengthening/ROM; Therapeutic exercise; Endurance training;Patient/family training;Bed mobility;Spoke to nursing;OT   PT Frequency Other (Comment)  (3-5x/wk)   Recommendation   PT Discharge Recommendation Post-Acute Rehabilitation Services  (STR)   Equipment Recommended Walker  (Pt owns walker)   PT - OK to Discharge Yes  (to STR when medically cleared)   AM-PAC Basic Mobility Inpatient   Turning in Bed Without Bedrails 2   Lying on Back to Sitting on Edge of Flat Bed 2   Moving Bed to Chair 1   Standing Up From Chair 1   Walk in Room 1   Climb 3-5 Stairs 1   Basic Mobility Inpatient Raw Score 8   Turning Head Towards Sound 2   Follow Simple Instructions 2   Low Function Basic Mobility Raw Score 12   Low Function Basic Mobility Standardized Score 18 33   Hx/personal factors: co-morbidities, inaccessible home, dec caregiver support, home alone, advanced age, use of AD, dec cognition, pain, WB restrictions, h/o of falls, fall risk, assist w/ ADL's and O2  Examination: dec mobility, dec balance, dec endurance, dec amb, risk for falls, pain, dec cognition, WB restrictions  Clinical: unpredictable (ongoing medical status, abnormal lab values, risk for falls and pain mgt)  Complexity: high     PT TREATMENT NOTE:    TIME IN: 10:40  TIME OUT: 11:10  TOTAL TIME: 30 min    After IE, pt tolerated further functional mobility  Pt on 4L O2 NC  Trialed RA during mobility  Quick move device was used to transfer pt OOB in chair  Pt required physical assist to maintain NWB RLE w/ use of quick move  Pt required maxAx2 for sit<>stand + assist of another to manage RLE to maintain NWB on RLE  Seated OOB in chair pts /56, SpO2 78% on RA and pt demonstrated inc lethargy and fatigue  W/ rest pt SpO2 inc to 99% w/ 4L O2 NC  Pt was transferred using the quick move from chair to bed based on current status  Sit to supine required minAx2 and pt demonstrated inc ability to manage LEs  [place] was performed in bed 2* urinary incontinence t/o session  Rolling R and L required minAx2 w/ VC for hand placement  BP supine in bed post session 97/54 w/ SpO2 90% on 4L O2 NC  Pt required inc time and redirection to complete tasks 2* inc lethargy and dec cognition  Nsg notified  Will continue PT per POC       [employer]

## 2021-03-12 NOTE — SOCIAL WORK
[address] [facility] and [facility] called  They are active with patient for RN, PT, OT, HHA  Ref sent to them in ecin

## 2021-03-12 NOTE — PLAN OF CARE
Problem: PHYSICAL THERAPY ADULT  Goal: Performs mobility at highest level of function for planned discharge setting  See evaluation for individualized goals  Description: Treatment/Interventions: Functional transfer training, LE strengthening/ROM, Therapeutic exercise, Endurance training, Patient/family training, Bed mobility, Spoke to nursing, OT  Equipment Recommended: Walker(Pt owns walker)       See flowsheet documentation for full assessment, interventions and recommendations  Note: Prognosis: Fair  Problem List: Decreased strength, Decreased range of motion, Decreased endurance, Impaired balance, Decreased mobility, Decreased cognition, Impaired judgement, Decreased safety awareness, Orthopedic restrictions, Pain(NWB RLE)  Assessment: Pt  88 y  o female presents with w/ right foot pain s/p fall  Pt admitted for Closed right ankle fracture w/ frequent falls,Closed bimalleolar fracture of right ankle  S/p open reducation w/ internal fixation R ankle bimalleolar fx and ORIF R tibiofubular syndesmosis on 3/11/21  Pt referred to PT for functional mobility evaluation & D/C planning w/ orders of up and OOB as tolerated and NWB on RLE  Upon arrival pt on 4L O2 NC  Pt demonstrated dec cognition and was only oriented to person and general place  Pt poor historian and ?accuracy of information provided for home environment and PLOF 2* inc lethargy and dec cognition  Consistent sternal rub required to inc arousal  Pt states she does not use home O2  PTA, pt reports being I w/ RW  Pt reports previous rehab and states she was only home for 4 days prior to this admission  Prior to evaluation of functional mobility, pt was educated on weight bearing status  During evaluation, deficits included dec mobility, balance, ambulation  Pt demonstrated dec endurance and tolerance to activity  Evaluation of functional mobility required modAx2 for supine to sit; maxAx2 for transfers   Pt was unable to maintain NWB on RLE during standing when attempting to amb therefore amb was not appropriate at this time  Pt demonstrated inc arousal following sit to stand transfer  Denies reports of dizziness t/o session  At end of session, pt back in bed in stable condition, call bell & phone in reach, bed alarm activated  Pt was educated on fall precautions and reinforced w/ good understanding  Pt would benefit from continued PT to address deficits as defined above and maximize level of independence with functional mobility and safety  The patient's AM-PAC Basic Mobility Inpatient Short Form Low Function Raw Score 12 , Standardized Score is 18 33  A standardized score less 42 9 suggests the patient may benefit from discharge to post-acute rehab services  Please also refer to the recommendation of the Physical Therapist for safe discharge planning  From PT/mobility standpoint recommendation for D/C STR, when medically cleared based on objective measures above, current function, dec family/caregiver support and dec cognition  CM to follow  Nsg staff to continue to mobilized pt w/ onur lift OOB in chair if appropriate as tolerated to prevent further decline in function  Nsg notified  After IE, pt performed further functional mobility  Please see PT treatment note below for details  Barriers to Discharge: Inaccessible home environment, Decreased caregiver support  Barriers to Discharge Comments: Stairs; lives alone  PT Discharge Recommendation: Post-Acute Rehabilitation Services(STR)     PT - OK to Discharge: Yes(to STR when medically cleared)    See flowsheet documentation for full assessment

## 2021-03-12 NOTE — DISCHARGE INSTRUCTIONS
Discharge Instructions - Podiatry    Weight Bearing Status:  Nonweightbearing right lower extremity                       Wound Care:  Please leave dressings/splint clean, dry and intact until postoperative visit  Follow-up appointment instructions: Please make an appointment within one week(s) of discharge with Dr Romi Roblero   Contact sooner if any increase in pain, or signs of infection occur

## 2021-03-12 NOTE — DISCHARGE INSTR - DIET
Assessment:  Likes the joesph ensure liquid  Needs assist w/ feeding unless she can hold it in her hand  Mildly prolonged mastication  No s/s aspiration  Plan/Recommendations:  Continue level 2 mechanical w/ thin  Continue ensure supplements  Assist as needed  ? Further upgrade

## 2021-03-12 NOTE — PROGRESS NOTES
[address]  Progress Note - Dany Finders 5/5/1932, 80 y o  female MRN: [id number]  Unit/Bed#: E2 -01 Encounter: [id number]  Primary Care Provider: Guillaume Nation DO   Date and time admitted to hospital: 3/11/2021  7:39 AM    * Closed right ankle fracture  Assessment & Plan  Presented status post fall found to have right malleolar fracture  S/p right malleolar reduction 03/11/2021  PT and OT consulted  Will likely need rehab and or placement  Dysphagia diet    Stage 3b chronic kidney disease  Assessment & Plan  Lab Results   Component Value Date    EGFR 40 03/12/2021    EGFR 34 03/11/2021    EGFR 37 02/04/2021    CREATININE 1 22 03/12/2021    CREATININE 1 40 (H) 03/11/2021    CREATININE 1 29 02/04/2021     Currently stable  Monitor renal fxns      Chronic systolic heart failure (HCC)  Assessment & Plan  Wt Readings from Last 3 Encounters:   03/12/21 84 8 kg (186 lb 15 2 oz)   02/08/21 84 8 kg (187 lb)   02/04/21 85 kg (187 lb 6 3 oz)     Appears volume overloaded  Diuresed with IV lasix 20mg, will repeat lasix this afternoon  Resume PO tmr, continue to evaluated if further IV diuresis required          Slurred speech  Assessment & Plan  Hx of dysphagia  ST consulted  Per daughter, slurred speech worsening over past week  CT head WNL  Will need rehab, family discussing moving her in for supervision and she was previously independent    Ambulatory dysfunction  Assessment & Plan  PT/OT consulted, will likely need placement  Lives at home alone, but daughter reports that they may move her in with them    Essential hypertension  Assessment & Plan  Currently normotensive, holding coreg per diuresing with IV lasix    Atrial fibrillation (HCC)  Assessment & Plan  EKG reveals patient in afib, paced  S/P PPM  Not on anticoagulation due to falls, c/w aspirin 81mg  Resume BB when BP improves, monitor while diuresing        VTE Pharmacologic Prophylaxis:   Pharmacologic: patient family declining RX  Mechanical VTE Prophylaxis in Place: Yes    Patient Centered Rounds: I have performed bedside rounds with nursing staff today  Discussions with Specialists or Other Care Team Provider: Podiatry    Education and Discussions with Family / Patient: Patient, daughter at bedside    Time Spent for Care: 30 minutes  More than 50% of total time spent on counseling and coordination of care as described above  Current Length of Stay: 1 day(s)    Current Patient Status: Inpatient   Certification Statement: The patient will continue to require additional inpatient hospital stay due to pending PT, STR evaluation    Discharge Plan: 2-3 days    Code Status: Level 3 - DNAR and DNI      Subjective:   Patient seen today on   No reports overnight  Currently eating well with no complaints at this time  Objective:     Vitals:   Temp (24hrs), Av 1 °F (36 2 °C), Min:96 3 °F (35 7 °C), Max:97 9 °F (36 6 °C)    Temp:  [96 3 °F (35 7 °C)-97 9 °F (36 6 °C)] 96 3 °F (35 7 °C)  HR:  [60-82] 74  Resp:  [12-20] 14  BP: ()/(46-92) 104/57  SpO2:  [82 %-100 %] 98 %  Body mass index is 32 09 kg/m²  Input and Output Summary (last 24 hours): Intake/Output Summary (Last 24 hours) at 3/12/2021 1445  Last data filed at 3/12/2021 0934  Gross per 24 hour   Intake 700 ml   Output 191 ml   Net 509 ml       Physical Exam:     Physical Exam  Vitals signs and nursing note reviewed  Constitutional:       Appearance: She is ill-appearing  HENT:      Head: Normocephalic and atraumatic  Eyes:      Conjunctiva/sclera: Conjunctivae normal    Cardiovascular:      Rate and Rhythm: Normal rate  Heart sounds: Normal heart sounds  Pulmonary:      Breath sounds: Rales present  No wheezing or rhonchi  Abdominal:      General: Bowel sounds are normal  There is no distension  Palpations: Abdomen is soft  Tenderness: There is no abdominal tenderness  Musculoskeletal: Normal range of motion  General: No swelling  Comments: Right leg wrapped in dressing, on sling elevated   Skin:     General: Skin is warm and dry  Neurological:      Mental Status: She is alert  Mental status is at baseline  Additional Data:     Labs:    Results from last 7 days   Lab Units 03/12/21  0432   WBC Thousand/uL 6 25   HEMOGLOBIN g/dL 7 8*   HEMATOCRIT % 26 2*   PLATELETS Thousands/uL 84*   NEUTROS PCT % 65   LYMPHS PCT % 16   MONOS PCT % 16*   EOS PCT % 2     Results from last 7 days   Lab Units 03/12/21  0432   SODIUM mmol/L 142   POTASSIUM mmol/L 4 8   CHLORIDE mmol/L 105   CO2 mmol/L 30   BUN mg/dL 41*   CREATININE mg/dL 1 22   ANION GAP mmol/L 7   CALCIUM mg/dL 9 7   GLUCOSE RANDOM mg/dL 67     Results from last 7 days   Lab Units 03/11/21  0807   INR  1 17                       * I Have Reviewed All Lab Data Listed Above  * Additional Pertinent Lab Tests Reviewed: All Labs For Current Hospital Admission Reviewed    Imaging:    Xr Chest 1 View Portable    Result Date: 3/11/2021  Impression: Cardiomegaly  Slight atypical vascular congestion  Trace pleural effusions  Workstation performed: [id number]    Xr Ankle 2 Vw Right    Result Date: 3/12/2021  Impression: Fluoroscopic guidance provided for procedure guidance  Please refer to the separate procedure notes for additional details  Workstation performed: [id number]    Xr Ankle 2 Vw Right    Result Date: 3/11/2021  Impression: Interval improvement in alignment of bimalleolar fracture dislocation postreduction  Workstation performed: [id number]    Xr Ankle 2 Vw Right    Result Date: 3/11/2021  Impression: Acute, displaced and angulated right ankle fracture-dislocation  This report is concordant with [place] PA-C's preliminary interpretation that is documented in the electronic medical record (EPIC)  Workstation performed: [id number]    Xr Ankle 3+ Vw Right    Result Date: 3/12/2021  Impression: Limited study due to overlying artifact  Status post bimalleolar ORIF  Workstation performed: [id number]    Xr Ankle 3+ Vw Right    Result Date: 3/11/2021  Impression: Fractures of the distal right tibia and fibula  Improved alignment of the talus with in the ankle mortise since an exam from earlier today at 0948 hours  Workstation performed: [id number]    Xr Ankle 3+ Vw Right    Result Date: 3/11/2021  Impression: Splinted and partially reduced right ankle bimalleolar fracture-dislocation, unchanged in alignment  Workstation performed: [id number]    Ct Head Without Contrast    Result Date: 3/11/2021  Impression: No acute intracranial abnormality  Workstation performed: [id number]    Ct Spine Cervical Without Contrast    Result Date: 3/11/2021  Impression: No cervical spine fracture or traumatic malalignment    Workstation performed: [id number]    Recent Cultures (last 7 days):           Last 24 Hours Medication List:   Current Facility-Administered Medications   Medication Dose Route Frequency Provider Last Rate    acetaminophen  650 mg Oral Q6H PRN Peralta Baas, DPM      acetaminophen  975 mg Oral Q6H [address] Peralta Baas, DPM      albuterol  2 puff Inhalation Q4H PRN Peralta Baas, DPM      aspirin  81 mg Oral Daily Peralta Nadya, DPM      atorvastatin  40 mg Oral Daily With [employer], DPM      bisacodyl  10 mg Rectal Daily Fabian Baas, DPM      calcium carbonate-vitamin D  1 tablet Oral Daily With Breakfast Fabian Covington, DPM      carvedilol  3 125 mg Oral BID With Meals Fabian Covington, DPM      citalopram  10 mg Oral Daily Peralta Baas, DPM      diazepam  2 5 mg Intravenous Q6H PRN Fabian Baas, DPM      docusate sodium  100 mg Oral Daily Peralta Baas, DPM      folic acid  1 mg Oral Daily Peralta Baas, DPM      furosemide  20 mg Intravenous Once Leanne Coots, MD  Denver Beal ON 3/13/2021] furosemide  40 mg Oral Daily Estefany Orozco MD      melatonin  3 mg Oral HS Fabian Covington, DPM      naloxone 0 04 mg Intravenous Q1MIN PRN Shante Jnig, DPM      ondansetron  4 mg Intravenous Q6H PRN Shante Jing, DPM      oxyCODONE  2 5 mg Oral Q4H PRN Ivet Whyte MD      pantoprazole  20 mg Oral Early Morning Shante Jing, DPM      polyethylene glycol  17 g Oral Daily Ivet Whyte MD      thiamine  100 mg Oral Daily Shante Jing, DPM          Today, Patient Was Seen By: Ivet Whyte MD    ** Please Note: Dictation voice to text software may have been used in the creation of this document   **

## 2021-03-12 NOTE — PROGRESS NOTES
[facility] Podiatry - Progress Note  Patient: Dany Roque 80 y o  female   MRN: [id number]  PCP: Guillaume Nation DO  Unit/Bed#: E2 -01 Encounter: [id number]  Date Of Visit: 21    ASSESSMENT:    Dany Roque is a 80 y o  female with:    1  Closed, bimalleolar right ankle fracture   - s/p right ankle ORIF (DOS 2021)  2  CHF  3  CKD stage 3  4  Afib      PLAN:    · Post operative dressings/splint are to remain clean, dry and intact  · Continue current pain management regimen  · Elevate extremity with at least two pillows  · Strict non-weightbearing right lower extremity  · Patient is stable from podiatry standpoint for discharge  · PT/OT consult pending  · Rest of care per primary team     SUBJECTIVE:     The patient was seen, evaluated, and assessed at bedside today  The patient was awake, alert, and in no acute distress  No acute events overnight  The patient reports mild-moderate pain  Patient denies N/V/F/chills/SOB/CP  OBJECTIVE:     Vitals:   /54 (BP Location: Left arm)   Pulse 80   Temp (!) 96 5 °F (35 8 °C) (Temporal)   Resp 20   Wt 84 8 kg (186 lb 15 2 oz)   SpO2 95%   BMI 32 09 kg/m²     Temp (24hrs), Av 3 °F (36 3 °C), Min:96 5 °F (35 8 °C), Max:98 2 °F (36 8 °C)      Physical Exam:     General:  Alert, cooperative, and in no distress  Lower extremity exam:  Cardiovascular status at baseline  Neurological status at baseline  Musculoskeletal status at baseline  No calf tenderness noted  RLE dressings CDI with splint present  Active ROM of digits intact, CRT brisk, gross sensation intact        Additional Data:     Labs:    Results from last 7 days   Lab Units 21  0432   WBC Thousand/uL 6 25   HEMOGLOBIN g/dL 7 8*   HEMATOCRIT % 26 2*   PLATELETS Thousands/uL 84*   NEUTROS PCT % 65   LYMPHS PCT % 16   MONOS PCT % 16*   EOS PCT % 2     Results from last 7 days   Lab Units 21  0432   POTASSIUM mmol/L 4 8   CHLORIDE mmol/L 105   CO2 mmol/L 30   BUN mg/dL 41*   CREATININE mg/dL 1 22   CALCIUM mg/dL 9 7     Results from last 7 days   Lab Units 03/11/21  0807   INR  1 17       * I Have Reviewed All Lab Data Listed Above  Recent Cultures (last 7 days):               Imaging: I have personally reviewed pertinent films in PACS  EKG, Pathology, and Other Studies: I have personally reviewed pertinent reports  ** Please Note: Portions of the record may have been created with voice recognition software  Occasional wrong word or "sound a like" substitutions may have occurred due to the inherent limitations of voice recognition software  Read the chart carefully and recognize, using context, where substitutions have occurred   **

## 2021-03-12 NOTE — SPEECH THERAPY NOTE
Speech Language/Pathology  Speech/Language Pathology  Assessment    Patient Name: Jose D Bradley  [id number] Date: 3/12/2021     Problem List  Principal Problem:    Closed right ankle fracture  Active Problems:    Atrial fibrillation ([facility] )    Essential hypertension    Ambulatory dysfunction    Slurred speech    Chronic systolic heart failure (HCC)    Stage 3b chronic kidney disease    Sick sinus syndrome [facility])    Past Medical History  Past Medical History:   Diagnosis Date    Arthritis     Cancer [facility])     breast    Cardiac disease     afib    CHF (congestive heart failure) ([facility] )     Hyperlipidemia     Hypertension     Stroke ([facility] )     TIA    TIA (transient ischemic attack)      Past Surgical History  Past Surgical History:   Procedure Laterality Date    BREAST SURGERY      right lumpectomy    CARDIAC PACEMAKER PLACEMENT      CORONARY STENT PLACEMENT      TOTAL HIP ARTHROPLASTY Bilateral           Bedside Swallow Evaluation:    Summary  Pt presents w/ mod  oropharyngeal dysphagia when seen w/ ice chips, puree, one bite of eggs and nectar thick liquids  (Pt initially was not alert and kept falling asleep  She eventually perked up and was able to take PO  Mastication was prolonged and ineffictive w/ eggs  Formation and control were mildly reduced/ [facility] w/ all PO  Transfer time was initially delayed but improved as alertness increased  Swallows were prompt to mildly delayed  Laryngeal rise was adequate on palpation  Pt coughed while swallowing eggs  Pt requires persistent stim to participate  She is oriented to place and reason for hospitalization  Speech is clear and low volume  Pt is [place]  Recommendations:  Diet: Puree  Liquid: nectar thick (straw or tsp)  Meds: crushed  Supervision: full  Positioning:Upright  Strategies: Pt to take PO/Meds only when fully alert and upright     Oral care: 2-3x/day  Aspiration precautions  Reflux precautions  Therapy Prognosis: fair  Prognosis considerations: mental status, in and out of alertness  Frequency: 2-5x/week    Dysphagia LTG  -Patient will demonstrate safe and effective oral intake (without overt s/s significant oral/pharyngeal dysphagia including s/s penetration or aspiration) for the highest appropriate diet level  1 Pt will tolerate least restrictive diet w/out s/s aspiration or oral/pharyngeal difficulties  2 Pt will will effectively manipulate and transfer pureed items w/out s/s dysphagia/aspiration  3 Pt will tolerate nectar thick liquids w/out s/s aspiration  Patient's goal:  None stated     Chief Complaint:   Right Foot Pain  History of Present Illness:  Eliza Duran is a 80 y o  female who presents with  Fall  Past medical history of atrial fibrillation, congestive heart failure, hypertension and hyperlipidemia  Patient presented after having a fall this week, according to daughter at bedside reports that she had several falls this past week alone  Reported significant pain and right lower leg where x-ray showed right malleolar fracture  Podiatry was consulted in the ED and was able to reduce the fracture but with repeat imaging there was concern that it may not have been completely resolved  Possibly planning for surgery today  According to daughter, patient has had multiple falls declining  Previous admission GOC were discussed and previously contemplated hospice but had significant improvement  Slurred speech ongoing with known dysphagia for past several weeks  CT scan 3/11/21:  IMPRESSION:  No acute intracranial abnormality      Consider consult w/:  Nutrition  Reason for consult:  R/o aspiration  Determine safest and least restrictive diet  h/o dysphagia   Slurred speech  h/o TIA  Current diet:  Regular w/ thin  Premorbid diet[de-identified]  ? Regular w/ thin  Last hospital stay 1/5/21- level 2 w/ thin   Previous VBS:  1/4/21- Pt presents w/ mod oral, mild(mod) pharyngeal dysphagia w/ reduced mastication & manipulation of hard solids, reduced lingual drive & hyolaryngeal movement  Noted retropulsion from the upper esophagus into the pyriform sinuses  Material was noted in the airway following one of those episodes but not other instances  This occurred early in the study, then began to slow as trials progressed  The esophagus was poorly clearing throughout w/ reduced suspected motility  O2 requirement:  RA  Voice/Speech:  Clear, low volume, slow rate  Social:  Lives alone  Follows commands:  Yes basic one step                        Cognitive Status: In and out of alertness  Oral [facility] exam:  Dentition: partial natural   Labial strength and ROM: WNL  Lingual strength and ROM: Weak lateralization likely due to alertness  Secretion management: WNL  Volitional cough: Fair  Volitional swallow: WNL  Items administered:  Ice chips, puree, nectar thick liquids, one bite of scrambled eggs  Oral stage:  Lip closure:  WNL  Mastication: Prologned  Bolus formation: Mildly reduced/ WFL, mod reduced w/ eggs  Bolus control: Mildly reduced/ WFL  Transfer: WFL   Oral residue: -   Pocketing: -   Pharyngeal stage:  Swallow promptness: Prompt to mildly delayed  Laryngeal rise: Adequate  Wet voice: -   Throat clear:  -   Cough: x1 w/ ice (pt was in and out of alertness initially), x1 w/ eggs  Secondary swallows: -   Audible swallows: -   Cough with eggs  Esophageal stage:  Poorly clearing w/ suspected reduced motility on 1/4/21 vbs     Aspiration precautions posted    Results d/w:  Pt, nursing, physician

## 2021-03-12 NOTE — PLAN OF CARE
Problem: OCCUPATIONAL THERAPY ADULT  Goal: Performs self-care activities at highest level of function for planned discharge setting  See evaluation for individualized goals  Description: Treatment Interventions: ADL retraining, UE strengthening/ROM, Functional transfer training, Endurance training, Cognitive reorientation, Patient/family training, Equipment evaluation/education, Compensatory technique education, Energy conservation, Activityengagement, Continued evaluation          See flowsheet documentation for full assessment, interventions and recommendations  Note: Limitation: Decreased ADL status, Decreased UE strength, Decreased Safe judgement during ADL, Decreased cognition, Decreased endurance, Decreased self-care trans, Decreased high-level ADLs, Decreased sensation, Decreased fine motor control  Prognosis: Fair, Good  Assessment: Pt is a 80 y o  female seen for OT evaluation s/p admit to [facility] on 3/11/2021 w/ fall resulting in Closed right ankle fracture, bimalleolar, s/p R ankle ORIF and right tibiofibular syndesmosis, NWB R LE  Comorbidities affecting pt's functional performance at time of assessment include: CAD, atrial fibrillation, CHF,valvular heart disease, HTN, dyslipidemia, PA, h/o R breast CA, h/o TIA 2003 and 2016  CT head: (No acute intracranial abnormality), CT spine: No cervical spine fracture or traumatic malalignment  Personal factors affecting pt at time of IE include:multiple falls over past week; lives alone  Prior to admission, pt was living alone and reports has home therapy, family brings food, reports independent w/ ADLs, independent w/ functional transfers and mobility w/ RW, assist w/ higher level IADLs, assist transport   Upon evaluation: Pt requires MOD assist x2 supine>sit bed mobility w/ increased time to complete, MAX assist x2 sit<>stand w/ RW and unable to maintain NWB RLE, MAX assist x2 sit>stand w/ use of quick move and assist of 3rd to maintain NWB R LE w/ use of sheet sling to maintain NWB RLE (one person maneuvering quick move and another providing support to patient), MAX assist LB ADLs, UB ADLs, total assist toileting, MOD assist UB ADLs 2* the following deficits impacting occupational performance: impaired balance, impaired activity tolerance, fall risk, impaired insight and safety awareness, lethargy w/ cues to keep eyes about and sternal rub for arousal, multiple lines, impaired functional reach, NWB R LE  Pt to benefit from continued skilled OT tx while in the hospital to address deficits as defined above and maximize level of functional independence w ADL's and functional mobility  Occupational Performance areas to address include: grooming, bathing/shower, toilet hygiene, dressing, health maintenance, functional mobility, clothing management, cleaning and meal prep, formal cognitive assessment, safety education  From OT standpoint, recommendation at time of d/c would be short term rehab  The patient's raw score on the AM-PAC Daily Activity inpatient short form is 11, standardized score is 29 04, less than 39 4  Patients at this level are likely to benefit from discharge to post-acute rehabilitation services  Please refer to the recommendation of the Occupational Therapist for safe discharge planning    Recommendation: Geriatric Consult  OT Discharge Recommendation: Post-Acute Rehabilitation Services  OT - OK to Discharge: (to rehab when medically stable)

## 2021-03-12 NOTE — OCCUPATIONAL THERAPY NOTE
Occupational Therapy Evaluation and Treatment     Patient Name: Umesh Cobb  XPPSP'S Date: 3/12/2021  Problem List  Principal Problem:    Closed right ankle fracture  Active Problems:    Atrial fibrillation [facility])    Essential hypertension    Ambulatory dysfunction    Slurred speech    Chronic systolic heart failure (HCC)    Stage 3b chronic kidney disease    Sick sinus syndrome [facility])    Past Medical History  Past Medical History:   Diagnosis Date    Arthritis     Cancer [facility])     breast    Cardiac disease     afib    [facility]congestive [facility] failure) ([facility] )     Hyperlipidemia     Hypertension     Stroke ([facility] )     TIA    TIA (transient ischemic attack)      Past Surgical History  Past Surgical History:   Procedure Laterality Date    BREAST SURGERY      right lumpectomy    CARDIAC PACEMAKER PLACEMENT      CORONARY STENT PLACEMENT      TOTAL HIP ARTHROPLASTY Bilateral              03/12/21 1111   OT Last Visit   OT Visit Date 03/12/21   Note Type   Note type Evaluation   Restrictions/Precautions   Weight Bearing Precautions Per Order Yes   RLE Weight Bearing Per Order NWB   Braces or Orthoses Splint   Other Precautions Chair Alarm; Bed Alarm;Cognitive;Multiple lines; Fall Risk;Pain;O2  (elevated R LE w/ 2 pillows, 4 LO2)   Pain Assessment   Pain Assessment Tool 0-10   Pain Score 4   Pain Location/Orientation Orientation: Right;Location: [facility] Pain Intervention(s) Ambulation/increased activity;Repositioned; Emotional support   Home Living   Type of [address] [facility] Multi-level; Able to live on main level with bedroom/bathroom; Performs ADLs on one level;Stairs to enter with rails  (4-5 [place], 1st floor setup w/ full bath)   Bathroom Shower/Tub Tub/shower unit   Bathroom Toilet Standard   Bathroom Equipment Shower chair;Grab bars in shower;Grab bars around toilet;Commode   [address] Walker;Life alert   Additional Comments unsure of accuracy of information pt w/ increased lethargy requring sternal rub and increased stimuli to maintain arousal; reports 1st floor setup    Prior Function   Level of [place] Independent with ADLs and functional mobility  (RW)   Lives With Alone   Receives Help From Family   ADL Assistance Independent   IADLs Needs assistance   Falls in the last 6 months 1 to 4  (3 falls in 3 days and more than that probably)   Vocational Retired   Comments pt reports family brings her meals and checks on her t/o the day, had home therapy 2x/wk for OT and PT; pt reports falling in bathroom, falling doing her wash, and one getting a meal   Lifestyle   Autonomy per pt independent w/ ADLs and assist w/ bathing, independent w/ functional transfers and mobility w/ RW, assist w/ IADLs, assist transport, does own laundry   Reciprocal Relationships family, home therapy   Service to Others 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 [address] 3  Moderate Assistance    [facility] 2  Maximal [address]  1  Total Assistance   Toileting Deficit Setup;Steadying;Verbal cueing;Supervison/safety; Increased time to complete;Clothing management up;Clothing management down  (incontinent of urine)   Functional Assistance 2  Maximal Assistance   Bed Mobility   Supine to Sit 3  Moderate assistance   Additional items Assist x 2; Increased time required;Verbal cues;LE management; Bedrails;HOB elevated   Additional Comments inccreased time to complete   Transfers   Sit to Stand 2  Maximal assistance   Additional items Armrests;Assist x 2; Increased time required;Verbal cues;HOB elevated   Stand to Sit 2  Maximal assistance   Additional items Assist x 2; Increased time required;Verbal cues;Armrests   Other 2  Maximal assistance   Additional items Assist x 2; Increased time required;Verbal cues; Bedrails  (w/ use of quickmove and R LE sheet to maintain NWB R LE)   Additional Comments pt unable to maintain NWB R LE w/ increased time to complete, cues for safety and positioning, assist controlled descent after stand w/ walker and then use of quick move w/ assist x2 and assist to maintain NWB R LE w/ sheet sling around thigh to lift up   Balance   Static Sitting Fair   Dynamic Sitting Fair -   Static Standing Poor +   Dynamic Standing Poor   Ambulatory Poor   Activity Tolerance   Activity Tolerance Patient limited by fatigue;Treatment limited secondary to medical complications (Comment)   Nurse Made Aware appropriate to see per Adia CLARK   RUKERA Assessment   RUE Assessment WFL  (limited elevation, grossly 3-/5)   LUE Assessment   LUE Assessment WFL  (limited elevation grossly 3-/5)   Hand Function   Gross Motor Coordination   (dysmetria noted, cogwheel rigidity, slight tremors)   Fine Motor Coordination Functional  (increased time)   Sensation   Light Touch Partial deficits in the RUE;Partial deficits in the LUE   Proprioception   Proprioception Partial deficits in the RUE;Partial deficits in the LUE   Vision-Basic Assessment   Current Vision No visual deficits   Vision - Complex Assessment   Ocular Range of Motion [facility]   Acuity   (able to state correct digits held up)   Perception   Inattention/Neglect Appears intact   Cognition   Overall Cognitive Status Impaired   Arousal/Participation Persistent stimuli required;Lethargic;Cooperative   Attention Difficulty attending to directions   Orientation Level Oriented to person;Oriented to place; Disoriented to time;Disoriented to situation   Memory Decreased recall of precautions;Decreased recall of recent events;Decreased short term memory   Following Commands Follows one step commands with increased time or repetition   Comments pt required sternal rubs at time to increased arousal and maintain alertness; impaired insight and safety awarness, increased processing time to complete, cues to keep eyes open t/o session, decreased STM, pleasant after sitting EOB increased arousal   Assessment   Limitation Decreased ADL status; Decreased UE strength;Decreased Safe judgement during ADL;Decreased cognition;Decreased endurance;Decreased self-care trans;Decreased high-level ADLs; Decreased sensation;Decreased fine motor control   Prognosis Fair;Good   Assessment Pt is a 80 y o  female seen for OT evaluation s/p admit to [facility] on 3/11/2021 w/ fall resulting in Closed right ankle fracture, bimalleolar, s/p R ankle ORIF and right tibiofibular syndesmosis, NWB R LE  Comorbidities affecting pt's functional performance at time of assessment include: CAD, atrial fibrillation, CHF,valvular heart disease, HTN, dyslipidemia, PA, h/o R breast CA, h/o TIA 2003 and 2016  CT head: (No acute intracranial abnormality), CT spine: No cervical spine fracture or traumatic malalignment  Personal factors affecting pt at time of IE include:multiple falls over past week; lives alone  Prior to admission, pt was living alone and reports has home therapy, family brings food, reports independent w/ ADLs, independent w/ functional transfers and mobility w/ RW, assist w/ higher level IADLs, assist transport   Upon evaluation: Pt requires MOD assist x2 supine>sit bed mobility w/ increased time to complete, MAX assist x2 sit<>stand w/ RW and unable to maintain NWB RLE, MAX assist x2 sit>stand w/ use of quick move and assist of 3rd to maintain NWB R LE w/ use of sheet sling to maintain NWB RLE (one person maneuvering quick move and another providing support to patient), MAX assist LB ADLs, UB ADLs, total assist toileting, MOD assist UB ADLs 2* the following deficits impacting occupational performance: impaired balance, impaired activity tolerance, fall risk, impaired insight and safety awareness, lethargy w/ cues to keep eyes about and sternal rub for arousal, multiple lines, impaired functional reach, NWB R LE  Pt to benefit from continued skilled OT tx while in the hospital to address deficits as defined above and maximize level of functional independence w ADL's and functional mobility  Occupational Performance areas to address include: grooming, bathing/shower, toilet hygiene, dressing, health maintenance, functional mobility, clothing management, cleaning and meal prep, formal cognitive assessment, safety education  From OT standpoint, recommendation at time of d/c would be short term rehab  The patient's raw score on the AM-PAC Daily Activity inpatient short form is 11, standardized score is 29 04, less than 39 4  Patients at this level are likely to benefit from discharge to post-acute rehabilitation services  Please refer to the recommendation of the Occupational Therapist for safe discharge planning  Goals   Patient Goals none expressed at this time   LTG Time Frame 10-14   Long Term Goal please see below goals   Plan   Treatment Interventions ADL retraining;UE strengthening/ROM; Functional transfer training; Endurance training;Cognitive reorientation;Patient/family training;Equipment evaluation/education; Compensatory technique education; Energy conservation; Activityengagement;Continued evaluation   Goal Expiration Date 03/26/21   OT Treatment Day 1   OT Frequency 3-5x/wk   Additional Treatment Session   Start Time 1046   End Time 1111   Treatment Assessment Pt seen for skilled OT session focused on ADLs, functional transfers and mobility  Pt seated in bedside chair and lethargic  Pt w/ BP: 100/56  Pt w/ cues to increased alertness and w/ tremors at time  Pt w/ MAX assist x2 sit>stand w/ use of quick move and assist of 3rd to maintain NWB R LE w/ use of sheet sling to maintain NWB RLE (one person maneuvering quick move and another providing support to patient  Pt incontinent of urine upon stance and required total assist for hygiene cleanup  Pt w/ MAX assist x2 stand>sit to bed  Pt w/ MOD assist x2 sit>supine bed mobility w/ increased initiation  Pt w/ MOD assist x1 rolling in bed w/ bed rails and total assist for hygiene cleanup  Pt seated upright in bed w/ R LE elevated on 2 pillows and bed alarm intact end of session  Pt w/ BP: 97/54 and RN made aware low BP, decreased arousal and tremors and dysmetria L UE and RN present  Pt continues to be limited due to impaired balance, impaired activity tolerance, fall risk, impaired insight and safety awareness, lethargy w/ cues to keep eyes about and sternal rub for arousal, multiple lines, impaired functional reach, NWB R LE, cogwheel rigidity of b/l UEs, L UE dysmetria  Recommend STR when medically stable  SPoke to [facility] omar     Additional Treatment Day 1   Recommendation   Recommendation Geriatric Consult   OT Discharge Recommendation Post-Acute Rehabilitation Services   OT - OK to Discharge   (to rehab when medically stable)   AM-PAC Daily Activity Inpatient   Lower Body Dressing 1   Bathing 2   Toileting 1   Upper Body Dressing 2   Grooming 2   Eating 3   Daily Activity Raw Score 11   Daily Activity Standardized Score (Calc for Raw Score >=11) 29 04   AM-PAC Applied Cognition Inpatient   Following a Speech/Presentation 2   Understanding Ordinary Conversation 3   Taking Medications 1   Remembering Where Things Are Placed or Put Away 2   Remembering List of 4-5 Errands 1   Taking Care of Complicated Tasks 1   Applied Cognition Raw Score 10   Applied Cognition Standardized Score 24 98   Modified [place] Scale   Modified [place] Scale 4     Occupational Therapy Goals to be met in 10-14 days:  1) Pt will improve activity tolerance to G for 30 min txment sessions to enhance ADLs  2) Pt will complete ADLs/self care w/ min assist w/ LHAE prn  3) Pt will complete toileting w/ min assist w/ G hygiene/thoroughness using DME PRN  4) Pt will improve functional transfers on/off all surfaces using DME PRN w/ G balance/safety including toileting w/ min assist while maintaining NWB R LE  5) Pt will improve fx'l mobility during I/ADl/leisure tasks using DME PRN w/ g balance/safety w/ min assist while maintaining NWB R LE  6) Pt will engage in ongoing cognitive assessment w/ G participation to A w/ safe d/c planning/recommendations  7) Pt will demonstrate G carryover of pt/caregiver education and training as appropriate w/ mod I  w/ G tolerance  8) Pt will engage in depression screen/leisure interest checklist w/ G participation to monitor s/s depression and ID 3 positive coping strategies to A w/ emotional regulation and management  9) Pt will demonstrate 100% carryover of E C  techniques w/ mod I t/o fx'l I/ADL/leisure tasks w/o cues s/p skilled education  10) Pt will demonstrate improved bed mobility to supervision to enhance ADLs  11) Pt will demonstrate 100% carryover of LHAE for LB ADLs/self care and leisure s/p skilled education w/ mod I and G participation   12) Pt will demonstrate improved b/l UE strength by 1 MMT grade to enhance ADLS and functional transfers  13) Pt will recall 3 fall prevention education techniques to enhance safety in the home and prevent further falls to improve overall quality of life    Documentation completed by: Joey Giron MS, OTR/L

## 2021-03-12 NOTE — ASSESSMENT & PLAN NOTE
Lab Results   Component Value Date    EGFR 40 03/12/2021    EGFR 34 03/11/2021    EGFR 37 02/04/2021    CREATININE 1 22 03/12/2021    CREATININE 1 40 (H) 03/11/2021    CREATININE 1 29 02/04/2021     Currently stable  Monitor renal fxns

## 2021-03-13 ENCOUNTER — APPOINTMENT (INPATIENT)
Dept: RADIOLOGY | Facility: HOSPITAL | Age: 86
DRG: 493 | End: 2021-03-13
Payer: COMMERCIAL

## 2021-03-13 PROBLEM — D62 ACUTE BLOOD LOSS ANEMIA: Status: ACTIVE | Noted: 2021-03-13

## 2021-03-13 LAB
ABO GROUP BLD: NORMAL
ABO GROUP BLD: NORMAL
ANION GAP SERPL CALCULATED.3IONS-SCNC: 6 MMOL/L (ref 4–13)
BASOPHILS # BLD AUTO: 0.04 THOUSANDS/ΜL (ref 0–0.1)
BASOPHILS NFR BLD AUTO: 1 % (ref 0–1)
BLD GP AB SCN SERPL QL: NEGATIVE
BUN SERPL-MCNC: 45 MG/DL (ref 5–25)
CALCIUM SERPL-MCNC: 9.4 MG/DL (ref 8.3–10.1)
CHLORIDE SERPL-SCNC: 104 MMOL/L (ref 100–108)
CO2 SERPL-SCNC: 30 MMOL/L (ref 21–32)
CREAT SERPL-MCNC: 1.61 MG/DL (ref 0.6–1.3)
EOSINOPHIL # BLD AUTO: 0.18 THOUSAND/ΜL (ref 0–0.61)
EOSINOPHIL NFR BLD AUTO: 3 % (ref 0–6)
ERYTHROCYTE [DISTWIDTH] IN BLOOD BY AUTOMATED COUNT: 18.8 % (ref 11.6–15.1)
FERRITIN SERPL-MCNC: 290 NG/ML (ref 8–388)
GFR SERPL CREATININE-BSD FRML MDRD: 28 ML/MIN/1.73SQ M
GLUCOSE SERPL-MCNC: 71 MG/DL (ref 65–140)
HCT VFR BLD AUTO: 22.7 % (ref 34.8–46.1)
HCT VFR BLD AUTO: 23.3 % (ref 34.8–46.1)
HGB BLD-MCNC: 7 G/DL (ref 11.5–15.4)
HGB BLD-MCNC: 7.2 G/DL (ref 11.5–15.4)
IMM GRANULOCYTES # BLD AUTO: 0.04 THOUSAND/UL (ref 0–0.2)
IMM GRANULOCYTES NFR BLD AUTO: 1 % (ref 0–2)
IRON SATN MFR SERPL: 17 %
IRON SERPL-MCNC: 45 UG/DL (ref 50–170)
LYMPHOCYTES # BLD AUTO: 0.85 THOUSANDS/ΜL (ref 0.6–4.47)
LYMPHOCYTES NFR BLD AUTO: 13 % (ref 14–44)
MAGNESIUM SERPL-MCNC: 2.1 MG/DL (ref 1.6–2.6)
MCH RBC QN AUTO: 31.4 PG (ref 26.8–34.3)
MCHC RBC AUTO-ENTMCNC: 30.9 G/DL (ref 31.4–37.4)
MCV RBC AUTO: 102 FL (ref 82–98)
MONOCYTES # BLD AUTO: 1.25 THOUSAND/ΜL (ref 0.17–1.22)
MONOCYTES NFR BLD AUTO: 19 % (ref 4–12)
NEUTROPHILS # BLD AUTO: 4.4 THOUSANDS/ΜL (ref 1.85–7.62)
NEUTS SEG NFR BLD AUTO: 63 % (ref 43–75)
NRBC BLD AUTO-RTO: 1 /100 WBCS
PLATELET # BLD AUTO: 78 THOUSANDS/UL (ref 149–390)
PMV BLD AUTO: 12.5 FL (ref 8.9–12.7)
POTASSIUM SERPL-SCNC: 4.8 MMOL/L (ref 3.5–5.3)
RBC # BLD AUTO: 2.29 MILLION/UL (ref 3.81–5.12)
RH BLD: NEGATIVE
RH BLD: NEGATIVE
SODIUM SERPL-SCNC: 140 MMOL/L (ref 136–145)
SPECIMEN EXPIRATION DATE: NORMAL
TIBC SERPL-MCNC: 271 UG/DL (ref 250–450)
WBC # BLD AUTO: 6.76 THOUSAND/UL (ref 4.31–10.16)

## 2021-03-13 PROCEDURE — 80048 BASIC METABOLIC PNL TOTAL CA: CPT | Performed by: STUDENT IN AN ORGANIZED HEALTH CARE EDUCATION/TRAINING PROGRAM

## 2021-03-13 PROCEDURE — 83540 ASSAY OF IRON: CPT | Performed by: STUDENT IN AN ORGANIZED HEALTH CARE EDUCATION/TRAINING PROGRAM

## 2021-03-13 PROCEDURE — 97530 THERAPEUTIC ACTIVITIES: CPT

## 2021-03-13 PROCEDURE — 97110 THERAPEUTIC EXERCISES: CPT

## 2021-03-13 PROCEDURE — 83550 IRON BINDING TEST: CPT | Performed by: STUDENT IN AN ORGANIZED HEALTH CARE EDUCATION/TRAINING PROGRAM

## 2021-03-13 PROCEDURE — 82728 ASSAY OF FERRITIN: CPT | Performed by: STUDENT IN AN ORGANIZED HEALTH CARE EDUCATION/TRAINING PROGRAM

## 2021-03-13 PROCEDURE — 86850 RBC ANTIBODY SCREEN: CPT | Performed by: STUDENT IN AN ORGANIZED HEALTH CARE EDUCATION/TRAINING PROGRAM

## 2021-03-13 PROCEDURE — 86920 COMPATIBILITY TEST SPIN: CPT

## 2021-03-13 PROCEDURE — 86900 BLOOD TYPING SEROLOGIC ABO: CPT | Performed by: STUDENT IN AN ORGANIZED HEALTH CARE EDUCATION/TRAINING PROGRAM

## 2021-03-13 PROCEDURE — 71045 X-RAY EXAM CHEST 1 VIEW: CPT

## 2021-03-13 PROCEDURE — P9016 RBC LEUKOCYTES REDUCED: HCPCS

## 2021-03-13 PROCEDURE — 99232 SBSQ HOSP IP/OBS MODERATE 35: CPT | Performed by: STUDENT IN AN ORGANIZED HEALTH CARE EDUCATION/TRAINING PROGRAM

## 2021-03-13 PROCEDURE — 85018 HEMOGLOBIN: CPT | Performed by: STUDENT IN AN ORGANIZED HEALTH CARE EDUCATION/TRAINING PROGRAM

## 2021-03-13 PROCEDURE — 97535 SELF CARE MNGMENT TRAINING: CPT

## 2021-03-13 PROCEDURE — 86901 BLOOD TYPING SEROLOGIC RH(D): CPT | Performed by: STUDENT IN AN ORGANIZED HEALTH CARE EDUCATION/TRAINING PROGRAM

## 2021-03-13 PROCEDURE — 83735 ASSAY OF MAGNESIUM: CPT | Performed by: STUDENT IN AN ORGANIZED HEALTH CARE EDUCATION/TRAINING PROGRAM

## 2021-03-13 PROCEDURE — 85014 HEMATOCRIT: CPT | Performed by: STUDENT IN AN ORGANIZED HEALTH CARE EDUCATION/TRAINING PROGRAM

## 2021-03-13 PROCEDURE — 85025 COMPLETE CBC W/AUTO DIFF WBC: CPT | Performed by: STUDENT IN AN ORGANIZED HEALTH CARE EDUCATION/TRAINING PROGRAM

## 2021-03-13 RX ORDER — CARVEDILOL 3.12 MG/1
3.12 TABLET ORAL 2 TIMES DAILY WITH MEALS
Status: DISCONTINUED | OUTPATIENT
Start: 2021-03-14 | End: 2021-03-13

## 2021-03-13 RX ORDER — TRAMADOL HYDROCHLORIDE 50 MG/1
50 TABLET ORAL ONCE
Status: COMPLETED | OUTPATIENT
Start: 2021-03-13 | End: 2021-03-13

## 2021-03-13 RX ORDER — TRAMADOL HYDROCHLORIDE 50 MG/1
25 TABLET ORAL ONCE
Status: COMPLETED | OUTPATIENT
Start: 2021-03-13 | End: 2021-03-13

## 2021-03-13 RX ORDER — FERROUS SULFATE 325(65) MG
325 TABLET ORAL
Status: DISCONTINUED | OUTPATIENT
Start: 2021-03-17 | End: 2021-03-16 | Stop reason: HOSPADM

## 2021-03-13 RX ORDER — FUROSEMIDE 40 MG/1
40 TABLET ORAL DAILY
Status: DISCONTINUED | OUTPATIENT
Start: 2021-03-14 | End: 2021-03-16 | Stop reason: HOSPADM

## 2021-03-13 RX ADMIN — CITALOPRAM HYDROBROMIDE 10 MG: 20 TABLET ORAL at 10:23

## 2021-03-13 RX ADMIN — THIAMINE HCL TAB 100 MG 100 MG: 100 TAB at 10:24

## 2021-03-13 RX ADMIN — Medication 1 TABLET: at 10:23

## 2021-03-13 RX ADMIN — TRAMADOL HYDROCHLORIDE 25 MG: 50 TABLET, FILM COATED ORAL at 22:14

## 2021-03-13 RX ADMIN — ACETAMINOPHEN 975 MG: 325 TABLET, COATED ORAL at 11:47

## 2021-03-13 RX ADMIN — ATORVASTATIN CALCIUM 40 MG: 40 TABLET, FILM COATED ORAL at 17:00

## 2021-03-13 RX ADMIN — BISACODYL 10 MG: 10 SUPPOSITORY RECTAL at 10:24

## 2021-03-13 RX ADMIN — FOLIC ACID 1 MG: 1 TABLET ORAL at 10:24

## 2021-03-13 RX ADMIN — PANTOPRAZOLE SODIUM 20 MG: 20 TABLET, DELAYED RELEASE ORAL at 05:57

## 2021-03-13 RX ADMIN — ACETAMINOPHEN 975 MG: 325 TABLET, COATED ORAL at 05:52

## 2021-03-13 RX ADMIN — POLYETHYLENE GLYCOL 3350 17 G: 17 POWDER, FOR SOLUTION ORAL at 10:23

## 2021-03-13 RX ADMIN — TRAMADOL HYDROCHLORIDE 50 MG: 50 TABLET, FILM COATED ORAL at 00:41

## 2021-03-13 RX ADMIN — ASPIRIN 81 MG: 81 TABLET, CHEWABLE ORAL at 10:23

## 2021-03-13 RX ADMIN — IRON SUCROSE 200 MG: 20 INJECTION, SOLUTION INTRAVENOUS at 17:07

## 2021-03-13 RX ADMIN — ACETAMINOPHEN 975 MG: 325 TABLET, COATED ORAL at 17:00

## 2021-03-13 NOTE — ASSESSMENT & PLAN NOTE
Wt Readings from Last 3 Encounters:   03/13/21 82 2 kg (181 lb 3 5 oz)   02/08/21 84 8 kg (187 lb)   02/04/21 85 kg (187 lb 6 3 oz)     Euvolemic  Resume po lasix 40mg daily

## 2021-03-13 NOTE — CASE MANAGEMENT
W/E CM f/u with attending to determine if pt would be d/c today as the CM hand-off note stated she would require a transport, however, attending reported a d/c tomorrow or Monday d/t monitoring of HgB

## 2021-03-13 NOTE — ASSESSMENT & PLAN NOTE
EKG reveals patient in afib, paced  S/P PPM  Not on anticoagulation due to falls, c/w aspirin 81mg  Resume BB

## 2021-03-13 NOTE — PHYSICAL THERAPY NOTE
PT TREATMENT TIME [id number]   Physical Therapy Treatment Note       03/13/21 7677   Note Type   Note Type Treatment   Pain Assessment   Pain Assessment Tool 0-10   Pain Score 4   Pain Location/Orientation Orientation: Right;Location: Foot   Hospital Pain Intervention(s) Repositioned   Restrictions/Precautions   RLE Weight Bearing Per Order NWB   Braces or Orthoses Splint  (posterior splint)   Other Precautions Chair Alarm; Bed Alarm;WBS;Fall Risk;Pain;O2;Multiple lines   Cognition   Overall Cognitive Status Impaired   Arousal/Participation Persistent stimuli required   Attention Difficulty attending to directions   Orientation Level Oriented to person;Oriented to place;Oriented to time;Oriented to situation   Memory Decreased recall of recent events   Following Commands Follows one step commands with increased time or repetition   Subjective   Subjective " I have a little pain "     Bed Mobility   Rolling R 3  Moderate assistance   Additional items Assist x 1;Bedrails; Increased time required;LE management   Supine to Sit 3  Moderate assistance   Additional items Assist x 2;Bedrails; Increased time required;Verbal cues;LE management   Additional Comments eob x 15 minutees with supervision performing r le arom exercises  sitting side sooots toward foot of bed x 2 wiith use of    Transfers   Other 3  Moderate assistance   Additional items Assist x 2; Increased time required;Verbal cues  (with use of quick move and A of another to maintain NWb rLE)   Balance   Static Sitting Fair +   Dynamic Sitting Fair -   Static Standing Poor -   Dynamic Standing Poor -   Ambulatory Zero   Endurance Deficit   Endurance Deficit Yes   Endurance Deficit Description fatigue,    Activity Tolerance   Activity Tolerance Patient limited by fatigue   Medical Staff Made Aware VLAD silva   Exercises   Quad Sets Supine;10 reps;Bilateral   Heelslides Supine;AAROM; Right  (x 12 reps)   Hip Flexion Supine;10 reps;AAROM; Right  (SLR)   Hip Abduction Supine;10 reps;AAROM; Right   Hip Adduction Supine;10 reps;AAROM; Right   Knee AROM Long Arc Quad Sitting;15 reps;AROM; Right   Ankle Pumps Supine;10 reps;AROM; Left   Assessment   Prognosis Fair   Problem List Decreased strength;Decreased range of motion;Decreased endurance; Impaired balance;Decreased mobility;Obesity; Decreased skin integrity;Orthopedic restrictions;Pain   Assessment Pt SEEN FOR PT TREATMENT INTERVENTIONS OF BED MOBILITY, TRANSFERS, LE STRENGTHENING AND ROM EXERCISES AND PT EDUCATION  Pt  PERFORM SUPINE B/L LE AA-AROM EXERCISES X 10 REPS TO TOLERANCE  PT  IS UNABLE TO I'LY SLR OR PERFORM HEEL SLIDES WITH R LE AND REQUIRES ASSISTANCE TO PERFORM  PT PERFORMS BED MOBILITY ROLLING O THE R WITH USE OF BED RAIL WITH MOD ASSIST X1, [place]PINE TO SIT WITH MOD ASSIST X2 WITH HEAD OF BED ELEVATED AND USE OF BED RAILS  PT SHOWING IMPROVED ABILITY TO PERFORM SIT TO STAND TRANSFERS WITH USE OF QUICK MOVE WITH MOD ASSIST X2 TO PERFORM SIT TO STAND AND MOD ASSIST OF ANOTHER TO MAINTAIN NWB TO R LE   PT PERFORMS TRANSFERS OUT OF BED TO CHAIR VIA DEPENDANT TRANSFER WITH USE OF QUICK MOVE DEVICE  PT UNABLE TO MAINTAIN NWB TO R LE   PT DEMONSTRATES STABLE STATIC SITTING BALANCE AT EOB, SITTING TOLERANCE 15 MINUTES EOB THIS SESSION  PT 'S BP STABLE 108/ 60 SUPINE, 101/ 54 SIT EOB / 60 IN RECLINER, sPO2 86% ON RA, PLACED PT BACK ON 2L O2 SPO2 100%  The patient's AM-PAC Basic Mobility Inpatient Short Form Low Function Raw Score 17 , Standardized Score is 27 46  A standardized score less 42 9 suggests the patient may benefit from discharge to post-acute rehab services  Please also refer to the recommendation of the Physical Therapist for safe discharge planning  At THIS TIME PT RECOMMENDATION FOR D/C IS STR BASED UPON OBJECTIVE FINDINGS, INCREASED ASSISTANCE FOR ALL MOBILITY, DECREASED CAREGVER SUPPORT AND DECREASED COGNITION,  INCREASED RISK FOR FALLS AND WEIGHTBEARING RESTRICTIONS TO R LE      Goals   Patient Goals " TO BE ABLE TO WALK AND USE MY ARMS  STG Expiration Date 03/26/21   PT Treatment Day 1   Plan   Treatment/Interventions Functional transfer training;LE strengthening/ROM; Therapeutic exercise; Endurance training;Patient/family training;Equipment eval/education; Bed mobility;Spoke to nursing;OT   Progress Progressing toward goals   PT Frequency   (3-5X/WEEK)   Recommendation   PT Discharge Recommendation Post-Acute Rehabilitation Services  (str)   PT - OK to Discharge Yes   [address] Mobility Inpatient   Turning in Bed Without Bedrails 2   Lying on Back to Sitting on Edge of Flat Bed 2   Moving Bed to Chair 1   Standing Up From Chair 2   Walk in Room 1   Climb 3-5 Stairs 1   Basic Mobility Inpatient Raw Score 9   Turning Head Towards Sound 4   Follow Simple Instructions 4   Low Function Basic Mobility Raw Score 17   Low Function Basic Mobility Standardized Score 27 46       Janeth Martinez, ISIDORO

## 2021-03-13 NOTE — ASSESSMENT & PLAN NOTE
Lab Results   Component Value Date    EGFR 28 03/13/2021    EGFR 40 03/12/2021    EGFR 34 03/11/2021    CREATININE 1 61 (H) 03/13/2021    CREATININE 1 22 03/12/2021    CREATININE 1 40 (H) 03/11/2021     Currently stable  Monitor renal fxns

## 2021-03-13 NOTE — ASSESSMENT & PLAN NOTE
Presented status post fall found to have right malleolar fracture  S/p right malleolar reduction 03/11/2021  PT and OT consulted  Will likely need rehab and or placement

## 2021-03-13 NOTE — PROGRESS NOTES
Patient noted to be hypotensive 80/43  RN notified SLIM; SLIM ordered STAT H&H and hgb result was 7 4  SLIM also ordered albumin to be administered   SLIM notified of hgb

## 2021-03-13 NOTE — ASSESSMENT & PLAN NOTE
PT/OT consulted  Lives at home alone, but daughter reports that they may move her in with them  Family requesting STR, plan for assisted living afterwards

## 2021-03-13 NOTE — PLAN OF CARE
Problem: OCCUPATIONAL THERAPY ADULT  Goal: Performs self-care activities at highest level of function for planned discharge setting  See evaluation for individualized goals  Description: Treatment Interventions: ADL retraining, UE strengthening/ROM, Functional transfer training, Endurance training, Cognitive reorientation, Patient/family training, Equipment evaluation/education, Compensatory technique education, Energy conservation, Activityengagement, Continued evaluation          See flowsheet documentation for full assessment, interventions and recommendations  Outcome: Progressing  Note: Limitation: Decreased ADL status, Decreased UE strength, Decreased Safe judgement during ADL, Decreased cognition, Decreased endurance, Decreased self-care trans, Decreased high-level ADLs, Decreased sensation, Decreased fine motor control  Prognosis: Fair, Good  Assessment: Pt was seen for skilled OT with focus on completion of bed mobility, self care routine, review of safety/NWB with functional tasks/transfers and review of current plan of care  Pt with initial lethargy noted with nursing reporting Pt having taken pain medications recently  B/P taken while supine at 108/60  SpO2 86% on RA  Reported findings to RN  AMBER Frida reports trialing Pt off of O2  RN recommended replaced at 2 liters  Pt with SAT returing to 96% with in 2 mins  Pt able to tolerate self care routine while long sitting in bed due to noted lethargy  Improved alertness following UE bathing/dressing activity  LE bathing/dressing completed with Pt rolling for staff to A due to noted weakness  Mod A x2 to achieve EOB positioning with cues for safe hand placement with activity  B/P taken while EOB at 101/64 with slight dizziness initially noted  Pt able to attempt light grooming activity at EOB  Increased A due to limited RUE ROM to reach top of R side of head with hair combing  Pt may benefit from use of long handled brush/comb to ease activity   Pt able to trial sit to stand at quick move device with Mod A of 2 staff members and 3rd staff member to help maintain NWB status in RLE with activity  Pt with B/P at 104/60 with OOB positioning in bedside recliner  Improvement noted with alertness and participation as treatment progressed  The patient's raw score on the AM-PAC Daily Activity inpatient short form is 13, standardized score is 32 03, less than 39 4  Patients at this level are likely to benefit from discharge to post-acute rehabilitation services    Recommendation: Geriatric Consult  OT Discharge Recommendation: Post-Acute Rehabilitation Services  OT - OK to Discharge: Yes(to rehab when medically cleared  )

## 2021-03-13 NOTE — OCCUPATIONAL THERAPY NOTE
Occupational Therapy Treatment Note:         03/13/21 0908   OT Last Visit   OT Visit Date 03/13/21   Note Type   Note Type Treatment   Restrictions/Precautions   Weight Bearing Precautions Per Order Yes   RLE Weight Bearing Per Order NWB   Braces or Orthoses Splint  (POSTERIOR SPLINT)   Other Precautions Pain;WBS; Bed Alarm; Chair Alarm;Multiple lines;O2;Fall Risk   Pain Assessment   Pain Assessment Tool 0-10   Pain Score 4   Pain Location/Orientation Orientation: Right;Location: Foot   ADL   Where Assessed Supine, bed  (hob ELEVATED  )   Grooming Assistance 4  Minimal Assistance   Grooming Deficit Setup;Verbal cueing;Supervision/safety; Increased time to complete;Wash/dry face; Wash/dry hands;Brushing hair   Grooming Comments Pt with limited functional reach in BUE  RUE with previous rotator surgery  May benefit from use of long handled brush or comb  UB Bathing Assistance 4  Minimal Assistance   UB Bathing Deficit Setup;Verbal cueing;Supervision/safety; Increased time to complete; Chest;Left arm   UB Bathing Comments limited reach   LB Bathing Assistance 2  Maximal Assistance   LB Bathing Deficit Setup;Verbal cueing;Supervision/safety; Increased time to complete; Buttocks; Perineal area; Left lower leg including foot   LB Bathing Comments LE bathing completed while supine due to limited stand tolerance/functional reach and weakness  UB Dressing Assistance 4  Minimal Assistance   UB Dressing Deficit Setup;Verbal cueing;Supervision/safety; Increased time to complete; Thread LUE   UB Dressing Comments  limited reach with use of RUE   LB Dressing Assistance 2  Maximal Assistance   LB Dressing Deficit Setup; Requires assistive device for steadying;Steadying;Verbal cueing;Supervision/safety; Increased time to complete; Don/doff R sock; Don/doff L sock; Thread LLE into underwear; Thread RLE into underwear;Pull up over hips   LB Dressing Comments with simulation      Toileting Assistance  Unable to assess   Toileting Comments Pt with pur wik catheter in place  Pt reports, "it just comes out"  Functional Standing Tolerance   Time 1-2 mins   Activity static stand balance activity  Comments Pt able to tolerate use of quick move with improved tech this tx session  Able to maintain NWB status with increased A  No further increase of pain noted  Bed Mobility   Supine to Sit 3  Moderate assistance   Additional items Assist x 2;Bedrails; Increased time required;Verbal cues;LE management   Additional Comments increased A required due to stiffness and increased fatigue from pain medications, groggy initially  Transfers   Sit to Stand 3  Moderate assistance   Additional items Assist x 2;Armrests; Bedrails; Increased time required;Verbal cues; Assist x 3   Stand to Sit 3  Moderate assistance   Additional items Assist x 3;Assist x 2   Stand pivot Unable to assess  (quick move device used  )   Additional Comments Pt with improved strength without further increase in pain levels with use of quick move device and 3rd staff member to A with maintaining NWB into RLE  Cognition   Overall Cognitive Status Impaired   Arousal/Participation Responsive; Cooperative   Attention Attends with cues to redirect   Orientation Level Oriented X4   Memory Decreased recall of recent events   Following Commands Follows multistep commands with increased time or repetition   Comments Pt able to follow commands and make her needs known this tx session  Pt initially with increased grogginess and need for stimulation  Improved alertness following movement  Additional Activities   Additional Activities Other (Comment)  (reviewed current plan of care  )   Additional Activities Comments Pt is hoping to walk better and return home  Activity Tolerance   Activity Tolerance Patient limited by fatigue;Patient limited by pain   Medical Staff Made Aware Reported all findings to nursing staff      Assessment   Assessment Pt was seen for skilled OT with focus on completion of bed mobility, self care routine, review of safety/NWB with functional tasks/transfers and review of current plan of care  Pt with initial lethargy noted with nursing reporting Pt having taken pain medications recently  B/P taken while supine at 108/60  SpO2 86% on RA  Reported findings to RN  AMBER Galicia reports trialing Pt off of O2  RN recommended replaced at 2 liters  Pt with SAT returing to 96% with in 2 mins  Pt able to tolerate self care routine while long sitting in bed due to noted lethargy  Improved alertness following UE bathing/dressing activity  LE bathing/dressing completed with Pt rolling for staff to A due to noted weakness  Mod A x2 to achieve EOB positioning with cues for safe hand placement with activity  B/P taken while EOB at 101/64 with slight dizziness initially noted  Pt able to attempt light grooming activity at EOB  Increased A due to limited RUE ROM to reach top of R side of head with hair combing  Pt may benefit from use of long handled brush/comb to ease activity  Pt able to trial sit to stand at quick move device with Mod A of 2 staff members and 3rd staff member to help maintain NWB status in RLE with activity  Pt with B/P at 104/60 with OOB positioning in bedside recliner  Improvement noted with alertness and participation as treatment progressed  The patient's raw score on the AM-PAC Daily Activity inpatient short form is 13, standardized score is 32 03, less than 39 4  Patients at this level are likely to benefit from discharge to post-acute rehabilitation services  Plan   Treatment Interventions ADL retraining;Functional transfer training;UE strengthening/ROM; Endurance training;Cognitive reorientation;Patient/family training   Goal Expiration Date 03/26/21   OT Treatment Day 2   OT Frequency 3-5x/wk   Recommendation   Recommendation Geriatric Consult   OT Discharge Recommendation Post-Acute Rehabilitation Services   OT - OK to Discharge Yes  (to rehab when medically cleared   )   AM-[facility] Daily Activity Inpatient   Lower Body Dressing 1   Bathing 2   Toileting 1   Upper Body Dressing 3   Grooming 3   Eating 3   Daily Activity Raw Score 13   Daily Activity Standardized Score (Calc for Raw Score >=11) 32 03   AM-PAC Applied Cognition Inpatient   Following a Speech/Presentation 3   Understanding Ordinary Conversation 3   Taking Medications 2   Remembering Where Things Are Placed or Put Away 2   Remembering List of 4-5 Errands 2   Taking Care of Complicated Tasks 2   Applied Cognition Raw Score 14   Applied Cognition Standardized Score 32 02   Porsha Spicer, [address]

## 2021-03-13 NOTE — PLAN OF CARE
Problem: PHYSICAL THERAPY ADULT  Goal: Performs mobility at highest level of function for planned discharge setting  See evaluation for individualized goals  Description: Treatment/Interventions: Functional transfer training, LE strengthening/ROM, Therapeutic exercise, Endurance training, Patient/family training, Bed mobility, Spoke to nursing, OT  Equipment Recommended: Walker(Pt owns walker)       See flowsheet documentation for full assessment, interventions and recommendations  Outcome: Progressing  Note: Prognosis: Fair  Problem List: Decreased strength, Decreased range of motion, Decreased endurance, Impaired balance, Decreased mobility, Obesity, Decreased skin integrity, Orthopedic restrictions, Pain  Assessment: Pt SEEN FOR PT TREATMENT INTERVENTIONS OF BED MOBILITY, TRANSFERS, LE STRENGTHENING AND ROM EXERCISES AND PT EDUCATION  Pt  PERFORM SUPINE B/L LE AA-AROM EXERCISES X 10 REPS TO TOLERANCE  PT  IS UNABLE TO I'LY SLR OR PERFORM HEEL SLIDES WITH R LE AND REQUIRES ASSISTANCE TO PERFORM  PT PERFORMS BED MOBILITY ROLLING O THE R WITH USE OF BED RAIL WITH MOD ASSIST X1, SUPINE TO SIT WITH MOD ASSIST X2 WITH HEAD OF BED ELEVATED AND USE OF BED RAILS  PT SHOWING IMPROVED ABILITY TO PERFORM SIT TO STAND TRANSFERS WITH USE OF QUICK MOVE WITH MOD ASSIST X2 TO PERFORM SIT TO STAND AND MOD ASSIST OF ANOTHER TO MAINTAIN NWB TO R LE   PT PERFORMS TRANSFERS OUT OF BED TO CHAIR VIA DEPENDANT TRANSFER WITH USE OF QUICK MOVE DEVICE  PT UNABLE TO MAINTAIN NWB TO R LE   PT DEMONSTRATES STABLE STATIC SITTING BALANCE AT EOB, SITTING TOLERANCE 15 MINUTES EOB THIS SESSION  PT 'S BP STABLE 108/ 60 SUPINE, 101/ 54 SIT EOB / 60 IN RECLINER, sPO2 86% ON RA, PLACED PT BACK ON 2L O2 SPO2 100%  The patient's AM-PAC Basic Mobility Inpatient Short Form Low Function Raw Score 17 , Standardized Score is 27 46  A standardized score less 42 9 suggests the patient may benefit from discharge to post-acute rehab services  Please also refer to the recommendation of the Physical Therapist for safe discharge planning  At THIS TIME PT RECOMMENDATION FOR D/C IS STR BASED UPON OBJECTIVE FINDINGS, INCREASED ASSISTANCE FOR ALL MOBILITY, DECREASED CAREGVER SUPPORT AND DECREASED COGNITION,  INCREASED RISK FOR FALLS AND WEIGHTBEARING RESTRICTIONS TO R LE  Barriers to Discharge: Inaccessible home environment, Decreased caregiver support  Barriers to Discharge Comments: Stairs; lives alone  PT Discharge Recommendation: Post-Acute Rehabilitation Services(str)     PT - OK to Discharge: Yes    See flowsheet documentation for full assessment

## 2021-03-13 NOTE — ASSESSMENT & PLAN NOTE
Hx of dysphagia  ST consulted, recommend dysphagia diet  CT head WNL  Will need rehab, custodial afterwards

## 2021-03-13 NOTE — ASSESSMENT & PLAN NOTE
Hgb trended down, on admission 9 8 now 7 2  Likely due to recent surgery  Check FOBT  Iron panel shows iron deficiency, will give IV iron  Monitor hgb

## 2021-03-13 NOTE — PROGRESS NOTES
[facility]  Progress Note - Perfecto Alebrtina 5/5/1932, 80 y o  female MRN: [id number]  Unit/Bed#: E2 -01 Encounter: [id number]  Primary Care Provider: Sarath Wagoner DO   Date and time admitted to hospital: 3/11/2021  7:39 AM    * Closed right ankle fracture  Assessment & Plan  Presented status post fall found to have right malleolar fracture  S/p right malleolar reduction 03/11/2021  PT and OT consulted  Will likely need rehab and or placement    Acute blood loss anemia  Assessment & Plan  Hgb trended down, on admission 9 8 now 7 2  Likely due to recent surgery  Check FOBT  Iron panel shows iron deficiency, will give IV iron  Monitor hgb    Stage 3b chronic kidney disease  Assessment & Plan  Lab Results   Component Value Date    EGFR 28 03/13/2021    EGFR 40 03/12/2021    EGFR 34 03/11/2021    CREATININE 1 61 (H) 03/13/2021    CREATININE 1 22 03/12/2021    CREATININE 1 40 (H) 03/11/2021     Currently stable  Monitor renal fxns      Chronic systolic heart failure (HCC)  Assessment & Plan  Wt Readings from Last 3 Encounters:   03/13/21 82 2 kg (181 lb 3 5 oz)   02/08/21 84 8 kg (187 lb)   02/04/21 85 kg (187 lb 6 3 oz)     Euvolemic  Resume po lasix 40mg daily            Slurred speech  Assessment & Plan  Hx of dysphagia  ST consulted, recommend dysphagia diet  CT head WNL  Will need rehab, assisted afterwards    Ambulatory dysfunction  Assessment & Plan  PT/OT consulted  Lives at home alone, but daughter reports that they may move her in with them  Family requesting STR, plan for assisted living afterwards    Essential hypertension  Assessment & Plan  Currently normotensive, continue coreg      Atrial fibrillation (HCC)  Assessment & Plan  EKG reveals patient in afib, paced  S/P PPM  Not on anticoagulation due to falls, c/w aspirin 81mg  Resume BB      VTE Pharmacologic Prophylaxis:   Pharmacologic: none, per family request  Mechanical VTE Prophylaxis in Place: Yes    Patient Centered Rounds: I have performed bedside rounds with nursing staff today  Discussions with Specialists or Other Care Team Provider: None    Education and Discussions with Family / Patient: Patient, daughter at bedside    Time Spent for Care: 30 minutes  More than 50% of total time spent on counseling and coordination of care as described above  Current Length of Stay: 2 day(s)    Current Patient Status: Inpatient   Certification Statement: The patient will continue to require additional inpatient hospital stay due to Anemia    Discharge Plan: 1-2 days, will need STR    Code Status: Level 3 - DNAR and DNI      Subjective:   Patient seen today, no events overnight  BP continues to be on lower side  No complaints at this time  Objective:     Vitals:   Temp (24hrs), Av 2 °F (36 2 °C), Min:97 1 °F (36 2 °C), Max:97 5 °F (36 4 °C)    Temp:  [97 1 °F (36 2 °C)-97 5 °F (36 4 °C)] 97 2 °F (36 2 °C)  HR:  [64-75] 64  Resp:  [18] 18  BP: ()/(43-67) 97/67  SpO2:  [90 %-100 %] 99 %  Body mass index is 31 11 kg/m²  Input and Output Summary (last 24 hours): Intake/Output Summary (Last 24 hours) at 3/13/2021 1639  Last data filed at 3/13/2021 1212  Gross per 24 hour   Intake 340 ml   Output 300 ml   Net 40 ml       Physical Exam:     Physical Exam  Vitals signs and nursing note reviewed  Constitutional:       Appearance: She is ill-appearing  HENT:      Head: Normocephalic and atraumatic  Eyes:      Conjunctiva/sclera: Conjunctivae normal    Cardiovascular:      Rate and Rhythm: Normal rate  Heart sounds: Normal heart sounds  Pulmonary:      Breath sounds: No wheezing, rhonchi or rales  Abdominal:      General: Bowel sounds are normal  There is no distension  Palpations: Abdomen is soft  Tenderness: There is no abdominal tenderness  Musculoskeletal: Normal range of motion  General: No swelling        Comments: Right leg wrapped in dressing, on sling elevated   Skin: General: Skin is warm and dry  Neurological:      Mental Status: She is alert  Mental status is at baseline  Additional Data:     Labs:    Results from last 7 days   Lab Units 03/13/21  0435   WBC Thousand/uL 6 76   HEMOGLOBIN g/dL 7 2*   HEMATOCRIT % 23 3*   PLATELETS Thousands/uL 78*   NEUTROS PCT % 63   LYMPHS PCT % 13*   MONOS PCT % 19*   EOS PCT % 3     Results from last 7 days   Lab Units 03/13/21  0435   SODIUM mmol/L 140   POTASSIUM mmol/L 4 8   CHLORIDE mmol/L 104   CO2 mmol/L 30   BUN mg/dL 45*   CREATININE mg/dL 1 61*   ANION GAP mmol/L 6   CALCIUM mg/dL 9 4   GLUCOSE RANDOM mg/dL 71     Results from last 7 days   Lab Units 03/11/21  0807   INR  1 17                       * I Have Reviewed All Lab Data Listed Above  * Additional Pertinent Lab Tests Reviewed:  Sheryl 66 Admission Reviewed    Imaging:    None    Recent Cultures (last 7 days):           Last 24 Hours Medication List:   Current Facility-Administered Medications   Medication Dose Route Frequency Provider Last Rate    acetaminophen  650 mg Oral Q6H PRN Tobias Ashton DPM      acetaminophen  975 mg Oral Q6H [facility] Tobias Ashton DPM      albuterol  2 puff Inhalation Q4H PRN Tobias Ashton DPM      aspirin  81 mg Oral Daily Tobias Ashton DPM      atorvastatin  40 mg Oral Daily With [employer], ELA      bisacodyl  10 mg Rectal Daily Tobias Ashton DPM      calcium carbonate-vitamin D  1 tablet Oral Daily With Breakfast Tobias Ashton DPM      [START ON 3/14/2021] carvedilol  3 125 mg Oral BID With Meals Giorgi Mcdonald MD      citalopram  10 mg Oral Daily Tobias Ashton DPM      docusate sodium  100 mg Oral Daily Tobias Ashton DPM      [START ON 3/17/2021] ferrous sulfate  325 mg Oral Daily With Breakfast Giorgi Mcdonald MD      folic acid  1 mg Oral Daily Tobias Ashton DPM      [START ON 3/14/2021] furosemide  40 mg Oral Daily Giorgi Mcdonald MD      iron sucrose  200 mg Intravenous Daily Catracho Gresham MD      melatonin  3 mg Oral HS Maren Lev, DPM      ondansetron  4 mg Intravenous Q6H PRN Maren Lev, DPM      pantoprazole  20 mg Oral Early Morning Maren Lev, DPM      polyethylene glycol  17 g Oral Daily Catracho Gresham MD      thiamine  100 mg Oral Daily Maren Lev, DPM          Today, Patient Was Seen By: Catracho Gresham MD    ** Please Note: Dictation voice to text software may have been used in the creation of this document   **

## 2021-03-14 LAB
ABO GROUP BLD BPU: NORMAL
ANION GAP SERPL CALCULATED.3IONS-SCNC: 6 MMOL/L (ref 4–13)
BASOPHILS # BLD AUTO: 0.03 THOUSANDS/ΜL (ref 0–0.1)
BASOPHILS NFR BLD AUTO: 0 % (ref 0–1)
BPU ID: NORMAL
BUN SERPL-MCNC: 48 MG/DL (ref 5–25)
CALCIUM SERPL-MCNC: 9.1 MG/DL (ref 8.3–10.1)
CHLORIDE SERPL-SCNC: 101 MMOL/L (ref 100–108)
CO2 SERPL-SCNC: 30 MMOL/L (ref 21–32)
CREAT SERPL-MCNC: 1.55 MG/DL (ref 0.6–1.3)
CROSSMATCH: NORMAL
EOSINOPHIL # BLD AUTO: 0.21 THOUSAND/ΜL (ref 0–0.61)
EOSINOPHIL NFR BLD AUTO: 3 % (ref 0–6)
ERYTHROCYTE [DISTWIDTH] IN BLOOD BY AUTOMATED COUNT: 18.5 % (ref 11.6–15.1)
GFR SERPL CREATININE-BSD FRML MDRD: 30 ML/MIN/1.73SQ M
GLUCOSE SERPL-MCNC: 90 MG/DL (ref 65–140)
HCT VFR BLD AUTO: 25.4 % (ref 34.8–46.1)
HCT VFR BLD AUTO: 25.5 % (ref 34.8–46.1)
HGB BLD-MCNC: 7.8 G/DL (ref 11.5–15.4)
HGB BLD-MCNC: 7.8 G/DL (ref 11.5–15.4)
IMM GRANULOCYTES # BLD AUTO: 0.05 THOUSAND/UL (ref 0–0.2)
IMM GRANULOCYTES NFR BLD AUTO: 1 % (ref 0–2)
LYMPHOCYTES # BLD AUTO: 0.85 THOUSANDS/ΜL (ref 0.6–4.47)
LYMPHOCYTES NFR BLD AUTO: 12 % (ref 14–44)
MCH RBC QN AUTO: 31.5 PG (ref 26.8–34.3)
MCHC RBC AUTO-ENTMCNC: 30.7 G/DL (ref 31.4–37.4)
MCV RBC AUTO: 102 FL (ref 82–98)
MONOCYTES # BLD AUTO: 1.07 THOUSAND/ΜL (ref 0.17–1.22)
MONOCYTES NFR BLD AUTO: 15 % (ref 4–12)
NEUTROPHILS # BLD AUTO: 4.85 THOUSANDS/ΜL (ref 1.85–7.62)
NEUTS SEG NFR BLD AUTO: 69 % (ref 43–75)
NRBC BLD AUTO-RTO: 0 /100 WBCS
PLATELET # BLD AUTO: 80 THOUSANDS/UL (ref 149–390)
PMV BLD AUTO: 12.6 FL (ref 8.9–12.7)
POTASSIUM SERPL-SCNC: 5.1 MMOL/L (ref 3.5–5.3)
RBC # BLD AUTO: 2.48 MILLION/UL (ref 3.81–5.12)
SODIUM SERPL-SCNC: 137 MMOL/L (ref 136–145)
UNIT DISPENSE STATUS: NORMAL
UNIT PRODUCT CODE: NORMAL
UNIT RH: NORMAL
WBC # BLD AUTO: 7.06 THOUSAND/UL (ref 4.31–10.16)

## 2021-03-14 PROCEDURE — 85018 HEMOGLOBIN: CPT | Performed by: STUDENT IN AN ORGANIZED HEALTH CARE EDUCATION/TRAINING PROGRAM

## 2021-03-14 PROCEDURE — 99222 1ST HOSP IP/OBS MODERATE 55: CPT | Performed by: PHYSICIAN ASSISTANT

## 2021-03-14 PROCEDURE — 85014 HEMATOCRIT: CPT | Performed by: STUDENT IN AN ORGANIZED HEALTH CARE EDUCATION/TRAINING PROGRAM

## 2021-03-14 PROCEDURE — 80048 BASIC METABOLIC PNL TOTAL CA: CPT | Performed by: STUDENT IN AN ORGANIZED HEALTH CARE EDUCATION/TRAINING PROGRAM

## 2021-03-14 PROCEDURE — 30233N1 TRANSFUSION OF NONAUTOLOGOUS RED BLOOD CELLS INTO PERIPHERAL VEIN, PERCUTANEOUS APPROACH: ICD-10-PCS | Performed by: STUDENT IN AN ORGANIZED HEALTH CARE EDUCATION/TRAINING PROGRAM

## 2021-03-14 PROCEDURE — 85025 COMPLETE CBC W/AUTO DIFF WBC: CPT | Performed by: STUDENT IN AN ORGANIZED HEALTH CARE EDUCATION/TRAINING PROGRAM

## 2021-03-14 PROCEDURE — 99232 SBSQ HOSP IP/OBS MODERATE 35: CPT | Performed by: STUDENT IN AN ORGANIZED HEALTH CARE EDUCATION/TRAINING PROGRAM

## 2021-03-14 RX ORDER — FUROSEMIDE 10 MG/ML
20 INJECTION INTRAMUSCULAR; INTRAVENOUS ONCE
Status: DISCONTINUED | OUTPATIENT
Start: 2021-03-14 | End: 2021-03-14

## 2021-03-14 RX ADMIN — ASPIRIN 81 MG: 81 TABLET, CHEWABLE ORAL at 09:23

## 2021-03-14 RX ADMIN — ACETAMINOPHEN 975 MG: 325 TABLET, COATED ORAL at 05:54

## 2021-03-14 RX ADMIN — PANTOPRAZOLE SODIUM 20 MG: 20 TABLET, DELAYED RELEASE ORAL at 05:54

## 2021-03-14 RX ADMIN — ACETAMINOPHEN 975 MG: 325 TABLET, COATED ORAL at 00:38

## 2021-03-14 RX ADMIN — ACETAMINOPHEN 975 MG: 325 TABLET, COATED ORAL at 13:23

## 2021-03-14 RX ADMIN — Medication 1 TABLET: at 09:24

## 2021-03-14 RX ADMIN — ATORVASTATIN CALCIUM 40 MG: 40 TABLET, FILM COATED ORAL at 17:19

## 2021-03-14 RX ADMIN — CITALOPRAM HYDROBROMIDE 10 MG: 20 TABLET ORAL at 09:24

## 2021-03-14 RX ADMIN — POLYETHYLENE GLYCOL 3350 17 G: 17 POWDER, FOR SOLUTION ORAL at 09:23

## 2021-03-14 RX ADMIN — IRON SUCROSE 200 MG: 20 INJECTION, SOLUTION INTRAVENOUS at 09:23

## 2021-03-14 RX ADMIN — DOCUSATE SODIUM 100 MG: 100 CAPSULE, LIQUID FILLED ORAL at 09:23

## 2021-03-14 RX ADMIN — BISACODYL 10 MG: 10 SUPPOSITORY RECTAL at 09:23

## 2021-03-14 RX ADMIN — FOLIC ACID 1 MG: 1 TABLET ORAL at 09:23

## 2021-03-14 RX ADMIN — ACETAMINOPHEN 975 MG: 325 TABLET, COATED ORAL at 20:47

## 2021-03-14 RX ADMIN — FUROSEMIDE 40 MG: 40 TABLET ORAL at 09:35

## 2021-03-14 RX ADMIN — THIAMINE HCL TAB 100 MG 100 MG: 100 TAB at 09:24

## 2021-03-14 NOTE — ASSESSMENT & PLAN NOTE
Presented status post fall found to have right malleolar fracture  S/p right malleolar reduction 03/11/2021  PT and OT consulted  To discuss with CM regarding STR

## 2021-03-14 NOTE — ASSESSMENT & PLAN NOTE
Lab Results   Component Value Date    EGFR 30 03/14/2021    EGFR 28 03/13/2021    EGFR 40 03/12/2021    CREATININE 1 55 (H) 03/14/2021    CREATININE 1 61 (H) 03/13/2021    CREATININE 1 22 03/12/2021     Currently stable  Monitor renal fxns

## 2021-03-14 NOTE — ASSESSMENT & PLAN NOTE
Hgb trended down, on admission 9 8  S/p 1 unit transfused  Likely due to recent surgery  GI consulted, no further intervention  Iron panel shows iron deficiency, will give IV iron

## 2021-03-14 NOTE — CONSULTS
Patient MRN: [id number]  Date of Service: 3/14/2021  Referring Physician: Scotty Butler MD  Provider Creating Note: Samra Gregg PA-C  PCP: Medardo Ramires  Reason for Consult: Anemia  QUITA Hunter is a 80 y o  female who was admitted with Closed right ankle fracture  She has a past medical history that includes atrial fibrillation (not on anticoagulation secondary to history of frequent falls), stage IIIB chronic kidney disease and breast cancer  She was admitted with a right ankle fracture following a fall and underwent ORIF 3/11  She has a history of anemia with a baseline hemoglobin between 9 and 10  Her hemoglobin was 9 8 at admission but dropped to 7 8 following surgery  No bowel movements have been charted  Patient is sleepy but appropriate  She denies any abdominal pain, heartburn, constipation, diarrhea or hematochezia prior to admission  Since the surgery she has had some nausea but no vomiting  She says her appetite had been stable  She reports a remote colonoscopy but cannot remember the details  No known history of peptic ulcer disease    She has a history of dysphagia and has been seen by speech pathology  She had a video swallow in January of this year that showed moderate oropharyngeal dysphagia with suspected esophageal motility disorder  She has been tolerating a dysphagia diet with nectar thick liquids    There are no records of an upper endoscopy being performed    Past Medical History:   Diagnosis Date    Arthritis     Cancer [facility])     breast    Cardiac disease     afib    CHF (congestive heart failure) ([facility] [address] )     Hyperlipidemia     Hypertension     Stroke ([facility] )     TIA    TIA (transient ischemic attack)      Past Surgical History:   Procedure Laterality Date    BREAST SURGERY      right lumpectomy    CARDIAC PACEMAKER PLACEMENT      CORONARY STENT PLACEMENT      TOTAL HIP ARTHROPLASTY Bilateral      Medications  Home Medications:   Prior to Admission medications Medication Sig Start Date End Date Taking? Authorizing Provider   furosemide (LASIX) 40 mg tablet Take 40 mg by mouth daily   Yes Historical Provider, MD   acetaminophen (TYLENOL) 325 mg tablet Take 2 tablets (650 mg total) by mouth every 6 (six) hours as needed for mild pain, headaches or fever  Patient not taking: Reported on 2/8/2021 1/8/21   Shahana Pang MD   albuterol (PROVENTIL HFA,VENTOLIN HFA) 90 mcg/act inhaler Inhale 2 puffs every 4 (four) hours as needed for wheezing    Historical Provider, MD   aspirin 81 MG tablet Take 81 mg by mouth daily  Historical Provider, MD   atorvastatin (LIPITOR) 40 mg tablet Take 40 mg by mouth daily    Historical Provider, MD   bisacodyl (DULCOLAX) 10 mg suppository Insert 1 suppository (10 mg total) into the rectum daily  Patient not taking: Reported on 2/8/2021 1/9/21   Shahana Pang MD   calcium carbonate-vitamin D (OSCAL-D) 500 mg-200 units per tablet Take 1 tablet by mouth daily with breakfast    Historical Provider, MD   carvedilol (COREG) 6 25 mg tablet Take 1 tablet (6 25 mg total) by mouth 2 (two) times a day with meals  Patient taking differently: Take 3 125 mg by mouth 2 (two) times a day with meals  1/8/21   Shahana Pang MD   citalopram (CeleXA) 10 mg tablet Take 10 mg by mouth daily  Historical Provider, MD   docusate sodium (COLACE) 100 mg capsule Take 100 mg by mouth daily    Historical Provider, MD   folic acid (FOLVITE) 1 mg tablet Take 1 mg by mouth daily  Historical Provider, MD   melatonin 3 mg Take 1 tablet (3 mg total) by mouth daily at bedtime  Patient not taking: Reported on 2/8/2021 1/8/21   Shahana Pang MD   Multiple Vitamins-Minerals (CENTRUM SILVER PO) Take 1 tablet by mouth daily  Historical Provider, MD   omeprazole (PriLOSEC) 20 mg delayed release capsule Take 20 mg by mouth Sunday, Tues, Thurs    Historical Provider, MD   psyllium (METAMUCIL) 0 52 G capsule Take 0 52 g by mouth daily      Historical Provider, MD   thiamine (VITAMIN B1) 100 mg tablet Take 100 mg by mouth daily  Historical Provider, MD       Inhouse Medications    Current Facility-Administered Medications:     acetaminophen (TYLENOL) tablet 650 mg, 650 mg, Oral, Q6H PRN, 650 mg at 03/12/21 2145    acetaminophen (TYLENOL) tablet 975 mg, 975 mg, Oral, Q6H [address], 975 mg at 03/14/21 0554    albuterol (PROVENTIL HFA,VENTOLIN HFA) inhaler 2 puff, 2 puff, Inhalation, Q4H PRN    aspirin chewable tablet 81 mg, 81 mg, Oral, Daily, 81 mg at 03/13/21 1023    atorvastatin (LIPITOR) tablet 40 mg, 40 mg, Oral, Daily With Dinner, 40 mg at 03/13/21 1700    bisacodyl (DULCOLAX) rectal suppository 10 mg, 10 mg, Rectal, Daily, 10 mg at 03/13/21 1024    calcium carbonate-vitamin D (OSCAL-D) 500 mg-200 units per tablet 1 tablet, 1 tablet, Oral, Daily With Breakfast, 1 tablet at 03/13/21 1023    citalopram (CeleXA) tablet 10 mg, 10 mg, Oral, Daily, 10 mg at 03/13/21 1023    docusate sodium (COLACE) capsule 100 mg, 100 mg, Oral, Daily, Stopped at 03/12/21 0831    [START ON 3/17/2021] ferrous sulfate tablet 325 mg, 325 mg, Oral, Daily With Breakfast    folic acid (FOLVITE) tablet 1 mg, 1 mg, Oral, Daily, 1 mg at 03/13/21 1024    furosemide (LASIX) tablet 40 mg, 40 mg, Oral, Daily    iron sucrose (VENOFER) 200 mg in sodium chloride 0 9 % 100 mL IVPB, 200 mg, Intravenous, Daily, 200 mg at 03/13/21 1707    melatonin tablet 3 mg, 3 mg, Oral, HS    ondansetron (ZOFRAN) injection 4 mg, 4 mg, Intravenous, Q6H PRN, 4 mg at 03/11/21 1735    pantoprazole (PROTONIX) EC tablet 20 mg, 20 mg, Oral, Early Morning, 20 mg at 03/14/21 0554    polyethylene glycol (MIRALAX) packet 17 g, 17 g, Oral, Daily, 17 g at 03/13/21 1023    thiamine tablet 100 mg, 100 mg, Oral, Daily, 100 mg at 03/13/21 1024    Allergies  Allergies   Allergen Reactions    Lovenox [Enoxaparin Sodium] Swelling     Social History   reports that she has never smoked   She has never used smokeless tobacco  She reports previous alcohol use  She reports that she does not use drugs  Family History  Family History   Problem Relation Age of Onset   [address] [facility] Sister     Stroke Family     Arthritis Family     Heart disease Family     Kidney disease Family      ROS  ROS: Reports: nausea Denies abdominal pain, chest pain, dyspnea  All others negative except as noted in HPI  Objective   Vitals  BP 94/62 (BP Location: Left arm)   Pulse 72   Temp 97 9 °F (36 6 °C) (Temporal)   Resp 18   Ht 5' 4" (1 626 m)   Wt 83 5 kg (184 lb 1 4 oz)   SpO2 100% Comment: 2liter  BMI 31 60 kg/m²   General:  Sleepy but appropriate  Eyes:  No scleral icterus  ENT:  Mucous membranes moist  Card:  Irregular  Lungs: Clear to ascultation b/l  No wheezes, rales, rhonchi  Abdomen:  Soft  Nontender  Bowel sounds normoactive  Skin:  No jaundice  Extremities:  No edema    Right lower extremity wrapped  Neuro: Alert     Laboratory Studies  Lab Results   Component Value Date    WBC 7 06 03/14/2021    HGB 7 8 (L) 03/14/2021    HCT 25 4 (L) 03/14/2021    PLT 80 (L) 03/14/2021     (H) 03/14/2021     Lab Results   Component Value Date    HGB 7 8 (L) 03/14/2021    HGB 7 8 (L) 03/14/2021    HGB 7 0 (L) 03/13/2021       Lab Results   Component Value Date    CREATININE 1 55 (H) 03/14/2021    BUN 48 (H) 03/14/2021    SODIUM 137 03/14/2021    K 5 1 03/14/2021     03/14/2021    CO2 30 03/14/2021    GLUC 90 03/14/2021    CALCIUM 9 1 03/14/2021    ALKPHOS 122 (H) 12/28/2020    ALB 3 2 (L) 12/28/2020    TBILI 0 93 12/28/2020    AST 45 12/28/2020    ALT 35 12/28/2020     Results from last 7 days   Lab Units 03/14/21  0440 03/14/21  0328 03/13/21  1819 03/13/21  0435 03/12/21  2253 03/12/21  0432 03/11/21  0807   WBC Thousand/uL 7 06  --   --  6 76  --  6 25 5 21   HEMOGLOBIN g/dL 7 8* 7 8* 7 0* 7 2* 7 4* 7 8* 9 8*   HEMATOCRIT % 25 4* 25 5* 22 7* 23 3* 24 0* 26 2* 30 8*   PLATELETS Thousands/uL 80*  --   --  78*  --  84* 112*   MCV fL 102*  --   -- 102*  --  102* 99*         Assessment and Plan:    1  Chronic anemia with hemoglobin drop  No evidence of acute GI bleed at present  Continue to monitor, transfuse as necessary  2  Dysphagia with presumed esophageal dysmotility  Consider outpatient upper endoscopy  3   Right ankle fracture, status post ORIF 3/11/2021    Principal Problem:    Closed right ankle fracture  Active Problems:    Atrial fibrillation [facility])    Essential hypertension    Ambulatory dysfunction    Slurred speech    Chronic systolic heart failure (HCC)    Stage 3b chronic kidney disease    Sick sinus syndrome (HCC)    Acute blood loss anemia      Carlene Owens PA-C

## 2021-03-14 NOTE — ASSESSMENT & PLAN NOTE
Wt Readings from Last 3 Encounters:   03/14/21 83 5 kg (184 lb 1 4 oz)   02/08/21 84 8 kg (187 lb)   02/04/21 85 kg (187 lb 6 3 oz)     Euvolemic  Resume po lasix 40mg daily

## 2021-03-14 NOTE — PROGRESS NOTES
[facility]  Progress Note - Maxcine Actis 5/5/1932, 80 y o  female MRN: [id number]  Unit/Bed#: E2 -01 Encounter: [id number]  Primary Care Provider: Leila Parada DO   Date and time admitted to hospital: 3/11/2021  7:39 AM    * Closed right ankle fracture  Assessment & Plan  Presented status post fall found to have right malleolar fracture  S/p right malleolar reduction 03/11/2021  PT and OT consulted  To discuss with CM regarding STR    Acute blood loss anemia  Assessment & Plan  Hgb trended down, on admission 9 8  S/p 1 unit transfused  Likely due to recent surgery  GI consulted, no further intervention  Iron panel shows iron deficiency, will give IV iron      Stage 3b chronic kidney disease  Assessment & Plan  Lab Results   Component Value Date    EGFR 30 03/14/2021    EGFR 28 03/13/2021    EGFR 40 03/12/2021    CREATININE 1 55 (H) 03/14/2021    CREATININE 1 61 (H) 03/13/2021    CREATININE 1 22 03/12/2021     Currently stable  Monitor renal fxns      Chronic systolic heart failure (HCC)  Assessment & Plan  Wt Readings from Last 3 Encounters:   03/14/21 83 5 kg (184 lb 1 4 oz)   02/08/21 84 8 kg (187 lb)   02/04/21 85 kg (187 lb 6 3 oz)     Euvolemic  Resume po lasix 40mg daily            Ambulatory dysfunction  Assessment & Plan  PT/OT consulted  Lives at home alone, but daughter reports that they may move her in with them  Family requesting STR, plan for assisted living afterwards    Essential hypertension  Assessment & Plan  Currently normotensive, continue coreg      Atrial fibrillation ([facility] [address] )  Assessment & Plan  EKG reveals patient in afib, paced  S/P PPM  Not on anticoagulation due to falls, c/w aspirin 81mg  Resume BB        VTE Pharmacologic Prophylaxis:   Pharmacologic: none  Mechanical VTE Prophylaxis in Place: Yes    Patient Centered Rounds: I have performed bedside rounds with nursing staff today      Discussions with Specialists or Other Care Team Provider: None    Education and Discussions with Family / Patient: Patient, Son at bedside    Time Spent for Care: 30 minutes  More than 50% of total time spent on counseling and coordination of care as described above  Current Length of Stay: 3 day(s)    Current Patient Status: Inpatient   Certification Statement: The patient will continue to require additional inpatient hospital stay due to Pending STR placement    Discharge Plan: 1-2 days    Code Status: Level 3 - DNAR and DNI      Subjective:   Patient today doing well  No events overnight  Hgb stable, no active bleeding seen  Objective:     Vitals:   Temp (24hrs), Av 4 °F (36 3 °C), Min:97 °F (36 1 °C), Max:97 9 °F (36 6 °C)    Temp:  [97 °F (36 1 °C)-97 9 °F (36 6 °C)] 97 5 °F (36 4 °C)  HR:  [64-83] 71  Resp:  [16-18] 18  BP: ()/(46-67) 116/51  SpO2:  [94 %-100 %] 94 %  Body mass index is 31 6 kg/m²  Input and Output Summary (last 24 hours): Intake/Output Summary (Last 24 hours) at 3/14/2021 1501  Last data filed at 3/14/2021 1301  Gross per 24 hour   Intake 790 ml   Output --   Net 790 ml       Physical Exam:     Physical Exam  Vitals signs and nursing note reviewed  Constitutional:       Appearance: She is ill-appearing  HENT:      Head: Normocephalic and atraumatic  Eyes:      Conjunctiva/sclera: Conjunctivae normal    Cardiovascular:      Rate and Rhythm: Normal rate  Heart sounds: Normal heart sounds  Pulmonary:      Breath sounds: No wheezing, rhonchi or rales  Abdominal:      General: Bowel sounds are normal  There is no distension  Palpations: Abdomen is soft  Tenderness: There is no abdominal tenderness  Musculoskeletal: Normal range of motion  General: No swelling  Comments: Right leg wrapped in dressing, on sling elevated   Skin:     General: Skin is warm and dry  Neurological:      Mental Status: She is alert  Mental status is at baseline           Additional Data:     Labs:    Results from last 7 days   Lab Units 03/14/21  0440   WBC Thousand/uL 7 06   HEMOGLOBIN g/dL 7 8*   HEMATOCRIT % 25 4*   PLATELETS Thousands/uL 80*   NEUTROS PCT % 69   LYMPHS PCT % 12*   MONOS PCT % 15*   EOS PCT % 3     Results from last 7 days   Lab Units 03/14/21  0440   SODIUM mmol/L 137   POTASSIUM mmol/L 5 1   CHLORIDE mmol/L 101   CO2 mmol/L 30   BUN mg/dL 48*   CREATININE mg/dL 1 55*   ANION GAP mmol/L 6   CALCIUM mg/dL 9 1   GLUCOSE RANDOM mg/dL 90     Results from last 7 days   Lab Units 03/11/21  0807   INR  1 17                       * I Have Reviewed All Lab Data Listed Above  * Additional Pertinent Lab Tests Reviewed: All Labs For Current Hospital Admission Reviewed    Imaging:    Xr Chest Portable    Result Date: 3/13/2021  Impression: Persistent right hilar fullness  There is a new opacity within the right lung base which may represent atelectasis or pneumonia  Possible small right basilar effusion  This examination was marked "immediate notification" in Epic in order to begin the standard process by which the radiology reading room liaison alerts the referring practitioner  Workstation performed: [id number]    Xr Chest 1 View Portable    Result Date: 3/11/2021  Impression: Cardiomegaly  Slight atypical vascular congestion  Trace pleural effusions  Workstation performed: [id number]    Xr Ankle 2 Vw Right    Result Date: 3/12/2021  Impression: Fluoroscopic guidance provided for procedure guidance  Please refer to the separate procedure notes for additional details  Workstation performed: [id number]    Xr Ankle 2 Vw Right    Result Date: 3/11/2021  Impression: Interval improvement in alignment of bimalleolar fracture dislocation postreduction  Workstation performed: [id number]    Xr Ankle 2 Vw Right    Result Date: 3/11/2021  Impression: Acute, displaced and angulated right ankle fracture-dislocation   This report is concordant with Joana Hill PA-C's preliminary interpretation that is documented in the electronic medical record (EPIC)  Workstation performed: [id number]    Xr Ankle 3+ Vw Right    Result Date: 3/12/2021  Impression: Limited study due to overlying artifact  Status post bimalleolar ORIF  Workstation performed: [id number]    Xr Ankle 3+ Vw Right    Result Date: 3/11/2021  Impression: Fractures of the distal right tibia and fibula  Improved alignment of the talus with in the ankle mortise since an exam from earlier today at 0948 hours  Workstation performed: [id number]    Xr Ankle 3+ Vw Right    Result Date: 3/11/2021  Impression: Splinted and partially reduced right ankle bimalleolar fracture-dislocation, unchanged in alignment  Workstation performed: [id number]    Ct Head Without Contrast    Result Date: 3/11/2021  Impression: No acute intracranial abnormality  Workstation performed: [id number]    Ct Spine Cervical Without Contrast    Result Date: 3/11/2021  Impression: No cervical spine fracture or traumatic malalignment    Workstation performed: [id number]    Recent Cultures (last 7 days):           Last 24 Hours Medication List:   Current Facility-Administered Medications   Medication Dose Route Frequency Provider Last Rate    acetaminophen  650 mg Oral Q6H PRN Thomas Flax, DPM      acetaminophen  975 mg Oral Q6H [address] Thomas Flax, DPM      albuterol  2 puff Inhalation Q4H PRN Thomas Flax, DPM      aspirin  81 mg Oral Daily Thomas Flax, DPM      atorvastatin  40 mg Oral Daily With [employer], DPM      bisacodyl  10 mg Rectal Daily Thomas Flax, DPM      calcium carbonate-vitamin D  1 tablet Oral Daily With Breakfast Thomas Flax, DPM      citalopram  10 mg Oral Daily Thomas Flax, DPM      docusate sodium  100 mg Oral Daily Thomas Flax, DPM      [START ON 3/17/2021] ferrous sulfate  325 mg Oral Daily With Breakfast Gilmer Herrera MD      folic acid  1 mg Oral Daily Thomas Flax, DPM      furosemide  40 mg Oral Daily Sherman Michaela Richards MD      iron sucrose  200 mg Intravenous Daily Tj Moreira MD      melatonin  3 mg Oral HS Ana Maria Obando DPM      ondansetron  4 mg Intravenous Q6H PRN Ana Maria Obando DPM      pantoprazole  20 mg Oral Early Morning Ana Maria Obando DPM      polyethylene glycol  17 g Oral Daily Tj Moreira MD      thiamine  100 mg Oral Daily Ana Maria Obando DPM          Today, Patient Was Seen By: Tj Moreira MD    ** Please Note: Dictation voice to text software may have been used in the creation of this document   **

## 2021-03-14 NOTE — PLAN OF CARE
Problem: Potential for Falls  Goal: Patient will remain free of falls  Description: INTERVENTIONS:  - Assess patient frequently for physical needs  -  Identify cognitive and physical deficits and behaviors that affect risk of falls  -  [place] fall precautions as indicated by assessment   - Educate patient/family on patient safety including physical limitations  - Instruct patient to call for assistance with activity based on assessment  - Modify environment to reduce risk of injury  - Consider OT/PT consult to assist with strengthening/mobility  Outcome: Progressing     Problem: SAFETY ADULT  Goal: Patient will remain free of falls  Description: INTERVENTIONS:  - Assess patient frequently for physical needs  -  Identify cognitive and physical deficits and behaviors that affect risk of falls    -  [place] fall precautions as indicated by assessment   - Educate patient/family on patient safety including physical limitations  - Instruct patient to call for assistance with activity based on assessment  - Modify environment to reduce risk of injury  - Consider OT/PT consult to assist with strengthening/mobility  Outcome: Progressing

## 2021-03-15 LAB
ANION GAP SERPL CALCULATED.3IONS-SCNC: 6 MMOL/L (ref 4–13)
BASOPHILS # BLD AUTO: 0.04 THOUSANDS/ΜL (ref 0–0.1)
BASOPHILS NFR BLD AUTO: 1 % (ref 0–1)
BUN SERPL-MCNC: 45 MG/DL (ref 5–25)
CALCIUM SERPL-MCNC: 9.1 MG/DL (ref 8.3–10.1)
CHLORIDE SERPL-SCNC: 101 MMOL/L (ref 100–108)
CO2 SERPL-SCNC: 30 MMOL/L (ref 21–32)
CREAT SERPL-MCNC: 1.3 MG/DL (ref 0.6–1.3)
EOSINOPHIL # BLD AUTO: 0.19 THOUSAND/ΜL (ref 0–0.61)
EOSINOPHIL NFR BLD AUTO: 3 % (ref 0–6)
ERYTHROCYTE [DISTWIDTH] IN BLOOD BY AUTOMATED COUNT: 18.9 % (ref 11.6–15.1)
GFR SERPL CREATININE-BSD FRML MDRD: 37 ML/MIN/1.73SQ M
GLUCOSE SERPL-MCNC: 100 MG/DL (ref 65–140)
HCT VFR BLD AUTO: 26.1 % (ref 34.8–46.1)
HGB BLD-MCNC: 7.9 G/DL (ref 11.5–15.4)
IMM GRANULOCYTES # BLD AUTO: 0.04 THOUSAND/UL (ref 0–0.2)
IMM GRANULOCYTES NFR BLD AUTO: 1 % (ref 0–2)
LYMPHOCYTES # BLD AUTO: 0.78 THOUSANDS/ΜL (ref 0.6–4.47)
LYMPHOCYTES NFR BLD AUTO: 11 % (ref 14–44)
MCH RBC QN AUTO: 30.7 PG (ref 26.8–34.3)
MCHC RBC AUTO-ENTMCNC: 30.3 G/DL (ref 31.4–37.4)
MCV RBC AUTO: 102 FL (ref 82–98)
MONOCYTES # BLD AUTO: 1.16 THOUSAND/ΜL (ref 0.17–1.22)
MONOCYTES NFR BLD AUTO: 16 % (ref 4–12)
NEUTROPHILS # BLD AUTO: 5.06 THOUSANDS/ΜL (ref 1.85–7.62)
NEUTS SEG NFR BLD AUTO: 68 % (ref 43–75)
NRBC BLD AUTO-RTO: 1 /100 WBCS
PLATELET # BLD AUTO: 108 THOUSANDS/UL (ref 149–390)
PMV BLD AUTO: 12.3 FL (ref 8.9–12.7)
POTASSIUM SERPL-SCNC: 5.1 MMOL/L (ref 3.5–5.3)
RBC # BLD AUTO: 2.57 MILLION/UL (ref 3.81–5.12)
SODIUM SERPL-SCNC: 137 MMOL/L (ref 136–145)
WBC # BLD AUTO: 7.27 THOUSAND/UL (ref 4.31–10.16)

## 2021-03-15 PROCEDURE — 99232 SBSQ HOSP IP/OBS MODERATE 35: CPT | Performed by: STUDENT IN AN ORGANIZED HEALTH CARE EDUCATION/TRAINING PROGRAM

## 2021-03-15 PROCEDURE — 80048 BASIC METABOLIC PNL TOTAL CA: CPT | Performed by: STUDENT IN AN ORGANIZED HEALTH CARE EDUCATION/TRAINING PROGRAM

## 2021-03-15 PROCEDURE — 97535 SELF CARE MNGMENT TRAINING: CPT

## 2021-03-15 PROCEDURE — 92526 ORAL FUNCTION THERAPY: CPT

## 2021-03-15 PROCEDURE — 97530 THERAPEUTIC ACTIVITIES: CPT

## 2021-03-15 PROCEDURE — 97110 THERAPEUTIC EXERCISES: CPT

## 2021-03-15 PROCEDURE — 85025 COMPLETE CBC W/AUTO DIFF WBC: CPT | Performed by: STUDENT IN AN ORGANIZED HEALTH CARE EDUCATION/TRAINING PROGRAM

## 2021-03-15 RX ORDER — OXYCODONE HYDROCHLORIDE 5 MG/1
2.5 TABLET ORAL EVERY 8 HOURS PRN
Status: DISCONTINUED | OUTPATIENT
Start: 2021-03-15 | End: 2021-03-16 | Stop reason: HOSPADM

## 2021-03-15 RX ORDER — OXYCODONE HYDROCHLORIDE 5 MG/1
2.5 TABLET ORAL ONCE
Status: COMPLETED | OUTPATIENT
Start: 2021-03-15 | End: 2021-03-15

## 2021-03-15 RX ORDER — CARVEDILOL 3.12 MG/1
3.12 TABLET ORAL 2 TIMES DAILY WITH MEALS
Status: DISCONTINUED | OUTPATIENT
Start: 2021-03-15 | End: 2021-03-16 | Stop reason: HOSPADM

## 2021-03-15 RX ADMIN — MELATONIN 3 MG: at 21:03

## 2021-03-15 RX ADMIN — ATORVASTATIN CALCIUM 40 MG: 40 TABLET, FILM COATED ORAL at 16:09

## 2021-03-15 RX ADMIN — BISACODYL 10 MG: 10 SUPPOSITORY RECTAL at 09:13

## 2021-03-15 RX ADMIN — POLYETHYLENE GLYCOL 3350 17 G: 17 POWDER, FOR SOLUTION ORAL at 09:06

## 2021-03-15 RX ADMIN — ACETAMINOPHEN 975 MG: 325 TABLET, COATED ORAL at 16:09

## 2021-03-15 RX ADMIN — ACETAMINOPHEN 975 MG: 325 TABLET, COATED ORAL at 09:02

## 2021-03-15 RX ADMIN — PANTOPRAZOLE SODIUM 20 MG: 20 TABLET, DELAYED RELEASE ORAL at 05:34

## 2021-03-15 RX ADMIN — ACETAMINOPHEN 975 MG: 325 TABLET, COATED ORAL at 21:03

## 2021-03-15 RX ADMIN — OXYCODONE HYDROCHLORIDE 2.5 MG: 5 TABLET ORAL at 01:10

## 2021-03-15 RX ADMIN — CITALOPRAM HYDROBROMIDE 10 MG: 20 TABLET ORAL at 09:04

## 2021-03-15 RX ADMIN — CARVEDILOL 3.12 MG: 3.12 TABLET, FILM COATED ORAL at 16:09

## 2021-03-15 RX ADMIN — ASPIRIN 81 MG: 81 TABLET, CHEWABLE ORAL at 09:04

## 2021-03-15 RX ADMIN — THIAMINE HCL TAB 100 MG 100 MG: 100 TAB at 09:03

## 2021-03-15 RX ADMIN — IRON SUCROSE 200 MG: 20 INJECTION, SOLUTION INTRAVENOUS at 09:22

## 2021-03-15 RX ADMIN — ACETAMINOPHEN 975 MG: 325 TABLET, COATED ORAL at 03:42

## 2021-03-15 RX ADMIN — DOCUSATE SODIUM 100 MG: 100 CAPSULE, LIQUID FILLED ORAL at 09:12

## 2021-03-15 RX ADMIN — FOLIC ACID 1 MG: 1 TABLET ORAL at 09:03

## 2021-03-15 RX ADMIN — Medication 1 TABLET: at 07:16

## 2021-03-15 RX ADMIN — FUROSEMIDE 40 MG: 40 TABLET ORAL at 09:03

## 2021-03-15 NOTE — OCCUPATIONAL THERAPY NOTE
[address] Progress Note     Patient Name: Breann Weinstein  VPMLB'U Date: 3/15/2021  Problem List  Principal Problem:    Closed right ankle fracture  Active Problems:    Atrial fibrillation [facility])    Essential hypertension    Ambulatory dysfunction    Slurred speech    Chronic systolic heart failure (HCC)    Stage 3b chronic kidney disease    Sick sinus syndrome ([facility] [address] )    Acute blood loss anemia          03/15/21 1305   OT Last Visit   OT Visit Date 03/15/21   Restrictions/Precautions   Weight Bearing Precautions Per Order Yes   RLE Weight Bearing Per Order NWB   Braces or Orthoses Splint  (on R LE)   Other Precautions WBS; Bed Alarm; Chair Alarm;Multiple lines;O2;Fall Risk;Pain;Aspiration   Pain Assessment   Pain Assessment Tool Pain Assessment not indicated - pt denies pain   Pain Score No Pain   ADL   Where Assessed Edge of bed   Grooming Assistance 5  Supervision/Setup   Grooming Deficit Setup;Verbal cueing;Supervision/safety; Increased time to complete;Wash/dry hands; Wash/dry face; Teeth care   Grooming Comments increased time to brush hair while seated EOB   LB Dressing Assistance 2  Maximal Assistance   LB Dressing Deficit Setup; Requires assistive device for steadying;Supervision/safety; Increased time to complete;Verbal cueing; Don/doff L sock   Toileting Assistance  1  Total Assistance   Toileting Deficit Setup;Steadying; Increased time to complete;Supervison/safety;Verbal cueing;Clothing management up;Clothing management down;Perineal hygiene   Toileting Comments pt incontinent of bowel movement   Functional Standing Tolerance   Time 30 sec   Activity w/ MOD asisst x2 support w/ quick move and assist of 3rd to use sheet sling to maintain NWB on R LW   Comments decreased endurance   Bed Mobility   Rolling R 4  Minimal assistance   Additional items Assist x 1; Increased time required; Bedrails;Verbal cues   Supine to Sit 4  Minimal assistance   Additional items Assist x 1; Increased time required;HOB elevated;Verbal cues;LE management   Additional Comments increased time to complete, Fair sitting balance EOB   Transfers   Sit to Stand 3  Moderate assistance   Additional items Assist x 2; Increased time required;Verbal cues  (pull up from quick move w/ assist of 3rd for NWB R LE)   Stand to Sit 3  Moderate assistance   Additional items Assist x 2; Increased time required;Verbal cues;Armrests   Other 3  Moderate assistance   Additional items Assist x 2; Increased time required;Verbal cues  (use of quick move)   Additional Comments pt w/ MOD assist x2 sit>stand from quick move, w/ max assist of 3rd and sheet sling around thigh to maintain NWB R LE   Therapeutic Exercise - ROM   UE-ROM Yes   ROM- Right Upper Extremities   R Shoulder AAROM; Flexion; Extension  (end stretch)   R Elbow AROM;Elbow flexion;Elbow extension  (forarm pronation/supination)   R Weight/Reps/Sets 2 sets x 10 reps   RUE ROM Comment tolerated well   ROM - Left Upper Extremities    L Shoulder AAROM; Flexion; Extension   L Elbow AROM;Elbow flexion;Elbow extension  (forearm pronation/supination)   L Weight/Reps/Sets 2 sets x 10 reps   LUE ROM Comment tolerated well   Cognition   Overall Cognitive Status Impaired   Arousal/Participation Responsive; Cooperative   Attention Within functional limits   Orientation Level Oriented to person;Oriented to place;Oriented to time  (oriented to month not specific date)   Memory Decreased recall of precautions;Decreased recall of recent events;Decreased short term memory   Following Commands Follows one step commands with increased time or repetition   Comments pt w/ increased alertness this session compared to initial eval, pleasant and cooperative and reports she is NWB on R LE   Additional Activities   Additional Activities   (education on safety and positioning)   Additional Activities Comments pt receptive   Activity Tolerance   Activity Tolerance Patient limited by fatigue;Patient limited by pain   Medical Staff Made Aware appropriate to see per RNJudd   Assessment   Assessment Pt seen for skilled OT session focused on ADLs, functional transfers and bed mobility and UE exercises  Pt seated upright in bed upon arrival  Pt w/ MIN assist x1 rolling in bed w/ bed rails and pt incontinent of bowel movement and then positioned on bed pan  Pt w/ total assist for hygiene cleanup w/ increased time to complete  Pt w/ MIN assist supine>sit bed mobility w/ increased time to complete  Pt while seated EOB washed face and brushed teeth w/ setup and increased time to complete; pt reports completing bathing earlier w/ PCA  Pt w/ MOD assist x2 sit>stand from bed w/ use of quick move device and assist of 3rd to assist w/ maintaining NWB on R LE w/ sheet sling around thigh  Pt w/ one therapist maneuvering quick move and other supporting pt  Pt w/ MOD assist x2 stand>sit w/ quick move device and assist of 3rd to maintain NWB on R LE w/ sheet sling  Pt while seated in chair completed b/l UE exercises seen above to increase strength and endurance for functional tasks  Pt w/ increased fatigue at end of session  Pt continues to be limited due to NWB R LE, fall risk, multiple lines, decreased strength and endurance, increased fatigue, impaired STM, hard of hearing  The patient's raw score on the AM-PAC Daily Activity inpatient short form is 13, standardized score is 32 03, less than 39 4  Patients at this level are likely to benefit from discharge to post-acute rehabilitation services  Please refer to the recommendation of the Occupational Therapist for safe discharge planning  Plan   Treatment Interventions ADL retraining;UE strengthening/ROM; Functional transfer training; Endurance training;Cognitive reorientation;Patient/family training;Equipment evaluation/education; Compensatory technique education; Energy conservation; Activityengagement   Goal Expiration Date 03/26/21   OT Treatment Day 3   OT Frequency 3-5x/wk   Recommendation Recommendation Geriatric Consult   OT Discharge Recommendation Post-Acute Rehabilitation Services   OT - OK to Discharge   (to rehab when medically stable)   AM-PAC Daily Activity Inpatient   Lower Body Dressing 1   Bathing 2   Toileting 1   Upper Body Dressing 3   Grooming 3   Eating 3   Daily Activity Raw Score 13   Daily Activity Standardized Score (Calc for Raw Score >=11) 32 03   AM-PAC Applied Cognition Inpatient   Following a Speech/Presentation 2   Understanding Ordinary Conversation 3   Taking Medications 2   Remembering Where Things Are Placed or Put Away 2   Remembering List of 4-5 Errands 2   Taking Care of Complicated Tasks 2   Applied Cognition Raw Score 13   Applied Cognition Standardized Score 30 46   Modified [place] Scale   Modified [place] Scale 4     Documentation completed by: Sherren Curls, MS, OTR/L

## 2021-03-15 NOTE — ASSESSMENT & PLAN NOTE
EKG reveals patient in afib, paced  S/P PPM  Not on anticoagulation due to falls, c/w aspirin 81mg  Resume Coreg 3 125 b i d

## 2021-03-15 NOTE — ASSESSMENT & PLAN NOTE
Lab Results   Component Value Date    EGFR 37 03/15/2021    EGFR 30 03/14/2021    EGFR 28 03/13/2021    CREATININE 1 30 03/15/2021    CREATININE 1 55 (H) 03/14/2021    CREATININE 1 61 (H) 03/13/2021     Currently stable  Monitor renal fxns

## 2021-03-15 NOTE — PROGRESS NOTES
Jeffery Perdomo  Progress Note - Estella Ernst 5/5/1932, 80 y o  female MRN: [id number]  Unit/Bed#: E2 -01 Encounter: [id number]  Primary Care Provider: Shivani Alejandro DO   Date and time admitted to hospital: 3/11/2021  7:39 AM    * Closed right ankle fracture  Assessment & Plan  Presented status post fall found to have right malleolar fracture  S/p right malleolar reduction 03/11/2021  PT and OT consulted  Pending placement with short-term rehab    Acute blood loss anemia  Assessment & Plan  Hgb trended down, on admission 9 8  S/p 1 unit transfused  Likely due to recent surgery with history of anemia baseline of 9-10  GI consulted, no further intervention   Iron panel shows iron deficiency, will give IV iron      Stage 3b chronic kidney disease  Assessment & Plan  Lab Results   Component Value Date    EGFR 37 03/15/2021    EGFR 30 03/14/2021    EGFR 28 03/13/2021    CREATININE 1 30 03/15/2021    CREATININE 1 55 (H) 03/14/2021    CREATININE 1 61 (H) 03/13/2021     Currently stable  Monitor renal fxns      Chronic systolic heart failure (HCC)  Assessment & Plan  Wt Readings from Last 3 Encounters:   03/15/21 83 7 kg (184 lb 8 4 oz)   02/08/21 84 8 kg (187 lb)   02/04/21 85 kg (187 lb 6 3 oz)     Euvolemic  Resume Coreg and p o  Lasix 40 mg daily            Essential hypertension  Assessment & Plan  Currently normotensive, continue coreg      Atrial fibrillation (HCC)  Assessment & Plan  EKG reveals patient in afib, paced  S/P PPM  Not on anticoagulation due to falls, c/w aspirin 81mg  Resume Coreg 3 125 b i d         VTE Pharmacologic Prophylaxis:   Pharmacologic: None, patient family declines  Mechanical VTE Prophylaxis in Place: Yes    Patient Centered Rounds: I have performed bedside rounds with nursing staff today  Discussions with Specialists or Other Care Team Provider:  Podiatry    Education and Discussions with Family / Patient:  Patient    Time Spent for Care: 30 minutes  More than 50% of total time spent on counseling and coordination of care as described above  Current Length of Stay: 4 day(s)    Current Patient Status: Inpatient   Certification Statement: The patient will continue to require additional inpatient hospital stay due to Pending short-term rehab placement    Discharge Plan:  Pending    Code Status: Level 3 - DNAR and DNI      Subjective:   Patient seen today, no events overnight  Reportedly had 1 bowel movement 2 days ago but has had poor appetite  Denies any chest pains, shortness of breath or cough  She did have worsening pain requesting oxycodone last night  Objective:     Vitals:   Temp (24hrs), Av 4 °F (36 3 °C), Min:97 °F (36 1 °C), Max:97 9 °F (36 6 °C)    Temp:  [97 °F (36 1 °C)-97 9 °F (36 6 °C)] 97 °F (36 1 °C)  HR:  [86-93] 86  Resp:  [18-20] 18  BP: (100-131)/(55-63) 131/63  SpO2:  [94 %-99 %] 99 %  Body mass index is 31 67 kg/m²  Input and Output Summary (last 24 hours): Intake/Output Summary (Last 24 hours) at 3/15/2021 1132  Last data filed at 3/15/2021 1001  Gross per 24 hour   Intake 700 ml   Output 300 ml   Net 400 ml       Physical Exam:     Physical Exam  Vitals signs and nursing note reviewed  Constitutional:       Appearance: She is ill-appearing  HENT:      Head: Normocephalic and atraumatic  Eyes:      Conjunctiva/sclera: Conjunctivae normal    Cardiovascular:      Rate and Rhythm: Normal rate  Heart sounds: Normal heart sounds  Pulmonary:      Breath sounds: No wheezing, rhonchi or rales  Abdominal:      General: Bowel sounds are normal  There is no distension  Palpations: Abdomen is soft  Tenderness: There is no abdominal tenderness  Musculoskeletal: Normal range of motion  General: No swelling  Comments: Right leg wrapped in dressing, on sling elevated   Skin:     General: Skin is warm and dry  Neurological:      Mental Status: She is alert  Mental status is at baseline  Additional Data:     Labs:    Results from last 7 days   Lab Units 03/15/21  0441   WBC Thousand/uL 7 27   HEMOGLOBIN g/dL 7 9*   HEMATOCRIT % 26 1*   PLATELETS Thousands/uL 108*   NEUTROS PCT % 68   LYMPHS PCT % 11*   MONOS PCT % 16*   EOS PCT % 3     Results from last 7 days   Lab Units 03/15/21  0441   SODIUM mmol/L 137   POTASSIUM mmol/L 5 1   CHLORIDE mmol/L 101   CO2 mmol/L 30   BUN mg/dL 45*   CREATININE mg/dL 1 30   ANION GAP mmol/L 6   CALCIUM mg/dL 9 1   GLUCOSE RANDOM mg/dL 100     Results from last 7 days   Lab Units 03/11/21  0807   INR  1 17                       * I Have Reviewed All Lab Data Listed Above  * Additional Pertinent Lab Tests Reviewed: All Labs For Current Hospital Admission Reviewed    Imaging:    Xr Chest Portable    Result Date: 3/13/2021  Impression: Persistent right hilar fullness  There is a new opacity within the right lung base which may represent atelectasis or pneumonia  Possible small right basilar effusion  This examination was marked "immediate notification" in Epic in order to begin the standard process by which the radiology reading room liaison alerts the referring practitioner  Workstation performed: [id number]    Xr Chest 1 View Portable    Result Date: 3/11/2021  Impression: Cardiomegaly  Slight atypical vascular congestion  Trace pleural effusions  Workstation performed: [id number]    Xr Ankle 2 Vw Right    Result Date: 3/12/2021  Impression: Fluoroscopic guidance provided for procedure guidance  Please refer to the separate procedure notes for additional details  Workstation performed: [id number]    Xr Ankle 2 Vw Right    Result Date: 3/11/2021  Impression: Interval improvement in alignment of bimalleolar fracture dislocation postreduction  Workstation performed: [id number]    Xr Ankle 2 Vw Right    Result Date: 3/11/2021  Impression: Acute, displaced and angulated right ankle fracture-dislocation   This report is concordant with Yaa Sabillon Balwinder Chatman PA-C's preliminary interpretation that is documented in the electronic medical record (EPIC)  Workstation performed: [id number]    Xr Ankle 3+ Vw Right    Result Date: 3/12/2021  Impression: Limited study due to overlying artifact  Status post bimalleolar ORIF  Workstation performed: [id number]    Xr Ankle 3+ Vw Right    Result Date: 3/11/2021  Impression: Fractures of the distal right tibia and fibula  Improved alignment of the talus with in the ankle mortise since an exam from earlier today at 0948 hours  Workstation performed: [id number]    Xr Ankle 3+ Vw Right    Result Date: 3/11/2021  Impression: Splinted and partially reduced right ankle bimalleolar fracture-dislocation, unchanged in alignment  Workstation performed: [id number]    Ct Head Without Contrast    Result Date: 3/11/2021  Impression: No acute intracranial abnormality  Workstation performed: [id number]    Ct Spine Cervical Without Contrast    Result Date: 3/11/2021  Impression: No cervical spine fracture or traumatic malalignment    Workstation performed: [id number]    Recent Cultures (last 7 days):           Last 24 Hours Medication List:   Current Facility-Administered Medications   Medication Dose Route Frequency Provider Last Rate    acetaminophen  650 mg Oral Q6H PRN Daryn Perez DPM      acetaminophen  975 mg Oral Q6H [address] Daryn Perez DPM      albuterol  2 puff Inhalation Q4H PRN Daryn Perez DPM      aspirin  81 mg Oral Daily Daryn Perez DPM      atorvastatin  40 mg Oral Daily With [employer], ELA      bisacodyl  10 mg Rectal Daily Daryn Perez DPM      calcium carbonate-vitamin D  1 tablet Oral Daily With Breakfast Daryn Perez DPM      carvedilol  3 125 mg Oral BID With Meals Maxwell Gavin MD      citalopram  10 mg Oral Daily Daryn Perez DPM      docusate sodium  100 mg Oral Daily Daryn Perez DPM      [START ON 3/17/2021] ferrous sulfate  325 mg Oral Daily With Breakfast Estefany Orozco MD      folic acid  1 mg Oral Daily Fabian Covington DPM      furosemide  40 mg Oral Daily Estefany Orozco MD      melatonin  3 mg Oral HS Fabian Covington DPM      ondansetron  4 mg Intravenous Q6H PRN Fabian Covington DPM      oxyCODONE  2 5 mg Oral Q8H PRN Estefany Orozco MD      pantoprazole  20 mg Oral Early Morning Fabian Covington DPM      polyethylene glycol  17 g Oral Daily Estefany Orozco MD      thiamine  100 mg Oral Daily Fabian Covington DPM          Today, Patient Was Seen By: Estefany Orozco MD    ** Please Note: Dictation voice to text software may have been used in the creation of this document   **

## 2021-03-15 NOTE — ASSESSMENT & PLAN NOTE
Wt Readings from Last 3 Encounters:   03/15/21 83 7 kg (184 lb 8 4 oz)   02/08/21 84 8 kg (187 lb)   02/04/21 85 kg (187 lb 6 3 oz)     Euvolemic  Resume Coreg and p o   Lasix 40 mg daily

## 2021-03-15 NOTE — ASSESSMENT & PLAN NOTE
Presented status post fall found to have right malleolar fracture  S/p right malleolar reduction 03/11/2021  PT and OT consulted  Pending placement with short-term rehab

## 2021-03-15 NOTE — PLAN OF CARE
Problem: Potential for Falls  Goal: Patient will remain free of falls  Description: INTERVENTIONS:  - Assess patient frequently for physical needs  -  Identify cognitive and physical deficits and behaviors that affect risk of falls  -  [place] fall precautions as indicated by assessment   - Educate patient/family on patient safety including physical limitations  - Instruct patient to call for assistance with activity based on assessment  - Modify environment to reduce risk of injury  - Consider OT/PT consult to assist with strengthening/mobility  Outcome: Progressing     Problem: SAFETY ADULT  Goal: Patient will remain free of falls  Description: INTERVENTIONS:  - Assess patient frequently for physical needs  -  Identify cognitive and physical deficits and behaviors that affect risk of falls    -  [place] fall precautions as indicated by assessment   - Educate patient/family on patient safety including physical limitations  - Instruct patient to call for assistance with activity based on assessment  - Modify environment to reduce risk of injury  - Consider OT/PT consult to assist with strengthening/mobility  Outcome: Progressing

## 2021-03-15 NOTE — PHYSICAL THERAPY NOTE
Physical Therapy Treatment Note     03/15/21 1152   Note Type   Note Type Treatment   Pain Assessment   Pain Assessment Tool Pain Assessment not indicated - pt denies pain   Pain Score No Pain   Restrictions/Precautions   RLE Weight Bearing Per Order NWB   Braces or Orthoses Splint   Other Precautions WBS; Chair Alarm; Bed Alarm;Multiple lines;O2;Fall Risk;Aspiration  (O2 A1 l NC)   General   Chart Reviewed Yes   Family/Caregiver Present No   Cognition   Overall Cognitive Status Impaired   Arousal/Participation Alert; Responsive; Cooperative   Attention Within functional limits   Orientation Level Oriented to person;Oriented to place;Oriented to situation;Oriented to time   Following Commands Follows multistep commands with increased time or repetition   Subjective   Subjective "i REALLY DO NOT HAVE ANY PAIN RIGHT NOW '     Bed Mobility   Rolling R 4  Minimal assistance   Additional items Bedrails; Increased time required;Verbal cues;LE management;Assist x 1   Supine to Sit 4  Minimal assistance   Additional items Assist x 1;HOB elevated; Increased time required;Verbal cues;LE management   Additional Comments EOB SITTING BALANCE TOLERANCE EOB X 10 MINUTES   Transfers   Sit to Stand 3  Moderate assistance   Additional items Assist x 2; Increased time required;Verbal cues  (MAX ASSIST OF 3RD TO MAINTAIN NWB TO R LE  QUICKMOVE)   Stand to Sit 3  Moderate assistance   Additional items Assist x 2; Increased time required;Verbal cues  (MAX ASSIST OF 3RD TO MAINTAIN NWB TO R LE  QUICKMOVE)   Other 3  Moderate assistance   Additional items Assist x 2; Increased time required;Verbal cues  (WITH USE OF QUICKMOVE, MAX ASSIST OF 3RD TO MAINTAIN nwb)   Balance   Static Sitting Fair +   Dynamic Sitting Fair -   Static Standing Poor -   Dynamic Standing Poor -   Ambulatory Zero   Endurance Deficit   Endurance Deficit Yes   Endurance Deficit Description FATIGUE,    Activity Tolerance   Activity Tolerance Patient limited by fatigue Medical Staff Made Aware ISATU;Y, otr   Nurse Made Aware AMBER DAVE   Exercises   Quad Sets Supine;AROM; Bilateral  (X 12 REPS)   Heelslides Supine;AROM; Right  (X12 REPS)   Hip Flexion Supine;AAROM; Right  (AROM L LE X 12 REPS  )   Hip Abduction Supine;AAROM; Right;Left  (AROM LLE X 12 REPS  )   Hip Adduction Supine;AAROM; Right  (AROM LLE X 12 REPS   )   Knee AROM Short Arc Quad Supine;AROM; Bilateral  (X 12 REPS  )   Assessment   Prognosis Fair   Problem List Decreased strength;Decreased range of motion;Decreased endurance; Impaired balance;Decreased mobility;Obesity; Decreased skin integrity;Orthopedic restrictions   Assessment Pt SEEN FOR PT TREATMENT INTERVENTIONS OF BED MOBILITY, TRANSFERS, LE STRENGTHENING AND ROM EXERCISES AND PT EDUCATION  Pt  PERFORM SUPINE B/L LE AA-AROM EXERCISES X12 REPS TO TOLERANCE  PT  IS UNABLE TO I'LY SLR, PERFORM HEEL SLIDES AND HIP ABD  /ADD WITH R LE AND REQUIRES ASSISTANCE TO PERFORM   PT PERFORMS BED MOBILITY ROLLING OF THE R WITH USE OF BED RAIL WITH MIN ASSIST X1, SUPINE TO SIT WITH MIN ASSIST X1 WITH HEAD OF BED ELEVATED AND USE OF BED RAILS   PT SHOWING IMPROVED ABILITY TO PERFORM SIT TO STAND TRANSFERS WITH USE OF QUICK MOVE WITH MOD ASSIST X2 TO PERFORM SIT TO STAND AND MAX ASSIST OF ANOTHER TO MAINTAIN NWB TO R LE  WITH USE OF SHEET AROUND THIGH   PT PERFORMS TRANSFERS OUT OF BED TO CHAIR VIA DEPENDANT TRANSFER WITH USE OF QUICK MOVE DEVICE  PT UNABLE TO MAINTAIN NWB TO R LE   PT DEMONSTRATES STABLE STATIC SITTING BALANCE AT EOB, SITTING TOLERANCE 10 MINUTES EOB THIS SESSION   PT  'S BP STABLE 135/76 EOB, sPO2 98% ON 1L NC  AND 95%-97% ON RA WITH MOBILITY, PT'S RNTENZIN INFORMED ON O2 SATS AND REPORTED TO LEAVE O2 OFF    The patient's AM-PAC Basic Mobility Inpatient Short Form Low Function Raw Score 17 , Standardized Score is 27 46  A standardized score less 42 9 suggests the patient may benefit from discharge to post-acute rehab services   Please also refer to the recommendation of the Physical Therapist for safe discharge planning   At THIS TIME PT RECOMMENDATION FOR D/C IS STR BASED UPON OBJECTIVE FINDINGS, INCREASED ASSISTANCE FOR ALL MOBILITY, DECREASED CAREGVER SUPPORT AND DECREASED COGNITION,  INCREASED RISK FOR FALLS AND WEIGHTBEARING RESTRICTIONS TO R LE  Goals   Patient Goals " TO BE ABLE TO STAY IN MY HOUSE "     STG Expiration Date 03/26/21   PT Treatment Day 2   Plan   Treatment/Interventions Functional transfer training;LE strengthening/ROM; Therapeutic exercise; Endurance training;Patient/family training;Cognitive reorientation;Equipment eval/education; Bed mobility;Gait training;Spoke to nursing;OT   PT Frequency   (3-5X/WEEK)   Recommendation   PT Discharge Recommendation Post-Acute Rehabilitation Services  (str)   PT - OK to Discharge Yes  ([address])   [address]   Turning in Bed Without Bedrails 2   Lying on Back to Sitting on Edge of Flat Bed 2   Moving Bed to Chair 1   Standing Up From Chair 2   Walk in Room 1   Climb 3-5 Stairs 1   Basic Mobility Inpatient Raw Score 9   Turning Head Towards Sound 4   Follow Simple Instructions 4   Low Function Basic Mobility Raw Score 17   Low Function Basic Mobility Standardized Score 27 46      03/15/21 1152   Note Type   Note Type Treatment   Pain Assessment   Pain Assessment Tool Pain Assessment not indicated - pt denies pain   Pain Score No Pain   Restrictions/Precautions   RLE Weight Bearing Per Order NWB   Braces or Orthoses Splint   Other Precautions WBS; Chair Alarm; Bed Alarm;Multiple lines;O2;Fall Risk;Aspiration  (O2 A1 l NC)   General   Chart Reviewed Yes   Family/Caregiver Present No   Cognition   Overall Cognitive Status Impaired   Arousal/Participation Alert; Responsive; Cooperative   Attention Within functional limits   Orientation Level Oriented to person;Oriented to place;Oriented to situation;Oriented to time   Following Commands Follows multistep commands with increased time or repetition   Subjective   Subjective "i REALLY DO NOT HAVE ANY PAIN RIGHT NOW '     Bed Mobility   Rolling R 4  Minimal assistance   Additional items Bedrails; Increased time required;Verbal cues;LE management;Assist x 1   Supine to Sit 4  Minimal assistance   Additional items Assist x 1;HOB elevated; Increased time required;Verbal cues;LE management   Additional Comments EOB SITTING BALANCE TOLERANCE EOB X 10 MINUTES   Transfers   Sit to Stand 3  Moderate assistance   Additional items Assist x 2; Increased time required;Verbal cues  (MAX ASSIST OF 3RD TO MAINTAIN NWB TO R LE  QUICKMOVE)   Stand to Sit 3  Moderate assistance   Additional items Assist x 2; Increased time required;Verbal cues  (MAX ASSIST OF 3RD TO MAINTAIN NWB TO R LE  QUICKMOVE)   Other 3  Moderate assistance   Additional items Assist x 2; Increased time required;Verbal cues  (WITH USE OF QUICKMOVE, MAX ASSIST OF 3RD TO MAINTAIN nwb)   Balance   Static Sitting Fair +   Dynamic Sitting Fair -   Static Standing Poor -   Dynamic Standing Poor -   Ambulatory Zero   Endurance Deficit   Endurance Deficit Yes   Endurance Deficit Description FATIGUE,    Activity Tolerance   Activity Tolerance Patient limited by fatigue   Medical Staff Made Aware ETIENNE, otr   Nurse Made Aware AMBER DAVE   Exercises   Quad Sets Supine;AROM; Bilateral  (X 12 REPS)   Heelslides Supine;AROM; Right  (X12 REPS)   Hip Flexion Supine;AAROM; Right  (AROM L LE X 12 REPS  )   Hip Abduction Supine;AAROM; Right;Left  (AROM LLE X 12 REPS  )   Hip Adduction Supine;AAROM; Right  (AROM LLE X 12 REPS   )   Knee AROM Short Arc Quad Supine;AROM; Bilateral  (X 12 REPS  )   Assessment   Prognosis Fair   Problem List Decreased strength;Decreased range of motion;Decreased endurance; Impaired balance;Decreased mobility;Obesity; Decreased skin integrity;Orthopedic restrictions   Assessment Pt SEEN FOR PT TREATMENT INTERVENTIONS OF BED MOBILITY, TRANSFERS, LE STRENGTHENING AND ROM EXERCISES AND PT EDUCATION  Pt  PERFORM SUPINE B/L LE AA-AROM EXERCISES X12 REPS TO TOLERANCE  PT  IS UNABLE TO I'LY SLR, PERFORM HEEL SLIDES AND HIP ABD  /ADD WITH R LE AND REQUIRES ASSISTANCE TO PERFORM   PT PERFORMS BED MOBILITY ROLLING OF THE R WITH USE OF BED RAIL WITH MIN ASSIST X1, SUPINE TO SIT WITH MIN ASSIST X1 WITH HEAD OF BED ELEVATED AND USE OF BED RAILS   PT SHOWING IMPROVED ABILITY TO PERFORM SIT TO STAND TRANSFERS WITH USE OF QUICK MOVE WITH MOD ASSIST X2 TO PERFORM SIT TO STAND AND MAX ASSIST OF ANOTHER TO MAINTAIN NWB TO R LE  WITH USE OF SHEET AROUND THIGH   PT PERFORMS TRANSFERS OUT OF BED TO CHAIR VIA DEPENDANT TRANSFER WITH USE OF QUICK MOVE DEVICE  PT UNABLE TO MAINTAIN NWB TO R LE   PT DEMONSTRATES STABLE STATIC SITTING BALANCE AT EOB, SITTING TOLERANCE 10 MINUTES EOB THIS SESSION   PT  'S BP STABLE 135/76 EOB, sPO2 98% ON 1L NC  AND 95%-97% ON RA WITH MOBILITY, PT'S RNTENZIN INFORMED ON O2 SATS AND REPORTED TO LEAVE O2 OFF    The patient's AM-PAC Basic Mobility Inpatient Short Form Low Function Raw Score 17 , Standardized Score is 27 46  A standardized score less 42 9 suggests the patient may benefit from discharge to post-acute rehab services  Please also refer to the recommendation of the Physical Therapist for safe discharge planning   At THIS TIME PT RECOMMENDATION FOR D/C IS STR BASED UPON OBJECTIVE FINDINGS, INCREASED ASSISTANCE FOR ALL MOBILITY, DECREASED CAREGVER SUPPORT AND DECREASED COGNITION,  INCREASED RISK FOR FALLS AND WEIGHTBEARING RESTRICTIONS TO R LE  Goals   Patient Goals " TO BE ABLE TO STAY IN MY HOUSE "     STG Expiration Date 03/26/21   PT Treatment Day 2   Plan   Treatment/Interventions Functional transfer training;LE strengthening/ROM; Therapeutic exercise; Endurance training;Patient/family training;Cognitive reorientation;Equipment eval/education; Bed mobility;Gait training;Spoke to nursing;OT   PT Frequency   (3-5X/WEEK)   Recommendation   PT Discharge Recommendation Post-Acute Rehabilitation Services  (str)   PT - OK to Discharge Yes  ([address])   [address] Mobility Inpatient   Turning in Bed Without Bedrails 2   Lying on Back to Sitting on Edge of Flat Bed 2   Moving Bed to Chair 1   Standing Up From Chair 2   Walk in Room 1   Climb 3-5 Stairs 1   Basic Mobility Inpatient Raw Score 9   Turning Head Towards Sound 4   Follow Simple Instructions 4   Low Function Basic Mobility Raw Score 17   Low Function Basic Mobility Standardized Score 27 46         Vinny Mean, PTA

## 2021-03-15 NOTE — PROGRESS NOTES
[facility] Podiatry - Progress Note  Patient: Muna Tavera 80 y o  female   MRN: [id number]  PCP: Kelsy Jaquez, DO  Unit/Bed#: E2 MS Alexus-01 Encounter: [id number]  Date Of Visit: 03/15/21    ASSESSMENT:    Muna Tavera is a 80 y o  female with:    1  Closed, bimalleolar right ankle fracture   - s/p right ankle ORIF (DOS 2021)  2  CHF  3  CKD stage 3  4  Afib    PLAN:    · RLE dressings/splint in proper position, to remain clean, dry, and intact  · Continue elevation of RLE while in bed  · Non weightbearing right lower extremity  · DVT ppx and pain management per primary team   · Patient stable for discharge to [facility] from podiatry standpoint  · Follow up with Dr Krissy Arora in outpatient setting for continued post-operative care  · Rest of care per primary team     SUBJECTIVE:     The patient was seen, evaluated, and assessed at bedside today  The patient was resting comfortably and in no acute distress  No acute events overnight  OBJECTIVE:     Vitals:   /61   Pulse 93   Temp (!) 97 1 °F (36 2 °C) (Temporal)   Resp 20   Ht 5' 4" (1 626 m)   Wt 83 7 kg (184 lb 8 4 oz)   SpO2 95%   BMI 31 67 kg/m²     Temp (24hrs), Av 6 °F (36 4 °C), Min:97 1 °F (36 2 °C), Max:97 9 °F (36 6 °C)      Physical Exam:     General:  Alert, cooperative, and in no distress  Lower extremity exam:  Cardiovascular status at baseline  Neurological status at baseline  Musculoskeletal status at baseline  No calf tenderness noted  RLE dressings CDI, in position, active ROM of digits intact, gross sensation intact/diminished, CRT brisk      Additional Data:     Labs:    Results from last 7 days   Lab Units 03/15/21  0441   WBC Thousand/uL 7 27   HEMOGLOBIN g/dL 7 9*   HEMATOCRIT % 26 1*   PLATELETS Thousands/uL 108*   NEUTROS PCT % 68   LYMPHS PCT % 11*   MONOS PCT % 16*   EOS PCT % 3     Results from last 7 days   Lab Units 03/15/21  0441   POTASSIUM mmol/L 5 1   CHLORIDE mmol/L 101   CO2 mmol/L 30   BUN mg/dL 45* This note was completed with dictation software and grammatical errors may exist.    Referring Physician: No ref. provider found    PCP: Alie Womack MD      CC: neck and left shoulder pain    Interval history:  Patient returns to our clinic.  He presents with worsening neck and left shoulder pain.  He had a fall in April 2017 and fell on his left shoulder.  Complains of left-sided neck and shoulder pain.   He has tried physical therapy which did not provide much benefit.  He underwent a left shoulder injection with Dr. Silva with moderate benefit but had a side effect with hyperglycemia.  He is scheduled for post-radiofrequency ablation of the left shoulder.  He presents to us with worsening posterior neck pain.  He does have history of cervical DDD.  However, he continues to have low platelets and we are unable to provide any neuro axonal interventional procedures.  He takes Norco 10 mg every 6 hours as needed with mild to moderate benefit.  This is prescribed by his PCP, however, she will be going on maternity leave in the near future.   He had tried Percocet but states it causes too much grogginess.    Prior HPI:   Steve June Jr. is a 70 y.o. male referred to us for lower back and knee pain.  He has significant history of pancytopenia, cirrhosis.  He presents with constant aching, sharp pain in his lower back as well as his left knee.  Pain worsens sitting, standing, bending, walking.  Pain improves with rest.  X-rays performed of the lumbar spine as well as knee shows left knee osteoarthritis.  He has tried physical therapy with minimal benefit.  He currently takes Norco 10 mg every 6 hours as needed with mild to moderate benefit.  He is unable to have any procedures due to his thrombocytopenia.  He also has ventral hernia, but surgical attention is currently not recommended due to his thrombocytopenia.  His main concern today is wishing to decrease his opioid medications.  He states being very  ""foggy" with his current medications.  He denies any increased sedation.  He denies any weakness.  No bowel bladder changes.    ROS:  CONSTITUTIONAL: No fevers, chills, night sweats, wt. loss, appetite changes  SKIN: no rashes or itching  ENT: No headaches, head trauma, vision changes, or eye pain  LYMPH NODES: None noticed   CV: No chest pain, palpitations.   RESP: No shortness of breath, dyspnea on exertion, cough, wheezing, or hemoptysis  GI: No nausea, emesis, diarrhea, constipation, melena, hematochezia, pain.    : No dysuria, hematuria, urgency, or frequency   HEME: No easy bruising, bleeding problems  PSYCHIATRIC: No depression, anxiety, psychosis, hallucinations.  NEURO: No seizures, memory loss, dizziness or difficulty sleeping  MSK: + History of present illness      Past Medical History:   Diagnosis Date    Abnormal thyroid function test     Allergy     Seasonal    Anemia     Arthritis     Gaviria esophagus     Basal cell carcinoma     right forearm    Basal cell carcinoma 12/2011    lower post neck    Cancer     skin CA    Cataract     Cirrhosis     Encounter for blood transfusion     Esophageal varices in cirrhosis     grade II on 7/12 EGD    Gastritis     on 7/12 EGD    GERD (gastroesophageal reflux disease) 2/28/2015    Hard of hearing     Hiatal hernia     History of hepatitis C 8/10/2012    tx with harvoni x 41 days (started 10/22/15). 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    Valvular heart disease     mild MR 12     Past Surgical History:   Procedure Laterality Date    CATARACT EXTRACTION  1/10/13    left eye    CATARACT EXTRACTION      right eye    CHOLECYSTECTOMY      COLONOSCOPY      EYE SURGERY   " CREATININE mg/dL 1 30   CALCIUM mg/dL 9 1     Results from last 7 days   Lab Units 03/11/21  0807   INR  1 17       * I Have Reviewed All Lab Data Listed Above  Recent Cultures (last 7 days):               Imaging: I have personally reviewed pertinent films in PACS  EKG, Pathology, and Other Studies: I have personally reviewed pertinent reports  ** Please Note: Portions of the record may have been created with voice recognition software  Occasional wrong word or "sound a like" substitutions may have occurred due to the inherent limitations of voice recognition software  Read the chart carefully and recognize, using context, where substitutions have occurred   ** "   Cataract surgery to right eye    KNEE ARTHROSCOPY W/ MENISCAL REPAIR      KNEE CARTILAGE SURGERY      left knee    KNEE SURGERY  12/2006    left    SKIN LESION EXCISION      TONSILLECTOMY      UPPER GASTROINTESTINAL ENDOSCOPY       Family History   Problem Relation Age of Onset    Leukemia Mother     Cancer Mother      bone    Alcohol abuse Father     Cirrhosis Father      EtOH induced    Amblyopia Neg Hx     Blindness Neg Hx     Cataracts Neg Hx     Diabetes Neg Hx     Glaucoma Neg Hx     Hypertension Neg Hx     Macular degeneration Neg Hx     Retinal detachment Neg Hx     Strabismus Neg Hx     Stroke Neg Hx     Thyroid disease Neg Hx     Psoriasis Neg Hx     Lupus Neg Hx     Eczema Neg Hx     Acne Neg Hx     Melanoma Neg Hx      Social History     Social History    Marital status:      Spouse name: N/A    Number of children: 5    Years of education: N/A     Social History Main Topics    Smoking status: Former Smoker     Packs/day: 1.00     Years: 25.00     Quit date: 8/9/2000    Smokeless tobacco: Never Used    Alcohol use No      Comment: states he stopped drinking approx. 7 yrs ago/"I might drink a beer every 2 weeks"    Drug use: No    Sexual activity: No     Other Topics Concern    None     Social History Narrative    He is .  He has children.  He is currently retired.         Medications/Allergies: See med card    Vitals:    09/06/17 1118   BP: (!) 141/83   Pulse: 65   Weight: 88.9 kg (196 lb)   Height: 5' 9" (1.753 m)   PainSc:   8   PainLoc: Neck         Physical exam:    GENERAL: A and O x3, the patient appears well groomed and is in no acute distress.  Skin: No rashes or obvious lesions  HEENT: normocephalic, atraumatic  CARDIOVASCULAR:  Palpable peripheral pulses  LUNGS: easy work of breathing  ABDOMEN: soft, nontender, + sizaeable ventral hernia in epigastric region.    UPPER EXTREMITIES: Normal alignment, normal range of motion, no atrophy, no skin " changes,  hair growth and nail growth normal and equal bilaterally. No swelling, no tenderness.    LOWER EXTREMITIES:  Normal alignment, normal range of motion, no atrophy, no skin changes,  hair growth and nail growth normal and equal bilaterally. No swelling, no tenderness.    LUMBAR SPINE  Lumbar spine: ROM is full with flexion extension and oblique extension with moderate increased pain.    Erasmo's test causes no increased pain on either side.    Supine straight leg raise is negative bilaterally.    Internal and external rotation of the hip causes no increased pain on either side.  Myofascial exam: No tenderness to palpation across lumbar paraspinous muscles.      MENTAL STATUS: normal orientation, speech, language, and fund of knowledge for social situation.  Emotional state appropriate.    CRANIAL NERVES:  II:  PERRL bilaterally,   III,IV,VI: EOMI.    V:  Facial sensation equal bilaterally  VII:  Facial motor function normal.  VIII:  Hearing equal to finger rub bilaterally  IX/X: Gag normal, palate symmetric  XI:  Shoulder shrug equal, head turn equal  XII:  Tongue midline without fasciculations      MOTOR: Tone and bulk: normal bilateral upper and lower Strength: normal   Delt Bi Tri WE WF     R 5 5 5 5 5 5   L 5 5 5 5 5 5     IP ADD ABD Quad TA Gas HAM  R 5 5 5 5 5 5 5  L 5 5 5 5 5 5 5    SENSATION: Light touch and pinprick intact bilaterally  REFLEXES: normal, symmetric, nonbrisk.  Toes down, no clonus. No hoffmans.  GAIT: normal rise, base, steps, and arm swing.        Imaging:  Xray L-spine 4/2013   Alignment is otherwise normal.  Vertebral body heights and disc space heights are relatively well maintained.  Vertebral end plate osteophytes are present anteriorly   at multiple levels.  The facet joints and posterior elements are unremarkable         Xray Knee 12/2013  Degenerative change of the knees, left greater than right.    Assessment:  Patient referred for lower back and left knee pain  1. DDD  (degenerative disc disease), cervical    2. Osteoarthritis of cervical spine, unspecified spinal osteoarthritis complication status    3. Chronic use of opiate for therapeutic purpose          Plan:  - I have stressed the importance of physical activity and exercise to improve overall health.  Continue PT exercises learned at home.  - Any interventional procedures will be deferred due to his low platelet count.  Patient expressed understanding.  - Continue evaluation with PM&R, Dr. Silva  - He can continue Norco 10mg q6hrs as needed.  Script provided for two months.  UDS today.   - Follow-up 2 month

## 2021-03-15 NOTE — PLAN OF CARE
Problem: OCCUPATIONAL THERAPY ADULT  Goal: Performs self-care activities at highest level of function for planned discharge setting  See evaluation for individualized goals  Description: Treatment Interventions: ADL retraining, UE strengthening/ROM, Functional transfer training, Endurance training, Cognitive reorientation, Patient/family training, Equipment evaluation/education, Compensatory technique education, Energy conservation, Activityengagement, Continued evaluation          See flowsheet documentation for full assessment, interventions and recommendations  Outcome: Progressing  Note: Limitation: Decreased ADL status, Decreased UE strength, Decreased Safe judgement during ADL, Decreased cognition, Decreased endurance, Decreased self-care trans, Decreased high-level ADLs, Decreased sensation, Decreased fine motor control  Prognosis: Fair, Good  Assessment: Pt seen for skilled OT session focused on ADLs, functional transfers and bed mobility and UE exercises  Pt seated upright in bed upon arrival  Pt w/ MIN assist x1 rolling in bed w/ bed rails and pt incontinent of bowel movement and then positioned on bed pan  Pt w/ total assist for hygiene cleanup w/ increased time to complete  Pt w/ MIN assist supine>sit bed mobility w/ increased time to complete  Pt while seated EOB washed face and brushed teeth w/ setup and increased time to complete; pt reports completing bathing earlier w/ PCA  Pt w/ MOD assist x2 sit>stand from bed w/ use of quick move device and assist of 3rd to assist w/ maintaining NWB on R LE w/ sheet sling around thigh  Pt w/ one therapist maneuvering quick move and other supporting pt  Pt w/ MOD assist x2 stand>sit w/ quick move device and assist of 3rd to maintain NWB on R LE w/ sheet sling  Pt while seated in chair completed b/l UE exercises seen above to increase strength and endurance for functional tasks  Pt w/ increased fatigue at end of session   Pt continues to be limited due to NWB R LE, fall risk, multiple lines, decreased strength and endurance, increased fatigue, impaired STM, hard of hearing  The patient's raw score on the AM-PAC Daily Activity inpatient short form is 13, standardized score is 32 03, less than 39 4  Patients at this level are likely to benefit from discharge to post-acute rehabilitation services  Please refer to the recommendation of the Occupational Therapist for safe discharge planning    Recommendation: Geriatric Consult  OT Discharge Recommendation: Post-Acute Rehabilitation Services  OT - OK to Discharge: (to rehab when medically stable)

## 2021-03-15 NOTE — PLAN OF CARE
Problem: PHYSICAL THERAPY ADULT  Goal: Performs mobility at highest level of function for planned discharge setting  See evaluation for individualized goals  Description: Treatment/Interventions: Functional transfer training, LE strengthening/ROM, Therapeutic exercise, Endurance training, Patient/family training, Bed mobility, Spoke to nursing, OT  Equipment Recommended: Walker(Pt owns walker)       See flowsheet documentation for full assessment, interventions and recommendations  Outcome: Progressing  Note: Prognosis: Fair  Problem List: Decreased strength, Decreased range of motion, Decreased endurance, Impaired balance, Decreased mobility, Obesity, Decreased skin integrity, Orthopedic restrictions  Assessment: Pt SEEN FOR PT TREATMENT INTERVENTIONS OF BED MOBILITY, TRANSFERS, LE STRENGTHENING AND ROM EXERCISES AND PT EDUCATION  Pt  PERFORM SUPINE B/L LE AA-AROM EXERCISES X12 REPS TO TOLERANCE  PT  IS UNABLE TO I'LY SLR, PERFORM HEEL SLIDES AND HIP ABD  /ADD WITH R LE AND REQUIRES ASSISTANCE TO PERFORM  PT PERFORMS BED MOBILITY ROLLING OF THE R WITH USE OF BED RAIL WITH MIN ASSIST X1, SUPINE TO SIT WITH MIN ASSIST X1 WITH HEAD OF BED ELEVATED AND USE OF BED RAILS  PT SHOWING IMPROVED ABILITY TO PERFORM SIT TO STAND TRANSFERS WITH USE OF QUICK MOVE WITH MOD ASSIST X2 TO PERFORM SIT TO STAND AND MAX ASSIST OF ANOTHER TO MAINTAIN NWB TO R LE  WITH USE OF SHEET AROUND THIGH  PT PERFORMS TRANSFERS OUT OF BED TO CHAIR VIA DEPENDANT TRANSFER WITH USE OF QUICK MOVE DEVICE  PT UNABLE TO MAINTAIN NWB TO R LE   PT DEMONSTRATES STABLE STATIC SITTING BALANCE AT EOB, SITTING TOLERANCE 10 MINUTES EOB THIS SESSION  PT 'S BP STABLE 135/76 EOB, sPO2 98% ON 1L NC  AND 95%-97% ON RA WITH MOBILITY, PT'S RNTENZIN INFORMED ON O2 SATS AND REPORTED TO LEAVE O2 OFF  The patient's AM-PAC Basic Mobility Inpatient Short Form Low Function Raw Score 17 , Standardized Score is 27 46   A standardized score less 42 9 suggests the patient may benefit from discharge to post-acute rehab services  Please also refer to the recommendation of the Physical Therapist for safe discharge planning  At THIS TIME PT RECOMMENDATION FOR D/C IS STR BASED UPON OBJECTIVE FINDINGS, INCREASED ASSISTANCE FOR ALL MOBILITY, DECREASED CAREGVER SUPPORT AND DECREASED COGNITION,  INCREASED RISK FOR FALLS AND WEIGHTBEARING RESTRICTIONS TO R LE  Barriers to Discharge: Inaccessible home environment, Decreased caregiver support  Barriers to Discharge Comments: Stairs; lives alone  PT Discharge Recommendation: Post-Acute Rehabilitation Services(str)     PT - OK to Discharge: Yes(WHEN MEDICALLY STABLE)    See flowsheet documentation for full assessment

## 2021-03-15 NOTE — SPEECH THERAPY NOTE
Speech Language/Pathology    Speech/Language Pathology Progress Note    Patient Name: Robin Elaine  UWSJW'S Date: 3/15/2021     Problem List  Principal Problem:    Closed right ankle fracture  Active Problems:    Atrial fibrillation [facility])    Essential hypertension    Ambulatory dysfunction    Slurred speech    Chronic systolic heart failure (HCC)    Stage 3b chronic kidney disease    Sick sinus syndrome (HCC)    Acute blood loss anemia       Past Medical History  Past Medical History:   Diagnosis Date    Arthritis     [facility]     breast    Cardiac disease     afib    CH[facility]congestive heart failure) [facility] )     Hyperlipidemia     Hypertension     Stroke ([facility] )     TIA    TIA (transient ischemic attack)         Past Surgical History  Past Surgical History:   Procedure Laterality Date    BREAST SURGERY      right lumpectomy    CARDIAC PACEMAKER PLACEMENT      CORONARY STENT PLACEMENT      ORIF TIBIA & FIBULA FRACTURES Right 3/11/2021    Procedure: 1)OPEN REDUCTION W/ INTERNAL FIXATION (ORIF) RIGHT ANKLE bimalleolar fracture 2) ORIF right tibiofibular syndesmosis; Surgeon: Dallas Parada DPM;  Location: [facility];  Service: Podiatry    TOTAL HIP ARTHROPLASTY Bilateral          Subjective:  "I think I sleep all day ervin im bored"  More alert today than when last seen 3/12  Objective:  Pt seen for po tolerance and potential upgrade  Tolerated ~ 10-12 ounces of thin water by straw wnl/  Prompt swallow and no overt s/s aspiration  Also had soft solid trials w/ mildly prolonged but functional mastication  Adequate transfer and swallow response  Assessment:  More alert and verbal than prior  Tolerated soft and thin trials  Plan/Recommendations:  Upgrade to dysphagia 2 mechanical and thin liquids  Will f/u

## 2021-03-15 NOTE — ASSESSMENT & PLAN NOTE
Hgb trended down, on admission 9 8  S/p 1 unit transfused  Likely due to recent surgery with history of anemia baseline of 9-10  GI consulted, no further intervention   Iron panel shows iron deficiency, will give IV iron

## 2021-03-16 VITALS
WEIGHT: 184.97 LBS | DIASTOLIC BLOOD PRESSURE: 43 MMHG | OXYGEN SATURATION: 97 % | TEMPERATURE: 95.5 F | BODY MASS INDEX: 31.58 KG/M2 | HEART RATE: 86 BPM | SYSTOLIC BLOOD PRESSURE: 119 MMHG | RESPIRATION RATE: 18 BRPM | HEIGHT: 64 IN

## 2021-03-16 LAB
ANION GAP SERPL CALCULATED.3IONS-SCNC: 6 MMOL/L (ref 4–13)
BASOPHILS # BLD AUTO: 0.04 THOUSANDS/ΜL (ref 0–0.1)
BASOPHILS NFR BLD AUTO: 1 % (ref 0–1)
BUN SERPL-MCNC: 43 MG/DL (ref 5–25)
CALCIUM SERPL-MCNC: 9.6 MG/DL (ref 8.3–10.1)
CHLORIDE SERPL-SCNC: 102 MMOL/L (ref 100–108)
CO2 SERPL-SCNC: 28 MMOL/L (ref 21–32)
CREAT SERPL-MCNC: 1.13 MG/DL (ref 0.6–1.3)
EOSINOPHIL # BLD AUTO: 0.2 THOUSAND/ΜL (ref 0–0.61)
EOSINOPHIL NFR BLD AUTO: 3 % (ref 0–6)
ERYTHROCYTE [DISTWIDTH] IN BLOOD BY AUTOMATED COUNT: 19.2 % (ref 11.6–15.1)
FLUAV RNA RESP QL NAA+PROBE: NEGATIVE
FLUBV RNA RESP QL NAA+PROBE: NEGATIVE
GFR SERPL CREATININE-BSD FRML MDRD: 43 ML/MIN/1.73SQ M
GLUCOSE SERPL-MCNC: 81 MG/DL (ref 65–140)
HCT VFR BLD AUTO: 26.9 % (ref 34.8–46.1)
HGB BLD-MCNC: 8.2 G/DL (ref 11.5–15.4)
IMM GRANULOCYTES # BLD AUTO: 0.05 THOUSAND/UL (ref 0–0.2)
IMM GRANULOCYTES NFR BLD AUTO: 1 % (ref 0–2)
LYMPHOCYTES # BLD AUTO: 0.83 THOUSANDS/ΜL (ref 0.6–4.47)
LYMPHOCYTES NFR BLD AUTO: 12 % (ref 14–44)
MCH RBC QN AUTO: 30.8 PG (ref 26.8–34.3)
MCHC RBC AUTO-ENTMCNC: 30.5 G/DL (ref 31.4–37.4)
MCV RBC AUTO: 101 FL (ref 82–98)
MONOCYTES # BLD AUTO: 1.09 THOUSAND/ΜL (ref 0.17–1.22)
MONOCYTES NFR BLD AUTO: 16 % (ref 4–12)
NEUTROPHILS # BLD AUTO: 4.7 THOUSANDS/ΜL (ref 1.85–7.62)
NEUTS SEG NFR BLD AUTO: 67 % (ref 43–75)
NRBC BLD AUTO-RTO: 0 /100 WBCS
PLATELET # BLD AUTO: 123 THOUSANDS/UL (ref 149–390)
PMV BLD AUTO: 11 FL (ref 8.9–12.7)
POTASSIUM SERPL-SCNC: 5.5 MMOL/L (ref 3.5–5.3)
RBC # BLD AUTO: 2.66 MILLION/UL (ref 3.81–5.12)
RSV RNA RESP QL NAA+PROBE: NEGATIVE
SARS-COV-2 RNA RESP QL NAA+PROBE: NEGATIVE
SODIUM SERPL-SCNC: 136 MMOL/L (ref 136–145)
WBC # BLD AUTO: 6.91 THOUSAND/UL (ref 4.31–10.16)

## 2021-03-16 PROCEDURE — 99239 HOSP IP/OBS DSCHRG MGMT >30: CPT | Performed by: STUDENT IN AN ORGANIZED HEALTH CARE EDUCATION/TRAINING PROGRAM

## 2021-03-16 PROCEDURE — 92526 ORAL FUNCTION THERAPY: CPT

## 2021-03-16 PROCEDURE — 85025 COMPLETE CBC W/AUTO DIFF WBC: CPT | Performed by: STUDENT IN AN ORGANIZED HEALTH CARE EDUCATION/TRAINING PROGRAM

## 2021-03-16 PROCEDURE — 80048 BASIC METABOLIC PNL TOTAL CA: CPT | Performed by: STUDENT IN AN ORGANIZED HEALTH CARE EDUCATION/TRAINING PROGRAM

## 2021-03-16 PROCEDURE — 0241U HB NFCT DS VIR RESP RNA 4 TRGT: CPT | Performed by: STUDENT IN AN ORGANIZED HEALTH CARE EDUCATION/TRAINING PROGRAM

## 2021-03-16 RX ORDER — FERROUS SULFATE 325(65) MG
325 TABLET ORAL
Refills: 0
Start: 2021-03-17

## 2021-03-16 RX ORDER — SODIUM POLYSTYRENE SULFONATE 4.1 MEQ/G
15 POWDER, FOR SUSPENSION ORAL; RECTAL ONCE
Status: DISCONTINUED | OUTPATIENT
Start: 2021-03-16 | End: 2021-03-16 | Stop reason: HOSPADM

## 2021-03-16 RX ADMIN — CITALOPRAM HYDROBROMIDE 10 MG: 20 TABLET ORAL at 08:06

## 2021-03-16 RX ADMIN — ACETAMINOPHEN 975 MG: 325 TABLET, COATED ORAL at 14:11

## 2021-03-16 RX ADMIN — FUROSEMIDE 40 MG: 40 TABLET ORAL at 08:07

## 2021-03-16 RX ADMIN — ASPIRIN 81 MG: 81 TABLET, CHEWABLE ORAL at 08:07

## 2021-03-16 RX ADMIN — Medication 1 TABLET: at 07:59

## 2021-03-16 RX ADMIN — PANTOPRAZOLE SODIUM 20 MG: 20 TABLET, DELAYED RELEASE ORAL at 05:45

## 2021-03-16 RX ADMIN — ACETAMINOPHEN 975 MG: 325 TABLET, COATED ORAL at 05:45

## 2021-03-16 RX ADMIN — FOLIC ACID 1 MG: 1 TABLET ORAL at 08:07

## 2021-03-16 RX ADMIN — CARVEDILOL 3.12 MG: 3.12 TABLET, FILM COATED ORAL at 07:58

## 2021-03-16 RX ADMIN — THIAMINE HCL TAB 100 MG 100 MG: 100 TAB at 08:08

## 2021-03-16 NOTE — PLAN OF CARE
Problem: Prexisting or High Potential for Compromised Skin Integrity  Goal: Skin integrity is maintained or improved  Description: INTERVENTIONS:  - Identify patients at risk for skin breakdown  - Assess and monitor skin integrity  - Assess and monitor nutrition and hydration status  - Monitor labs   - Assess for incontinence   - Turn and reposition patient  - Assist with mobility/ambulation  - Relieve pressure over bony prominences  - Avoid friction and shearing  - Provide appropriate hygiene as needed including keeping skin clean and dry  - Evaluate need for skin moisturizer/barrier cream  - Collaborate with interdisciplinary team   - Patient/family teaching  - Consider wound care consult   Outcome: Progressing     Problem: Potential for Falls  Goal: Patient will remain free of falls  Description: INTERVENTIONS:  - Assess patient frequently for physical needs  -  Identify cognitive and physical deficits and behaviors that affect risk of falls    -  [place] fall precautions as indicated by assessment   - Educate patient/family on patient safety including physical limitations  - Instruct patient to call for assistance with activity based on assessment  - Modify environment to reduce risk of injury  - Consider OT/PT consult to assist with strengthening/mobility  Outcome: Progressing     Problem: PAIN - ADULT  Goal: Verbalizes/displays adequate comfort level or baseline comfort level  Description: Interventions:  - Encourage patient to monitor pain and request assistance  - Assess pain using appropriate pain scale  - Administer analgesics based on type and severity of pain and evaluate response  - Implement non-pharmacological measures as appropriate and evaluate response  - Consider cultural and social influences on pain and pain management  - Notify physician/advanced practitioner if interventions unsuccessful or patient reports new pain  Outcome: Progressing     Problem: SAFETY ADULT  Goal: Patient will remain free of falls  Description: INTERVENTIONS:  - Assess patient frequently for physical needs  -  Identify cognitive and physical deficits and behaviors that affect risk of falls    -  [place] fall precautions as indicated by assessment   - Educate patient/family on patient safety including physical limitations  - Instruct patient to call for assistance with activity based on assessment  - Modify environment to reduce risk of injury  - Consider OT/PT consult to assist with strengthening/mobility  Outcome: Progressing  Goal: Maintain or return to baseline ADL function  Description: INTERVENTIONS:  -  Assess patient's ability to carry out ADLs; assess patient's baseline for ADL function and identify physical deficits which impact ability to perform ADLs (bathing, care of mouth/teeth, toileting, grooming, dressing, etc )  - Assess/evaluate cause of self-care deficits   - Assess range of motion  - Assess patient's mobility; develop plan if impaired  - Assess patient's need for assistive devices and provide as appropriate  - Encourage maximum independence but intervene and supervise when necessary  - Involve family in performance of ADLs  - Assess for home care needs following discharge   - Consider OT consult to assist with ADL evaluation and planning for discharge  - Provide patient education as appropriate  Outcome: Progressing  Goal: Maintain or return mobility status to optimal level  Description: INTERVENTIONS:  - Assess patient's baseline mobility status (ambulation, transfers, stairs, etc )    - Identify cognitive and physical deficits and behaviors that affect mobility  - Identify mobility aids required to assist with transfers and/or ambulation (gait belt, sit-to-stand, lift, walker, cane, etc )  - [place] fall precautions as indicated by assessment  - Record patient progress and toleration of activity level on Mobility SBAR; progress patient to next Phase/Stage  - Instruct patient to call for assistance with activity based on assessment  - Consider rehabilitation consult to assist with strengthening/weightbearing, etc   Outcome: Progressing     Problem: Knowledge Deficit  Goal: Patient/family/caregiver demonstrates understanding of disease process, treatment plan, medications, and discharge instructions  Description: Complete learning assessment and assess knowledge base  Interventions:  - Provide teaching at level of understanding  - Provide teaching via preferred learning methods  Outcome: Progressing     Problem: Nutrition/Hydration-ADULT  Goal: Nutrient/Hydration intake appropriate for improving, restoring or maintaining nutritional needs  Description: Monitor and assess patient's nutrition/hydration status for malnutrition  Collaborate with interdisciplinary team and initiate plan and interventions as ordered  Monitor patient's weight and dietary intake as ordered or per policy  Utilize nutrition screening tool and intervene as necessary  Determine patient's food preferences and provide high-protein, high-caloric foods as appropriate       INTERVENTIONS:  - Monitor oral intake, urinary output, labs, and treatment plans  - Assess nutrition and hydration status and recommend course of action  - Evaluate amount of meals eaten  - Assist patient with eating if necessary   - Allow adequate time for meals  - Recommend/ encourage appropriate diets, oral nutritional supplements, and vitamin/mineral supplements  - Order, calculate, and assess calorie counts as needed  - Recommend, monitor, and adjust tube feedings and TPN/PPN based on assessed needs  - Assess need for intravenous fluids  - Provide specific nutrition/hydration education as appropriate  - Include patient/family/caregiver in decisions related to nutrition  Outcome: Progressing

## 2021-03-16 NOTE — ASSESSMENT & PLAN NOTE
Lab Results   Component Value Date    EGFR 43 03/16/2021    EGFR 37 03/15/2021    EGFR 30 03/14/2021    CREATININE 1 13 03/16/2021    CREATININE 1 30 03/15/2021    CREATININE 1 55 (H) 03/14/2021     Currently stable

## 2021-03-16 NOTE — ASSESSMENT & PLAN NOTE
Wt Readings from Last 3 Encounters:   03/16/21 83 9 kg (184 lb 15 5 oz)   02/08/21 84 8 kg (187 lb)   02/04/21 85 kg (187 lb 6 3 oz)     Euvolemic  Resume Coreg and p o   Lasix 40 mg daily

## 2021-03-16 NOTE — SPEECH THERAPY NOTE
Speech Language/Pathology    Speech/Language Pathology Progress Note    Patient Name: Shannan Vera  XQHBS'A Date: 3/16/2021     Problem List  Principal Problem:    Closed right ankle fracture  Active Problems:    Atrial fibrillation [facility])    Essential hypertension    Ambulatory dysfunction    Slurred speech    Chronic systolic heart failure (HCC)    Stage 3b chronic kidney disease    Sick sinus syndrome ([facility] [address] )    Acute blood loss anemia       Past Medical History  Past Medical History:   Diagnosis Date    Arthritis     [facility]     breast    Cardiac disease     afib    [facility]     Hyperlipidemia     Hypertension     Stroke ([facility] )     TIA    TIA (transient ischemic attack)         Past Surgical History  Past Surgical History:   Procedure Laterality Date    BREAST SURGERY      right lumpectomy    CARDIAC PACEMAKER PLACEMENT      CORONARY STENT PLACEMENT      ORIF TIBIA & FIBULA FRACTURES Right 3/11/2021    Procedure: 1)OPEN REDUCTION W/ INTERNAL FIXATION (ORIF) RIGHT ANKLE bimalleolar fracture 2) ORIF right tibiofibular syndesmosis; Surgeon: Lius Antonio Louis DPM;  [facility];  Service: Podiatry    TOTAL HIP ARTHROPLASTY Bilateral          Subjective:  "I keep seeing a black cat , not the face, just the back and tail  I used to have a black cat"  Objective:  Pt seen at lunch for tolerance and recommendations  Pt ate a very small amt of the ground chicken and mashed potatoes, slightly more of the chopped carrots  Fed my me  I asked if she would have eaten it if she had to feed herself  "probably not"  mastication was mildly prolonged but otherwise oral and pharyngeal stages were wnl for food and thin liquids  Drank most of the ensure, which I encouraged her to hold herself  Also placed the vanilla pudding in her left hand so that she could feed herself w/ the right  Ate 100% of the pudding  No s/s aspiration  Assessment:  Likes the joesph ensure liquid   Needs assist w/ feeding unless she can hold it in her hand  Mildly prolonged mastication  No s/s aspiration  Plan/Recommendations:  Continue level 2 mechanical w/ thin  Continue ensure supplements  Assist as needed  ? Further upgrade

## 2021-03-16 NOTE — CASE MANAGEMENT
SARAH contacted [facility] to see if they can accept pt today  Pt's family paid remaining balance from prior admission and LC has submitted for auth this AM  CM will advise when a determination has been made and transport time once arranged  SARAH made Rn aware that pt will need a covid test prior to transfer  MD also aware to place order

## 2021-03-16 NOTE — PLAN OF CARE
Problem: Prexisting or High Potential for Compromised Skin Integrity  Goal: Skin integrity is maintained or improved  Description: INTERVENTIONS:  - Identify patients at risk for skin breakdown  - Assess and monitor skin integrity  - Assess and monitor nutrition and hydration status  - Monitor labs   - Assess for incontinence   - Turn and reposition patient  - Assist with mobility/ambulation  - Relieve pressure over bony prominences  - Avoid friction and shearing  - Provide appropriate hygiene as needed including keeping skin clean and dry  - Evaluate need for skin moisturizer/barrier cream  - Collaborate with interdisciplinary team   - Patient/family teaching  - Consider wound care consult   Outcome: Progressing     Problem: Potential for Falls  Goal: Patient will remain free of falls  Description: INTERVENTIONS:  - Assess patient frequently for physical needs  -  Identify cognitive and physical deficits and behaviors that affect risk of falls    -  [place] fall precautions as indicated by assessment   - Educate patient/family on patient safety including physical limitations  - Instruct patient to call for assistance with activity based on assessment  - Modify environment to reduce risk of injury  - Consider OT/PT consult to assist with strengthening/mobility  Outcome: Progressing     Problem: PAIN - ADULT  Goal: Verbalizes/displays adequate comfort level or baseline comfort level  Description: Interventions:  - Encourage patient to monitor pain and request assistance  - Assess pain using appropriate pain scale  - Administer analgesics based on type and severity of pain and evaluate response  - Implement non-pharmacological measures as appropriate and evaluate response  - Consider cultural and social influences on pain and pain management  - Notify physician/advanced practitioner if interventions unsuccessful or patient reports new pain  Outcome: Progressing     Problem: SAFETY ADULT  Goal: Patient will remain free of falls  Description: INTERVENTIONS:  - Assess patient frequently for physical needs  -  Identify cognitive and physical deficits and behaviors that affect risk of falls    -  [place] fall precautions as indicated by assessment   - Educate patient/family on patient safety including physical limitations  - Instruct patient to call for assistance with activity based on assessment  - Modify environment to reduce risk of injury  - Consider OT/PT consult to assist with strengthening/mobility  Outcome: Progressing  Goal: Maintain or return to baseline ADL function  Description: INTERVENTIONS:  -  Assess patient's ability to carry out ADLs; assess patient's baseline for ADL function and identify physical deficits which impact ability to perform ADLs (bathing, care of mouth/teeth, toileting, grooming, dressing, etc )  - Assess/evaluate cause of self-care deficits   - Assess range of motion  - Assess patient's mobility; develop plan if impaired  - Assess patient's need for assistive devices and provide as appropriate  - Encourage maximum independence but intervene and supervise when necessary  - Involve family in performance of ADLs  - Assess for home care needs following discharge   - Consider OT consult to assist with ADL evaluation and planning for discharge  - Provide patient education as appropriate  Outcome: Progressing  Goal: Maintain or return mobility status to optimal level  Description: INTERVENTIONS:  - Assess patient's baseline mobility status (ambulation, transfers, stairs, etc )    - Identify cognitive and physical deficits and behaviors that affect mobility  - Identify mobility aids required to assist with transfers and/or ambulation (gait belt, sit-to-stand, lift, walker, cane, etc )  - [place] fall precautions as indicated by assessment  - Record patient progress and toleration of activity level on Mobility SBAR; progress patient to next Phase/Stage  - Instruct patient to call for assistance with activity based on assessment  - Consider rehabilitation consult to assist with strengthening/weightbearing, etc   Outcome: Progressing     Problem: Knowledge Deficit  Goal: Patient/family/caregiver demonstrates understanding of disease process, treatment plan, medications, and discharge instructions  Description: Complete learning assessment and assess knowledge base  Interventions:  - Provide teaching at level of understanding  - Provide teaching via preferred learning methods  Outcome: Progressing     Problem: Nutrition/Hydration-ADULT  Goal: Nutrient/Hydration intake appropriate for improving, restoring or maintaining nutritional needs  Description: Monitor and assess patient's nutrition/hydration status for malnutrition  Collaborate with interdisciplinary team and initiate plan and interventions as ordered  Monitor patient's weight and dietary intake as ordered or per policy  Utilize nutrition screening tool and intervene as necessary  Determine patient's food preferences and provide high-protein, high-caloric foods as appropriate       INTERVENTIONS:  - Monitor oral intake, urinary output, labs, and treatment plans  - Assess nutrition and hydration status and recommend course of action  - Evaluate amount of meals eaten  - Assist patient with eating if necessary   - Allow adequate time for meals  - Recommend/ encourage appropriate diets, oral nutritional supplements, and vitamin/mineral supplements  - Order, calculate, and assess calorie counts as needed  - Recommend, monitor, and adjust tube feedings and TPN/PPN based on assessed needs  - Assess need for intravenous fluids  - Provide specific nutrition/hydration education as appropriate  - Include patient/family/caregiver in decisions related to nutrition  Outcome: Progressing

## 2021-03-16 NOTE — CASE MANAGEMENT
[facility]     Report: [phone] Fax: [phone]

## 2021-03-16 NOTE — DISCHARGE SUMMARY
Jeffery 48  [facility] 5/5/1932[facility] 80 [facility]  female MRN: [id number]  Unit/Bed#: E2 -01 Encounter: [id number]  Primary Care Provider: Amber Howe DO   Date and time admitted to hospital: 3/11/2021  7:39 AM    * Closed right ankle fracture  Assessment & Plan  Presented status post fall found to have right malleolar fracture  S/p right malleolar reduction 03/11/2021  PT and OT consulted  Discharge to STR  Follow up with Podiatry in 1 week    Acute blood loss anemia  Assessment & Plan  Hgb trended down, on admission 9 8  S/p 1 unit transfused  Likely due to recent surgery with history of anemia baseline of 9-10  GI consulted, no further intervention   IV iron given, recommend PO iron daily on discharge      Stage 3b chronic kidney disease  Assessment & Plan  Lab Results   Component Value Date    EGFR 43 03/16/2021    EGFR 37 03/15/2021    EGFR 30 03/14/2021    CREATININE 1 13 03/16/2021    CREATININE 1 30 03/15/2021    CREATININE 1 55 (H) 03/14/2021     Currently stable      Chronic systolic heart failure (HCC)  Assessment & Plan  Wt Readings from Last 3 Encounters:   03/16/21 83 9 kg (184 lb 15 5 oz)   02/08/21 84 8 kg (187 lb)   02/04/21 85 kg (187 lb 6 3 oz)     Euvolemic  Resume Coreg and p o  Lasix 40 mg daily            Ambulatory dysfunction  Assessment & Plan  PT/OT consulted  Lives at home alone, but daughter reports that they may move her in with them  Family requesting STR, plan for assisted living afterwards    Essential hypertension  Assessment & Plan  Currently normotensive, continue coreg      Atrial fibrillation [facility])  Assessment & Plan  EKG reveals patient in afib, paced  S/P PPM  Not on anticoagulation due to falls, c/w aspirin 81mg  Resume Coreg 3 125 b i d          Discharging Physician / Practitioner: Lisa Marroquin MD  PCP: Amber Howe DO  Admission Date:   Admission Orders (From admission, onward)     Ordered        03/11/21 1215  Inpatient Admission  Once                   Discharge Date: 03/16/21    Resolved Problems  Date Reviewed: 3/16/2021    None          Consultations During Hospital Stay:  · Podiary    Procedures Performed:   · Right Bimalleolar Ankle Fracture s/p ORIF 03/11    Significant Findings / Test Results:   Xr Chest Portable    Result Date: 3/13/2021  Impression: Persistent right hilar fullness  There is a new opacity within the right lung base which may represent atelectasis or pneumonia  Possible small right basilar effusion  This examination was marked "immediate notification" in Epic in order to begin the standard process by which the radiology reading room liaison alerts the referring practitioner  Workstation performed: [id number]    Xr Chest 1 View Portable    Result Date: 3/11/2021  Impression: Cardiomegaly  Slight atypical vascular congestion  Trace pleural effusions  Workstation performed: [id number]    Xr Ankle 2 Vw Right    Result Date: 3/12/2021  Impression: Fluoroscopic guidance provided for procedure guidance  Please refer to the separate procedure notes for additional details  Workstation performed: [id number]    Xr Ankle 2 Vw Right    Result Date: 3/11/2021  Impression: Interval improvement in alignment of bimalleolar fracture dislocation postreduction  Workstation performed: [id number]    Xr Ankle 2 Vw Right    Result Date: 3/11/2021  Impression: Acute, displaced and angulated right ankle fracture-dislocation  This report is concordant with Nehemias Collier PA-C's preliminary interpretation that is documented in the electronic medical record (EPIC)  Workstation performed: [id number]    Xr Ankle 3+ Vw Right    Result Date: 3/12/2021  Impression: Limited study due to overlying artifact  Status post bimalleolar ORIF  Workstation performed: [id number]    Xr Ankle 3+ Vw Right    Result Date: 3/11/2021  Impression: Fractures of the distal right tibia and fibula    Improved alignment of the talus with in the ankle mortise since an exam from earlier today at 0948 hours  Workstation performed: [id number]    Xr Ankle 3+ Vw Right    Result Date: 3/11/2021  Impression: Splinted and partially reduced right ankle bimalleolar fracture-dislocation, unchanged in alignment  Workstation performed: [id number]    Ct Head Without Contrast    Result Date: 3/11/2021  Impression: No acute intracranial abnormality  Workstation performed: [id number]    Ct Spine Cervical Without Contrast    Result Date: 3/11/2021  · Impression: No cervical spine fracture or traumatic malalignment  Workstation performed: [id number]    Incidental Findings:   · None     Test Results Pending at Discharge (will require follow up): · None     Outpatient Tests Requested:  · None    Complications:  None    Reason for Admission: Foot Pain    Hospital Course:     Jose D Bradley is a 80 y o  female patient who originally presented to the hospital on 3/11/2021 due to fall  Patient previously was living at home independently where she accidentally tripped and had a mechanical fall resulting and right lower leg pain  Upon being evaluated in the ED, x-ray showed right malleolar fracture  Podiatry was consulted the ED was able to reduce the fracture but require further reduction in the OR  Patient had been declining lately as she been having multiple falls and family was considering transitioning patient to assisted living  Patient completed surgery successfully and was on oxygen where she was diuresed with IV Lasix  She eventually was able to tolerate room air and home p o  Lasix was resumed  Patient hemoglobin was noted to drop from initial 9 8 to 7 0  Patient was given 1 unit of hemoglobin and GI was consulted for further evaluation  Likely hemodilution and recent surgery contributing to anemia  No active bleed and patient was found to be anemic on iron panel    Patient discharged to [facility], continue home medications with addition ferrous sulfate for her anemia once daily  Please see above list of diagnoses and related plan for additional information  Condition at Discharge: fair     Discharge Day Visit / Exam:     Subjective:  Patient had no complaints today  Had a large bowel movement after constipated for several days  Currently on room air  Vitals: Blood Pressure: (!) 119/43 (03/16/21 1500)  Pulse: 86 (03/16/21 1500)  Temperature: (!) 95 5 °F (35 3 °C) (03/16/21 1500)  Temp Source: Tympanic (03/16/21 1500)  Respirations: 18 (03/16/21 1500)  Height: 5' 4" (162 6 cm) (03/12/21 1146)  Weight - Scale: 83 9 kg (184 lb 15 5 oz) (03/16/21 0600)  SpO2: 97 % (03/16/21 1500)  Exam:   Physical Exam  Vitals signs and nursing note reviewed  HENT:      Head: Normocephalic and atraumatic  Eyes:      Conjunctiva/sclera: Conjunctivae normal    Cardiovascular:      Rate and Rhythm: Normal rate  Heart sounds: Normal heart sounds  Pulmonary:      Breath sounds: No wheezing, rhonchi or rales  Abdominal:      General: Bowel sounds are normal  There is no distension  Palpations: Abdomen is soft  Tenderness: There is no abdominal tenderness  Musculoskeletal: Normal range of motion  General: No swelling  Comments: Right leg dressing in place   Skin:     General: Skin is warm and dry  Neurological:      Mental Status: She is alert  Mental status is at baseline  Discussion with Family: Daughter    Discharge instructions/Information to patient and family:   See after visit summary for information provided to patient and family  Provisions for Follow-Up Care:  See after visit summary for information related to follow-up care and any pertinent home health orders        Disposition:     Acute Rehab at [place] [address] to [facility] SNF:   · Not Applicable to this Patient - Not Applicable to this Patient    Planned Readmission: None     Discharge Statement:  I spent 35 minutes discharging the patient  This time was spent on the day of discharge  I had direct contact with the patient on the day of discharge  Greater than 50% of the total time was spent examining patient, answering all patient questions, arranging and discussing plan of care with patient as well as directly providing post-discharge instructions  Additional time then spent on discharge activities  Discharge Medications:  See after visit summary for reconciled discharge medications provided to patient and family        ** Please Note: This note has been constructed using a voice recognition system **

## 2021-03-16 NOTE — ASSESSMENT & PLAN NOTE
Presented status post fall found to have right malleolar fracture  S/p right malleolar reduction 03/11/2021  PT and OT consulted  Discharge to [facility]  Follow up with Podiatry in 1 week

## 2021-03-16 NOTE — ASSESSMENT & PLAN NOTE
Hgb trended down, on admission 9 8  S/p 1 unit transfused  Likely due to recent surgery with history of anemia baseline of 9-10  GI consulted, no further intervention   IV iron given, recommend PO iron daily on discharge

## 2021-03-17 ENCOUNTER — APPOINTMENT (EMERGENCY)
Dept: RADIOLOGY | Facility: HOSPITAL | Age: 86
DRG: 551 | End: 2021-03-17
Payer: COMMERCIAL

## 2021-03-17 ENCOUNTER — HOSPITAL ENCOUNTER (EMERGENCY)
Facility: HOSPITAL | Age: 86
DRG: 551 | End: 2021-03-17
Attending: EMERGENCY MEDICINE | Admitting: EMERGENCY MEDICINE
Payer: COMMERCIAL

## 2021-03-17 ENCOUNTER — APPOINTMENT (EMERGENCY)
Dept: CT IMAGING | Facility: HOSPITAL | Age: 86
DRG: 551 | End: 2021-03-17
Payer: COMMERCIAL

## 2021-03-17 ENCOUNTER — HOSPITAL ENCOUNTER (INPATIENT)
Facility: HOSPITAL | Age: 86
LOS: 5 days | Discharge: NURSING, REHAB OR LONG-TERM CARE | DRG: 551 | End: 2021-03-22
Attending: SURGERY | Admitting: SURGERY
Payer: COMMERCIAL

## 2021-03-17 VITALS
TEMPERATURE: 96.2 F | DIASTOLIC BLOOD PRESSURE: 68 MMHG | OXYGEN SATURATION: 96 % | SYSTOLIC BLOOD PRESSURE: 138 MMHG | RESPIRATION RATE: 20 BRPM | HEART RATE: 84 BPM

## 2021-03-17 DIAGNOSIS — S02.2XXA CLOSED FRACTURE OF NASAL BONE, INITIAL ENCOUNTER: ICD-10-CM

## 2021-03-17 DIAGNOSIS — S82.891D CLOSED FRACTURE OF RIGHT ANKLE WITH ROUTINE HEALING, SUBSEQUENT ENCOUNTER: ICD-10-CM

## 2021-03-17 DIAGNOSIS — S12.9XXA CERVICAL SPINE FRACTURE (HCC): ICD-10-CM

## 2021-03-17 DIAGNOSIS — I48.20 CHRONIC ATRIAL FIBRILLATION (HCC): ICD-10-CM
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
## 2021-03-17 DIAGNOSIS — S12.591A OTHER CLOSED NONDISPLACED FRACTURE OF SIXTH CERVICAL VERTEBRA, INITIAL ENCOUNTER (HCC): ICD-10-CM

## 2021-03-17 DIAGNOSIS — S12.9XXA CLOSED FRACTURE OF MULTIPLE CERVICAL VERTEBRAE, INITIAL ENCOUNTER (HCC): Primary | ICD-10-CM

## 2021-03-17 DIAGNOSIS — S00.83XA FACIAL CONTUSION, INITIAL ENCOUNTER: ICD-10-CM

## 2021-03-17 DIAGNOSIS — S02.2XXA NASAL FRACTURE: Primary | ICD-10-CM

## 2021-03-17 LAB
ANION GAP SERPL CALCULATED.3IONS-SCNC: 6 MMOL/L (ref 4–13)
BASOPHILS # BLD AUTO: 0.02 THOUSANDS/ΜL (ref 0–0.1)
BASOPHILS NFR BLD AUTO: 0 % (ref 0–1)
BILIRUB UR QL STRIP: NEGATIVE
BUN SERPL-MCNC: 35 MG/DL (ref 5–25)
CALCIUM SERPL-MCNC: 10 MG/DL (ref 8.3–10.1)
CHLORIDE SERPL-SCNC: 100 MMOL/L (ref 100–108)
CK SERPL-CCNC: 83 U/L (ref 26–192)
CLARITY UR: CLEAR
CO2 SERPL-SCNC: 30 MMOL/L (ref 21–32)
COLOR UR: YELLOW
CREAT SERPL-MCNC: 1.2 MG/DL (ref 0.6–1.3)
EOSINOPHIL # BLD AUTO: 0.09 THOUSAND/ΜL (ref 0–0.61)
EOSINOPHIL NFR BLD AUTO: 2 % (ref 0–6)
ERYTHROCYTE [DISTWIDTH] IN BLOOD BY AUTOMATED COUNT: 19.1 % (ref 11.6–15.1)
GFR SERPL CREATININE-BSD FRML MDRD: 40 ML/MIN/1.73SQ M
GLUCOSE SERPL-MCNC: 95 MG/DL (ref 65–140)
GLUCOSE UR STRIP-MCNC: NEGATIVE MG/DL
HCT VFR BLD AUTO: 28.2 % (ref 34.8–46.1)
HGB BLD-MCNC: 8.9 G/DL (ref 11.5–15.4)
HGB UR QL STRIP.AUTO: NEGATIVE
IMM GRANULOCYTES # BLD AUTO: 0.03 THOUSAND/UL (ref 0–0.2)
IMM GRANULOCYTES NFR BLD AUTO: 1 % (ref 0–2)
KETONES UR STRIP-MCNC: NEGATIVE MG/DL
LEUKOCYTE ESTERASE UR QL STRIP: NEGATIVE
LYMPHOCYTES # BLD AUTO: 0.69 THOUSANDS/ΜL (ref 0.6–4.47)
LYMPHOCYTES NFR BLD AUTO: 14 % (ref 14–44)
MCH RBC QN AUTO: 31.9 PG (ref 26.8–34.3)
MCHC RBC AUTO-ENTMCNC: 31.6 G/DL (ref 31.4–37.4)
MCV RBC AUTO: 101 FL (ref 82–98)
MONOCYTES # BLD AUTO: 0.91 THOUSAND/ΜL (ref 0.17–1.22)
MONOCYTES NFR BLD AUTO: 18 % (ref 4–12)
NEUTROPHILS # BLD AUTO: 3.24 THOUSANDS/ΜL (ref 1.85–7.62)
NEUTS SEG NFR BLD AUTO: 65 % (ref 43–75)
NITRITE UR QL STRIP: NEGATIVE
NRBC BLD AUTO-RTO: 0 /100 WBCS
PH UR STRIP.AUTO: 7 [PH]
PLATELET # BLD AUTO: 158 THOUSANDS/UL (ref 149–390)
PMV BLD AUTO: 10.9 FL (ref 8.9–12.7)
POTASSIUM SERPL-SCNC: 5.4 MMOL/L (ref 3.5–5.3)
PROT UR STRIP-MCNC: NEGATIVE MG/DL
RBC # BLD AUTO: 2.79 MILLION/UL (ref 3.81–5.12)
SODIUM SERPL-SCNC: 136 MMOL/L (ref 136–145)
SP GR UR STRIP.AUTO: 1.01 (ref 1–1.03)
UROBILINOGEN UR QL STRIP.AUTO: 0.2 E.U./DL
WBC # BLD AUTO: 4.98 THOUSAND/UL (ref 4.31–10.16)

## 2021-03-17 PROCEDURE — 81003 URINALYSIS AUTO W/O SCOPE: CPT | Performed by: EMERGENCY MEDICINE

## 2021-03-17 PROCEDURE — 99223 1ST HOSP IP/OBS HIGH 75: CPT | Performed by: SURGERY

## 2021-03-17 PROCEDURE — 82550 ASSAY OF CK (CPK): CPT | Performed by: EMERGENCY MEDICINE

## 2021-03-17 PROCEDURE — 70486 CT MAXILLOFACIAL W/O DYE: CPT

## 2021-03-17 PROCEDURE — 99285 EMERGENCY DEPT VISIT HI MDM: CPT

## 2021-03-17 PROCEDURE — 70450 CT HEAD/BRAIN W/O DYE: CPT

## 2021-03-17 PROCEDURE — 99285 EMERGENCY DEPT VISIT HI MDM: CPT | Performed by: EMERGENCY MEDICINE

## 2021-03-17 PROCEDURE — 80048 BASIC METABOLIC PNL TOTAL CA: CPT | Performed by: EMERGENCY MEDICINE

## 2021-03-17 PROCEDURE — 85025 COMPLETE CBC W/AUTO DIFF WBC: CPT | Performed by: EMERGENCY MEDICINE

## 2021-03-17 PROCEDURE — 99284 EMERGENCY DEPT VISIT MOD MDM: CPT

## 2021-03-17 PROCEDURE — 73610 X-RAY EXAM OF ANKLE: CPT

## 2021-03-17 PROCEDURE — G1004 CDSM NDSC: HCPCS

## 2021-03-17 PROCEDURE — 72125 CT NECK SPINE W/O DYE: CPT

## 2021-03-17 PROCEDURE — 36415 COLL VENOUS BLD VENIPUNCTURE: CPT | Performed by: EMERGENCY MEDICINE

## 2021-03-17 RX ORDER — POLYETHYLENE GLYCOL 3350 17 G/17G
17 POWDER, FOR SOLUTION ORAL DAILY
Status: DISCONTINUED | OUTPATIENT
Start: 2021-03-18 | End: 2021-03-22 | Stop reason: HOSPADM

## 2021-03-17 RX ORDER — MULTIVIT-MIN/FERROUS GLUCONATE 9 MG/15 ML
15 LIQUID (ML) ORAL DAILY
Status: DISCONTINUED | OUTPATIENT
Start: 2021-03-18 | End: 2021-03-22 | Stop reason: HOSPADM

## 2021-03-17 RX ORDER — CARVEDILOL 3.12 MG/1
3.12 TABLET ORAL 2 TIMES DAILY WITH MEALS
Status: DISCONTINUED | OUTPATIENT
Start: 2021-03-18 | End: 2021-03-22 | Stop reason: HOSPADM

## 2021-03-17 RX ORDER — ATORVASTATIN CALCIUM 40 MG/1
40 TABLET, FILM COATED ORAL DAILY
Status: DISCONTINUED | OUTPATIENT
Start: 2021-03-18 | End: 2021-03-22 | Stop reason: HOSPADM

## 2021-03-17 RX ORDER — DOCUSATE SODIUM 100 MG/1
100 CAPSULE, LIQUID FILLED ORAL DAILY
Status: DISCONTINUED | OUTPATIENT
Start: 2021-03-18 | End: 2021-03-22 | Stop reason: HOSPADM

## 2021-03-17 RX ORDER — FERROUS SULFATE 325(65) MG
325 TABLET ORAL
Status: DISCONTINUED | OUTPATIENT
Start: 2021-03-18 | End: 2021-03-22 | Stop reason: HOSPADM

## 2021-03-17 RX ORDER — CITALOPRAM 10 MG/1
10 TABLET ORAL DAILY
Status: DISCONTINUED | OUTPATIENT
Start: 2021-03-18 | End: 2021-03-22 | Stop reason: HOSPADM

## 2021-03-17 RX ORDER — LANOLIN ALCOHOL/MO/W.PET/CERES
100 CREAM (GRAM) TOPICAL DAILY
Status: DISCONTINUED | OUTPATIENT
Start: 2021-03-18 | End: 2021-03-22 | Stop reason: HOSPADM

## 2021-03-17 RX ORDER — FUROSEMIDE 10 MG/ML
40 INJECTION INTRAMUSCULAR; INTRAVENOUS ONCE
Status: COMPLETED | OUTPATIENT
Start: 2021-03-17 | End: 2021-03-17

## 2021-03-17 RX ORDER — ACETAMINOPHEN 325 MG/1
650 TABLET ORAL EVERY 6 HOURS PRN
Status: DISCONTINUED | OUTPATIENT
Start: 2021-03-17 | End: 2021-03-22 | Stop reason: HOSPADM

## 2021-03-17 RX ORDER — FOLIC ACID 1 MG/1
1 TABLET ORAL DAILY
Status: DISCONTINUED | OUTPATIENT
Start: 2021-03-18 | End: 2021-03-22 | Stop reason: HOSPADM

## 2021-03-17 RX ORDER — ONDANSETRON 2 MG/ML
4 INJECTION INTRAMUSCULAR; INTRAVENOUS EVERY 6 HOURS PRN
Status: DISCONTINUED | OUTPATIENT
Start: 2021-03-17 | End: 2021-03-22 | Stop reason: HOSPADM

## 2021-03-17 RX ORDER — LANOLIN ALCOHOL/MO/W.PET/CERES
3 CREAM (GRAM) TOPICAL
Status: DISCONTINUED | OUTPATIENT
Start: 2021-03-17 | End: 2021-03-21

## 2021-03-17 RX ORDER — FUROSEMIDE 40 MG/1
40 TABLET ORAL DAILY
Status: DISCONTINUED | OUTPATIENT
Start: 2021-03-18 | End: 2021-03-22 | Stop reason: HOSPADM

## 2021-03-17 RX ADMIN — FUROSEMIDE 40 MG: 10 INJECTION, SOLUTION INTRAMUSCULAR; INTRAVENOUS at 21:38

## 2021-03-17 NOTE — EMTALA/ACUTE CARE TRANSFER
[facility] [address] [address]  Dept: [phone]      EMTALA TRANSFER CONSENT    NAME Perfecto Albertina                                         1932                              MRN [id number]    I have been informed of my rights regarding examination, treatment, and transfer   by Dr Heena Hankins DO    Benefits: Specialized equipment and/or services available at the receiving facility (Include comment)________________________(trauam)    Risks: Potential for delay in receiving treatment, Potential deterioration of medical condition      Consent for Transfer:  I acknowledge that my medical condition has been evaluated and explained to me by the emergency department physician or other qualified medical person and/or my attending physician, who has recommended that I be transferred to the service of  Accepting Physician: Dr Leonard Barron at [address] Name, [address] : [facility]  The above potential benefits of such transfer, the potential risks associated with such transfer, and the probable risks of not being transferred have been explained to me, and I fully understand them  The doctor has explained that, in my case, the benefits of transfer outweigh the risks  I agree to be transferred  I authorize the performance of emergency medical procedures and treatments upon me in both transit and upon arrival at the receiving facility  Additionally, I authorize the release of any and all medical records to the receiving facility and request they be transported with me, if possible  I understand that the safest mode of transportation during a medical emergency is an ambulance and that the Hospital advocates the use of this mode of transport   Risks of traveling to the receiving facility by car, including absence of medical control, life sustaining equipment, such as oxygen, and medical personnel has been explained to me and I fully understand them  (ROMAN CORRECT BOX BELOW)  [  ]  I consent to the stated transfer and to be transported by ambulance/helicopter  [  ]  I consent to the stated transfer, but refuse transportation by ambulance and accept full responsibility for my transportation by car  I understand the risks of non-ambulance transfers and I exonerate the Hospital and its staff from any deterioration in my condition that results from this refusal     X___________________________________________    DATE  21  TIME________  Signature of patient or legally responsible individual signing on patient behalf           RELATIONSHIP TO PATIENT_________________________          Provider Certification    NAME Hank Retana                                         1932                              MRN [id number]    A medical screening exam was performed on the above named patient  Based on the examination:    Condition Necessitating Transfer The primary encounter diagnosis was Nasal fracture  Diagnoses of Facial contusion, initial encounter, Fall, and Cervical spine fracture [facility]) were also pertinent to this visit      Patient Condition: The patient has been stabilized such that within reasonable medical probability, no material deterioration of the patient condition or the condition of the unborn child(aakash) is likely to result from the transfer, An emergency transfer is being made prior to stabilization due to the need for definitive care and the benefit of transfer outweighs the risk    Reason for Transfer: Level of Care needed not available at this facility(trauma)    Transfer Requirements: [facility]   · Space available and qualified personnel available for treatment as acknowledged by    · Agreed to accept transfer and to provide appropriate medical treatment as acknowledged by       Dr Breann Gerber  · Appropriate medical records of the examination and treatment of the patient are provided at the time of transfer   STAFF INITIAL WHEN COMPLETED _______  · Transfer will be performed by qualified personnel from    and appropriate transfer equipment as required, including the use of necessary and appropriate life support measures  Provider Certification: I have examined the patient and explained the following risks and benefits of being transferred/refusing transfer to the patient/family:  General risk, such as traffic hazards, adverse weather conditions, rough terrain or turbulence, possible failure of equipment (including vehicle or aircraft), or consequences of actions of persons outside the control of the transport personnel, Unanticipated needs of medical equipment and personnel during transport, Consent was not obtained as patient is committed to psychiatric facility and transfer is mandated, Risk of worsening condition, The possibility of a transport vehicle being unavailable      Based on these reasonable risks and benefits to the patient and/or the unborn child(aakash), and based upon the information available at the time of the patients examination, I certify that the medical benefits reasonably to be expected from the provision of appropriate medical treatments at another medical facility outweigh the increasing risks, if any, to the individuals medical condition, and in the case of labor to the unborn child, from effecting the transfer      X____________________________________________ DATE 03/17/21        TIME_______      ORIGINAL - SEND TO MEDICAL RECORDS   COPY - SEND WITH PATIENT DURING TRANSFER

## 2021-03-17 NOTE — ED NOTES
Transport with DANIEL @ 1830 to [facility] AND [facility] ED   # for report: [phone]  Accepting physician Dr Rocky Sandoval, RN  03/17/21 0349

## 2021-03-17 NOTE — ED PROVIDER NOTES
Emergency Department Note- Luis Angel Holstein 80 y o  female MRN: [id number]    Unit/Bed#: ED 21 Encounter: [id number]        History of Present Illness     Patient is a 22-year-old female presents for evaluation of unwitnessed fall  Patient was hospitalized March 11th through March 16, 2021 for injury sustained as a fall  She lived independently previously, she had a mechanical fall and fell, workup showed that she had a right malleolar fracture, which was treated surgically, had a decrease in hemoglobin from 9 8-7 0, transfuse 1 unit packed red cells, GI consultations suggested that this was most likely hemodilution and recent surgery contributing to the anemia, no further workup required patient was discharged back to with request rehab, ferrous sulfate added to her medications  Patient's daughter is in the room, indicates the patient was discharged yesterday and just prior to discharge, the patient was at her baseline mental status which is awake and oriented, able to function appropriately  Daughter says that she was speaking with the staff there today and they told her the patient had an unwitnessed fall yesterday when she apparently tried to get out of bed  The patient is supposed to be nonweightbearing on the right leg for the next 6 weeks  The patient was then put back into bed and there was no complications, around noon today the patient had an unwitnessed fall where she was heard to fall, found in the bathroom lying face down and there was noted be some blood  Prior to my assessment, the daughter indicated the patient said that she knew where she was, knew what was going on but did not remember trying to get out of bed, but said that she had cotton candy for lunch and the daughter knows that clearly not true  Right now the patient tells me she feels okay, has some slight discomfort in her forehead where she hit the ground, does not remember trying to get out of bed    She denies chest pain, denies fever, denies chills, denies numbness or tingling, denies dysuria or hematuria  Daughter indicates that about 6 weeks ago or 8 weeks ago the patient did also suffer another fall and suffered a nasal fracture which required treatment by Plastic surgery  REVIEW OF SYSTEMS     Constitutional:  No recent weight  gains or losses   Eyes:  No visual changes   ENT:  No tinnitus or hearing changes   Cardiac: No chest pain or palpitations   Respiratory:  No cough or shortness of breath   Abdominal:  No nausea or vomiting   Urinary: No dysuria or hematuria   Hematologic: No easy bruising or bleeding   Skin: No rash   Musculoskeletal: As per HPI   Neurologic: As per HPI   Psychiatric: No mood changes      Historical Information   Past Medical History:   Diagnosis Date    Arthritis     Cancer ([facility] )     breast    Cardiac disease     afib    CHF (congestive heart failure) (HCC)     Hyperlipidemia     Hypertension     Stroke ([facility] )     TIA    TIA (transient ischemic attack)      Past Surgical History:   Procedure Laterality Date    BREAST SURGERY      right lumpectomy    CARDIAC PACEMAKER PLACEMENT      CORONARY STENT PLACEMENT      ORIF TIBIA & FIBULA FRACTURES Right 3/11/2021    Procedure: 1)OPEN REDUCTION W/ INTERNAL FIXATION (ORIF) RIGHT ANKLE bimalleolar fracture 2) ORIF right tibiofibular syndesmosis;   Surgeon: Beverly Morales DPM;  Location: [facility];  Service: Podiatry    TOTAL HIP ARTHROPLASTY Bilateral      Social History   Social History     Substance and Sexual Activity   Alcohol Use Not Currently     Social History     Substance and Sexual Activity   Drug Use No     Social History     Tobacco Use   Smoking Status Never Smoker   Smokeless Tobacco Never Used     Family History:   Family History   Problem Relation Age of Onset    Cancer Sister     Stroke Family     Arthritis Family     Heart disease Family     Kidney disease Family        MEDICATIONS:  Current Facility-Administered Medications on File Prior to Encounter   Medication Dose Route Frequency Provider Last Rate Last Admin    [DISCONTINUED] acetaminophen (TYLENOL) tablet 650 mg  650 mg Oral Q6H PRN Daryn Perez DPM   650 mg at 03/12/21 2145    [DISCONTINUED] acetaminophen (TYLENOL) tablet 975 mg  975 mg Oral Q6H [facility] Nishant Painting, DPM   975 mg at 03/16/21 1411    [DISCONTINUED] albuterol (PROVENTIL HFA,VENTOLIN HFA) inhaler 2 puff  2 puff Inhalation Q4H PRN Daryn Perez DPM        [DISCONTINUED] aspirin chewable tablet 81 mg  81 mg Oral Daily MICHELLE IsbellM   81 mg at 03/16/21 0807    [DISCONTINUED] atorvastatin (LIPITOR) tablet 40 mg  40 mg Oral Daily With [employer], DPM   40 mg at 03/15/21 1609    [DISCONTINUED] bisacodyl (DULCOLAX) rectal suppository 10 mg  10 mg Rectal Daily MICHELLE IsbellM   10 mg at 03/15/21 0913    [DISCONTINUED] calcium carbonate-vitamin D (OSCAL-D) 500 mg-200 units per tablet 1 tablet  1 tablet Oral Daily With Breakfast MICHELLE IsbellM   1 tablet at 03/16/21 0759    [DISCONTINUED] carvedilol (COREG) tablet 3 125 mg  3 125 mg Oral BID With Meals Maxwell Gavin MD   3 125 mg at 03/16/21 0758    [DISCONTINUED] citalopram (CeleXA) tablet 10 mg  10 mg Oral Daily MICHELLE IsbellM   10 mg at 03/16/21 0806    [DISCONTINUED] docusate sodium (COLACE) capsule 100 mg  100 mg Oral Daily MICHELLE IsbellM   100 mg at 03/15/21 0189    [DISCONTINUED] ferrous sulfate tablet 325 mg  325 mg Oral Daily With Breakfast Maxwell Gavin MD        [DISCONTINUED] folic acid (FOLVITE) tablet 1 mg  1 mg Oral Daily MICHELLE IsbellM   1 mg at 03/16/21 6736    [DISCONTINUED] furosemide (LASIX) tablet 40 mg  40 mg Oral Daily Maxwell Gavin MD   40 mg at 03/16/21 0807    [DISCONTINUED] melatonin tablet 3 mg  3 mg Oral HS MICHELLE IsbellM   3 mg at 03/15/21 2103    [DISCONTINUED] ondansetron (ZOFRAN) injection 4 mg  4 mg Intravenous Q6H PRN MICHELLE IsbellM   4 mg at 03/11/21 9436    [DISCONTINUED] oxyCODONE (ROXICODONE) IR tablet 2 5 mg  2 5 mg Oral Q8H PRN Marley Lopez MD        [DISCONTINUED] pantoprazole (PROTONIX) EC tablet 20 mg  20 mg Oral Early Morning Radha Coffin, DPM   20 mg at 03/16/21 0545    [DISCONTINUED] polyethylene glycol (MIRALAX) packet 17 g  17 g Oral Daily Marley Lopez MD   17 g at 03/15/21 0906    [DISCONTINUED] sodium polystyrene (KAYEXALATE) powder 15 g  15 g Oral Once Marley Lopez MD       Cait Medina [DISCONTINUED] thiamine tablet 100 mg  100 mg Oral Daily Radhairving Suin, DPM   100 mg at 03/16/21 0808     Current Outpatient Medications on File Prior to Encounter   Medication Sig Dispense Refill    acetaminophen (TYLENOL) 325 mg tablet Take 2 tablets (650 mg total) by mouth every 6 (six) hours as needed for mild pain, headaches or fever  0    albuterol (PROVENTIL HFA,VENTOLIN HFA) 90 mcg/act inhaler Inhale 2 puffs every 4 (four) hours as needed for wheezing      aspirin 81 MG tablet Take 81 mg by mouth daily   atorvastatin (LIPITOR) 40 mg tablet Take 40 mg by mouth daily      calcium carbonate-vitamin D (OSCAL-D) 500 mg-200 units per tablet Take 1 tablet by mouth daily with breakfast      carvedilol (COREG) 6 25 mg tablet Take 1 tablet (6 25 mg total) by mouth 2 (two) times a day with meals (Patient taking differently: Take 3 125 mg by mouth 2 (two) times a day with meals )  0    citalopram (CeleXA) 10 mg tablet Take 10 mg by mouth daily   ferrous sulfate 325 (65 Fe) mg tablet Take 1 tablet (325 mg total) by mouth daily with breakfast  0    furosemide (LASIX) 40 mg tablet Take 40 mg by mouth daily      melatonin 3 mg Take 1 tablet (3 mg total) by mouth daily at bedtime  0    Multiple Vitamins-Minerals (CENTRUM SILVER PO) Take 1 tablet by mouth daily   omeprazole (PriLOSEC) 20 mg delayed release capsule Take 20 mg by mouth Sunday, Tues, Thurs      psyllium (METAMUCIL) 0 52 G capsule Take 0 52 g by mouth daily        bisacodyl (DULCOLAX) 10 mg suppository Insert 1 suppository (10 mg total) into the rectum daily (Patient not taking: Reported on 2/8/2021) 12 suppository 0    docusate sodium (COLACE) 100 mg capsule Take 100 mg by mouth daily      folic acid (FOLVITE) 1 mg tablet Take 1 mg by mouth daily   thiamine (VITAMIN B1) 100 mg tablet Take 100 mg by mouth daily  ALLERGIES:  Allergies   Allergen Reactions    Lovenox [Enoxaparin Sodium] Swelling       Vitals:    03/17/21 1444 03/17/21 1608 03/17/21 1619   BP: (!) 175/98  145/69   TempSrc:  Rectal    Pulse: 91  71   Resp: 20  20   Patient Position - Orthostatic VS: Sitting  Lying   Temp:  (S) (!) 96 2 °F (35 7 °C)        PHYSICAL EXAM    General:  Patient is well-appearing  Head:  Her scalp itself is atraumatic, there is no hematoma or bogginess  Eyes:  Conjunctiva pink, Extraocular muscle intact, there is some bilateral periorbital ecchymosis PERRL  ENT:  Patient has a hematoma just above the bridge of her nose, about 2 cm in diameter  There is an abrasion over the front part of the nose  There is some dried blood around her nose  There is some ecchymosis and swelling of the nose with slight deformity  There is no septal hematoma  Mucous membranes are moist, no dental malocclusion, no craniofacial instability, no Mackay signs  Neck:  In a cervical collar, no tenderness or step-offs or deformities  Cardiac:  S1-S2, without murmurs, no chest wall tenderness  Lungs:  Clear to auscultation bilaterally  Abdomen:  Soft, nontender, normal bowel sounds, no CVA tenderness, no tympany, no rigidity, no guarding  Extremities:  Her right leg is immobilized below the knee with a splint, toes are visible, she can wiggle those and they are warm and well perfused  The right knee, femur and hip are atraumatic, nontender with normal, painless range of motion  The bilateral arms and left leg are atraumatic, nontender with normal, painless range of motion    Back: No midline cervical, thoracic, lumbar, sacral tenderness, deformities, or step-offs  Neurologic:  Awake, fluent speech, normal comprehension, AAOx3, cranial nerves 2-12 are intact, no slurred speech, no facial droop  Strength is 4/5 in the bilateral arms and bilateral legs  No pronator drift or deficit on finger-to-nose testing  Skin:  Pink warm and dry  Psychiatric:  Alert, pleasant, cooperative      Labs Reviewed   BASIC METABOLIC PANEL - Abnormal       Result Value Ref Range Status    Sodium 136  136 - 145 mmol/L Final    Potassium 5 4 (*) 3 5 - 5 3 mmol/L Final    Chloride 100  100 - 108 mmol/L Final    CO2 30  21 - 32 mmol/L Final    ANION GAP 6  4 - 13 mmol/L Final    BUN 35 (*) 5 - 25 mg/dL Final    Creatinine 1 20  0 60 - 1 30 mg/dL Final    Comment: Standardized to IDMS reference method    Glucose 95  65 - 140 mg/dL Final    Comment: If the patient is fasting, the ADA then defines impaired fasting glucose as > 100 mg/dL and diabetes as > or equal to 123 mg/dL  Specimen collection should occur prior to Sulfasalazine administration due to the potential for falsely depressed results  Specimen collection should occur prior to Sulfapyridine administration due to the potential for falsely elevated results      Calcium 10 0  8 3 - 10 1 mg/dL Final    eGFR 40  ml/min/1 73sq m Final    Narrative:     [place] guidelines for Chronic Kidney Disease (CKD):     Stage 1 with normal or high GFR (GFR > 90 mL/min/1 73 square meters)    Stage 2 Mild CKD (GFR = 60-89 mL/min/1 73 square meters)    Stage 3A Moderate CKD (GFR = 45-59 mL/min/1 73 square meters)    Stage 3B Moderate CKD (GFR = 30-44 mL/min/1 73 square meters)    Stage 4 Severe CKD (GFR = 15-29 mL/min/1 73 square meters)    Stage 5 End Stage CKD (GFR <15 mL/min/1 73 square meters)  Note: GFR calculation is accurate only with a steady state creatinine   CBC AND DIFFERENTIAL - Abnormal    WBC 4 98  4 31 - 10 16 Thousand/uL Final    RBC 2 79 (*) 3 81 - 5 12 Million/uL Final    Hemoglobin 8 9 (*) 11 5 - 15 4 g/dL Final    Hematocrit 28 2 (*) 34 8 - 46 1 % Final     (*) 82 - 98 fL Final    MCH 31 9  26 8 - 34 3 pg Final    MCHC 31 6  31 4 - 37 4 g/dL Final    RDW 19 1 (*) 11 6 - 15 1 % Final    MPV 10 9  8 9 - 12 7 fL Final    Platelets 550  912 - 390 Thousands/uL Final    nRBC 0  /100 WBCs Final    Neutrophils Relative 65  43 - 75 % Final    Immat GRANS % 1  0 - 2 % Final    Lymphocytes Relative 14  14 - 44 % Final    Monocytes Relative 18 (*) 4 - 12 % Final    Eosinophils Relative 2  0 - 6 % Final    Basophils Relative 0  0 - 1 % Final    Neutrophils Absolute 3 24  1 85 - 7 62 Thousands/µL Final    Immature Grans Absolute 0 03  0 00 - 0 20 Thousand/uL Final    Lymphocytes Absolute 0 69  0 60 - 4 47 Thousands/µL Final    Monocytes Absolute 0 91  0 17 - 1 22 Thousand/µL Final    Eosinophils Absolute 0 09  0 00 - 0 61 Thousand/µL Final    Basophils Absolute 0 02  0 00 - 0 10 Thousands/µL Final   CK - Normal    Total CK 83  26 - 192 U/L Final   UA W REFLEX TO MICROSCOPIC WITH REFLEX TO CULTURE    Color, UA Yellow   Final    Clarity, UA Clear   Final    Specific [place], UA 1 010  1 003 - 1 030 Final    pH, UA 7 0  4 5, 5 0, 5 5, 6 0, 6 5, 7 0, 7 5, 8 0 Final    Leukocytes, UA Negative  Negative Final    Nitrite, UA Negative  Negative Final    Protein, UA Negative  Negative mg/dl Final    Glucose, UA Negative  Negative mg/dl Final    Ketones, UA Negative  Negative mg/dl Final    Urobilinogen, UA 0 2  0 2, 1 0 E U /dl E U /dl Final    Bilirubin, UA Negative  Negative Final    Blood, UA Negative  Negative Final       Medications - No data to display    CT head wo contrast   Final Result      No acute intracranial pathology  Frontal scalp hematoma  CT facial bone CT for discussion of nasal bone fractures                 Workstation performed: [id number]         CT facial bones wo contrast   Final Result      Acute on subacute comminuted nasal bone fractures with increased displacement  I personally discussed this study with DEMTERIA GUAN on 3/17/2021 at 5:25 PM          Workstation performed: [id number]         CT spine cervical wo contrast   Final Result      Acute avulsion at the anterior-inferior corner of C6 vertebral body with mild widening of the anterior C6-7 disc space compared with the prior study  Nondisplaced fracture right C7 and possibly right C6 facet  Suggest follow-up with MRI to assess for ligamentous injury  I personally discussed this study with DEMETRIA GUAN on 3/17/2021 at 5:25 PM                Workstation performed: [id number]         XR ankle 3+ vw right   ED Interpretation   X-ray interpreted by me shows by malleolar fracture, status post plates and screws surgical repair  No acute change from previous      Final Result      Limited exam through external cast   No obvious change from prior study            Workstation performed: [id number]             ED Course as of Mar 17 1746   Wed Mar 17, 2021   1721 Call from radiology: acute on subacute nasal bone fractures, small avulsion of anterior/inferior corner of C6 with slight widening of disc space  Non-displaced fx C7 facette, possible one in C6, head CT unremarkable      1726 On reassessment, there is no change with the above findings  Assessment/Plan     ED Medical Decision Making:    Spoke with Dr Gloria Salazar, [facility] who accepted the patient for transfer        Time reflects when diagnosis was documented in both MDM as applicable and the Disposition within this note     Time User Action Codes Description Comment    3/17/2021  5:35 PM Efe Bras Add [S02  2XXA] Nasal fracture     3/17/2021  5:35 PM Efe Bras Add [S00 83XA] Facial contusion, initial encounter     3/17/2021  5:35 PM Efe Bras Add [place]  XXXA] Fall     3/17/2021  5:35 PM Efe Bras Add [place]  9XXA] Cervical spine fracture [facility])       ED Disposition     ED Disposition Condition Date/Time Comment    Transfer to Another Facility-In Network  Wed Mar 17, 2021  5:34 PM Falguni Underwood should be transferred out to B          MD Documentation      Most Recent Value   Patient Condition  The patient has been stabilized such that within reasonable medical probability, no material deterioration of the patient condition or the condition of the unborn child(aakash) is likely to result from the transfer, An emergency transfer is being made prior to stabilization due to the need for definitive care and the benefit of transfer outweighs the risk   Reason for Transfer  Level of Care needed not available at this facility [trauma]   Benefits of Transfer  Specialized equipment and/or services available at the receiving facility (Include comment)________________________ [trauam]   Risks of Transfer  Potential for delay in receiving treatment, Potential deterioration of medical condition   Accepting Physician  Dr Angela Wan Name, Keshia Mancilla 284   Provider Certification  General risk, such as traffic hazards, adverse weather conditions, rough terrain or turbulence, possible failure of equipment (including vehicle or aircraft), or consequences of actions of persons outside the control of the transport personnel, Unanticipated needs of medical equipment and personnel during transport, Consent was not obtained as patient is committed to psychiatric facility and transfer is mandated, Risk of worsening condition, The possibility of a transport vehicle being unavailable      RN Documentation      Most [facility] Name, Keshia Mancilla 284      Follow-up Information    None         New Prescriptions    No medications on file            Kristin Mariano DO  03/17/21 7766

## 2021-03-17 NOTE — EMTALA/ACUTE CARE TRANSFER
[facility] [address]  [address] [address]  Dept: [phone]      EMTALA TRANSFER CONSENT    NAME Falguni Underwood                                         1932                              MRN [id number]    I have been informed of my rights regarding examination, treatment, and transfer   by Dr Kristin Mariano DO    Benefits: Specialized equipment and/or services available at the receiving facility (Include comment)________________________(trauam)    Risks: Potential for delay in receiving treatment, Potential deterioration of medical condition      Consent for Transfer:  I acknowledge that my medical condition has been evaluated and explained to me by the emergency department physician or other qualified medical person and/or my attending physician, who has recommended that I be transferred to the service of  Accepting Physician: Dr Jamyie Garcia at [address] Name, [address] : [place]  The above potential benefits of such transfer, the potential risks associated with such transfer, and the probable risks of not being transferred have been explained to me, and I fully understand them  The doctor has explained that, in my case, the benefits of transfer outweigh the risks  I agree to be transferred  I authorize the performance of emergency medical procedures and treatments upon me in both transit and upon arrival at the receiving facility  Additionally, I authorize the release of any and all medical records to the receiving facility and request they be transported with me, if possible  I understand that the safest mode of transportation during a medical emergency is an ambulance and that the Hospital advocates the use of this mode of transport   Risks of traveling to the receiving facility by car, including absence of medical control, life sustaining equipment, such as oxygen, and medical personnel has been explained to me and I fully understand them  (ROMAN CORRECT BOX BELOW)  [ x ]  I consent to the stated transfer and to be transported by ambulance/helicopter  [  ]  I consent to the stated transfer, but refuse transportation by ambulance and accept full responsibility for my transportation by car  I understand the risks of non-ambulance transfers and I exonerate the Hospital and its staff from any deterioration in my condition that results from this refusal     X___________________________________________    DATE  21  TIME________  Signature of patient or legally responsible individual signing on patient behalf           RELATIONSHIP TO PATIENT_________________________          Provider Certification    NAME Ashley Samaniego                                         1932                              MRN [id number]    A medical screening exam was performed on the above named patient  Based on the examination:    Condition Necessitating Transfer The primary encounter diagnosis was Nasal fracture  Diagnoses of Facial contusion, initial encounter, Fall, and Cervical spine fracture [facility]) were also pertinent to this visit      Patient Condition: The patient has been stabilized such that within reasonable medical probability, no material deterioration of the patient condition or the condition of the unborn child(aakash) is likely to result from the transfer, An emergency transfer is being made prior to stabilization due to the need for definitive care and the benefit of transfer outweighs the risk    Reason for Transfer: Level of Care needed not available at this facility(trauma)    Transfer Requirements: Alfredo Clemens Brad   · Space available and qualified personnel available for treatment as acknowledged by  PACS  · Agreed to accept transfer and to provide appropriate medical treatment as acknowledged by       Dr Nora Farrell  · Appropriate medical records of the examination and treatment of the patient are provided at the time of transfer   STAFF INITIAL WHEN COMPLETED _______  · Transfer will be performed by qualified personnel from    and appropriate transfer equipment as required, including the use of necessary and appropriate life support measures  Provider Certification: I have examined the patient and explained the following risks and benefits of being transferred/refusing transfer to the patient/family:  General risk, such as traffic hazards, adverse weather conditions, rough terrain or turbulence, possible failure of equipment (including vehicle or aircraft), or consequences of actions of persons outside the control of the transport personnel, Unanticipated needs of medical equipment and personnel during transport, Consent was not obtained as patient is committed to psychiatric facility and transfer is mandated, Risk of worsening condition, The possibility of a transport vehicle being unavailable      Based on these reasonable risks and benefits to the patient and/or the unborn child(aakash), and based upon the information available at the time of the patients examination, I certify that the medical benefits reasonably to be expected from the provision of appropriate medical treatments at another medical facility outweigh the increasing risks, if any, to the individuals medical condition, and in the case of labor to the unborn child, from effecting the transfer      X____________________________________________ DATE 03/17/21        TIME_______      ORIGINAL - SEND TO MEDICAL RECORDS   COPY - SEND WITH PATIENT DURING TRANSFER

## 2021-03-18 ENCOUNTER — APPOINTMENT (INPATIENT)
Dept: RADIOLOGY | Facility: HOSPITAL | Age: 86
DRG: 551 | End: 2021-03-18
Payer: COMMERCIAL

## 2021-03-18 PROBLEM — S12.9XXA CLOSED CERVICAL SPINE FRACTURE (HCC): Status: ACTIVE | Noted: 2021-03-18

## 2021-03-18 PROBLEM — S02.2XXA NASAL BONE FRACTURE: Status: ACTIVE | Noted: 2021-03-18

## 2021-03-18 LAB
ANION GAP SERPL CALCULATED.3IONS-SCNC: 6 MMOL/L (ref 4–13)
BUN SERPL-MCNC: 35 MG/DL (ref 5–25)
CALCIUM SERPL-MCNC: 9.8 MG/DL (ref 8.3–10.1)
CHLORIDE SERPL-SCNC: 103 MMOL/L (ref 100–108)
CO2 SERPL-SCNC: 28 MMOL/L (ref 21–32)
CREAT SERPL-MCNC: 1.19 MG/DL (ref 0.6–1.3)
ERYTHROCYTE [DISTWIDTH] IN BLOOD BY AUTOMATED COUNT: 19.5 % (ref 11.6–15.1)
GFR SERPL CREATININE-BSD FRML MDRD: 41 ML/MIN/1.73SQ M
GLUCOSE SERPL-MCNC: 88 MG/DL (ref 65–140)
HCT VFR BLD AUTO: 27.7 % (ref 34.8–46.1)
HGB BLD-MCNC: 8.6 G/DL (ref 11.5–15.4)
MCH RBC QN AUTO: 31.2 PG (ref 26.8–34.3)
MCHC RBC AUTO-ENTMCNC: 31 G/DL (ref 31.4–37.4)
MCV RBC AUTO: 100 FL (ref 82–98)
PLATELET # BLD AUTO: 149 THOUSANDS/UL (ref 149–390)
PMV BLD AUTO: 10.9 FL (ref 8.9–12.7)
POTASSIUM SERPL-SCNC: 4.5 MMOL/L (ref 3.5–5.3)
RBC # BLD AUTO: 2.76 MILLION/UL (ref 3.81–5.12)
SODIUM SERPL-SCNC: 137 MMOL/L (ref 136–145)
WBC # BLD AUTO: 5.25 THOUSAND/UL (ref 4.31–10.16)

## 2021-03-18 PROCEDURE — 97167 OT EVAL HIGH COMPLEX 60 MIN: CPT

## 2021-03-18 PROCEDURE — 99223 1ST HOSP IP/OBS HIGH 75: CPT | Performed by: INTERNAL MEDICINE

## 2021-03-18 PROCEDURE — 92610 EVALUATE SWALLOWING FUNCTION: CPT

## 2021-03-18 PROCEDURE — 99232 SBSQ HOSP IP/OBS MODERATE 35: CPT | Performed by: SURGERY

## 2021-03-18 PROCEDURE — 99222 1ST HOSP IP/OBS MODERATE 55: CPT | Performed by: NEUROLOGICAL SURGERY

## 2021-03-18 PROCEDURE — 72040 X-RAY EXAM NECK SPINE 2-3 VW: CPT

## 2021-03-18 PROCEDURE — 80048 BASIC METABOLIC PNL TOTAL CA: CPT | Performed by: EMERGENCY MEDICINE

## 2021-03-18 PROCEDURE — 97163 PT EVAL HIGH COMPLEX 45 MIN: CPT

## 2021-03-18 PROCEDURE — 85027 COMPLETE CBC AUTOMATED: CPT | Performed by: EMERGENCY MEDICINE

## 2021-03-18 PROCEDURE — NC001 PR NO CHARGE: Performed by: INTERNAL MEDICINE

## 2021-03-18 RX ORDER — ALBUTEROL SULFATE 90 UG/1
2 AEROSOL, METERED RESPIRATORY (INHALATION) EVERY 4 HOURS PRN
Status: DISCONTINUED | OUTPATIENT
Start: 2021-03-18 | End: 2021-03-22 | Stop reason: HOSPADM

## 2021-03-18 RX ADMIN — CARVEDILOL 3.12 MG: 3.12 TABLET, FILM COATED ORAL at 17:00

## 2021-03-18 RX ADMIN — CARVEDILOL 3.12 MG: 3.12 TABLET, FILM COATED ORAL at 08:08

## 2021-03-18 RX ADMIN — CITALOPRAM HYDROBROMIDE 10 MG: 10 TABLET ORAL at 08:08

## 2021-03-18 RX ADMIN — MELATONIN TAB 3 MG 3 MG: 3 TAB at 22:16

## 2021-03-18 RX ADMIN — FUROSEMIDE 40 MG: 40 TABLET ORAL at 08:08

## 2021-03-18 NOTE — H&P
H&P Exam - Trauma   Ava Shaffer 80 y o  female MRN: [id number]  Unit/Bed#: ED 02 Encounter: [id number]    Assessment/Plan   Trauma Alert: Other Transfer  Model of Arrival: Ambulance  Trauma Team: Attending Carin Lundborg, Residents [place] and Fellow [place] Utilities: Neurosurgery: Cervical Spine Fracture  Time Called Consult placed    Trauma Active Problems:   Cervical spine fractures  Nasal bone fracture  Change in mental status    Trauma Plan:   Admission  Analgesia  Neurosurgery consult  Maintain cervical spine precautions  Aspirations precautions  NPO until speech eval  Patient appears fluid overload, will provide Lasix  Resume home medications    Chief Complaint: None    History of Present Illness   HPI:  Ava Shaffer is a 80 y o  female who presents as a transfer from [facility]  History is provided by daughter at bedside  She states that the patient was recently admitted to the [facility] where she was found to have a nasal bone fracture which was repaired by Plastic surgery as well as a right ankle fracture which was surgically repaired by Orthopedics  She was then discharged earlier today to a rehab facility  While there she had another fall forward with new facial trauma and was transported back to [facility] for re-evaluation  CT of head and C-spine showed worsening of the nasal bone fracture as well as cervical spine fractures  Secondary to cervical spine fracture she was transferred to our campus for evaluation by Neurosurgery     Mechanism:Fall    Review of Systems    12-point, complete review of systems was reviewed and negative except as stated above  Historical Information   History is unobtainable from the patient due to ams    Efforts to obtain history included the following sources: family member, other medical personnel, obtained from other records    Past Medical History:   Diagnosis Date    Arthritis     Cancer [facility])     breast    Cardiac disease     afib    [facility]     Hyperlipidemia     Hypertension     Stroke ([facility] )     TIA    TIA (transient ischemic attack)      Past Surgical History:   Procedure Laterality Date    BREAST SURGERY      right lumpectomy    CARDIAC PACEMAKER PLACEMENT      CORONARY STENT PLACEMENT      ORIF TIBIA & FIBULA FRACTURES Right 3/11/2021    Procedure: 1)OPEN REDUCTION W/ INTERNAL FIXATION (ORIF) RIGHT ANKLE bimalleolar fracture 2) ORIF right tibiofibular syndesmosis; Surgeon: Josephine Leo DPM;  Location: [facility];  Service: Podiatry    TOTAL HIP ARTHROPLASTY Bilateral      Social History   Social History     Substance and Sexual Activity   Alcohol Use Not Currently     Social History     Substance and Sexual Activity   Drug Use No     Social History     Tobacco Use   Smoking Status Never Smoker   Smokeless Tobacco Never Used     E-Cigarette/Vaping    E-Cigarette Use Never User      E-Cigarette/Vaping Substances    Nicotine No     THC No     Flavoring No     Other No     Unknown No        There is no immunization history on file for this patient    Last Tetanus: 06/7/2018   Family History: Non-contributory  Unable to obtain/limited by AMS      Meds/Allergies   all current active meds have been reviewed and current meds:   Current Facility-Administered Medications   Medication Dose Route Frequency    acetaminophen (TYLENOL) tablet 650 mg  650 mg Oral Q6H PRN    [START ON 3/18/2021] atorvastatin (LIPITOR) tablet 40 mg  40 mg Oral Daily    [START ON 3/18/2021] carvedilol (COREG) tablet 3 125 mg  3 125 mg Oral BID With Meals    [START ON 3/18/2021] citalopram (CeleXA) tablet 10 mg  10 mg Oral Daily    [START ON 3/18/2021] docusate sodium (COLACE) capsule 100 mg  100 mg Oral Daily    [START ON 3/18/2021] ferrous sulfate tablet 325 mg  325 mg Oral Daily With Breakfast    [START ON 4/24/4414] folic acid (FOLVITE) tablet 1 mg  1 mg Oral Daily    furosemide (LASIX) injection 40 mg  40 mg Intravenous Once    [START ON 3/18/2021] furosemide (LASIX) tablet 40 mg  40 mg Oral Daily    melatonin tablet 3 mg  3 mg Oral HS    [START ON 3/18/2021] multivitamin with iron-minerals liquid 15 mL  15 mL Oral Daily    ondansetron (ZOFRAN) injection 4 mg  4 mg Intravenous Q6H PRN    [START ON 3/18/2021] polyethylene glycol (MIRALAX) packet 17 g  17 g Oral Daily    [START ON 3/18/2021] thiamine tablet 100 mg  100 mg Oral Daily       Allergies   Allergen Reactions    Lovenox [Enoxaparin Sodium] Swelling         PHYSICAL EXAM    PE limited by: None    Objective   Vitals:   First set: Temperature: (!) 96 °F (35 6 °C) (03/17/21 1926)  Pulse: 90 (03/17/21 1926)  Respirations: 18 (03/17/21 1926)  Blood Pressure: 157/73 (03/17/21 1926)    Primary Survey:   (A) Airway: Patent  (B) Breathing: Non labored  (C) Circulation: Pulses:   normal  (D) Disabliity:  Eye Opening:   Spontaneous = 4, Motor Response: Obeys commands = 6 and Verbal Response:  Confused = 4  (E) Expose:  Completed    Secondary Survey: (Click on Physical Exam tab above)  Physical Exam  Vitals signs reviewed  Constitutional:       General: She is not in acute distress  Appearance: She is not diaphoretic  HENT:      Head: Normocephalic  Comments: Bruising and inferior aspects of both eyes  Small abrasions on face  Right Ear: External ear normal       Left Ear: External ear normal       Nose:      Comments: No septal hematoma, diffuse bruising around nasal bridge, Steri-Strips over right side laceration which is old  Mouth/Throat:      Comments: Small laceration to inside of lower lip  No damaged teeth  Eyes:      General: No scleral icterus  Right eye: No discharge  Left eye: No discharge  Conjunctiva/sclera: Conjunctivae normal       Pupils: Pupils are equal, round, and reactive to light  Neck:      Musculoskeletal: Neck supple  Vascular: No JVD        Comments: Patient in C collar 2/2 fractures  Cardiovascular: Rate and Rhythm: Normal rate  Rhythm irregular  Heart sounds: Normal heart sounds  Pulmonary:      Effort: Pulmonary effort is normal       Breath sounds: Normal breath sounds  Abdominal:      General: Bowel sounds are normal  There is no distension  Palpations: Abdomen is soft  There is no mass  Tenderness: There is no abdominal tenderness  There is no guarding  Musculoskeletal:         General: Deformity present  No tenderness  Comments: Right lower extremity has a splint and wrapped in Ace bandage  Recent surgical site  Good capillary refill, sensation intact, motor intact  Skin:     General: Skin is warm  Findings: Bruising present  Neurological:      Mental Status: She is alert  Cranial Nerves: No cranial nerve deficit  Sensory: No sensory deficit  Motor: No abnormal muscle tone  Coordination: Coordination normal       Comments: No focal deficits noted  Patient is diffusely weak  Oriented to self and intermittently location, occasionally confused           Invasive Devices     Peripheral Intravenous Line            Peripheral IV 03/17/21 Right Antecubital less than 1 day                Lab Results:   BMP/CMP:   Lab Results   Component Value Date    SODIUM 136 03/17/2021    K 5 4 (H) 03/17/2021     03/17/2021    CO2 30 03/17/2021    BUN 35 (H) 03/17/2021    CREATININE 1 20 03/17/2021    CALCIUM 10 0 03/17/2021    EGFR 40 03/17/2021   , CBC:   Lab Results   Component Value Date    WBC 4 98 03/17/2021    HGB 8 9 (L) 03/17/2021    HCT 28 2 (L) 03/17/2021     (H) 03/17/2021     03/17/2021    MCH 31 9 03/17/2021    MCHC 31 6 03/17/2021    RDW 19 1 (H) 03/17/2021    MPV 10 9 03/17/2021    NRBC 0 03/17/2021   , Urinalysis:   Lab Results   Component Value Date    COLORU Yellow 03/17/2021    CLARITYU Clear 03/17/2021    SPECGRAV 1 010 03/17/2021    PHUR 7 0 03/17/2021    LEUKOCYTESUR Negative 03/17/2021    NITRITE Negative 03/17/2021 GLUCOSEU Negative 03/17/2021    KETONESU Negative 03/17/2021    BILIRUBINUR Negative 03/17/2021    BLOODU Negative 03/17/2021   , CK:   Lab Results   Component Value Date    CKTOTAL 83 03/17/2021    and Troponin: No results found for: TROPONINI  Imaging/EKG Studies:  CT scan of facial bones show displaced, comminuted nasal bone fracture  No acute pathology on CT of head  C-spine show fracture of C6 and C7    Other Studies: None    Code Status: Level 3 - DNAR and DNI  Advance Directive and Living Will: Yes    Power of : Yes  POLST:

## 2021-03-18 NOTE — UTILIZATION REVIEW
Initial Clinical Review    Admission: Date/Time/Statement:   Admission Orders (From admission, onward)     Ordered        03/17/21 2007  Inpatient Admission  Once                   Orders Placed This Encounter   Procedures    Inpatient Admission     Standing Status:   Standing     Number of Occurrences:   1     Order Specific Question:   Level of Care     Answer:   Med Surg [16]     Order Specific Question:   Estimated length of stay     Answer:   More than 2 Midnights     Order Specific Question:   Certification     Answer:   I certify that inpatient services are medically necessary for this patient for a duration of greater than two midnights  See H&P and MD Progress Notes for additional information about the patient's course of treatment  ED Arrival Information     Expected Arrival Acuity Means of Arrival Escorted By Service Admission Type    - 3/17/2021 19:15 [facility] [place]) Trauma Urgent    Arrival Complaint    c6 avulsion fx        Chief Complaint   Patient presents with    Neck Injury     Trauma transfer from [facility] after having two falls in about a 12 hour period at [facility] where patient was at for rehab - second fall resulted in nasal fracture and c6 fracture   Facial Injury     Assessment/Plan:   80y Female, transfer from [facility] ED to [facility] presents S/p Fall with new facial trauma  Recent hospitalization to [facility] on 3/11 to 361 for Fall with right ankle fracture repair by Ortho and nasal bone fracture repaired by Plastic surgery  He was d/c to IP rehab  Pt had another fall today at Rehab facility with new facial trauma and sent back to [facility] ED for re-evaluation  CT Head showed worsening of the nasal bone fracture as well as cervical spine fractures  Secondary to cervical spine fracture she was transferred to our campus for evaluation by Neurosurgery     Admit Inpatient level of care S/p Fall, Cervical spine fractures, Nasal bone fracture and Change in mental status  NPO  Neurosurgery consult  Analgesic prn  Maintain cervical spine precautions  Aspirations precautions  NPO until speech eval  Appears fluid overload, will provide diuretic  3/18 Progress notes; 3/17 CT Facial bones: Worsening of nasal bone fractures after prior treatment per plastic surgery  Plastic surgery consult for new fall and worsening fractures  Speech therapy eval recommending puree diet with thin liquids  3/17 CT- Cspine: Acute avulsion at the anterior-inferior corner of C6 vertebral body with mild widening of the anterior C6-7 disc space compared with the prior study  Nondisplaced fracture right C7 and possibly right C6 facet  3/18 Neurosurgery cons; Closed cervical spine fracture  [place] J brace ordered to be worn when OOB  Upright cervical spine ordered  Recommend OT Coag for concern for concussion      ED Triage Vitals   Temperature Pulse Respirations Blood Pressure SpO2   03/17/21 1926 03/17/21 1926 03/17/21 1926 03/17/21 1926 03/17/21 1926   (!) 96 °F (35 6 °C) 90 18 157/73 99 %      Temp Source Heart Rate Source Patient Position - Orthostatic VS BP Location FiO2 (%)   03/17/21 1926 03/17/21 1926 03/17/21 1926 03/17/21 2125 --   Axillary Monitor Lying Left arm       Pain Score       03/17/21 2110       No Pain          Wt Readings from Last 1 Encounters:   03/16/21 83 9 kg (184 lb 15 5 oz)     Additional Vital Signs:   03/18/21 09:45:40  97 6 °F (36 4 °C)  99  --  --  --  97 %  --  --   03/17/21 22:33:02  96 7 °F (35 9 °C)Abnormal   94  19  170/71  104  93 %  --  --   03/17/21 21:25:26  --  118Abnormal   --  136/78   --  95 %  --  Lying   BP: Recheked manually at 03/17/21 2125 03/17/21 21:15:49  97 8 °F (36 6 °C)  88  19  60/40Abnormal   47  94 %  --  --   03/17/21 2110  --  --  --  --  --  --  None (Room air)  --   03/17/21 2035  --  82  39Abnormal   --  --  98 %  --       Pertinent Labs/Diagnostic Test Results:   3/17   Xray Right Ankle - Limited exam through external cast   No obvious change from prior study    CT Cervical Spine - Acute avulsion at the anterior-inferior corner of C6 vertebral body with mild widening of the anterior C6-7 disc space compared with the prior study  Nondisplaced fracture right C7 and possibly right C6 facet  Suggest follow-up with MRI to assess for ligamentous injury  CT Facial Bones - Acute on subacute comminuted nasal bone fractures with increased displacement  CT Head - No acute intracranial pathology  Frontal scalp hematoma      Results from last 7 days   Lab Units 03/16/21  1423   SARS-COV-2  Negative     Lab Units 03/18/21  0422 03/17/21  1514   WBC Thousand/uL 5 25 4 98   HEMOGLOBIN g/dL 8 6* 8 9*   HEMATOCRIT % 27 7* 28 2*   PLATELETS Thousands/uL 149 158   NEUTROS ABS Thousands/µL  --  3 24         Lab Units 03/18/21  0422 03/17/21  1514   SODIUM mmol/L 137 136   POTASSIUM mmol/L 4 5 5 4*   CHLORIDE mmol/L 103 100   CO2 mmol/L 28 30   ANION GAP mmol/L 6 6   BUN mg/dL 35* 35*   CREATININE mg/dL 1 19 1 20   EGFR ml/min/1 73sq m 41 40   CALCIUM mg/dL 9 8 10 0   MAGNESIUM mg/dL  --   --              Lab Units 03/18/21  0422 03/17/21  1514   GLUCOSE RANDOM mg/dL 88 95       Results from last 7 days   Lab Units 03/17/21  1514   CK TOTAL U/L 83       Results from last 7 days   Lab Units 03/17/21  1537   CLARITY UA  Clear   COLOR UA  Yellow   SPEC GRAV UA  1 010   PH UA  7 0   GLUCOSE UA mg/dl Negative   KETONES UA mg/dl Negative   BLOOD UA  Negative   PROTEIN UA mg/dl Negative   NITRITE UA  Negative   BILIRUBIN UA  Negative   UROBILINOGEN UA E U /dl 0 2   LEUKOCYTES UA  Negative     ED Treatment:   Medication Administration from 03/17/2021 1856 to 03/17/2021 2108     None        Past Medical History:   Diagnosis Date    Arthritis     Cancer [facility])     breast    Cardiac disease     afib    [facility]F [facility]congestive [facility]) ([facility] )     Hyperlipidemia     Hypertension     [facility]     TIA    TIA (transient ischemic attack)      Present on Admission:   Closed cervical spine fracture (HCC)   Nasal bone fracture      Admitting Diagnosis: Closed fracture of multiple cervical vertebrae, initial encounter ([facility] [address] ) [S12  9XXA]  Age/Sex: 80 y o  female     Admission Orders:  Scheduled Medications:  atorvastatin, 40 mg, Oral, Daily  carvedilol, 3 125 mg, Oral, BID With Meals  citalopram, 10 mg, Oral, Daily  docusate sodium, 100 mg, Oral, Daily  ferrous sulfate, 325 mg, Oral, Daily With Breakfast  folic acid, 1 mg, Oral, Daily  furosemide, 40 mg, Oral, Daily  melatonin, 3 mg, Oral, HS  multivitamin with iron-minerals, 15 mL, Oral, Daily  polyethylene glycol, 17 g, Oral, Daily  thiamine, 100 mg, Oral, Daily      Continuous IV Infusions: None     PRN Meds:  acetaminophen, 650 mg, Oral, Q6H PRN  albuterol, 2 puff, Inhalation, Q4H PRN  ondansetron, 4 mg, Intravenous, Q6H PRN      Cervical spine precautions  Aspiration precautions  IP CONSULT TO NEUROSURGERY  IP CONSULT TO GERONTOLOGY  IP CONSULT TO CASE MANAGEMENT  IP CONSULT TO PLASTIC SURGERY  IP CONSULT TO GERONTOLOGY    Network Utilization Review Department  ATTENTION: Please call with any questions or concerns to [phone] and carefully listen to the prompts so that you are directed to the right person  All voicemails are confidential   Megan Castro all requests for admission clinical reviews, approved or denied determinations and any other requests to dedicated fax number below belonging to the campus where the patient is receiving treatment   List of dedicated fax numbers for the Facilities:  [address] DENIALS (Administrative/Medical Necessity) [phone]   [address] (Maternity/NICU/Pediatrics) 261 [facility],[address] [address] [place] 2000 [facility] [place] ([address]) [address] [place] [address] [address]81 [phone]   [address]51 [phone]

## 2021-03-18 NOTE — PLAN OF CARE
Problem: OCCUPATIONAL THERAPY ADULT  Goal: Performs self-care activities at highest level of function for planned discharge setting  See evaluation for individualized goals  Description: Treatment Interventions: ADL retraining, Functional transfer training, Endurance training, Cognitive reorientation, Patient/family training, Compensatory technique education, Activityengagement          See flowsheet documentation for full assessment, interventions and recommendations  Note: Limitation: Decreased ADL status, Decreased Safe judgement during ADL, Decreased cognition, Decreased endurance, Decreased self-care trans, Decreased high-level ADLs  Prognosis: Fair, Poor  Assessment: Pt is a 80 y o  female who was admitted to [facility] on 3/17/2021 with Closed cervical spine fracture ([address] ) s/p unwitnessed fall at SNF/STR  Pt's problem list also includes PMH of HTN, underlying neurological disorder, previous surgery, cancer history and arthritis, breast ca, afib, chf, hld, CVA, TIA, PPM  At baseline pt was completing adls and mobility independently however has required assist since fall with R malleolar fx ~6wks ago  Pt lives alone in row home with FFSU and ramp to enter - has been at [address] [place] [address] for STR since fall/ankle fx  Currently pt requires max assist for overall ADLS and mod a x 2 for functional transfers with limited carryover of NWB RLE and spinal precautions/safety  Pt currently presents with impairments in the following categories -limited home support, difficulty performing ADLS, difficulty performing IADLS , limited insight into deficits, compliance, decreased initiation and engagement , health management  and environment activity tolerance, endurance, standing balance/tolerance, sitting balance/tolerance, memory, insight, safety , judgement , attention , sequencing , task initiation  and task termination    These impairments, as well as pt's fatigue, pain, spinal precautions, decreased caregiver support, risk for falls and home environment  limit pt's ability to safely engage in all baseline areas of occupation, includingeating, grooming, bathing, dressing, toileting, functional mobility/transfers, community mobility, laundry , house maintenance, meal prep, cleaning, social participation  and leisure activities  From OT standpoint, recommend inpt rehab with possible need for LTC/more supervised living arrangements LT  OT will continue to follow to address the below stated goals        OT Discharge Recommendation: Post-Acute Rehabilitation Services

## 2021-03-18 NOTE — SPEECH THERAPY NOTE
Bedside Swallow Evaluation:    Summary:  Pt presents w/ adequate oropharyngeal stages of swallowing for PO items assessed  Aggressive oral care is provided and dried bloody secretions are removed from lingual surface, roof of mouth, lips, and teeth  ST fed pt and pt retrieval, manipulation, transfer and initiation are adequate and timely for puree via tspn and thin liquids via straw  RN in room administering pt meds crushed in puree (baseline) w/o difficulty or concerns  No s/s of aspiration are observed for any PO items  Pt currently in C-collar which requires constant manipulation when feeding as it shift and prevents full opening and access to oral cavity  Recommendations:  Diet: Puree/1  Liquid: Thin  Meds: Crush in puree  Supervision: Full  Positioning:Upright  Strategies: Pt to take PO/Meds only when fully alert and upright  Oral care: Frequent (3-5x/day)  Aspiration precautions    Therapy Prognosis: Good  Prognosis considerations: Success during eval  Frequency: 3-5x/week    Goal(s):  Pt will tolerate least restrictive diet w/out s/s aspiration or oral/pharyngeal difficulties  Patient's goal: None stated    From H&P:  3/18/2021  HPI:  Jose D Bradley is a 80 y o  female who presents as a transfer from [facility]  History is provided by daughter at bedside  She states that the patient was recently admitted to the [facility] where she was found to have a nasal bone fracture which was repaired by Plastic surgery as well as a right ankle fracture which was surgically repaired by Orthopedics  She was then discharged earlier today to a rehab facility  While there she had another fall forward with new facial trauma and was transported back to [facility] for re-evaluation  CT of head and C-spine showed worsening of the nasal bone fracture as well as cervical spine fractures    Secondary to cervical spine fracture she was transferred to our campus for evaluation by Neurosurgery  Mechanism:Fall    Consider consult w/:  None    Reason for consult:  R/o aspiration  Determine safest and least restrictive diet  Failed nursing dysphagia assessment  h/o dysphagia     Precautions:  None    Current diet:  NPO  Premorbid diet[de-identified]  Assumed regular with thin  Will continue to inquire  Pt was on level 2 w/ thin liquids on 1/5/21 hospital stay and at [facility] 3/16/21  Previous VBS:  1/4/21- Pt presents w/ mod oral, mild(mod) pharyngeal dysphagia w/ reduced mastication & manipulation of hard solids, reduced lingual drive & hyolaryngeal movement   Noted retropulsion from the upper esophagus into the pyriform sinuses   Material was noted in the airway following one of those episodes but not other instances   This occurred early in the study, then began to slow as trials progressed  The esophagus was poorly clearing throughout w/ reduced suspected motility  O2 requirement:  RA  Voice/Speech:  wfl  Social:  Originally from home  Pt was d/[facility] from [facility] to a rehab facility  Follows commands:  intermittently                        Cognitive Status:  Alert and awake  Oral [facility] exam:  Dentition: partial natural  Labial strength and ROM: wfl  Lingual strength and ROM: wfl  Mandibular strength and ROM: wfl  Velum: not visualized  Oral care provided    Items administered:  Puree, thin liquids, meds crushed in applesauce  Liquids were taken by straw       Oral stage:  Lip closure: wfl  Mastication: n/a  Bolus formation: wfl  Bolus control: wfl  Transfer: wfl  Oral residue: no  Pocketing: no    Pharyngeal stage:  Swallow promptness: wfl  Laryngeal rise: wfl  Wet voice: no  Throat clear: no  Cough: no  Secondary swallows: no  Audible swallows: no  No overt s/s aspiration    Esophageal stage:  No s/s reported    Aspiration precautions posted    Results d/w:  Pt, nursing, physician

## 2021-03-18 NOTE — ORTHOTIC NOTE
Orthotic Note            Date: 3/18/2021      Patient Name: Hank Retana            Reason for Consult:  Patient Active Problem List   Diagnosis    Atrial fibrillation [facility])    Essential hypertension    Left lower lobe pneumonia    Acute on chronic combined systolic and diastolic congestive heart failure ([facility] )    Cardiomyopathy ([facility] )    Mitral regurgitation    Acute renal failure superimposed on stage 2 chronic kidney disease ([facility] )    Ambulatory dysfunction    Toxic metabolic encephalopathy    Hypothermia    Thrombocytopenia (HCC)    Anemia    Hyponatremia    Hyperkalemia    Erythema of lower extremity    Elevated TSH    Goals of care, counseling/discussion    Dysphagia    Closed right ankle fracture    Slurred speech    Chronic systolic heart failure (HCC)    Stage 3b chronic kidney disease    Sick sinus syndrome (HCC)    Acute blood loss anemia    Closed cervical spine fracture (HCC)    Nasal bone fracture   [place] Collar     [employer] delivered and fit pt with a [place] Collar while sitting in chair at bedside  Removed Aspen Collar and donned [employer]  Pt tolerated fitting well  Educated pt on proper donning, doffing, and cleaning instructions  RN aware and will continue to follow up daily  Recommendations:  [phone] to any bracing instructions and/or adjustments       [facility] Restorative Technician, BS

## 2021-03-18 NOTE — PHYSICAL THERAPY NOTE
PHYSICAL THERAPY EVALUATION  NAME:  Umesh Cobb  DATE: 03/18/21    AGE:   80 y o  Mrn:   [id number]  ADMIT DX:  Closed fracture of multiple cervical vertebrae, initial encounter ([facility] ) [S12  9XXA]    Past Medical History:   Diagnosis Date    Arthritis     Cancer [facility])     breast    Cardiac disease     afib    CHF (congestive heart failure) (HCC)     Hyperlipidemia     Hypertension     Stroke ([facility] )     TIA    TIA (transient ischemic attack)        Past Surgical History:   Procedure Laterality Date    BREAST SURGERY      right lumpectomy    CARDIAC PACEMAKER PLACEMENT      CORONARY STENT PLACEMENT      ORIF TIBIA & FIBULA FRACTURES Right 3/11/2021    Procedure: 1)OPEN REDUCTION W/ INTERNAL FIXATION (ORIF) RIGHT ANKLE bimalleolar fracture 2) ORIF right tibiofibular syndesmosis; Surgeon: Karen Durham DPM;  Location: [facility];  Service: Podiatry    TOTAL HIP ARTHROPLASTY Bilateral        Length Of Stay: 1    PHYSICAL THERAPY EVALUATION:        03/18/21 1037   Note Type   Note type Evaluation   Pain Assessment   Pain Assessment Tool FLACC   Pain Rating: FLACC (Rest) - Face 0   Pain Rating: FLACC (Rest) - Legs 0   Pain Rating: FLACC (Rest) - Activity 0   Pain Rating: FLACC (Rest) - Cry 0   Pain Rating: FLACC (Rest) - Consolability 0   Score: FLACC (Rest) 0   Pain Rating: FLACC (Activity) - Face 1   Pain Rating: FLACC (Activity) - Legs 0   Pain Rating: FLACC (Activity) - Activity 0   Pain Rating: FLACC (Activity) - Cry 1   Pain Rating: FLACC (Activity) - Consolability 1   Score: FLACC (Activity) 3   Home Living   Type of Home House   Home Layout Two level; Able to live on main level with bedroom/bathroom; Ramped entrance   Home Equipment Walker;Cane   Additional Comments At baseline patient resides 2 story 1st floor setup however since discharge from [facility] 3/16/21 pt has been at [facility] for rehab   Prior Function   Level of [place] Needs assistance with ADLs and functional mobility   Lives With Alone   Receives Help From Family;Personal care attendant   Falls in the last 6 months 1 to 4  (at least 2 per chart)   Comments At baseline patient was independent with mobility  since recent fall and R ankle fx pt has been performing sit to stand transfers with mod  Ax 2    Restrictions/Precautions   Weight Bearing Precautions Per Order Yes   RLE Weight Bearing Per Order NWB   LLE Weight Bearing Per Order WBAT   Braces or Orthoses C/S Collar   Other Precautions Cognitive; Chair Alarm; Bed Alarm;Multiple lines; Fall Risk;Pain;WBS   General   Additional Pertinent History Patient was recently admitted at [facility] from 3/11-3/16/21 for a fall which resulted in facial fx as well as R ankle fx  Pt underwent ORIF of R ankle on 3/11/21  Pt was d/c from [facility] to [facility] on 3/16/21  Pt returned to [facility] on 3/17/21 s/p another fall while at [address] [place] [address] and was transferred to [facility] for further medical management and evaluation by neurosx for new C6,C7 fx    Family/Caregiver Present No   Cognition   Overall Cognitive Status Impaired   Attention Difficulty attending to directions   Orientation Level Oriented to person;Oriented to place;Oriented to time  (general place/time )   Memory Decreased recall of precautions   Following Commands Follows one step commands inconsistently   RUE Assessment   RUE Assessment WFL   LUE Assessment   LUE Assessment WFL   RLE Assessment   RLE Assessment X  (R ankle in splint )   Strength RLE   RLE Overall Strength 3+/5  (at hip and knee )   LLE Assessment   LLE Assessment WFL   Strength LLE   LLE Overall Strength 3+/5   Bed Mobility   Supine to Sit 3  Moderate assistance   Additional items Assist x 2; Increased time required;Verbal cues   Transfers   Sit pivot 3  Moderate assistance   Additional items Assist x 2; Increased time required;Verbal cues   Additional Comments Verbal cues and tactile cues needed for proper weight shifts during transfers    Sit pivot transfers performed to drop-arm recliner chair   Ambulation/Elevation   Gait pattern Not appropriate   Balance   Static Sitting Fair   Dynamic Sitting Fair -   Endurance Deficit   Endurance Deficit Yes   Endurance Deficit Description Fatigue, pain   Activity Tolerance   Activity Tolerance Patient limited by fatigue;Patient limited by pain   Medical Staff Made [address], OT    Nurse Made Aware Patient appropriate to be seen and mobilized per nursing   Assessment   Prognosis Fair   Problem List Decreased strength;Decreased range of motion;Decreased endurance; Impaired balance;Decreased mobility; Decreased cognition; Impaired judgement;Decreased safety awareness;Pain;Orthopedic restrictions   Assessment Pt is 80 y o  female seen for PT evaluation s/p admit to [facility] on 3/17/2021  Two pt identifiers were used to confirm  Pt presented s/p fall at [facility]  Patient was recently discharged from [facility] on 3/16/21 to [address] [place] [address]  Pt was admitted to [facility] from 3/11-3/16/21 for fall resulting in facial fx as well as R ankle fx  Pt underwent R ankle ORIF on 3/11/21 and is NWB to E  Pt was at rehab for approx 1 day and sustained another fall resulting in new C6,C7 fx and worsening nasal bone fx and represented to [facility] and was ultimately transferred to [facility] for further medical management and evaluation  Pt was admitted with a primary dx of:  Nasal bone fracture, C6, C7 fracture, history of right ankle fracture ( 3/11/21)  Neurosurgery recommending c/s collar at this time  PT now consulted for assessment of mobility and d/c needs  Pt with Up in chair orders  Pts current co morbidities affecting treatment include:  Arthritis, cancer, cardiac disease, CHF, HLD, HTN, TIA   Pts current clinical presentation is Unstable/ Unpredictable (high complexity) due to Ongoing medical management for primary dx, Decreased activity tolerance compared to baseline, Fall risk, Increased assistance needed from caregiver at current time, Cog status, Spinal precautions at current time, Continuous pulse oximetry monitoring    Upon evaluation, pt currently is requiring Mod Ax2 for bed mobility; Mod Ax2 for sit pivot transfers  Pt presents at PT eval functioning below baseline and currently w/ overall mobility deficits 2* to: BLE weakness, decreased ROM, impaired balance, decreased endurance, pain, decreased activity tolerance compared to baseline, decreased safety awareness, impaired judgement, fall risk, decreased cognition, spinal precautions  Pt currently at a fall risk 2* to impairments listed above  Based on the aforementioned PT evaluation, pt will continue to benefit from skilled Acute PT interventions to address stated impairments; to maximize functional mobility; for ongoing pt/ family training; and DME needs  At conclusion of PT session pt returned back in chair and chair alarm engaged with phone and call bell within reach  Pt denies any further questions at this time  PT is currently recommending Rehab  PT will continue to follow during hospital stay  Goals   Patient Goals none stated 2* cog status    STG Expiration Date 03/28/21   Short Term Goal #1 In 10 days pt will complete: 1) Bed mobility skills with min Ax1 to increase safety and independence as well as decrease caregiver burden  2) Functional sit pivot transfers with min Ax 1 while maintaining nonweightbearing to right lower extremity to promote increased independence, safety, and QOL  3) Improve balance grades by 1/2 grade to increase safety with all mobility and decrease fall risk  4) Improve BLE strength by 1/2 grade to help increase overall functional mobility and decrease fall risk  5) patient will be able to tolerate sitting edge of bed x 25 min while participating with PT in order to increase patient's overall activity tolerance   6) further out of bed transfers and ambulation to be assessed when appropriate, PT to see at that time   Plan   Treatment/Interventions Functional transfer training;HAO strengthening/ROM; Therapeutic exercise; Endurance training;Patient/family training;Equipment eval/education; Bed mobility;Continued evaluation;Spoke to nursing;OT   Recommendation   PT Discharge Recommendation Post-Acute Rehabilitation Services   Equipment Recommended Wheelchair   Wheelchair Package Recommended Standard   Change or Add item to Wheelchair Package? Yes, Add Item   What would you like to ADD? Other (Comment)  (elevating leg rests )   PT - OK to Discharge Yes  (To rehab when medically cleared)   AM-PAC Basic Mobility Inpatient   Turning in Bed Without Bedrails 2   Lying on Back to Sitting on Edge of Flat Bed 1   Moving Bed to Chair 1   Standing Up From Chair 1   Walk in Room 1   Climb 3-5 Stairs 1   Basic Mobility Inpatient Raw Score 7   Turning Head Towards Sound 4   Follow Simple Instructions 3   Low Function Basic Mobility Raw Score 14   Low Function Basic Mobility Standardized Score 22 01   Modified Power Scale   Modified Power Scale 4   Barthel Index   Feeding 5   Bathing 0   Grooming Score 0   Dressing Score 0   Bladder Score 0   Bowels Score 10   Toilet Use Score 5   Transfers (Bed/Chair) Score 5   Mobility (Level Surface) Score 0   Stairs Score 0   Barthel Index Score 25   Portions of the documentation may have been created using voice recognition software  Occasional wrong word or sound alike substitutions may have occurred due to the inherent limitations of the voice recognition software  Read the chart carefully and recognize, using context, where substitutions have occurred      Natasha Busby, PT, DPT

## 2021-03-18 NOTE — ASSESSMENT & PLAN NOTE
Pleasant 80year old female that presents statu post fall twice within one week  She presents with headaches without neck pain  · Images reviewed personally and by attending,  Final results as below  · CT head: No acute intracranial pathology  Frontal scalp hematoma  · CT cervical spine: Acute avulsion at the anterior-inferior corner of C6 vertebral body with mild widening of the anterior C6-7 disc space compared with the prior study  Nondisplaced fracture right C7 and possibly right C6 facet  · [place] J brace ordered to be worn when OOB  · [place] collar ordered for showering  · Ordered upright cervical spine XR AP/lateral  · On exam, the patient does not show signs of myelopathy  However, if her clinical exam worsens, per primary team may consider ordering a MRI cervical w/o contrast pending the compatibility of her pacemaker  · [id number]  [id number]  · Mobilize with PT/OT with brace  · Recommend OT Coag for concern of concussion  · DVT PPX: SCDs and pharmacological ppx per primary team      Neurosurgery will continue to follow once imaging is completed  Call with questions/concerns in the interim

## 2021-03-18 NOTE — PLAN OF CARE
Problem: PHYSICAL THERAPY ADULT  Goal: Performs mobility at highest level of function for planned discharge setting  See evaluation for individualized goals  Description: Treatment/Interventions: Functional transfer training, LE strengthening/ROM, Therapeutic exercise, Endurance training, Patient/family training, Equipment eval/education, Bed mobility, Continued evaluation, Spoke to nursing, OT  Equipment Recommended: Wheelchair       See flowsheet documentation for full assessment, interventions and recommendations  Note: Prognosis: Fair  Problem List: Decreased strength, Decreased range of motion, Decreased endurance, Impaired balance, Decreased mobility, Decreased cognition, Impaired judgement, Decreased safety awareness, Pain, Orthopedic restrictions  Assessment: Pt is 80 y o  female seen for PT evaluation s/p admit to [facility] on 3/17/2021  Two pt identifiers were used to confirm  Pt presented s/p fall at [facility]  Patient was recently discharged from [facility] on 3/16/21 to [address] [place] [address]  Pt was admitted to [facility] from 3/11-3/16/21 for fall resulting in facial fx as well as R ankle fx  Pt underwent R ankle ORIF on 3/11/21 and is NWB to E  Pt was at rehab for approx 1 day and sustained another fall resulting in new C6,C7 fx and worsening nasal bone fx and represented to [facility] and was ultimately transferred to [facility] for further medical management and evaluation  Pt was admitted with a primary dx of:  Nasal bone fracture, C6, C7 fracture, history of right ankle fracture ( 3/11/21)  Neurosurgery recommending c/s collar at this time  PT now consulted for assessment of mobility and d/c needs  Pt with Up in chair orders  Pts current co morbidities affecting treatment include:  Arthritis, cancer, cardiac disease, CHF, HLD, HTN, TIA   Pts current clinical presentation is Unstable/ Unpredictable (high complexity) due to Ongoing medical management for primary dx, Decreased activity tolerance compared to baseline, Fall risk, Increased assistance needed from caregiver at current time, Cog status, Spinal precautions at current time, Continuous pulse oximetry monitoring    Upon evaluation, pt currently is requiring Mod Ax2 for bed mobility; Mod Ax2 for sit pivot transfers  Pt presents at PT eval functioning below baseline and currently w/ overall mobility deficits 2* to: BLE weakness, decreased ROM, impaired balance, decreased endurance, pain, decreased activity tolerance compared to baseline, decreased safety awareness, impaired judgement, fall risk, decreased cognition, spinal precautions  Pt currently at a fall risk 2* to impairments listed above  Based on the aforementioned PT evaluation, pt will continue to benefit from skilled Acute PT interventions to address stated impairments; to maximize functional mobility; for ongoing pt/ family training; and DME needs  At conclusion of PT session pt returned back in chair and chair alarm engaged with phone and call bell within reach  Pt denies any further questions at this time  PT is currently recommending Rehab  PT will continue to follow during hospital stay  PT Discharge Recommendation: Post-Acute Rehabilitation Services     PT - OK to Discharge: Yes(To rehab when medically cleared)    See flowsheet documentation for full assessment

## 2021-03-18 NOTE — CASE MANAGEMENT
LOS1  Pt is a 30 day readmission s/p admission [facility] 3/11-3/16/21  Not a bundle    Pt presently w/ confusion and dx: toxic met encephalopathy  TC to patient's daughter and SALVATORE Persaud-[phone] to discuss CM role, Pt's baseline status and DCP  P/t admission 3/11/21 pt had been living alone in [facility] home in [facility] with first floor set up and ramp to enter  She has a lot of family support and [facility] and [facility]  Patient is fully oriented at baseline    Last admission pt was admitted s/p fall w/ Malleolar fx and surgical intervention  Pt is NWB on right x 6 weeks and was d/c to [facility] for STR  Pt is now s/p unwitnessed fall and readmitted w/ nasal fx and Nondisplaced fracture righ C7 and possible C6-now wearing cervical collar-[place] J collar when OOB and mars collar for showering  Daughter is an ER nurse here at [facility] and informed this CM that they will be having a family meeting tomorrow night to discuss long term plan for patient  Pt has been to [address] [place] [address] multiple times for STR but dtr relates that pt would not be able to return there for LT plan as she can not afford same  Await PT/OT eval      DME: james  PCP: Christiano Puri DO  Pharmacy is [facility] on [facility] in [place]

## 2021-03-18 NOTE — CONSULTS
Consultation - Geriatric Medicine   Eliecersleene Evans 80 y o  female MRN: [id number]  [facility] Encounter: [id number]      Assessment/Plan     Acute metabolic encephalopathy  -evidenced by fluctuating mentation, confusion and restlessness, impulsivity and frequently attempting to get out of bed throughout the night requiring frequent redirection and reorientation in patient who is fully oriented at baseline   -multifactorial including age, fall with traumatic injury including facial bone fractures, acute pain, sleep deprivation and multiple environment changes between hospital and rehab facility in frail elderly individual with limited physiologic reserve  -[facility] 3/17/21 revealed no acute intracranial pathology  -afebrile with no leukocytosis to suggest infectious etiology  -continue acute pain control  -maintain circadian rhythm  -monitor fecal and urinary retention  -provide frequent reorientation/redirection  -encourage family at bedside for familiarity and reassurance    Ambulatory dysfunction with unwitnessed fall  -hx recurrent falls recently admitted for ankle fx now NWB RLE during recovery   -remains very high risk falls due to ambulatory dysfxn at baseline as well as NWB RLE due to recent fx, acute metabolic encephalopathy, impulsivity and poor safety awareness  -continue to encourage good body mechanics and assist with all transfers  -acute personal items and call bell close to prevent reaching  -benefits from near nursing station for more direct visual observation  -consider checking orthostatics  -PT/OT eval    Acute C6 vertebral body avulsion  -s/p unwitnessed fall at long term care facility  -confirmed on CT C-spine on admission   -C-collar in place, continue c-spine precautions   -continue acute pain control per geriatric pain protocol   -Nsx consult pending    Acute on subacute comminuted nasal bone fractures   -confirmed on CT facial bones 3/17/21  -NPO and sinus precautions   -acute pain control  -plastic surgery consult pending     Right bimalleolar ankle fracture  -s/p ORIF 03/11/2021  -DERIK GALLEGOS  -acute pain control  -PT/OT    Acute pain due to trauma  -recommend pain control per Geriatric pain protocol:  Tylenol 975mg Q8H scheduled  Roxicodone 2 5mg Q4H PRN moderate pain  Roxicodone 5mg Q4H PRN severe pain  Dilaudid 0 2mg Q4H PRN  -consider adjuncts such as Gabapentin 100mg HS and lidocaine patch topically  -encourage addition of non-pharmacologic pain treatment including ice and frequent repositioning  -recommend  bowel regimen to prevent and treat constipation due to increased risk with acute pain and opiate pain medications    Cognitive screening  -currently acutely encephalopathic and confused which also occurred during recent hospital admission however at baseline is oriented and independent with ADLs and iADLs  -given recurrent episodes encephalopathy may benefit from formal comprehensive geriatric assessment as o/p to determine baseline and identify and mobilize additional resources as appropriate as well as to assist family with level of care decision making    Impaired Hearing  -Encourage use of hearing aids at all appropriate times  -encourage providers and caregivers to speak slowly and clearly directly to patient  -minimize background noise to encourage patient engagement  -consider use of hearing amplifier to reduce risk of straining to hear if hearing aids are not present or are not sufficient     Impaired Vision  -recommend use of corrective lenses at all appropriate times  -encourage adequate lighting and encourage use of assistance with ambulation  -keep personal belongings close to person to avoid reaching  -encourage appropriate footwear at all times    Deconditioning/debility/frailty  -clinical frailty scale stage 5, mildly frail  -multifactorial including age and multiple chronic medical co-morbidities   -albumin low at 3 2, encourage well balanced nutrition and good control chronic medical conditions    Delirium precautions  -Patient is high risk of delirium due to age, fall, current episode acute encephalopathy and hospitalization   -Initiate delirium precautions  -maintain normal sleep/wake cycle  -minimize overnight interruptions, group overnight vitals/labs/nursing checks as possible  -dim lights, close blinds and turn off tv to minimize stimulation and encourage sleep environment in evenings  -ensure that pain is well controlled   -monitor for fecal and urinary retention which may precipitate delirium  -encourage early mobilization and ambulation as appropriate  -provide frequent reorientation and redirection  -encourage family and friends at the bedside to help help calm patient if anxious  -limit use of medications which may precipitate or worsen delirium such as tramadol, benzodiazepine, anticholinergics, and benadryl  -encourage hydration and nutrition   -redirect unwanted behaviors as first line    Home medication review  Personally confirmed with medication list obtained from [address] [place] [address] STR:    Tylenol 650 mg Q6H PRN  Albuterol HFA 90mcg/act 2 puffs Q4H  Aspirin 81 mg daily  Atorvastatin 40 mg daily  Calcium carbonate 500 mg daily  Centrum Silver daily  Citalopram 10 mg daily  Ferrous sulfate 325 mg daily  Furosemide 40 mg daily  Melatonin 3 mg HS  Omeprazole 20 mg daily  Metamucil capsule daily  Milk of Mag every 3 days as needed for constipation  Enema every 4 days as needed  Carvedilol 6 25 mg BID    Care coordination: rounded with Carlos Enrique Awad (RN)    History of Present Illness   Physician Requesting Consult: Kimberli Alarcon MD  Reason for Consult /Principal Problem: Fall  Hx and PE limited by: N/A  Additional history obtained from: Chart review and patient interview, spoke with patients primary nurse at [address] [facility]    HPI: Jeanna Ford is a 80y o  year old female generalized anxiety disorder, CAD, ambulatory dysfunction with recurrent falls, macular degeneration, systolic Congestive Heart Failure, osteoporosis, and recent hospitalization for ambulatory dysfunction fall found to have right bimalleolar ankle fracture and facial fractures now S/P ORIF right ankle  She was discharged to short-term rehab on 03/16/2021, she presented yesterday to the [facility] emergency department following unwitnessed fall at the short term rehab facility 3/16/21 and was found down but as she had no injuries she was reportedly placed back in bed, she was again found down on 3/17/21 in which she was face down and noted to have blood around her on the floor, she was sent to the [facility] ED for further evaluation, from there she was [facility] for evaluation by the Trauma team   She is being seen in consultation by Geriatrics for acute metabolic encephalopathy  She was seen and examined at bedside, on initial entry to room she was restless and attempting to get out of bed she is confused and stated that she had just dropped her grabber/reacher tool and was trying to get up to get it  She tells me that she is currently at [address] [place] [address] where she is recovering from a fall  Nursing repots that overnight her mentation was waxing and waning and she was impulsive attempting to get out of bed multiple times throughout the night, she is impulsive but easily redirected  Prior to her admission 3/11/21 she was awake and alert, fully oriented and residing in the community independently with close family support  She usually ambulates with walker, she uses glasses and hearing aids, no dentures  Inpatient consult to Gerontology  Consult performed by: Triston Bear DO  Consult ordered by: Zaina Munroe MD          Review of Systems   Constitutional: Negative  HENT: Negative for hearing loss and trouble swallowing  Eyes: Negative for visual disturbance (Denies blurry vision, double vision, or change in vision from baseline)  Respiratory: Negative for shortness of breath           Denies shortness of breath despite being tachypneic on initial entry to room   Cardiovascular: Negative for leg swelling  Gastrointestinal: Negative for abdominal pain  Endocrine: Negative  Genitourinary: Negative  Musculoskeletal: Positive for gait problem ( recurrent falls)  Pounding headache and facial pain   Skin: Negative  Allergic/Immunologic: Negative  Neurological: Positive for numbness ( left hand) and headaches ( severe)  Negative for dizziness and speech difficulty  Hematological: Negative  Psychiatric/Behavioral: Negative  All other systems reviewed and are negative  Historical Information   Past Medical History:   Diagnosis Date    Arthritis     Cancer [facility])     breast    Cardiac disease     afib    [facility]congest[facility] failure) [facility] )     Hyperlipidemia     Hypertension     Stroke ([facility] )     TIA    TIA (transient ischemic attack)      Past Surgical History:   Procedure Laterality Date    BREAST SURGERY      right lumpectomy    CARDIAC PACEMAKER PLACEMENT      CORONARY STENT PLACEMENT      ORIF TIBIA & FIBULA FRACTURES Right 3/11/2021    Procedure: 1)OPEN REDUCTION W/ INTERNAL FIXATION (ORIF) RIGHT ANKLE bimalleolar fracture 2) ORIF right tibiofibular syndesmosis;   Surgeon: Shayla Kenny DPM;  [facility]: [facility];  Service: Podiatry    TOTAL HIP ARTHROPLASTY Bilateral      Social History   Social History     Substance and Sexual Activity   Alcohol Use Not Currently     Social History     Substance and Sexual Activity   Drug Use No     Social History     Tobacco Use   Smoking Status Never Smoker   Smokeless Tobacco Never Used     Family History:   Family History   Problem Relation Age of Onset    Cancer Sister     Stroke Family     Arthritis Family     Heart disease Family     Kidney disease Family      Meds/Allergies   all current active meds have been reviewed    Allergies   Allergen Reactions    Lovenox [Enoxaparin Sodium] Swelling     Objective     Intake/Output Summary (Last 24 hours) at 3/18/2021 0147  Last data filed at 3/18/2021 4667  Gross per 24 hour   Intake --   Output 1400 ml   Net -1400 ml     Invasive Devices     Peripheral Intravenous Line            Peripheral IV 03/17/21 Right Antecubital less than 1 day              Physical Exam  Vitals signs and nursing note reviewed  Constitutional:       Comments: Elderly female appears younger than stated age   HENT:      Head: Normocephalic  Comments: Extensive facial ecchymosis most notable periorbital areas with swelling across bridge of nose     Nose:      Comments: Mild swelling bridge of nose     Mouth/Throat:      Mouth: Mucous membranes are dry  Comments: Dentition intact, mucous membranes are extremely dry  Eyes:      General: No scleral icterus  Right eye: No discharge  Left eye: No discharge  Comments: Pupils are equal and round   Neck:      Comments: Cervical collar in place,twisted about 1/4 of way to right and riding up over chin and mouth   Cardiovascular:      Rate and Rhythm: Normal rate  Pulses: Normal pulses  Pulmonary:      Breath sounds: No wheezing  Comments: Tachypnea, coarse  Abdominal:      General: There is no distension  Palpations: Abdomen is soft  Tenderness: There is no abdominal tenderness  Musculoskeletal:      Comments: Right lower extremity in splint   Skin:     General: Skin is warm and dry  Comments: Diffuse facial ecchymosis  Upper lip bruising and swelling     Neurological:      Mental Status: She is alert  Comments: Awake and ,alert, confused about place, [facility] and on initial entry to room was found attempting to get out of bed which she reports was to look for her grabber tool that she just dropped it on floor and wants to lean over to get it, states she is here (at [facility]) due to fall and ankle fracture    Psychiatric:         Attention and Perception: She is inattentive  Behavior: Behavior is cooperative  Judgment: Judgment is impulsive  Lab Results:   I have personally reviewed pertinent lab results including the following:  -sodium 137, potassium 4 5 chloride 103, CO2 28, glucose 88, calcium 9 8, GFR 41  -hemoglobin 8 6, hematocrit 27 7, WBC 5 25 platelets 508    -TSH 3 145  -B12 1826    I have personally reviewed the following imaging study reports in PACS:    Right ankle XR 03/17/2021:  Limited exam through external cast, no obvious change from prior study  CT head without 03/17/2021:  No acute intracranial pathology, frontal scalp hematoma  CT facial bones without contrast 03/17/2021:  Acute on chronic subacute comminuted nasal bone fractures with increased placement  CT C-spine without contrast 03/17/2021:  Acute avulsion fracture at anterior inferior corner of C6 vertebral body with mild widening of anterior C6-7 compared with prior study, nondisplaced fracture right C7 and possible right C6 facet    Therapies:   PT: Pending   OT: Pending    VTE Prophylaxis: Reason for no pharmacologic prophylaxis trauma    Code Status: Level 3 - DNAR and DNI  Advance Directive and Living Will: Yes    Power of : Yes  POLST:      Family and Social Support:   Living Arrangements: Other (Comment) (Currently was at [facility])  Support Systems: Children;Family members;Friends/neighbors  Assistance Needed: Needs assistane with Adl's  Type of Current Residence: Assisted living  [address] Name: [facility]    Goals of Care: Find grabber tool

## 2021-03-18 NOTE — PHYSICAL THERAPY NOTE
PHYSICAL THERAPY EVALUATION  NAME:  Jose D Bradley  DATE: 03/18/21    AGE:   80 y o  Mrn:   [id number]  ADMIT DX:  Closed fracture of multiple cervical vertebrae[facility] [S12  9XXA]    Past Medical History:   Diagnosis Date    Arthritis     Cancer [facility])     breast    Cardiac disease     afib    CHF (congestive heart failure) (HCC)     Hyperlipidemia     Hypertension     Stroke ([facility] )     TIA    TIA (transient ischemic attack)        Past Surgical History:   Procedure Laterality Date    BREAST SURGERY      right lumpectomy    CARDIAC PACEMAKER PLACEMENT      CORONARY STENT PLACEMENT      ORIF TIBIA & FIBULA FRACTURES Right 3/11/2021    Procedure: 1)OPEN REDUCTION W/ INTERNAL FIXATION (ORIF) RIGHT ANKLE bimalleolar fracture 2) ORIF right tibiofibular syndesmosis; Surgeon: Eleuterio Smith DPM;  Location: [facility];  Service: Podiatry    TOTAL HIP ARTHROPLASTY Bilateral        Length Of Stay: 1    PHYSICAL THERAPY EVALUATION:        03/18/21 1037   Note Type   Note type Evaluation   Pain Assessment   Pain Assessment Tool FLACC   Pain Rating: FLACC (Rest) - Face 0   Pain Rating: FLACC (Rest) - Legs 0   Pain Rating: FLACC (Rest) - Activity 0   Pain Rating: FLACC (Rest) - Cry 0   Pain Rating: FLACC (Rest) - Consolability 0   Score: FLACC (Rest) 0   Pain Rating: FLACC (Activity) - Face 1   Pain Rating: FLACC (Activity) - Legs 0   Pain Rating: FLACC (Activity) - Activity 0   Pain Rating: FLACC (Activity) - Cry 1   Pain Rating: FLACC (Activity) - Consolability 1   Score: FLACC (Activity) 3   Home Living   Type of Home House   Home Layout Two level; Able to live on main level with bedroom/bathroom; Ramped entrance   Home Equipment Walker;Cane   Additional Comments At baseline patient resides 2 story 1st floor setup however since discharge from [facility] 3/16/21 pt has been at [facility] for rehab   Prior Function   Level of [place] Needs assistance with ADLs and functional mobility   Lives With Alone   Receives Help From Family;Personal care attendant   Falls in the last 6 months 1 to 4  (at least 2 per chart)   Comments At baseline patient was independent with mobility  since recent fall and R ankle fx pt has been performing sit to stand transfers with mod  Ax 2    Restrictions/Precautions   Weight Bearing Precautions Per Order Yes   RLE Weight Bearing Per Order NWB   LLE Weight Bearing Per Order WBAT   Braces or Orthoses C/S Collar   Other Precautions Cognitive; Chair Alarm; Bed Alarm;Multiple lines; Fall Risk;Pain;WBS   General   Additional Pertinent History Patient was recently admitted at [facility] from 3/11-3/16/21 for a fall which resulted in facial fx as well as R ankle fx  Pt underwent ORIF of R ankle on 3/11/21  Pt was d/c from [facility] to [facility] on 3/16/21  Pt returned to [facility] on 3/17/21 s/p another fall while at [address] [place] [address] and was transferred [facility] for further medical management and evaluation by neurosx for new C6,C7 fx    Family/Caregiver Present No   Cognition   Overall Cognitive Status Impaired   Attention Difficulty attending to directions   Orientation Level Oriented to person;Oriented to place;Oriented to time  (general place/time )   Memory Decreased recall of precautions   Following Commands Follows one step commands inconsistently   RUE Assessment   RUE Assessment WFL   LUE Assessment   LUE Assessment WFL   RLE Assessment   RLE Assessment X  (R ankle in splint )   Strength RLE   RLE Overall Strength 3+/5  (at hip and knee )   LLE Assessment   LLE Assessment WFL   Strength LLE   LLE Overall Strength 3+/5   Bed Mobility   Supine to Sit 3  Moderate assistance   Additional items Assist x 2; Increased time required;Verbal cues   Transfers   Sit pivot 3  Moderate assistance   Additional items Assist x 2; Increased time required;Verbal cues   Additional Comments Verbal cues and tactile cues needed for proper weight shifts during transfers    Sit pivot transfers performed to drop-arm recliner chair   Ambulation/Elevation   Gait pattern Not appropriate   Balance   Static Sitting Fair   Dynamic Sitting Fair -   Endurance Deficit   Endurance Deficit Yes   Endurance Deficit Description Fatigue, pain   Activity Tolerance   Activity Tolerance Patient limited by fatigue;Patient limited by pain   Medical Staff Made [address], OT    Nurse Made Aware Patient appropriate to be seen and mobilized per nursing   Assessment   Prognosis Fair   Problem List Decreased strength;Decreased range of motion;Decreased endurance; Impaired balance;Decreased mobility; Decreased cognition; Impaired judgement;Decreased safety awareness;Pain;Orthopedic restrictions   Assessment Pt is 80 y o  female seen for PT evaluation s/p admit to [facility] on 3/17/2021  Two pt identifiers were used to confirm  Pt presented s/p fall at [facility]  Patient was recently discharged from [facility] on 3/16/21 to [address] [place] [address]  Pt was admitted to [facility] from 3/11-3/16/21 for fall resulting in facial fx as well as R ankle fx  Pt underwent R ankle ORIF on 3/11/21 and is NWB to E  Pt was at rehab for approx 1 day and sustained another fall resulting in new C6,C7 fx and worsening nasal bone fx and represented to [facility] and was ultimately transferred to [facility] for further medical management and evaluation  Pt was admitted with a primary dx of:  Nasal bone fracture, C6, C7 fracture, history of right ankle fracture ( 3/11/21)  Neurosurgery recommending c/s collar at this time  PT now consulted for assessment of mobility and d/c needs  Pt with Up in chair orders  Pts current co morbidities affecting treatment include:  Arthritis, cancer, cardiac disease, CHF, HLD, HTN, TIA   Pts current clinical presentation is Unstable/ Unpredictable (high complexity) due to Ongoing medical management for primary dx, Decreased activity tolerance compared to baseline, Fall risk, Increased assistance needed from caregiver at current time, Cog status, Spinal precautions at current time, Continuous pulse oximetry monitoring    Upon evaluation, pt currently is requiring Mod Ax2 for bed mobility; Mod Ax2 for sit pivot transfers  Pt presents at PT eval functioning below baseline and currently w/ overall mobility deficits 2* to: BLE weakness, decreased ROM, impaired balance, decreased endurance, pain, decreased activity tolerance compared to baseline, decreased safety awareness, impaired judgement, fall risk, decreased cognition, spinal precautions  Pt currently at a fall risk 2* to impairments listed above  Based on the aforementioned PT evaluation, pt will continue to benefit from skilled Acute PT interventions to address stated impairments; to maximize functional mobility; for ongoing pt/ family training; and DME needs  At conclusion of PT session pt returned back in chair and chair alarm engaged with phone and call bell within reach  Pt denies any further questions at this time  PT is currently recommending Rehab  PT will continue to follow during hospital stay  Goals   Patient Goals none stated 2* cog status    STG Expiration Date 03/28/21   Short Term Goal #1 In 10 days pt will complete: 1) Bed mobility skills with min Ax1 to increase safety and independence as well as decrease caregiver burden  2) Functional sit pivot transfers with min Ax 1 while maintaining nonweightbearing to right lower extremity to promote increased independence, safety, and QOL  3) Improve balance grades by 1/2 grade to increase safety with all mobility and decrease fall risk  4) Improve BLE strength by 1/2 grade to help increase overall functional mobility and decrease fall risk  5) patient will be able to tolerate sitting edge of bed x 25 min while participating with PT in order to increase patient's overall activity tolerance   6) further out of bed transfers and ambulation to be assessed when appropriate, PT to see at that time   Plan   Treatment/Interventions Functional transfer training;HAO strengthening/ROM; Therapeutic exercise; Endurance training;Patient/family training;Equipment eval/education; Bed mobility;Continued evaluation;Spoke to nursing;OT   Recommendation   PT Discharge Recommendation Post-Acute Rehabilitation Services   Equipment Recommended Wheelchair   Wheelchair Package Recommended Standard   Change or Add item to Wheelchair Package? Yes, Add Item   What would you like to ADD? Other (Comment)  (elevating leg rests )   PT - OK to Discharge Yes  (To rehab when medically cleared)   AM-PAC Basic Mobility Inpatient   Turning in Bed Without Bedrails 2   Lying on Back to Sitting on Edge of Flat Bed 1   Moving Bed to Chair 1   Standing Up From Chair 1   Walk in Room 1   Climb 3-5 Stairs 1   Basic Mobility Inpatient Raw Score 7   Turning Head Towards Sound 4   Follow Simple Instructions 3   Low Function Basic Mobility Raw Score 14   Low Function Basic Mobility Standardized Score 22 01   Modified [place] Scale   Modified [place] Scale 4   Barthel Index   Feeding 5   Bathing 0   Grooming Score 0   Dressing Score 0   Bladder Score 0   Bowels Score 10   Toilet Use Score 5   Transfers (Bed/Chair) Score 5   Mobility (Level Surface) Score 0   Stairs Score 0   Barthel Index Score 25   Portions of the documentation may have been created using voice recognition software  Occasional wrong word or sound alike substitutions may have occurred due to the inherent limitations of the voice recognition software  Read the chart carefully and recognize, using context, where substitutions have occurred      Amanda Stokes PT, DPT

## 2021-03-18 NOTE — ASSESSMENT & PLAN NOTE
- 3/17 CT- Cspine: Acute avulsion at the anterior-inferior corner of C6 vertebral body with mild widening of the anterior C6-7 disc space compared with the prior study  Nondisplaced fracture right C7 and possibly right C6 facet    - Neurosurgery consulted: will follow for recommendations, possible need for MRI c-spine

## 2021-03-18 NOTE — CONSULTS
[facility] 5/5/1932, 80 y o  female MRN: [id number]  Unit/Bed#: [facility] 631-01 Encounter: [id number]  Primary Care Provider: Janeth Dennison DO   Date and time admitted to hospital: 3/17/2021  7:15 PM    Inpatient consult to Neurosurgery  Consult performed by: Kanika Thornton PA-C  Consult ordered by: Laurita Homans, MD          * Closed cervical spine fracture [facility])  Assessment & Plan  Pleasant 80year old female that presents statu post fall twice within one week  She presents with headaches without neck pain  · Images reviewed personally and by attending,  Final results as below  · CT head: No acute intracranial pathology  Frontal scalp hematoma  · CT cervical spine: Acute avulsion at the anterior-inferior corner of C6 vertebral body with mild widening of the anterior C6-7 disc space compared with the prior study  Nondisplaced fracture right C7 and possibly right C6 facet  · Lyman J brace ordered to be worn when OOB  · Gametime 88 collar ordered for showering  · Ordered upright cervical spine XR AP/lateral  · On exam, the patient does not show signs of myelopathy  However, if her clinical exam worsens, per primary team may consider ordering a MRI cervical w/o contrast pending the compatibility of her pacemaker  · st yakelin [id number]  [id number]  · Mobilize with PT/OT with brace  · Recommend OT Coag for concern of concussion  · DVT PPX: SCDs and pharmacological ppx per primary team      Neurosurgery will continue to follow once imaging is completed  Call with questions/concerns in the interim  History of Present Illness     Unfortunately, the patient's focus and attention waxes and wanes  She is not a very good historian  Provider notes reviewed  This is a pleasant 80year old female that presents status post fall twice  The patient was recently hospitalized and discharged on 3/16/21 to rehab after a fall   She sustained a nasal and ankle fracture  While at the rehab facility the patient, had an unwitnessed fall andwas found down 3/17/21  There was evidence of blood on the floor and trama lesions on her face  Subsequently, she was sent to the ED at [facility] then transferred to [facility] for further evaluation  At this time, the patient will answer questions correctly ut then becomes impulsive and speak in tangents  She does note that she is here because she fell and has a headache       Review of Systems   Constitutional: Negative  HENT: Negative  Eyes: Negative  Respiratory: Negative  Cardiovascular: Negative  Gastrointestinal: Negative  Negative for vomiting  Endocrine: Negative  Genitourinary: Negative  Musculoskeletal: Negative for arthralgias, back pain, neck pain and neck stiffness  Skin: Positive for wound  Neurological: Positive for headaches  Negative for dizziness, seizures, syncope, weakness and light-headedness  Psychiatric/Behavioral: Positive for confusion  A 10 point system review was performed and otherwise negative, save what is noted above  Historical Information   Past Medical History:   Diagnosis Date    Arthritis     Cancer [facility])     breast    Cardiac disease     afib    CHF (congestive heart failure) ([facility] [place] [address] )     Hyperlipidemia     Hypertension     [facility]     TIA    TIA (transient ischemic attack)      Past Surgical History:   Procedure Laterality Date    BREAST SURGERY      right lumpectomy    CARDIAC PACEMAKER PLACEMENT      CORONARY STENT PLACEMENT      ORIF TIBIA & FIBULA FRACTURES Right 3/11/2021    Procedure: 1)OPEN REDUCTION W/ INTERNAL FIXATION (ORIF) RIGHT ANKLE bimalleolar fracture 2) ORIF right tibiofibular syndesmosis;   Surgeon: Luh Cam DPM;  Location: [facility];  Service: Podiatry    TOTAL HIP ARTHROPLASTY Bilateral      Social History     Substance and Sexual Activity   Alcohol Use Not Currently     Social History     Substance and Sexual Activity   Drug Use No     Social History     Tobacco Use   Smoking Status Never Smoker   Smokeless Tobacco Never Used     Family History   Problem Relation Age of Onset    Cancer Sister     Stroke Family     Arthritis Family     Heart disease Family     Kidney disease Family        Meds/Allergies   all current active meds have been reviewed  Allergies   Allergen Reactions    Lovenox [Enoxaparin Sodium] Swelling       Objective   I/O       03/16 0701 - 03/17 0700 03/17 0701 - 03/18 0700 03/18 0701 - 03/19 0700    Urine  1400     Total Output  1400     Net  -1400                  Physical Exam  Vitals signs reviewed  Constitutional:       General: She is not in acute distress  Appearance: Normal appearance  She is overweight  She is not ill-appearing  HENT:      Head: Normocephalic  Abrasion, right periorbital erythema and left periorbital erythema present  Nose: Nose normal       Mouth/Throat:      Mouth: Mucous membranes are dry  Eyes:      Extraocular Movements: Extraocular movements intact  Conjunctiva/sclera: Conjunctivae normal       Pupils: Pupils are equal, round, and reactive to light  Neck:      Musculoskeletal: Normal range of motion  Cardiovascular:      Pulses: Normal pulses  Pulmonary:      Effort: Pulmonary effort is normal       Breath sounds: Normal breath sounds  Abdominal:      General: Abdomen is flat  Skin:     General: Skin is warm and dry  Findings: Bruising and lesion present  Neurological:      General: No focal deficit present  Mental Status: She is alert and oriented to person, place, and time  GCS: GCS eye subscore is 4  GCS verbal subscore is 4  GCS motor subscore is 6  Cranial Nerves: Cranial nerves are intact  Sensory: Sensation is intact  Motor: Motor function is intact  Deep Tendon Reflexes: Strength normal       Reflex Scores:       Brachioradialis reflexes are 2+ on the right side and 2+ on the left side         Patellar reflexes are 2+ on the right side and 2+ on the left side  Psychiatric:         Attention and Perception: Perception normal  She is inattentive  Mood and Affect: Mood is anxious  Speech: Speech is tangential          Behavior: Behavior is cooperative  Thought Content: Thought content normal          Cognition and Memory: She exhibits impaired recent memory  Judgment: Judgment is impulsive  Neurologic Exam     Mental Status   Oriented to person, place, and time  Oriented to city  Oriented to year, month and season  Attention: normal  Concentration: decreased  Level of consciousness: alert  Abnormal comprehension  Shows signs and symptoms of concussion  Cranial Nerves   Cranial nerves II through XII intact  CN III, IV, VI   Pupils are equal, round, and reactive to light  Motor Exam   Muscle bulk: normal  Overall muscle tone: normal    Strength   Strength 5/5 throughout  Would not cooperate with performing Pronator drift exam  Appeared confused  Sensory Exam   Light touch normal      Gait, Coordination, and Reflexes     Reflexes   Right brachioradialis: 2+  Left brachioradialis: 2+  Right patellar: 2+  Left patellar: 2+  Right Sharif: absent  Left Sharif: absent  Right ankle clonus: absent  Left ankle clonus: absent        Vitals:Blood pressure 153/85, pulse 99, temperature 97 6 °F (36 4 °C), resp  rate 20, SpO2 97 %  ,There is no height or weight on file to calculate BMI       Lab Results:   Results from last 7 days   Lab Units 03/18/21 0422 03/17/21  1514 03/16/21  0514 03/15/21  0441   WBC Thousand/uL 5 25 4 98 6 91 7 27   HEMOGLOBIN g/dL 8 6* 8 9* 8 2* 7 9*   HEMATOCRIT % 27 7* 28 2* 26 9* 26 1*   PLATELETS Thousands/uL 149 158 123* 108*   NEUTROS PCT %  --  65 67 68   MONOS PCT %  --  18* 16* 16*     Results from last 7 days   Lab Units 03/18/21  0422 03/17/21  1514 03/16/21  0514   POTASSIUM mmol/L 4 5 5 4* 5 5*   CHLORIDE mmol/L 103 100 102   CO2 mmol/L 28 30 28   BUN mg/dL 35* 35* 43*   CREATININE mg/dL 1 19 1 20 1 13   CALCIUM mg/dL 9 8 10 0 9 6     Results from last 7 days   Lab Units 03/13/21  0435   MAGNESIUM mg/dL 2 1             No results found for: TROPONINT  ABG:No results found for: PHART, QLM2HPT, PO2ART, PQW4TTY, P6BFDCQN, BEART, SOURCE    Imaging Studies: I have personally reviewed pertinent films in PACS with the Neurosurgeon  Xr Ankle 3+ Vw Right    Result Date: 3/17/2021  Impression: Limited exam through external cast   No obvious change from prior study Workstation performed: [id number]     Ct Head Wo Contrast    Result Date: 3/17/2021  Impression: No acute intracranial pathology  Frontal scalp hematoma  CT facial bone CT for discussion of nasal bone fractures  Workstation performed: [id number]     Ct Facial Bones Wo Contrast    Result Date: 3/17/2021  Impression: Acute on subacute comminuted nasal bone fractures with increased displacement  I personally discussed this study with DEMETRIA GUAN on 3/17/2021 at 5:25 PM  Workstation performed: [id number]     Ct Spine Cervical Wo Contrast    Result Date: 3/17/2021  Impression: Acute avulsion at the anterior-inferior corner of C6 vertebral body with mild widening of the anterior C6-7 disc space compared with the prior study  Nondisplaced fracture right C7 and possibly right C6 facet  Suggest follow-up with MRI to assess for ligamentous injury  I personally discussed this study with DEMETRIA GUAN on 3/17/2021 at 5:25 PM  Workstation performed: [id number]       VTE Prophylaxis: Sequential compression device Yvone Loupe)     Code Status: Level 3 - DNAR and DNI  Advance Directive and Living Will: Yes    Power of : Yes  POLST:      Counseling / Coordination of Care  I spent 20 minutes with the patient

## 2021-03-18 NOTE — OCCUPATIONAL THERAPY NOTE
Occupational Therapy Evaluation     Patient Name: Muna Tavera  EVACQ'Z Date: 3/18/2021  Problem List  Principal Problem:    Closed cervical spine fracture [facility])  Active Problems:    Nasal bone fracture    Past Medical History  Past Medical History:   Diagnosis Date    Arthritis     Cancer [facility])     breast    Cardiac disease     afib    [facility] [address] [facility]     Hyperlipidemia     Hypertension     [facility]     TIA    TIA (transient ischemic attack)      Past Surgical History  Past Surgical History:   Procedure Laterality Date    BREAST SURGERY      right lumpectomy    CARDIAC PACEMAKER PLACEMENT      CORONARY STENT PLACEMENT      ORIF TIBIA & FIBULA FRACTURES Right 3/11/2021    Procedure: 1)OPEN REDUCTION W/ INTERNAL FIXATION (ORIF) RIGHT ANKLE bimalleolar fracture 2) ORIF right tibiofibular syndesmosis; Surgeon: Lenny Layton DPM;  Location: [facility];  Service: Podiatry    TOTAL HIP ARTHROPLASTY Bilateral          03/18/21 1035   OT Last Visit   OT Visit Date 03/18/21   Note Type   Note type Evaluation   Restrictions/Precautions   Weight Bearing Precautions Per Order Yes   RUE Weight Bearing Per Order WBAT   LUE Weight Bearing Per Order WBAT   RLE Weight Bearing Per Order NWB   LLE Weight Bearing Per Order WBAT   Braces or Orthoses C/S Collar   Other Precautions Cognitive; Chair Alarm; Bed Alarm;WBS;Fall Risk;Pain   Pain Assessment   Pain Assessment Tool FLACC   Pain Rating: FLACC (Rest) - Face 0   Pain Rating: FLACC (Rest) - Legs 0   Pain Rating: FLACC (Rest) - Activity 0   Pain Rating: FLACC (Rest) - Cry 0   Pain Rating: FLACC (Rest) - Consolability 0   Score: FLACC (Rest) 0   Pain Rating: FLACC (Activity) - Face 1   Pain Rating: FLACC (Activity) - Legs 0   Pain Rating: FLACC (Activity) - Activity 0   Pain Rating: FLACC (Activity) - Cry 1   Pain Rating: FLACC (Activity) - Consolability 1   Score: FLACC (Activity) 3   Home Living   Type of Home House   Home Layout Two level;Able to live on main level with bedroom/bathroom; Ramped entrance   Additional Comments Pt was living alone in row home with FFSU and ramp to enter prior to fall ~6wks ago with malleolar fx  - has been at [address] for STR since    Prior Function   Level of [place] Needs assistance with IADLs; Needs assistance with ADLs and functional mobility   Lives With Alone   Receives Help From Family;Personal care attendant   ADL Assistance Needs assistance   IADLs Needs assistance   Falls in the last 6 months 1 to 4  (at least 2)   Vocational Retired   Comments Pt was I with adls and mobility prior to fall ~6wks ago resulting in R malleolar fx - has required assistance with all self care since transition to STR s/p fall   Lifestyle   Autonomy I adls and mobility - has required assistance since fall ~6wks ago - family assists prn    Reciprocal Relationships supportive family    Service to Others retired   Intrinsic Gratification unable to ID   Subjective   Subjective "Pull me over there" pointing to cabinet/sink in room    ADL   Eating Assistance 3  Moderate Assistance   Grooming Assistance 3  Moderate Assistance   UB Bathing Assistance 2  Maximal Assistance   LB Bathing Assistance 2  Maximal Assistance   UB Dressing Assistance 2  Maximal Assistance   LB Dressing Assistance 2  Maximal [address]  2  Maximal Assistance   Bed Mobility   Supine to Sit 3  Moderate assistance   Additional items Assist x 2   Transfers   Sit pivot 3  Moderate assistance   Additional items Assist x 2   Balance   Static Sitting Fair   Dynamic Sitting Fair -   Static Standing Zero   Activity Tolerance   Activity Tolerance Patient limited by fatigue;Patient limited by pain;Treatment limited secondary to medical complications (Comment)   RUE Assessment   RUE Assessment WFL   LUE Assessment   LUE Assessment WFL   Cognition   Overall Cognitive Status Impaired   Arousal/Participation Arousable; Cooperative;Lethargic;Sedated;Persistent stimuli required   Attention Difficulty attending to directions   Orientation Level Oriented to person;Oriented to place;Oriented to time;Oriented to situation  (general place/time - occasional )   Memory Decreased long term memory;Decreased recall of biographical information;Decreased short term memory;Decreased recall of recent events;Decreased recall of precautions   Following Commands Follows one step commands inconsistently   Assessment   Limitation Decreased ADL status; Decreased Safe judgement during ADL;Decreased cognition;Decreased endurance;Decreased self-care trans;Decreased high-level ADLs   Prognosis Fair;Poor   Assessment Pt is a 80 y o  female who was [facility] to [facility] on [facility]/2021 with Closed cervical spine fracture ([address] ) s/p unwitnessed fall at SNF/STR  Pt's problem list also includes PMH of HTN, underlying neurological disorder, previous surgery, cancer history and arthritis, breast ca, afib, chf, hld, CVA, TIA, PPM  At baseline pt was completing adls and mobility independently however has required assist since fall with R malleolar fx ~6wks ago  Pt lives alone in row home with FFSU and ramp to enter - has been at [address] [place] [address] for STR since fall/ankle fx  Currently pt requires max assist for overall ADLS and mod a x 2 for functional transfers with limited carryover of NWB RLE and spinal precautions/safety  Pt currently presents with impairments in the following categories -limited home support, difficulty performing ADLS, difficulty performing IADLS , limited insight into deficits, compliance, decreased initiation and engagement , health management  and environment activity tolerance, endurance, standing balance/tolerance, sitting balance/tolerance, memory, insight, safety , judgement , attention , sequencing , task initiation  and task termination    These impairments, as well as pt's fatigue, pain, spinal precautions, decreased caregiver support, risk for falls and home environment  limit pt's ability to safely engage in all baseline areas of occupation, includingeating, grooming, bathing, dressing, toileting, functional mobility/transfers, community mobility, laundry , house maintenance, meal prep, cleaning, social participation  and leisure activities  From OT standpoint, recommend inpt rehab with possible need for LTC/more supervised living arrangements LT  OT will continue to follow to address the below stated goals  Goals   Patient Goals none stated 2* cognitive limitations    LTG Time Frame 10-14   Long Term Goal #1 refer to established goals below    Plan   Treatment Interventions ADL retraining;Functional transfer training; Endurance training;Cognitive reorientation;Patient/family training; Compensatory technique education; Activityengagement   Goal Expiration Date 04/01/21   OT Frequency 3-5x/wk   Recommendation   OT Discharge Recommendation Post-Acute Rehabilitation Services   Additional Comments  likely need for LTC or more supervised living arrangement LT    AM-PAC Daily Activity Inpatient   Lower Body Dressing 2   Bathing 2   Toileting 2   Upper Body Dressing 2   Grooming 2   Eating 2   Daily Activity Raw Score 12   Daily Activity Standardized Score (Calc for Raw Score >=11) 30 6   AM-PAC Applied Cognition Inpatient   Following a Speech/Presentation 1   Understanding Ordinary Conversation 2   Taking Medications 2   Remembering Where Things Are Placed or Put Away 2   Remembering List of 4-5 Errands 1   Taking Care of Complicated Tasks 1   Applied Cognition Raw Score 9   Applied Cognition Standardized Score 22 48       OCCUPATIONAL THERAPY GOALS:    *SBA feeding/grooming after setup  *Min a adls after setup with use of AE PRN  *Min a toileting and clothing management   *Min a functional transfers to/from all surfaces with fair to fair+ dynamic balance and safety for participation in dynamic adls and iadl tasks   *Demonstrate fair to fair+ carryover with safe use of AD, NWB RLE, spinal precautions and carryover with use of proper body mechanics during functional tasks   *Assess DME needs   *Increase activity tolerance to 35-40 minutes for participation in adls and enjoyable activities  *Pt to participate in ongoing functional cognitive assessment with good attention/participation to assist with safe d/c recommendations    The patient's raw score on the AM-PAC Daily Activity inpatient short form is 12, standardized score is 30 6, less than 39 4  Patients at this level are likely to benefit from discharge to post-acute rehabilitation services  Please refer to the recommendation of the Occupational Therapist for safe discharge planning        Zulema Chicas

## 2021-03-18 NOTE — PROGRESS NOTES
[facility] [facility]  Progress Note - Estella Ernst 5/5/1932, 80 y o  female MRN: [id number]  Unit/Bed#: [facility] 631-01 Encounter: [id number]  Primary Care Provider: Shivani Alejandro DO   Date and time admitted to hospital: 3/17/2021  7:15 PM    Nasal bone fracture  Assessment & Plan  - 3/17 CT Facial bones: Worsening of nasal bone fractures after prior treatment per plastic surgery  - Plastic surgery consult for evaluation after new fall and worsening fractures  - speech therapy evaluated recommending puree Diet with thin liquids  * Closed cervical spine fracture [facility])  Assessment & Plan  - 3/17 CT- Cspine: Acute avulsion at the anterior-inferior corner of C6 vertebral body with mild widening of the anterior C6-7 disc space compared with the prior study  Nondisplaced fracture right C7 and possibly right C6 facet    - Neurosurgery consulted: will follow for recommendations, possible need for MRI c-spine         TERTIARY TRAUMA SURVEY NOTE    Prophylaxis: Sequential compression device Fanta Railing)     Disposition:  Continued inpatient admission for evaluation by Neurosurgery and Plastic surgery    Code status:  Level 3 - DNAR and DNI    Consultants:  Neurosurgery Plastic surgery    Is the patient 72 years or older?: YES:    1  Before the illness or injury that brought you to the Emergency, did you need someone to help you on a regular basis? 0=No   2  Since the illness or injury that brought you to the Emergency, have you needed more help than usual to take care of yourself? 0=No   3  Have you been hospitalized for one or more nights during the past 6 months (excluding a stay in the Emergency Department)? 1=Yes   4  In general, do you see well? 0=Yes   5  In general, do you have serious problems with your memory? 1=Yes   6  Do you take more than three different medications everyday?  1=Yes   TOTAL   3     Did you order a geriatric consult if the score was 2 or greater?: yes          SUBJECTIVE: Transfer from:  [address] [place] [address] Received: yes  Tertiary Exam Due on: 3/18    Mechanism of Injury:Fall    Details related to Injury: none    Chief Complaint: Tired    HPI/Last 24 hour events:   Patient transferred from [facility] after a full at her facility, she was found face down and confused  Of note she has nasal bone fractures recently treated by Plastic surgery as well as a right ankle fracture recently treated with ORIF  Was evaluated and found to have worsening of her nasal bone l fractures as well as multiple C-spine fractures  Acute avulsion at the anterior-inferior corner of C6 vertebral body with mild widening of the anterior C6-7 disc space compared with the prior study  Nondisplaced fracture right C7 and possibly right C6 facet  No acute events overnight patient reports that she is tired wants to get some sleep no other painful areas of her body  No headache nausea vomiting fever chills trouble with vision       Active medications:           Current Facility-Administered Medications:     acetaminophen (TYLENOL) tablet 650 mg, 650 mg, Oral, Q6H PRN    atorvastatin (LIPITOR) tablet 40 mg, 40 mg, Oral, Daily    carvedilol (COREG) tablet 3 125 mg, 3 125 mg, Oral, BID With Meals, 3 125 mg at 03/18/21 0808    citalopram (CeleXA) tablet 10 mg, 10 mg, Oral, Daily, 10 mg at 03/18/21 0808    docusate sodium (COLACE) capsule 100 mg, 100 mg, Oral, Daily    ferrous sulfate tablet 325 mg, 325 mg, Oral, Daily With Breakfast    folic acid (FOLVITE) tablet 1 mg, 1 mg, Oral, Daily    furosemide (LASIX) tablet 40 mg, 40 mg, Oral, Daily, 40 mg at 03/18/21 0808    melatonin tablet 3 mg, 3 mg, Oral, HS    multivitamin with iron-minerals liquid 15 mL, 15 mL, Oral, Daily    ondansetron (ZOFRAN) injection 4 mg, 4 mg, Intravenous, Q6H PRN    polyethylene glycol (MIRALAX) packet 17 g, 17 g, Oral, Daily    thiamine tablet 100 mg, 100 mg, Oral, Daily      OBJECTIVE:     Vitals:   Vitals: 03/17/21 2233   BP: 170/71   Pulse: 94   Resp: 19   Temp: (!) 96 7 °F (35 9 °C)   SpO2: 93%       Physical Exam:   GENERAL APPEARANCE:  No acute distress  NEURO:  A and O x3 GCS 15  Motor sensory intact distally to all extremities  HEENT:  Nasal bridge, forehead, bilateral periorbital ecchymoses  CV:  Regular rate rhythm 2+ radial pulse  LUNGS:  Clear to auscultation bilaterally  GI:  Nontender nondistended  MSK:  Mild tenderness to palpation of the left hip  No pain with passive motion  No other tender soft tissue or bony prominences in all extremities  SKIN:  No other wounds found    I/O:   I/O       03/16 0701 - 03/17 0700 03/17 0701 - 03/18 0700 03/18 0701 - 03/19 0700    Urine  1400     Total Output  1400     Net  -1400                  Invasive Devices: Invasive Devices     Peripheral Intravenous Line            Peripheral IV 03/17/21 Right Antecubital less than 1 day                  Imaging:   Xr Ankle 3+ Vw Right    Result Date: 3/17/2021  Impression: Limited exam through external cast   No obvious change from prior study Workstation performed: [id number]     Ct Head Wo Contrast    Result Date: 3/17/2021  Impression: No acute intracranial pathology  Frontal scalp hematoma  CT facial bone CT for discussion of nasal bone fractures  Workstation performed: [id number]     Ct Facial Bones Wo Contrast    Result Date: 3/17/2021  Impression: Acute on subacute comminuted nasal bone fractures with increased displacement  I personally discussed this study with DEMETRIA GUAN on 3/17/2021 at 5:25 PM  Workstation performed: [id number]     Ct Spine Cervical Wo Contrast    Result Date: 3/17/2021  Impression: Acute avulsion at the anterior-inferior corner of C6 vertebral body with mild widening of the anterior C6-7 disc space compared with the prior study  Nondisplaced fracture right C7 and possibly right C6 facet  Suggest follow-up with MRI to assess for ligamentous injury    I personally discussed this study with DEMETRIA GUAN on 3/17/2021 at 5:25 PM  Workstation performed: [id number]       Labs:   CBC:   Lab Results   Component Value Date    WBC 5 25 03/18/2021    HGB 8 6 (L) 03/18/2021    HCT 27 7 (L) 03/18/2021     (H) 03/18/2021     03/18/2021    MCH 31 2 03/18/2021    MCHC 31 0 (L) 03/18/2021    RDW 19 5 (H) 03/18/2021    MPV 10 9 03/18/2021    NRBC 0 03/17/2021     CMP:   Lab Results   Component Value Date     03/18/2021    CO2 28 03/18/2021    BUN 35 (H) 03/18/2021    CREATININE 1 19 03/18/2021    CALCIUM 9 8 03/18/2021    EGFR 41 03/18/2021

## 2021-03-18 NOTE — PLAN OF CARE
Problem: Prexisting or High Potential for Compromised Skin Integrity  Goal: Skin integrity is maintained or improved  Description: INTERVENTIONS:  - Identify patients at risk for skin breakdown  - Assess and monitor skin integrity  - Assess and monitor nutrition and hydration status  - Monitor labs   - Assess for incontinence   - Turn and reposition patient  - Assist with mobility/ambulation  - Relieve pressure over bony prominences  - Avoid friction and shearing  - Provide appropriate hygiene as needed including keeping skin clean and dry  - Evaluate need for skin moisturizer/barrier cream  - Collaborate with interdisciplinary team   - Patient/family teaching  - Consider wound care consult   Outcome: Progressing     Problem: PAIN - ADULT  Goal: Verbalizes/displays adequate comfort level or baseline comfort level  Description: Interventions:  - Encourage patient to monitor pain and request assistance  - Assess pain using appropriate pain scale  - Administer analgesics based on type and severity of pain and evaluate response  - Implement non-pharmacological measures as appropriate and evaluate response  - Consider cultural and social influences on pain and pain management  - Notify physician/advanced practitioner if interventions unsuccessful or patient reports new pain  Outcome: Progressing     Problem: INFECTION - ADULT  Goal: Absence or prevention of progression during hospitalization  Description: INTERVENTIONS:  - Assess and monitor for signs and symptoms of infection  - Monitor lab/diagnostic results  - Monitor all insertion sites, i e  indwelling lines, tubes, and drains  - Monitor endotracheal if appropriate and nasal secretions for changes in amount and color  - [place] appropriate cooling/warming therapies per order  - Administer medications as ordered  - Instruct and encourage patient and family to use good hand hygiene technique  - Identify and instruct in appropriate isolation precautions for identified infection/condition  Outcome: Progressing  Goal: Absence of fever/infection during neutropenic period  Description: INTERVENTIONS:  - Monitor WBC    Outcome: Progressing     Problem: SAFETY ADULT  Goal: Patient will remain free of falls  Description: INTERVENTIONS:  - Assess patient frequently for physical needs  -  Identify cognitive and physical deficits and behaviors that affect risk of falls    -  [place] fall precautions as indicated by assessment   - Educate patient/family on patient safety including physical limitations  - Instruct patient to call for assistance with activity based on assessment  - Modify environment to reduce risk of injury  - Consider OT/PT consult to assist with strengthening/mobility  Outcome: Progressing  Goal: Maintain or return to baseline ADL function  Description: INTERVENTIONS:  -  Assess patient's ability to carry out ADLs; assess patient's baseline for ADL function and identify physical deficits which impact ability to perform ADLs (bathing, care of mouth/teeth, toileting, grooming, dressing, etc )  - Assess/evaluate cause of self-care deficits   - Assess range of motion  - Assess patient's mobility; develop plan if impaired  - Assess patient's need for assistive devices and provide as appropriate  - Encourage maximum independence but intervene and supervise when necessary  - Involve family in performance of ADLs  - Assess for home care needs following discharge   - Consider OT consult to assist with ADL evaluation and planning for discharge  - Provide patient education as appropriate  Outcome: Progressing  Goal: Maintain or return mobility status to optimal level  Description: INTERVENTIONS:  - Assess patient's baseline mobility status (ambulation, transfers, stairs, etc )    - Identify cognitive and physical deficits and behaviors that affect mobility  - Identify mobility aids required to assist with transfers and/or ambulation (gait belt, sit-to-stand, lift, walker, cane, etc )  - [place] fall precautions as indicated by assessment  - Record patient progress and toleration of activity level on Mobility SBAR; progress patient to next Phase/Stage  - Instruct patient to call for assistance with activity based on assessment  - Consider rehabilitation consult to assist with strengthening/weightbearing, etc   Outcome: Progressing     Problem: DISCHARGE PLANNING  Goal: Discharge to home or other facility with appropriate resources  Description: INTERVENTIONS:  - Identify barriers to discharge w/patient and caregiver  - Arrange for needed discharge resources and transportation as appropriate  - Identify discharge learning needs (meds, wound care, etc )  - Arrange for interpretive services to assist at discharge as needed  - Refer to Case Management Department for coordinating discharge planning if the patient needs post-hospital services based on physician/advanced practitioner order or complex needs related to functional status, cognitive ability, or social support system  Outcome: Progressing     Problem: Knowledge Deficit  Goal: Patient/family/caregiver demonstrates understanding of disease process, treatment plan, medications, and discharge instructions  Description: Complete learning assessment and assess knowledge base  Interventions:  - Provide teaching at level of understanding  - Provide teaching via preferred learning methods  Outcome: Progressing     Problem: Nutrition/Hydration-ADULT  Goal: Nutrient/Hydration intake appropriate for improving, restoring or maintaining nutritional needs  Description: Monitor and assess patient's nutrition/hydration status for malnutrition  Collaborate with interdisciplinary team and initiate plan and interventions as ordered  Monitor patient's weight and dietary intake as ordered or per policy  Utilize nutrition screening tool and intervene as necessary   Determine patient's food preferences and provide high-protein, high-caloric foods as appropriate       INTERVENTIONS:  - Monitor oral intake, urinary output, labs, and treatment plans  - Assess nutrition and hydration status and recommend course of action  - Evaluate amount of meals eaten  - Assist patient with eating if necessary   - Allow adequate time for meals  - Recommend/ encourage appropriate diets, oral nutritional supplements, and vitamin/mineral supplements  - Order, calculate, and assess calorie counts as needed  - Recommend, monitor, and adjust tube feedings and TPN/PPN based on assessed needs  - Assess need for intravenous fluids  - Provide specific nutrition/hydration education as appropriate  - Include patient/family/caregiver in decisions related to nutrition  Outcome: Progressing     Problem: ANXIETY  Goal: Will report anxiety at manageable levels  Description: INTERVENTIONS:  - Administer medication as ordered  - Teach and encourage coping skills  - Provide emotional support  - Assess patient/family for anxiety and ability to cope  Outcome: Progressing  Goal: By discharge: Patient will verbalize 2 strategies to deal with anxiety  Description: Interventions:  - Identify any obvious source/trigger to anxiety  - Staff will assist patient in applying identified coping technique/skills  - Encourage attendance of scheduled groups and activities  Outcome: Progressing     Problem: SLEEP DISTURBANCE  Goal: Will exhibit normal sleeping pattern  Description: Interventions:  -  Assess the patients sleep pattern, noting recent changes  - Administer medication as ordered  - Decrease environmental stimuli, including noise, as appropriate during the night  - Encourage the patient to actively participate in unit groups and or exercise during the day to enhance ability to achieve adequate sleep at night  - Assess the patient, in the morning, encouraging a description of sleep experience  Outcome: Progressing

## 2021-03-18 NOTE — PLAN OF CARE
Problem: Prexisting or High Potential for Compromised Skin Integrity  Goal: Skin integrity is maintained or improved  Description: INTERVENTIONS:  - Identify patients at risk for skin breakdown  - Assess and monitor skin integrity  - Assess and monitor nutrition and hydration status  - Monitor labs   - Assess for incontinence   - Turn and reposition patient  - Assist with mobility/ambulation  - Relieve pressure over bony prominences  - Avoid friction and shearing  - Provide appropriate hygiene as needed including keeping skin clean and dry  - Evaluate need for skin moisturizer/barrier cream  - Collaborate with interdisciplinary team   - Patient/family teaching  - Consider wound care consult   Outcome: Progressing     Problem: PAIN - ADULT  Goal: Verbalizes/displays adequate comfort level or baseline comfort level  Description: Interventions:  - Encourage patient to monitor pain and request assistance  - Assess pain using appropriate pain scale  - Administer analgesics based on type and severity of pain and evaluate response  - Implement non-pharmacological measures as appropriate and evaluate response  - Consider cultural and social influences on pain and pain management  - Notify physician/advanced practitioner if interventions unsuccessful or patient reports new pain  Outcome: Progressing     Problem: INFECTION - ADULT  Goal: Absence or prevention of progression during hospitalization  Description: INTERVENTIONS:  - Assess and monitor for signs and symptoms of infection  - Monitor lab/diagnostic results  - Monitor all insertion sites, i e  indwelling lines, tubes, and drains  - Monitor endotracheal if appropriate and nasal secretions for changes in amount and color  - [place] appropriate cooling/warming therapies per order  - Administer medications as ordered  - Instruct and encourage patient and family to use good hand hygiene technique  - Identify and instruct in appropriate isolation precautions for identified infection/condition  Outcome: Progressing  Goal: Absence of fever/infection during neutropenic period  Description: INTERVENTIONS:  - Monitor WBC    Outcome: Progressing     Problem: SAFETY ADULT  Goal: Patient will remain free of falls  Description: INTERVENTIONS:  - Assess patient frequently for physical needs  -  Identify cognitive and physical deficits and behaviors that affect risk of falls    -  [place] fall precautions as indicated by assessment   - Educate patient/family on patient safety including physical limitations  - Instruct patient to call for assistance with activity based on assessment  - Modify environment to reduce risk of injury  - Consider OT/PT consult to assist with strengthening/mobility  Outcome: Progressing  Goal: Maintain or return to baseline ADL function  Description: INTERVENTIONS:  -  Assess patient's ability to carry out ADLs; assess patient's baseline for ADL function and identify physical deficits which impact ability to perform ADLs (bathing, care of mouth/teeth, toileting, grooming, dressing, etc )  - Assess/evaluate cause of self-care deficits   - Assess range of motion  - Assess patient's mobility; develop plan if impaired  - Assess patient's need for assistive devices and provide as appropriate  - Encourage maximum independence but intervene and supervise when necessary  - Involve family in performance of ADLs  - Assess for home care needs following discharge   - Consider OT consult to assist with ADL evaluation and planning for discharge  - Provide patient education as appropriate  Outcome: Progressing  Goal: Maintain or return mobility status to optimal level  Description: INTERVENTIONS:  - Assess patient's baseline mobility status (ambulation, transfers, stairs, etc )    - Identify cognitive and physical deficits and behaviors that affect mobility  - Identify mobility aids required to assist with transfers and/or ambulation (gait belt, sit-to-stand, lift, walker, cane, etc )  - [place] fall precautions as indicated by assessment  - Record patient progress and toleration of activity level on Mobility SBAR; progress patient to next Phase/Stage  - Instruct patient to call for assistance with activity based on assessment  - Consider rehabilitation consult to assist with strengthening/weightbearing, etc   Outcome: Progressing     Problem: DISCHARGE PLANNING  Goal: Discharge to home or other facility with appropriate resources  Description: INTERVENTIONS:  - Identify barriers to discharge w/patient and caregiver  - Arrange for needed discharge resources and transportation as appropriate  - Identify discharge learning needs (meds, wound care, etc )  - Arrange for interpretive services to assist at discharge as needed  - Refer to Case Management Department for coordinating discharge planning if the patient needs post-hospital services based on physician/advanced practitioner order or complex needs related to functional status, cognitive ability, or social support system  Outcome: Progressing     Problem: Knowledge Deficit  Goal: Patient/family/caregiver demonstrates understanding of disease process, treatment plan, medications, and discharge instructions  Description: Complete learning assessment and assess knowledge base  Interventions:  - Provide teaching at level of understanding  - Provide teaching via preferred learning methods  Outcome: Progressing     Problem: Nutrition/Hydration-ADULT  Goal: Nutrient/Hydration intake appropriate for improving, restoring or maintaining nutritional needs  Description: Monitor and assess patient's nutrition/hydration status for malnutrition  Collaborate with interdisciplinary team and initiate plan and interventions as ordered  Monitor patient's weight and dietary intake as ordered or per policy  Utilize nutrition screening tool and intervene as necessary   Determine patient's food preferences and provide high-protein, high-caloric foods as appropriate       INTERVENTIONS:  - Monitor oral intake, urinary output, labs, and treatment plans  - Assess nutrition and hydration status and recommend course of action  - Evaluate amount of meals eaten  - Assist patient with eating if necessary   - Allow adequate time for meals  - Recommend/ encourage appropriate diets, oral nutritional supplements, and vitamin/mineral supplements  - Order, calculate, and assess calorie counts as needed  - Recommend, monitor, and adjust tube feedings and TPN/PPN based on assessed needs  - Assess need for intravenous fluids  - Provide specific nutrition/hydration education as appropriate  - Include patient/family/caregiver in decisions related to nutrition  Outcome: Progressing     Problem: ANXIETY  Goal: Will report anxiety at manageable levels  Description: INTERVENTIONS:  - Administer medication as ordered  - Teach and encourage coping skills  - Provide emotional support  - Assess patient/family for anxiety and ability to cope  Outcome: Progressing  Goal: By discharge: Patient will verbalize 2 strategies to deal with anxiety  Description: Interventions:  - Identify any obvious source/trigger to anxiety  - Staff will assist patient in applying identified coping technique/skills  - Encourage attendance of scheduled groups and activities  Outcome: Progressing     Problem: SLEEP DISTURBANCE  Goal: Will exhibit normal sleeping pattern  Description: Interventions:  -  Assess the patients sleep pattern, noting recent changes  - Administer medication as ordered  - Decrease environmental stimuli, including noise, as appropriate during the night  - Encourage the patient to actively participate in unit groups and or exercise during the day to enhance ability to achieve adequate sleep at night  - Assess the patient, in the morning, encouraging a description of sleep experience  Outcome: Progressing

## 2021-03-19 ENCOUNTER — APPOINTMENT (INPATIENT)
Dept: RADIOLOGY | Facility: HOSPITAL | Age: 86
DRG: 551 | End: 2021-03-19
Payer: COMMERCIAL

## 2021-03-19 PROCEDURE — G1004 CDSM NDSC: HCPCS

## 2021-03-19 PROCEDURE — 97535 SELF CARE MNGMENT TRAINING: CPT

## 2021-03-19 PROCEDURE — 99232 SBSQ HOSP IP/OBS MODERATE 35: CPT | Performed by: INTERNAL MEDICINE

## 2021-03-19 PROCEDURE — 99232 SBSQ HOSP IP/OBS MODERATE 35: CPT | Performed by: NEUROLOGICAL SURGERY

## 2021-03-19 PROCEDURE — 97530 THERAPEUTIC ACTIVITIES: CPT

## 2021-03-19 PROCEDURE — 92526 ORAL FUNCTION THERAPY: CPT

## 2021-03-19 PROCEDURE — 99232 SBSQ HOSP IP/OBS MODERATE 35: CPT | Performed by: SURGERY

## 2021-03-19 PROCEDURE — 70450 CT HEAD/BRAIN W/O DYE: CPT

## 2021-03-19 RX ORDER — HEPARIN SODIUM 5000 [USP'U]/ML
5000 INJECTION, SOLUTION INTRAVENOUS; SUBCUTANEOUS EVERY 8 HOURS SCHEDULED
Status: DISCONTINUED | OUTPATIENT
Start: 2021-03-19 | End: 2021-03-22 | Stop reason: HOSPADM

## 2021-03-19 RX ORDER — ASPIRIN 81 MG/1
81 TABLET ORAL DAILY
Status: DISCONTINUED | OUTPATIENT
Start: 2021-03-19 | End: 2021-03-22 | Stop reason: HOSPADM

## 2021-03-19 RX ADMIN — FERROUS SULFATE TAB 325 MG (65 MG ELEMENTAL FE) 325 MG: 325 (65 FE) TAB at 09:18

## 2021-03-19 RX ADMIN — POLYETHYLENE GLYCOL 3350 17 G: 17 POWDER, FOR SOLUTION ORAL at 09:18

## 2021-03-19 RX ADMIN — CARVEDILOL 3.12 MG: 3.12 TABLET, FILM COATED ORAL at 16:35

## 2021-03-19 RX ADMIN — THIAMINE HCL TAB 100 MG 100 MG: 100 TAB at 09:18

## 2021-03-19 RX ADMIN — FUROSEMIDE 40 MG: 40 TABLET ORAL at 09:21

## 2021-03-19 RX ADMIN — ATORVASTATIN CALCIUM 40 MG: 40 TABLET, FILM COATED ORAL at 09:19

## 2021-03-19 RX ADMIN — CARVEDILOL 3.12 MG: 3.12 TABLET, FILM COATED ORAL at 09:21

## 2021-03-19 RX ADMIN — ASPIRIN 81 MG: 81 TABLET, COATED ORAL at 16:35

## 2021-03-19 RX ADMIN — SENNOSIDES A AND B 15 ML: 8.8 SYRUP ORAL at 09:19

## 2021-03-19 RX ADMIN — HEPARIN SODIUM 5000 UNITS: 5000 INJECTION INTRAVENOUS; SUBCUTANEOUS at 21:48

## 2021-03-19 RX ADMIN — CITALOPRAM HYDROBROMIDE 10 MG: 10 TABLET ORAL at 09:19

## 2021-03-19 RX ADMIN — MELATONIN TAB 3 MG 3 MG: 3 TAB at 21:48

## 2021-03-19 RX ADMIN — FOLIC ACID 1 MG: 1 TABLET ORAL at 09:18

## 2021-03-19 NOTE — ASSESSMENT & PLAN NOTE
- 3/17 CT- Cspine: Acute avulsion at the anterior-inferior corner of C6 vertebral body with mild widening of the anterior C6-7 disc space compared with the prior study  Nondisplaced fracture right C7 and possibly right C6 facet    - Neurosurgery consulted:  Nothing to do acutely  Follow-up 2 weeks with repeat AP lateral cervical spine x-rays    MRI deferred at this time no signs of myelopathy the patient has pacemaker   - [place] J states on at all times until follow-up in cleared by Neurosurgery

## 2021-03-19 NOTE — PROGRESS NOTES
[address] [facility]  Progress Note - Primus Clarke 5/5/1932, 80 y o  female MRN: [id number]  Unit/Bed#: [facility] 631-01 Encounter: [id number]  Primary Care Provider: Tiffanie Garsia DO   Date and time admitted to hospital: 3/17/2021  7:15 PM    * Closed cervical spine fracture [facility])  Assessment & Plan  Pleasant 80year old female that presents statu post fall twice within one week  She presents with headaches without neck pain  · Today, the patient is more confused  · Will order a STAT CT head to rule out any delayed intracranial bleed  · Images reviewed personally and by attending,  Final results as below  · CT head: No acute intracranial pathology  Frontal scalp hematoma  · CT cervical spine: Acute avulsion at the anterior-inferior corner of C6 vertebral body with mild widening of the anterior C6-7 disc space compared with the prior study  Nondisplaced fracture right C7 and possibly right C6 facet  · Cervical x-ray 3/19/21: Read pending  Stable appearance of cervical alignment  · CT Head 3/19/21: Results pending but per my interpretation, no intracranial bleed seen  · Posey J brace ordered to be worn when OOB  · [place] collar ordered for showering  · Ordered upright cervical spine XR AP/lateral  · On exam, the patient does not show signs of myelopathy  However, if her clinical exam worsens, per primary team may consider ordering a MRI cervical w/o contrast pending the compatibility of her pacemaker-st yakelin [id number]  [id number]  · Mobilize with PT/OT with brace  · Recommend OT Coag for concern of concussion  · DVT PPX: SCDs and pharmacological ppx per primary team      Neurosurgery will sign off and follow up in 2 weeks with new cervical x-rays AP/LAT  Call with questions/concerns in the interim  Subjective/Objective     Patient seen and examined  Sleeping at that time  Arousalable to voice when name is called  More confused today  Not aware of her location or time  Oriented to self  Endorses that she has no neck pain or headaches  I/O       03/17 0701 - 03/18 0700 03/18 0701 - 03/19 0700 03/19 0701 - 03/20 0700    P  O   480     Total Intake(mL/kg)  480 (5 7)     Urine (mL/kg/hr) 1400 837 (0 4)     Stool  0     Total Output 1400 837     Net -1400 -357            Unmeasured Urine Occurrence  4 x     Unmeasured Stool Occurrence  5 x           Invasive Devices     Peripheral Intravenous Line            Peripheral IV 03/17/21 Right Antecubital 1 day          Drain            External Urinary Catheter less than 1 day                Physical Exam:  Vitals: Blood pressure 148/65, pulse 81, temperature (!) 97 4 °F (36 3 °C), resp  rate 16, height 5' 4" (1 626 m), weight 83 5 kg (184 lb), SpO2 95 %  ,Body mass index is 31 58 kg/m²  General appearance: alert, appears stated age, not cooperative but does not appear in distress  Head: Normocephalic, with signs of trama, traumatic with bilateral ender-orbital ecchymosis  Eyes: EOMI, PERRL  Neck: supple, symmetrical, trachea midline and NT  Lungs: non labored breathing  Heart: regular heart rate  Neurologic:   Mental status: sedtaed, but arousalable by voice, thought content is not appropriate  Cranial nerves: grossly intact (Cranial nerves II-XII)  Sensory: normal to light touch  Motor: moving all extremities without focal weakness except for overall generalized weakness due to deconditioning and age  GCS: [id number]         Lab Results:  Results from last 7 days   Lab Units 03/18/21 0422 03/17/21  1514 03/16/21  0514 03/15/21  0441   WBC Thousand/uL 5 25 4 98 6 91 7 27   HEMOGLOBIN g/dL 8 6* 8 9* 8 2* 7 9*   HEMATOCRIT % 27 7* 28 2* 26 9* 26 1*   PLATELETS Thousands/uL 149 158 123* 108*   NEUTROS PCT %  --  65 67 68   MONOS PCT %  --  18* 16* 16*     Results from last 7 days   Lab Units 03/18/21  0422 03/17/21  1514 03/16/21  0514   POTASSIUM mmol/L 4 5 5 4* 5 5*   CHLORIDE mmol/L 103 100 102   CO2 mmol/L 28 30 28   BUN mg/dL 35* 35* 43*   CREATININE mg/dL 1 19 1 20 1 13   CALCIUM mg/dL 9 8 10 0 9 6     Results from last 7 days   Lab Units 03/13/21  0435   MAGNESIUM mg/dL 2 1             No results found for: TROPONINT  ABG:No results found for: PHART, BFB7VFJ, PO2ART, EJV8GYQ, Q4XZIXYR, BEART, SOURCE    Imaging Studies: I have personally reviewed pertinent reports  and I have personally reviewed pertinent films in PACS    Xr Ankle 3+ Vw Right    Result Date: 3/17/2021  Impression: Limited exam through external cast   No obvious change from prior study Workstation performed: [id number]     Ct Head Wo Contrast    Result Date: 3/17/2021  Impression: No acute intracranial pathology  Frontal scalp hematoma  CT facial bone CT for discussion of nasal bone fractures  Workstation performed: [id number]     Ct Facial Bones Wo Contrast    Result Date: 3/17/2021  Impression: Acute on subacute comminuted nasal bone fractures with increased displacement  I personally discussed this study with DEMETRIA GUAN on 3/17/2021 at 5:25 PM  Workstation performed: [id number]     Ct Spine Cervical Wo Contrast    Result Date: 3/17/2021  Impression: Acute avulsion at the anterior-inferior corner of C6 vertebral body with mild widening of the anterior C6-7 disc space compared with the prior study  Nondisplaced fracture right C7 and possibly right C6 facet  Suggest follow-up with MRI to assess for ligamentous injury  I personally discussed this study with DEMETRIA GUAN on 3/17/2021 at 5:25 PM  Workstation performed: [id number]       EKG, Pathology, and Other Studies: I have personally reviewed pertinent reports        VTE Pharmacologic Prophylaxis: Sequential compression device (Venodyne)     VTE Mechanical Prophylaxis: sequential compression device

## 2021-03-19 NOTE — OCCUPATIONAL THERAPY NOTE
[address] Progress Note     Patient Name: Breann Weinstein  PCCQV'W Date: 3/19/2021  Problem List  Principal Problem:    Closed cervical spine fracture [facility])  Active Problems:    Nasal bone fracture            03/19/21 1450   OT Last Visit   OT Visit Date 03/19/21   Note Type   Note Type Treatment   Restrictions/Precautions   Weight Bearing Precautions Per Order Yes   RLE Weight Bearing Per Order NWB   LLE Weight Bearing Per Order WBAT   Braces or Orthoses C/S Collar   Other Precautions Cognitive; Chair Alarm; Bed Alarm; Fall Risk;Pain;Spinal precautions   Pain Assessment   Pain Assessment Tool FLACC   Pain Rating: FLACC (Rest) - Face 0   Pain Rating: FLACC (Rest) - Legs 0   Pain Rating: FLACC (Rest) - Activity 0   Pain Rating: FLACC (Rest) - Cry 0   Pain Rating: FLACC (Rest) - Consolability 0   Score: FLACC (Rest) 0   Pain Rating: FLACC (Activity) - Face 1   Pain Rating: FLACC (Activity) - Legs 0   Pain Rating: FLACC (Activity) - Activity 0   Pain Rating: FLACC (Activity) - Cry 1   Pain Rating: FLACC (Activity) - Consolability 0   Score: FLACC (Activity) 2   ADL   Eating Assistance 3  Moderate Assistance   Eating Deficit Verbal cueing; Increased time to complete;Bringing food to mouth assist;Pureed diet   Grooming Assistance 4  Minimal Assistance   Grooming Deficit Setup;Verbal cueing; Increased time to complete   UB Dressing Assistance 2  Maximal Assistance   UB Dressing Deficit Other (Comment)  (DON HEARING AIDS)   LB Dressing Assistance 2  Maximal Assistance   LB Dressing Deficit Don/doff L sock   Bed Mobility   Supine to Sit 3  Moderate assistance   Additional items Assist x 2; Increased time required;Verbal cues;LE management   Transfers   Sit to Stand 3  Moderate assistance   Additional items Assist x 2; Increased time required;Verbal cues   Stand to Sit 3  Moderate assistance   Additional items Assist x 2; Increased time required;Verbal cues   Stand pivot 3  Moderate assistance   Additional items Assist x 2;Increased time required;Verbal cues   Additional Comments 3RD PERSON ASSISTED TO HOLD RLE IN ORDER TO MAINTAIN NWB THROUGHOUT ALL TRANSFERS  B/L HHA WAS UTILIZED FOR ALL TRANSFERS  Cognition   Overall Cognitive Status Impaired   Arousal/Participation Alert; Cooperative   Attention Attends with cues to redirect   Orientation Level Oriented to person;Oriented to place;Oriented to time  (GROSS ORIENTATION TO TIME)   Memory Decreased recall of precautions   Following Commands Follows one step commands with increased time or repetition   Comments PT WAS PLEASANT AND COOPERATIVE THROUGHOUT SESSION  PT ABLE TO RECALL 1/3 PRECAUTIONS, FURTHER EDUCATION WAS PROVIDED  PT REQUIRED MIN VC TO CARRY OVER SPINAL PRECAUTIONS  PT REQUIRED VC AND ASSISTANCE TO MAINTAIN NWB STATUS  Assessment   Assessment PT SEEN FOR OT TREATMENT SESSION FOCUSING ON ADLS, FUNCTIONAL TRANSFERS, AND COGNITION  PT ABLE TO RECALL 1/3 SPINAL PRECAUTIONS, FURTHER EDUCATION PROVIDED  PT WAS MAX A TO DON HEARING AIDS, HOWEVER, HEARING AIDS WERE REMOVED DUE TO LOOSE FIT  PT WAS ABLE TO IDENTIFY IF HEARING AIDS WERE IN PROPERLY OR NEEDED ADJUSTMENT  PT WAS MAX A TO DON L SOCK  PT COMPLETED BED MOBILITY W/ MOD A X 2  PT WAS MOD A X 2 FOR SIT <-> STAND AND STAND PIVOT TRANSFERS IN ORDER TO SIT IN CHAIR AND ENGAGE IN ADLS REQUIRING 3RD PERSON ASSIST TO HOLD RLE TO CARRY OVER NWBS  PT WAS MOD A FOR EATING W/ A FORK W/ HAND OVER HAND ASSISTANCE TO GUIDE HER HAND TO BRING FORK TO HER MOUTH IN A COORDINATED MOVEMENT  PT REQUIRED INCREASED TIME DUE TO DECREASED ENDURANCE AND INCOORDINATION  PT REQUIRED OCCASIONAL VC TO ENSURE FOOD WAS SWALLOWED BEFORE TAKING ANOTHER BITE  PT REQUIRED ASSISTANCE STEADYING WATER BOTTLE AS SHE BROUGHT IT TO HER MOUTH TO SIP THROUGH STRAW  PT NEEDED MIN A TO APPLY DEODORANT TO OPEN THE DEODORANT CONTAINER  PT WAS ABLE TO SCREW THE CAP ON THE DEODORANT WHEN SHE WAS FINISHED   THERAPIST FOLLOWED DIRECTIONS FOR FEEDING PROVIDED BY SPEECH THERAPY  PT EDUCATED ON BODY MECHANICS, SPINAL PRECAUTIONS, WBS, AND COMPENSATORY STRATEGIES  PT PROGRESSING COMPARED TO INITIAL OT EVALUATION, HOWEVER LIMITED DUE TO PAIN, WBS, SPINAL PRECAUTIONS, IMPAIRED COGNITION, FATIGUE, AND DECREASED BALANCE  FROM OT PERSPECTIVE, D/C REC REHAB CONT  CONT TO FOLLOW-UP 3-5X/WEEK TO ADDRESS THE PREVIOUSLY STATED GOALS  Plan   Treatment Interventions ADL retraining;Functional transfer training; Endurance training;Cognitive reorientation;Patient/family training; Activityengagement; Compensatory technique education   Goal Expiration Date 04/01/21   OT Treatment Day 1   OT Frequency 3-5x/wk   Recommendation   OT Discharge Recommendation Post-Acute Rehabilitation Services   OT - OK to Discharge Yes   AM-PAC Daily Activity Inpatient   Lower Body Dressing 2   Bathing 2   Toileting 2   Upper Body Dressing 2   Grooming 3   Eating 2   Daily Activity Raw Score 13   Daily Activity Standardized Score (Calc for Raw Score >=11) 32 03   AM-PAC Applied Cognition Inpatient   Following a Speech/Presentation 2   Understanding Ordinary Conversation 3   Taking Medications 2   Remembering Where Things Are Placed or Put Away 2   Remembering List of 4-5 Errands 2   Taking Care of Complicated Tasks 2   Applied Cognition Raw Score 13   Applied Cognition Standardized Score 30 46            Hortencia Dao, OTS

## 2021-03-19 NOTE — SPEECH THERAPY NOTE
SLP Progress Note    Patient Name: Gaviota Sanches  ISJXD'I Date: 3/19/2021     Problem List  Principal Problem:    Closed cervical spine fracture [facility])  Active Problems:    Nasal bone fracture       Past Medical History  Past Medical History:   Diagnosis Date    Arthritis     Cancer [facility])     breast    Cardiac disease     afib    CHF [facility]congestive [facility] failure) ([facility] [address] )     Hyperlipidemia     Hypertension     Stroke ([facility] )     TIA    TIA (transient ischemic attack)        Subjective:  "Tired "    Objective:  Pt was seen for diagnostic dysphagia therapy to ensure tolerance of current diet (puree/thin liquid) and to assess potential for safe diet upgrade  Records reviewed to begin ([facility])  Pt was sleeping upon arrival and roused but for just a brief period (then was taken off floor (presume for Ct of head given lethargy)  Pt positioned partially upright  Hard cervical collar in place    Pt was assessed with minimal amounts of puree and sips of ensure (she had not eaten breakfast)  Suck via straw was weak  Bolus formation and transfer were grossly effective  Oral control at least mildly impaired (mild labial leakage noted)  Swallow initiation suspected to be prompt to min delayed  No coughing, throat clearing, c/o stasis, multiple swallows, change in vocal quality noted with the minimal intake today  Assessment:  Pt reported to be more lethargic this am (and pt not appropriate for trial of upgraded diet/more textured food)  She was seen briefly prior to being taken off floor for Xray vs CT study  No gross overt s/s aspiration with the limited amt taken  Plan/Recommendations:  Continue current conservative diet

## 2021-03-19 NOTE — PROGRESS NOTES
Progress Note - Geriatric Medicine   Jose D Bradley 80 y o  female MRN: [id number]  Unit/Bed#: [facility] 631-01 Encounter: [id number]      Assessment/Plan:    Acute metabolic encephalopathy   -mentation this morning fluctuating more drastically than prior evaluation, pt is somnolent not following command on my exam however much more alert on exam for provider just moments prior, continues to be restless and confused  -afebrile with no leukocytosis to suggest infectious etiology  -no sedating medications administered this morning prior to evaluation and is not having home medications which were held/gievn that could be contributing  -pain, concussion, and poor sleep likely largely contributing  -D/W Nsx, they have ordered STAT CTH  -continue neuro checks per protocol  -ensure pain controlled  -encourage circadian rhythm  -reorient frequently     Ambulatory dysfunction with multiple unwitnessed falls at long-term care facility  -hx recurrent falls recently admitted to STR with bimalleolar ankle fx and nasal bone fractures  -admitted now s/p two unwitnessed falls at [facility]  -now with acute on subacute nasal bone fx and acute C6 fx  -encourage good body mechanics, per STR pt was tx via Valerie Means 2/2 NWB status   -NWB RLE impairing mobilization, continue assist with tx  -fall precautions    Acute C6 vertebral body avulsion  -confirmed on CT C-spine on admission  -continue seizure precautions  -acute pain control  -Neurosurgery following    Acute on subacute comminuted nasal bone fractures  -confirmed on CT facial bone 3/17/21  -continue sinus precautions  -acute pain control  -plastic surg f/u    Acute pain due to trauma  -currently only has Tylenol ordered, recommend geriatric pain protocol will multimodal pain control  -encourage bowel regimen with pain medications to reduce risk of constipation    Impaired hearing  -encourage use of hearing aids or hearing amplifier to reduce straining to hear  -speak loudly include minimizing background noise and using teach back method to ensure clear communication    Impaired vision  -encourage use of corrective lenses all appropriate times  -ensure room is well lit and from fall hazards  -consider large print font for any educational materials provided to patient    Frailty in geriatric patient  -patient is mild to moderately frail due to underlying chronic conditions as well as age and acute traumatic injuries  -continue to encourage nutritional intake which is somewhat limited due to facial fractures and should be monitored closely  -good control chronic medical conditions    Care coordination: Rounded with João (RN) and Julia Izquierdo (Neurosurgery)    Subjective:     Patient seen examined at bedside, she is somnolent, difficult to arouse, does not maintain wakeful state, does not follow commands and speech is not discernable except "uh huh" but not consistently to same question phrased differently  Nursing reports at last neuro check she was awake and alert, following commands  Had not received any sedating medications this morning to account for change  Discussed with Nsx given acute change in mental status  Review of Systems   Unable to perform ROS: Mental status change     Objective:     Vitals: Blood pressure 148/65, pulse 81, temperature (!) 97 4 °F (36 3 °C), resp  rate 16, height 5' 4" (1 626 m), weight 83 5 kg (184 lb), SpO2 95 %  ,Body mass index is 31 58 kg/m²  Intake/Output Summary (Last 24 hours) at 3/19/2021 0924  Last data filed at 3/19/2021 0551  Gross per 24 hour   Intake 360 ml   Output 837 ml   Net -477 ml     Current Medications: Reviewed    Physical Exam:   Physical Exam  Vitals signs and nursing note reviewed     Constitutional:       Comments: Difficult to arouse, does not consistently verbalize to questions, does not follow commands, lift right arm off bed drops down without attempt to hold off bed    Strong urine odor in room   HENT:      Head:      Comments: Facial swelling more pronounced than yesterday facial ecchymosis more widespread and now appreciated left neck base     Nose:      Comments: Nasal bridge swelling, dry blood at nares     Mouth/Throat:      Mouth: Mucous membranes are dry  Comments: Tongue and membranes very dry  Eyes:      General:         Right eye: No discharge  Left eye: No discharge  Conjunctiva/sclera: Conjunctivae normal       Comments: When pt opens eyes pupils equal but does not keep open to check reactivity to light    Neck:      Comments: C-collar in place, appears to be well fitting   Cardiovascular:      Rate and Rhythm: Normal rate  Rhythm irregular  Pulses: Normal pulses  Pulmonary:      Comments: Coarse upper airway/lung sounds, sounds facial/nasal congestion also  Abdominal:      General: There is no distension  Palpations: Abdomen is soft  Musculoskeletal:      Comments: Diffusely reduced muscle tone  Left middle finger ecchymotic and swollen  Arthritic changes of hands bilaterally  RLE in splint     Skin:     General: Skin is warm and dry  Comments: Facial/left neck ecchymosis more pronounced  Ecchymosis left middle finger and right knuckles more pronounced than prior eval   Neurological:      Mental Status: She is lethargic  Comments: Lethargic   Psychiatric:      Comments: Unable to fully discern due to mental status        Invasive Devices     Peripheral Intravenous Line            Peripheral IV 03/17/21 Right Antecubital 1 day          Drain            External Urinary Catheter less than 1 day              Lab, Imaging and other studies: I have personally reviewed pertinent reports

## 2021-03-19 NOTE — ASSESSMENT & PLAN NOTE
Pleasant 80year old female that presents statu post fall twice within one week  She presents with headaches without neck pain  · Today, the patient is more confused  · Will order a STAT CT head to rule out any delayed intracranial bleed  · Images reviewed personally and by attending,  Final results as below  · CT head: No acute intracranial pathology  Frontal scalp hematoma  · CT cervical spine: Acute avulsion at the anterior-inferior corner of C6 vertebral body with mild widening of the anterior C6-7 disc space compared with the prior study  Nondisplaced fracture right C7 and possibly right C6 facet  · Cervical x-ray 3/19/21: Read pending  Stable appearance of cervical alignment  · CT Head 3/19/21: Results pending but per my interpretation, no intracranial bleed seen  · Oliver J brace ordered to be worn when OOB  · [employer] collar ordered for showering  · Ordered upright cervical spine XR AP/lateral  · On exam, the patient does not show signs of myelopathy  However, if her clinical exam worsens, per primary team may consider ordering a MRI cervical w/o contrast pending the compatibility of her pacemaker-st yakelin [id number]  [id number]  · Mobilize with PT/OT with brace  · Recommend OT Coag for concern of concussion  · DVT PPX: SCDs and pharmacological ppx per primary team      Neurosurgery will sign off and follow up in 2 weeks with new cervical x-rays AP/LAT  Call with questions/concerns in the interim

## 2021-03-19 NOTE — PHYSICAL THERAPY NOTE
PHYSICAL THERAPY NOTE          Patient Name: Jose D Bradley  BXAAO'E Date: 3/19/2021     03/19/21 5090   Note Type   Note Type Treatment   Pain Assessment   Pain Assessment Tool FLACC   Pain Rating: FLACC (Rest) - Face 0   Pain Rating: FLACC (Rest) - Legs 0   Pain Rating: FLACC (Rest) - Activity 0   Pain Rating: FLACC (Rest) - Cry 0   Pain Rating: FLACC (Rest) - Consolability 0   Score: FLACC (Rest) 0   Pain Rating: FLACC (Activity) - Face 1   Pain Rating: FLACC (Activity) - Legs 0   Pain Rating: FLACC (Activity) - Activity 0   Pain Rating: FLACC (Activity) - Cry 1   Pain Rating: FLACC (Activity) - Consolability 0   Score: FLACC (Activity) 2   Restrictions/Precautions   Weight Bearing Precautions Per Order Yes   RLE Weight Bearing Per Order NWB   LLE Weight Bearing Per Order WBAT   Braces or Orthoses C/S Collar   Other Precautions Cognitive; Chair Alarm; Bed Alarm;Multiple lines; Fall Risk;Pain;WBS   General   Chart Reviewed Yes   Response to Previous Treatment Patient with no complaints from previous session  Family/Caregiver Present No   Cognition   Overall Cognitive Status Impaired   Arousal/Participation Alert   Attention Attends with cues to redirect   Orientation Level Oriented to person;Oriented to place;Oriented to time  (gross time )   Memory Decreased recall of precautions   Following Commands Follows one step commands with increased time or repetition   Subjective   Subjective Patient willing to participate in PT treatment session   Bed Mobility   Supine to Sit 3  Moderate assistance   Additional items Assist x 2; Increased time required;Verbal cues   Transfers   Sit to Stand 3  Moderate assistance   Additional items Assist x 2; Increased time required;Verbal cues  (Assistive 3rd person to maintain NWB to RLE)   Stand to Sit 3  Moderate assistance   Additional items Assist x 2; Increased time required;Verbal cues  (Assistive 3rd person to maintain NWB to RLE)   Stand pivot 3  Moderate assistance   Additional items Assist x 2; Increased time required;Verbal cues  (Assistive 3rd person to maintain NWB to RLE)   Additional Comments Verbal cues and tactile cues were needed for hand placement safety  Bilateral handhold assist utilized for transfers   Ambulation/Elevation   Gait pattern Not appropriate  (Decreased standing tolerance at current time)   Balance   Static Sitting Fair -   Dynamic Sitting Poor +   Static Standing Poor   Dynamic Standing Poor -   Endurance Deficit   Endurance Deficit Yes   Endurance Deficit Description Fatigue, pain   Activity Tolerance   Activity Tolerance Patient limited by fatigue;Patient limited by pain   Medical Staff Made Aware PRASHANT Vizcaino; [place] [employer] OT student    Nurse Made Aware Patient appropriate to be seen and mobilized per nursing   Assessment   Prognosis Fair   Problem List Decreased strength;Decreased range of motion;Decreased endurance; Impaired balance;Decreased mobility; Decreased cognition; Impaired judgement;Decreased safety awareness;Orthopedic restrictions;Pain   Assessment Patient resting in the bed at time of PT treatment session  Patient reports feeling " okay" and is willing to participate with therapy  Patient is able to perform all bed mobility and transfers with mod A x2 which is approximately the same level of assistance required compared to previous session, although patient was able to perform sit<-> stand as well as stand pivot transfers using bilateral handhold assist  Assist of 3rd person was required to ensure patient maintained nonweightbearing to right lower extremity during all transfers  Sitting EOB pt demonstrated poor + seated balance requiring occasional min A to maintain sitting balance  Patient's transfers were improved today as patient was only able to tolerate sit pivot transfers during PT evaluation    Throughout all transfers constant cuing was required for patient to facilitate proper weight shifts  Due to increased out of bed mobility/transfers mobility goals were updated     At conclusion of PT treatment session patient was assisted back into chair with all needs within reach in chair alarm engaged  Overall patient continues to progress with therapy and will continue to benefit from skilled PT services are hospital stay  D/C recommendation medically cleared his rehab     Goals   Patient Goals " to get better"   STG Expiration Date 03/29/21   Short Term Goal #1 Updated mobility goals: In 9 days pt will complete: 1) Bed mobility skills with min Ax1 to increase safety and independence as well as decrease caregiver burden  2) Functional sit< -> stand, stand pivot, and sit pivot transfers with min Ax1 while maintaining NWB to RLE to promote increased independence, safety, and QOL  3) Improve balance grades by 1/2 grade to increase safety with all mobility and decrease fall risk  4) Improve BLE strength by 1/2 grade to help increase overall functional mobility and decrease fall risk  5) patient will be able to tolerate sitting edge of bed x 25 min while participating with PT in order to increase pts overall activity tolerance  Ambulation to be assessed when appropriate, PT to see at that time   Plan   Treatment/Interventions Functional transfer training;LE strengthening/ROM; Therapeutic exercise; Endurance training;Patient/family training;Equipment eval/education; Bed mobility;Continued evaluation;Spoke to nursing;OT   Progress Progressing toward goals   Recommendation   PT Discharge Recommendation Post-Acute Rehabilitation Services   Equipment Recommended Wheelchair;Walker   Wheelchair Package Recommended Standard   Change or Add item to Wheelchair Package? Yes, Add Item   What would you like to ADD?  Other (Comment)  (elevating leg rests)   Walker Package Recommended Wheeled walker   PT - OK to Discharge Yes  (To rehab when medically cleared)   Cade Pelayo Turning in Bed Without Bedrails 2   Lying on Back to Sitting on Edge of Flat Bed 1   Moving Bed to Chair 1   Standing Up From Chair 1   Walk in Room 1   Climb 3-5 Stairs 1   Basic Mobility Inpatient Raw Score 7   Turning Head Towards Sound 4   Follow Simple Instructions 3   Low Function Basic Mobility Raw Score 14   Low Function Basic Mobility Standardized Score 22 01   Portions of the documentation may have been created using voice recognition software  Occasional wrong word or sound alike substitutions may have occurred due to the inherent limitations of the voice recognition software  Read the chart carefully and recognize, using context, where substitutions have occurred      Librado Fontanez, PT, DPT

## 2021-03-19 NOTE — ASSESSMENT & PLAN NOTE
- 3/17 CT Facial bones: Worsening of nasal bone fractures after prior treatment per facial plastic surgery Dr Naye Murrell on 2/8/21  - Facial plastic surgery consult for evaluation after new fall and worsening fractures  - Pending

## 2021-03-19 NOTE — PLAN OF CARE
Problem: OCCUPATIONAL THERAPY ADULT  Goal: Performs self-care activities at highest level of function for planned discharge setting  See evaluation for individualized goals  Description: Treatment Interventions: ADL retraining, Functional transfer training, Endurance training, Cognitive reorientation, Patient/family training, Compensatory technique education, Activityengagement          See flowsheet documentation for full assessment, interventions and recommendations  Outcome: Progressing  Note: Limitation: Decreased ADL status, Decreased Safe judgement during ADL, Decreased cognition, Decreased endurance, Decreased self-care trans, Decreased high-level ADLs  Prognosis: Fair, Poor  Assessment: PT SEEN FOR OT TREATMENT SESSION FOCUSING ON ADLS, FUNCTIONAL TRANSFERS, AND COGNITION  PT ABLE TO RECALL 1/3 SPINAL PRECAUTIONS, FURTHER EDUCATION PROVIDED  PT WAS MAX A TO DON HEARING AIDS, HOWEVER, HEARING AIDS WERE REMOVED DUE TO LOOSE FIT  PT WAS ABLE TO IDENTIFY IF HEARING AIDS WERE IN PROPERLY OR NEEDED ADJUSTMENT  PT WAS MAX A TO DON L SOCK  PT COMPLETED BED MOBILITY W/ MOD A X 2  PT WAS MOD A X 2 FOR SIT <-> STAND AND STAND PIVOT TRANSFERS IN ORDER TO SIT IN CHAIR AND ENGAGE IN ADLS REQUIRING 3RD PERSON ASSIST TO HOLD RLE TO CARRY OVER NWBS  PT WAS MOD A FOR EATING W/ A FORK W/ HAND OVER HAND ASSISTANCE TO GUIDE HER HAND TO BRING FORK TO HER MOUTH IN A COORDINATED MOVEMENT  PT REQUIRED INCREASED TIME DUE TO DECREASED ENDURANCE AND INCOORDINATION  PT REQUIRED OCCASIONAL VC TO ENSURE FOOD WAS SWALLOWED BEFORE TAKING ANOTHER BITE  PT REQUIRED ASSISTANCE STEADYING WATER BOTTLE AS SHE BROUGHT IT TO HER MOUTH TO SIP THROUGH STRAW  PT NEEDED MIN A TO APPLY DEODORANT TO OPEN THE DEODORANT CONTAINER  PT WAS ABLE TO SCREW THE CAP ON THE DEODORANT WHEN SHE WAS FINISHED  THERAPIST FOLLOWED DIRECTIONS FOR FEEDING PROVIDED BY SPEECH THERAPY  PT EDUCATED ON BODY MECHANICS, SPINAL PRECAUTIONS, WBS, AND COMPENSATORY STRATEGIES  PT PROGRESSING COMPARED TO INITIAL OT EVALUATION, HOWEVER LIMITED DUE TO PAIN, WBS, SPINAL PRECAUTIONS, IMPAIRED COGNITION, FATIGUE, AND DECREASED BALANCE  FROM OT PERSPECTIVE, D/C REC REHAB CONT  CONT TO FOLLOW-UP 3-5X/WEEK TO ADDRESS THE PREVIOUSLY STATED GOALS  OT Discharge Recommendation: Post-Acute Rehabilitation Services  OT - OK to Discharge:  Yes

## 2021-03-19 NOTE — PLAN OF CARE
Problem: PHYSICAL THERAPY ADULT  Goal: Performs mobility at highest level of function for planned discharge setting  See evaluation for individualized goals  Description: Treatment/Interventions: Functional transfer training, LE strengthening/ROM, Therapeutic exercise, Endurance training, Patient/family training, Equipment eval/education, Bed mobility, gait training, Continued evaluation, Spoke to nursing, OT  Equipment Recommended: Wheelchair       See flowsheet documentation for full assessment, interventions and recommendations  Outcome: Progressing  Note: Prognosis: Fair  Problem List: Decreased strength, Decreased range of motion, Decreased endurance, Impaired balance, Decreased mobility, Decreased cognition, Impaired judgement, Decreased safety awareness, Orthopedic restrictions, Pain  Assessment: Patient resting in the bed at time of PT treatment session  Patient reports feeling " okay" and is willing to participate with therapy  Patient is able to perform all bed mobility and transfers with mod A x2 which is approximately the same level of assistance required compared to previous session, although patient was able to perform sit<-> stand as well as stand pivot transfers using bilateral handhold assist  Assist of 3rd person was required to ensure patient maintained nonweightbearing to right lower extremity during all transfers  Sitting EOB pt demonstrated poor + seated balance requiring occasional min A to maintain sitting balance  Patient's transfers were improved today as patient was only able to tolerate sit pivot transfers during PT evaluation  Throughout all transfers constant cuing was required for patient to facilitate proper weight shifts  Due to increased out of bed mobility/transfers mobility goals were updated     At conclusion of PT treatment session patient was assisted back into chair with all needs within reach in chair alarm engaged    Overall patient continues to progress with therapy and will continue to benefit from skilled PT services are hospital stay  D/C recommendation medically cleared his rehab          PT Discharge Recommendation: Post-Acute Rehabilitation Services     PT - OK to Discharge: Yes(To rehab when medically cleared)    See flowsheet documentation for full assessment

## 2021-03-19 NOTE — PROGRESS NOTES
[address] [facility]  Progress Note - Robin Elaine 5/5/1932, 80 y o  female MRN: [id number]  Unit/Bed#: [facility] 631-01 Encounter: [id number]  Primary Care Provider: Aide Fountain DO   Date and time admitted to hospital: 3/17/2021  7:15 PM    Nasal bone fracture  Assessment & Plan  - 3/17 CT Facial bones: Worsening of nasal bone fractures after prior treatment per facial plastic surgery Dr Clint Lee on 2/8/21  - Follow up with surgeon of record as soon as possible for discussion of operative fixation likely Tuesday or Wednesday next week    * Closed cervical spine fracture [facility])  Assessment & Plan  - 3/17 CT- Cspine: Acute avulsion at the anterior-inferior corner of C6 vertebral body with mild widening of the anterior C6-7 disc space compared with the prior study  Nondisplaced fracture right C7 and possibly right C6 facet    - Neurosurgery consulted:  Nothing to do acutely  Follow-up 2 weeks with repeat AP lateral cervical spine x-rays  MRI deferred at this time no signs of myelopathy the patient has pacemaker   - Grady J states on at all times until follow-up in cleared by Neurosurgery          Disposition:  Continue current level of care, discharge planning PT currently recommending rehab  SUBJECTIVE:  Chief Complaint:  I feel tired    Subjective:   No acute events overnight, patient states that she has no chest pain shortness of breath nausea vomiting headache  States she slept well overnight but still feels very tired this morning         OBJECTIVE:     Meds/Allergies     Current Facility-Administered Medications:     acetaminophen (TYLENOL) tablet 650 mg, 650 mg, Oral, Q6H PRN, Taran Gonzalez MD    albuterol (PROVENTIL HFA,VENTOLIN HFA) inhaler 2 puff, 2 puff, Inhalation, Q4H PRN, Mirza Weiner MD    atorvastatin (LIPITOR) tablet 40 mg, 40 mg, Oral, Daily, Taran Gonzalez MD, 40 mg at 03/19/21 0919    carvedilol (COREG) tablet 3 125 mg, 3 125 mg, Oral, BID With Turner Miguel MD, 3 125 mg at 03/19/21 2317    citalopram (CeleXA) tablet 10 mg, 10 mg, Oral, Daily, Mango Campo MD, 10 mg at 03/19/21 0919    docusate sodium (COLACE) capsule 100 mg, 100 mg, Oral, Daily, Mango Campo MD    ferrous sulfate tablet 325 mg, 325 mg, Oral, Daily With Breakfast, Mango Campo MD, 325 mg at 90/43/47 6411    folic acid (FOLVITE) tablet 1 mg, 1 mg, Oral, Daily, Mango Campo MD, 1 mg at 03/19/21 5156    furosemide (LASIX) tablet 40 mg, 40 mg, Oral, Daily, Mango Campo MD, 40 mg at 03/19/21 0921    melatonin tablet 3 mg, 3 mg, Oral, HS, Mango Campo MD, 3 mg at 03/18/21 2216    multivitamin with iron-minerals liquid 15 mL, 15 mL, Oral, Daily, Mango Campo MD, 15 mL at 03/19/21 0919    ondansetron (ZOFRAN) injection 4 mg, 4 mg, Intravenous, Q6H PRN, Mango Campo MD    polyethylene glycol (MIRALAX) packet 17 g, 17 g, Oral, Daily, Mango Campo MD, 17 g at 03/19/21 8411    thiamine tablet 100 mg, 100 mg, Oral, Daily, Mango Campo MD, 100 mg at 03/19/21 0918     Vitals:   Vitals:    03/19/21 0920   BP: 148/65   Pulse: 81   Resp:    Temp:    SpO2: 95%       Intake/Output:  I/O       03/17 0701 - 03/18 0700 03/18 0701 - 03/19 0700 03/19 0701 - 03/20 0700    P  O   480     Total Intake(mL/kg)  480 (5 7)     Urine (mL/kg/hr) 1400 837 (0 4)     Stool  0     Total Output 1400 837     Net -1400 -357            Unmeasured Urine Occurrence  4 x     Unmeasured Stool Occurrence  5 x            Nutrition/GI Proph/Bowel Reg:  Dysphagia 1 puree and thin liquids/Colace MiraLax    Physical Exam:   GENERAL APPEARANCE:  Lethargic, no acute distress  NEURO:  A and O x3  GCS 14 opens eyes to voice  Following commands  Motor and sensation grossly intact to all extremities    HEENT:  Pupils equal round reactive to light, no active bleeding from nose no septal hematoma  CV:  Regular rate rhythm 2+ radial pulse  LUNGS:  Clear to auscultation bilaterally  GI:  Soft nontender nondistended  MSK:  Right lower extremity splint clean dry and intact  SKIN:  Warm dry    Invasive Devices     Peripheral Intravenous Line            Peripheral IV 03/17/21 Right Antecubital 1 day          Drain            External Urinary Catheter less than 1 day                 Lab Results: Results: I have personally reviewed pertinent reports   , BMP/CMP: No results found for: SODIUM, K, CL, CO2, ANIONGAP, BUN, CREATININE, GLUCOSE, CALCIUM, AST, ALT, ALKPHOS, PROT, BILITOT, EGFR and CBC: No results found for: WBC, HGB, HCT, MCV, PLT, ADJUSTEDWBC, MCH, MCHC, RDW, MPV, NRBC  Imaging/EKG Studies: Results: I have personally reviewed pertinent reports    , Other: C spine AP/lateral   Other Studies:   VTE Prophylaxis: Sequential compression device (Venodyne)  and Heparin

## 2021-03-19 NOTE — ASSESSMENT & PLAN NOTE
Pleasant 80year old female that presents statu post fall twice within one week  She presents with headaches without neck pain  · Images reviewed personally and by attending,  Final results as below  · CT head: No acute intracranial pathology  Frontal scalp hematoma  · CT cervical spine: Acute avulsion at the anterior-inferior corner of C6 vertebral body with mild widening of the anterior C6-7 disc space compared with the prior study  Nondisplaced fracture right C7 and possibly right C6 facet  · Cervical x-ray 3/19/21: Read pending  Stable appearance of cervical alignment  · [place] J brace ordered to be worn when OOB  · Nonabox 88 collar ordered for showering  · Ordered upright cervical spine XR AP/lateral  · On exam, the patient does not show signs of myelopathy  However, if her clinical exam worsens, per primary team may consider ordering a MRI cervical w/o contrast pending the compatibility of her pacemaker-st yakelin [id number]  [id number]  · Mobilize with PT/OT with brace  · Recommend OT Coag for concern of concussion  · DVT PPX: SCDs and pharmacological ppx per primary team      Neurosurgery will sign off  We will follow up in 2 weeks with new cervical x-rays AP/LAT  Call with questions/concerns in the interim

## 2021-03-20 PROCEDURE — 99231 SBSQ HOSP IP/OBS SF/LOW 25: CPT | Performed by: SURGERY

## 2021-03-20 RX ADMIN — HEPARIN SODIUM 5000 UNITS: 5000 INJECTION INTRAVENOUS; SUBCUTANEOUS at 21:09

## 2021-03-20 RX ADMIN — FOLIC ACID 1 MG: 1 TABLET ORAL at 08:11

## 2021-03-20 RX ADMIN — CARVEDILOL 3.12 MG: 3.12 TABLET, FILM COATED ORAL at 08:11

## 2021-03-20 RX ADMIN — FUROSEMIDE 40 MG: 40 TABLET ORAL at 08:11

## 2021-03-20 RX ADMIN — MELATONIN TAB 3 MG 3 MG: 3 TAB at 21:09

## 2021-03-20 RX ADMIN — SENNOSIDES A AND B 15 ML: 8.8 SYRUP ORAL at 08:11

## 2021-03-20 RX ADMIN — HEPARIN SODIUM 5000 UNITS: 5000 INJECTION INTRAVENOUS; SUBCUTANEOUS at 05:32

## 2021-03-20 RX ADMIN — THIAMINE HCL TAB 100 MG 100 MG: 100 TAB at 08:11

## 2021-03-20 RX ADMIN — ATORVASTATIN CALCIUM 40 MG: 40 TABLET, FILM COATED ORAL at 08:11

## 2021-03-20 RX ADMIN — HEPARIN SODIUM 5000 UNITS: 5000 INJECTION INTRAVENOUS; SUBCUTANEOUS at 13:48

## 2021-03-20 RX ADMIN — ASPIRIN 81 MG: 81 TABLET, COATED ORAL at 08:11

## 2021-03-20 RX ADMIN — CITALOPRAM HYDROBROMIDE 10 MG: 10 TABLET ORAL at 08:11

## 2021-03-20 RX ADMIN — CARVEDILOL 3.12 MG: 3.12 TABLET, FILM COATED ORAL at 17:11

## 2021-03-20 RX ADMIN — FERROUS SULFATE TAB 325 MG (65 MG ELEMENTAL FE) 325 MG: 325 (65 FE) TAB at 08:11

## 2021-03-20 NOTE — PROGRESS NOTES
[address] [facility]  Progress Note - Jose D Bradley 5/5/1932, 80 y o  female MRN: [id number]  Unit/Bed#: [facility] 631-01 Encounter: [id number]  Primary Care Provider: Delsa Collet, DO   Date and time admitted to hospital: 3/17/2021  7:15 PM    No new Assessment & Plan notes have been filed under this hospital service since the last note was generated  Service: Trauma        Disposition:  Continue current level of care, PT/OT recommends rehab, dispo planning      SUBJECTIVE:  Chief Complaint:  I do not know why I kept falling    Subjective:   Patient seen and evaluated this morning, she offered no new complaints  She does report bilateral hand and leg numbness which she states is not new  She states this has been ongoing prior to recent fall    No headache, vision changes, chest pain, shortness of breath, abdominal pain, nausea or vomiting      OBJECTIVE:     Meds/Allergies     Current Facility-Administered Medications:     acetaminophen (TYLENOL) tablet 650 mg, 650 mg, Oral, Q6H PRN, Jerica Arroyo MD    albuterol (PROVENTIL HFA,VENTOLIN HFA) inhaler 2 puff, 2 puff, Inhalation, Q4H PRN, Clemencia Moss MD    aspirin (ECOTRIN LOW STRENGTH) EC tablet 81 mg, 81 mg, Oral, Daily, Clemencia Moss MD, 81 mg at 03/20/21 6409    atorvastatin (LIPITOR) tablet 40 mg, 40 mg, Oral, Daily, Jerica Arroyo MD, 40 mg at 03/20/21 6628    carvedilol (COREG) tablet 3 125 mg, 3 125 mg, Oral, BID With Meals, Jerica Arroyo MD, 3 125 mg at 03/20/21 8812    citalopram (CeleXA) tablet 10 mg, 10 mg, Oral, Daily, Jerica Arroyo MD, 10 mg at 03/20/21 3062    docusate sodium (COLACE) capsule 100 mg, 100 mg, Oral, Daily, Jerica Arroyo MD, Stopped at 03/20/21 4406    ferrous sulfate tablet 325 mg, 325 mg, Oral, Daily With Breakfast, Jerica Arroyo MD, 325 mg at 19/30/54 1172    folic acid (FOLVITE) tablet 1 mg, 1 mg, Oral, Daily, Jerica Arroyo MD, 1 mg at 03/20/21 9200   furosemide (LASIX) tablet 40 mg, 40 mg, Oral, Daily, David Granger MD, 40 mg at 03/20/21 0811    heparin (porcine) subcutaneous injection 5,000 Units, 5,000 Units, Subcutaneous, Q8H [facility], Loren Roy MD, 5,000 Units at 03/20/21 0532    melatonin tablet 3 mg, 3 mg, Oral, HS, David Granger MD, 3 mg at 03/19/21 2148    multivitamin with iron-minerals liquid 15 mL, 15 mL, Oral, Daily, David Granger MD, 15 mL at 03/20/21 0811    ondansetron (ZOFRAN) injection 4 mg, 4 mg, Intravenous, Q6H PRN, David Granger MD    polyethylene glycol (MIRALAX) packet 17 g, 17 g, Oral, Daily, David Granger MD, Stopped at 03/20/21 2688    thiamine tablet 100 mg, 100 mg, Oral, Daily, David Granger MD, 100 mg at 03/20/21 0811     Vitals:   Vitals:    03/20/21 0857   BP: 131/53   Pulse: 65   Resp: 18   Temp: (!) 97 °F (36 1 °C)   SpO2: 94%       Intake/Output:  I/O       03/18 0701 - 03/19 0700 03/19 0701 - 03/20 0700 03/20 0701 - 03/21 0700    P  O  480 628     Total Intake(mL/kg) 480 (5 7) 628 (7 5)     Urine (mL/kg/hr) 837 (0 4) 447 (0 2) 122 (0 6)    Stool 0 0     Total Output 837 447 122    Net -357 +181 -122           Unmeasured Urine Occurrence 4 x 2 x     Unmeasured Stool Occurrence 5 x 2 x            Nutrition/GI Proph/Bowel Reg:  Dysphagia 1, MiraLax    Physical Exam:   GENERAL APPEARANCE:  Elderly appearing female, no acute distress  NEURO:  GCS 15, sensation grossly intact, cranial nerves 2-12 intact  HEENT:  Normocephalic, facial ecchymosis and swelling, c-collar  CV:  Regular rate and rhythm, no murmurs  LUNGS:  Clear to auscultation bilaterally, no respiratory distress  GI:  Abdomen soft, nontender, nondistended  :  Deferred  MSK:  Full range of motion, no deformity  SKIN:  Warm and dry    Invasive Devices     Peripheral Intravenous Line            Peripheral IV 03/17/21 Right Antecubital 2 days          Drain            External Urinary Catheter 1 day                 Lab Results:  I have reviewed all recent laboratory results   Imaging/EKG Studies: Results: I have personally reviewed pertinent reports      VTE Prophylaxis: Heparin

## 2021-03-20 NOTE — ASSESSMENT & PLAN NOTE
- 3/17 CT- Cspine: Acute avulsion at the anterior-inferior corner of C6 vertebral body with mild widening of the anterior C6-7 disc space compared with the prior study  Nondisplaced fracture right C7 and possibly right C6 facet    - Neurosurgery consulted:  Nothing to do acutely  Follow-up 2 weeks with repeat AP lateral cervical spine x-rays  MRI deferred at this time no signs of myelopathy the patient has pacemaker    -C-collar at all times until follow-up in cleared by Neurosurgery

## 2021-03-20 NOTE — ASSESSMENT & PLAN NOTE
Patient reportedly has had frequent falls  - PT/OT recommends rehab  - case management for dispo planning

## 2021-03-20 NOTE — ASSESSMENT & PLAN NOTE
- 3/17 CT Facial bones: Worsening of nasal bone fractures after prior treatment per facial plastic surgery Dr Clint Lee on 2/8/21  - Facial plastic surgery consult for evaluation after new fall and worsening fractures  - Pending

## 2021-03-21 PROCEDURE — 97110 THERAPEUTIC EXERCISES: CPT

## 2021-03-21 PROCEDURE — 99232 SBSQ HOSP IP/OBS MODERATE 35: CPT | Performed by: SURGERY

## 2021-03-21 PROCEDURE — 97535 SELF CARE MNGMENT TRAINING: CPT

## 2021-03-21 PROCEDURE — 97530 THERAPEUTIC ACTIVITIES: CPT

## 2021-03-21 RX ORDER — LANOLIN ALCOHOL/MO/W.PET/CERES
6 CREAM (GRAM) TOPICAL
Status: DISCONTINUED | OUTPATIENT
Start: 2021-03-21 | End: 2021-03-22 | Stop reason: HOSPADM

## 2021-03-21 RX ADMIN — ATORVASTATIN CALCIUM 40 MG: 40 TABLET, FILM COATED ORAL at 09:11

## 2021-03-21 RX ADMIN — HEPARIN SODIUM 5000 UNITS: 5000 INJECTION INTRAVENOUS; SUBCUTANEOUS at 14:11

## 2021-03-21 RX ADMIN — FERROUS SULFATE TAB 325 MG (65 MG ELEMENTAL FE) 325 MG: 325 (65 FE) TAB at 09:11

## 2021-03-21 RX ADMIN — HEPARIN SODIUM 5000 UNITS: 5000 INJECTION INTRAVENOUS; SUBCUTANEOUS at 05:20

## 2021-03-21 RX ADMIN — CARVEDILOL 3.12 MG: 3.12 TABLET, FILM COATED ORAL at 09:11

## 2021-03-21 RX ADMIN — FOLIC ACID 1 MG: 1 TABLET ORAL at 09:11

## 2021-03-21 RX ADMIN — HEPARIN SODIUM 5000 UNITS: 5000 INJECTION INTRAVENOUS; SUBCUTANEOUS at 21:40

## 2021-03-21 RX ADMIN — FUROSEMIDE 40 MG: 40 TABLET ORAL at 09:11

## 2021-03-21 RX ADMIN — CARVEDILOL 3.12 MG: 3.12 TABLET, FILM COATED ORAL at 17:49

## 2021-03-21 RX ADMIN — ACETAMINOPHEN 650 MG: 325 TABLET, FILM COATED ORAL at 21:40

## 2021-03-21 RX ADMIN — MELATONIN TAB 3 MG 6 MG: 3 TAB at 21:40

## 2021-03-21 RX ADMIN — ASPIRIN 81 MG: 81 TABLET, COATED ORAL at 09:11

## 2021-03-21 RX ADMIN — SENNOSIDES A AND B 15 ML: 8.8 SYRUP ORAL at 09:11

## 2021-03-21 RX ADMIN — CITALOPRAM HYDROBROMIDE 10 MG: 10 TABLET ORAL at 09:11

## 2021-03-21 RX ADMIN — THIAMINE HCL TAB 100 MG 100 MG: 100 TAB at 09:11

## 2021-03-21 NOTE — PHYSICAL THERAPY NOTE
Physical Therapy Progress Note     03/21/21 1414   PT Last Visit   PT Visit Date 03/21/21   Note Type   Note Type Treatment   Pain Assessment   Pain Assessment Tool Pain Assessment not indicated - pt denies pain   Subjective   Subjective Pt encountered seated in recliner, pleasant and agreeable to treatment  Family present & very supportive & encouraging  They are concerned regarding pt's weakness & inabilty to maintain activity since COVID began last year  Pt appears fatigued but relaxed in recliner during session  Transfers   Additional Comments attempted forward & backwards scooting, requiring Ax1-2 to reposition   Balance   Static Sitting Fair -   Dynamic Sitting Poor +   Endurance Deficit   Endurance Deficit Yes   Endurance Deficit Description fatigue, generalized weakness/deconditioning   Activity Tolerance   Activity Tolerance Patient tolerated treatment well;Patient limited by fatigue   Nurse AMBER Ferrari   Exercises   Hip Flexion Sitting;20 reps;AAROM; Bilateral   Hip Abduction Sitting;20 reps;AAROM; Bilateral   Knee AROM Long Arc Quad Sitting;20 reps;Bilateral;AROM   Ankle Pumps Sitting;20 reps;AROM; Bilateral  (toe wiggling RLE)   Assessment   Prognosis Fair   Problem List Decreased strength;Decreased range of motion;Decreased endurance; Impaired balance;Decreased mobility; Decreased cognition; Impaired judgement;Decreased safety awareness;Orthopedic restrictions;Pain   Assessment Pt participated in session consisting of LE exercises as noted above  Pt able to perform all exercises without complaints of increased pain  Assist given to improve AROM with hip movements  Pt required assist to reposition in reclienr as she began to slouch & could not readjust herself  Further transfers deferred due to pt fatigue, lack of additional assist, and pt request to remain in recliner for remainder of afternoon    Discussed role of rehab, short term goals to improve transfers with reduced assist & maintain strength & endurance until pt cleared for increased activity  Due to these deficits, POC and discharge plan remain appropriate at this time  Goals   Patient Goals to be active again   STG Expiration Date 03/29/21   PT Treatment Day 1   Plan   Treatment/Interventions Functional transfer training;LE strengthening/ROM; Therapeutic exercise; Endurance training;Patient/family training;Bed mobility; Equipment eval/education   Progress Progressing toward goals   PT Frequency Other (Comment)  (3-6x/week as per PT schedule transcribed by evaluating PT)   Recommendation   PT Discharge Recommendation Post-Acute Rehabilitation Services   Equipment Recommended Wheelchair;Walker  [facility])   AM-PAC Basic Mobility Inpatient   Turning in Bed Without Bedrails 2   Lying on Back to Sitting on Edge of Flat Bed 1   Moving Bed to Chair 1   Standing Up From Chair 1   Walk in Room 1   Climb 3-5 Stairs 1   Basic Mobility Inpatient Raw Score 7   Turning Head Towards Sound 4   Follow Simple Instructions 4   Low Function Basic Mobility Raw Score 15   Low Function Basic Mobility Standardized Score 23 9   The patient's AM-PAC Basic Mobility Inpatient Short Form Low Function Raw Score 15 , Standardized Score is 23 9  A standardized score less 42 9 suggests the patient may benefit from discharge to post-acute rehab services  Please also refer to the recommendation of the Physical Therapist for safe discharge planning        Lauren Beltrán, PTA

## 2021-03-21 NOTE — ASSESSMENT & PLAN NOTE
- 3/17 CT Facial bones: Worsening of nasal bone fractures after prior treatment per facial plastic surgery Dr Wojciech Wilson on 2/8/21  -patient to follow-up with plastic surgeon as an outpatient

## 2021-03-21 NOTE — PROGRESS NOTES
[address] [facility]  Progress Note - Valentina Ranjan 5/5/1932, 80 y o  female MRN: [id number]  Unit/Bed#: [facility] 631-01 Encounter: [id number]  Primary Care Provider: Harley Shen DO   Date and time admitted to hospital: 3/17/2021  7:15 PM    Nasal bone fracture  Assessment & Plan  - 3/17 CT Facial bones: Worsening of nasal bone fractures after prior treatment per facial plastic surgery Dr Paul Rouse on 2/8/21  -patient to follow-up with plastic surgeon as an outpatient      Ambulatory dysfunction  Assessment & Plan  Patient reportedly has had frequent falls  - PT/OT recommends rehab  - case management for dispo planning    * Closed cervical spine fracture [facility])  Assessment & Plan  - 3/17 CT- Cspine: Acute avulsion at the anterior-inferior corner of C6 vertebral body with mild widening of the anterior C6-7 disc space compared with the prior study  Nondisplaced fracture right C7 and possibly right C6 facet    - Neurosurgery consulted:  Nothing to do acutely  Follow-up 2 weeks with repeat AP lateral cervical spine x-rays  MRI deferred at this time no signs of myelopathy the patient has pacemaker  -C-collar at all times until follow-up in cleared by Neurosurgery        Disposition: Pending placement, continue current level of care      SUBJECTIVE:  Chief Complaint:  Difficulty sleeping    Subjective:   Patient seen and evaluated this morning, she was resting comfortably in bed  She reports difficulty sleeping, but denies any other complaints  Pain is well controlled  No headache, vision changes, chest pain, shortness of breath, abdominal pain, nausea or vomiting  No paresthesias        OBJECTIVE:     Meds/Allergies     Current Facility-Administered Medications:     acetaminophen (TYLENOL) tablet 650 mg, 650 mg, Oral, Q6H PRN, Martin Somers MD    albuterol (PROVENTIL HFA,VENTOLIN HFA) inhaler 2 puff, 2 puff, Inhalation, Q4H PRN, Jasper Pleitez MD    aspirin (ECOTRIN LOW STRENGTH) EC tablet 81 mg, 81 mg, Oral, Daily, Wu Soria MD, 81 mg at 03/20/21 6716    atorvastatin (LIPITOR) tablet 40 mg, 40 mg, Oral, Daily, Allan Loja MD, 40 mg at 03/20/21 5892    carvedilol (COREG) tablet 3 125 mg, 3 125 mg, Oral, BID With Meals, Allan Loja MD, 3 125 mg at 03/20/21 1711    citalopram (CeleXA) tablet 10 mg, 10 mg, Oral, Daily, Allan Loja MD, 10 mg at 03/20/21 6575    docusate sodium (COLACE) capsule 100 mg, 100 mg, Oral, Daily, Allan Loja MD, Stopped at 03/20/21 2108    ferrous sulfate tablet 325 mg, 325 mg, Oral, Daily With Breakfast, Allan Loja MD, 325 mg at 45/30/44 2623    folic acid (FOLVITE) tablet 1 mg, 1 mg, Oral, Daily, Allan Loja MD, 1 mg at 03/20/21 3377    furosemide (LASIX) tablet 40 mg, 40 mg, Oral, Daily, Allan Loja MD, 40 mg at 03/20/21 0811    heparin (porcine) subcutaneous injection 5,000 Units, 5,000 Units, Subcutaneous, Q8H [address], Wu Soria MD, 5,000 Units at 03/21/21 0520    melatonin tablet 6 mg, 6 mg, Oral, HS, Rosario Patel MD    multivitamin with iron-minerals liquid 15 mL, 15 mL, Oral, Daily, Allan Loja MD, 15 mL at 03/20/21 0811    ondansetron (ZOFRAN) injection 4 mg, 4 mg, Intravenous, Q6H PRN, Allan Loja MD    polyethylene glycol (MIRALAX) packet 17 g, 17 g, Oral, Daily, Allan Loja MD, Stopped at 03/20/21 0993    thiamine tablet 100 mg, 100 mg, Oral, Daily, Allan Loja MD, 100 mg at 03/20/21 0811     Vitals:   Vitals:    03/21/21 0649   BP: 114/51   Pulse: 71   Resp: 17   Temp: (!) 97 4 °F (36 3 °C)   SpO2: 94%       Intake/Output:  I/O       03/19 0701 - 03/20 0700 03/20 0701 - 03/21 0700    P  O  628 622    Total Intake(mL/kg) 628 (7 5) 622 (7 4)    Urine (mL/kg/hr) 447 (0 2) 203 (0 1)    Stool 0 0    Total Output 447 203    Net +181 +419          Unmeasured Urine Occurrence 2 x 1 x    Unmeasured Stool Occurrence 2 x 1 x           Nutrition/GI Proph/Bowel Reg: Dysphagia 1, GI prophylaxis not indicated, MiraLax    Physical Exam:   GENERAL APPEARANCE:  Elderly appearing female, no acute distress  NEURO:  GCS 15, sensation grossly intact, cranial nerves 2-12 intact, alert to person, place, and time  HEENT:  Normocephalic, facial ecchymosis and swelling, C-collar in place  CV:  Regular rate and rhythm no murmurs  LUNGS:  Clear to auscultation bilaterally, no respiratory distress  GI:  Abdomen soft, nontender, nondistended  :  Deferred  MSK:  Full range of motion, no deformities  SKIN:  Warm and dry    Invasive Devices     Peripheral Intravenous Line            Peripheral IV 03/17/21 Right Antecubital 3 days          Drain            External Urinary Catheter 2 days                 Lab Results: Results: I have personally reviewed pertinent reports  Imaging/EKG Studies: Results: I have personally reviewed pertinent reports      VTE Prophylaxis: Heparin

## 2021-03-21 NOTE — PLAN OF CARE
Problem: OCCUPATIONAL THERAPY ADULT  Goal: Performs self-care activities at highest level of function for planned discharge setting  See evaluation for individualized goals  Description: Treatment Interventions: ADL retraining, Functional transfer training, Endurance training, Cognitive reorientation, Patient/family training, Compensatory technique education, Activityengagement          See flowsheet documentation for full assessment, interventions and recommendations  Outcome: Progressing  Note: Limitation: Decreased ADL status, Decreased Safe judgement during ADL, Decreased cognition, Decreased endurance, Decreased self-care trans, Decreased high-level ADLs  Prognosis: Fair, Poor  Assessment: pt participated in am ot session and was seen focusing on adls seated in bedside chair  pt engaged in bathing and dressing needing mod to max asst x 1  pt was wearing hearing aids this session  pt reprots having had 5 children  pt is unableto maintain nwbing r le therepfore per nsg she perfomred asst x 2-3 trnasfer with them to get to recliner chair prior to ot session  pt was seen for adls in chair  OT Discharge Recommendation: Post-Acute Rehabilitation Services  OT - OK to Discharge:  Yes     VLAD Kay

## 2021-03-21 NOTE — PLAN OF CARE
Problem: PHYSICAL THERAPY ADULT  Goal: Performs mobility at highest level of function for planned discharge setting  See evaluation for individualized goals  Description: Treatment/Interventions: Functional transfer training, LE strengthening/ROM, Therapeutic exercise, Endurance training, Patient/family training, Equipment eval/education, Bed mobility, gait training, Continued evaluation, Spoke to nursing, OT  Equipment Recommended: Wheelchair       See flowsheet documentation for full assessment, interventions and recommendations  Outcome: Progressing  Note: Prognosis: Fair  Problem List: Decreased strength, Decreased range of motion, Decreased endurance, Impaired balance, Decreased mobility, Decreased cognition, Impaired judgement, Decreased safety awareness, Orthopedic restrictions, Pain  Assessment: Pt participated in session consisting of LE exercises as noted above  Pt able to perform all exercises without complaints of increased pain  Assist given to improve AROM with hip movements  Pt required assist to reposition in reclienr as she began to slouch & could not readjust herself  Further transfers deferred due to pt fatigue, lack of additional assist, and pt request to remain in recliner for remainder of afternoon  Discussed role of rehab, short term goals to improve transfers with reduced assist & maintain strength & endurance until pt cleared for increased activity  Due to these deficits, POC and discharge plan remain appropriate at this time  PT Discharge Recommendation: Post-Acute Rehabilitation Services     PT - OK to Discharge: Yes(To rehab when medically cleared)    See flowsheet documentation for full assessment

## 2021-03-21 NOTE — OCCUPATIONAL THERAPY NOTE
Occupational Therapy Treatment Note:       03/21/21 1140   OT Last Visit   OT Visit Date 03/21/21   Note Type   Note Type Treatment   Restrictions/Precautions   RLE Weight Bearing Per Order NWB   Braces or Orthoses C/S Collar   Other Precautions Cognitive; Chair Alarm; Bed Alarm;WBS;Fall Risk;Spinal precautions;Hard of hearing;Visual impairment  (large magnifying glass was lent to pt)   Pain Assessment   Pain Assessment Tool Pain Assessment not indicated - pt denies pain   Pain Score No Pain   ADL   Grooming Assistance 3  Moderate Assistance   Grooming Comments pt lacks arom b shoulders from previous injuries  she is unable to reach full face and hair to brush nor into axillary areas for grooming   UB Bathing Assistance 4  Minimal Assistance   LB Bathing Assistance 2  Maximal Assistance   UB Dressing Assistance 3  Moderate Assistance   LB Dressing Assistance 2  Maximal [address]  2  Maximal Assistance   Bed Mobility   Additional Comments pt seated in drop arm recliner c asst of nsg  session was conducted in this position   Cognition   Overall Cognitive Status Impaired   Arousal/Participation Alert   Attention Within functional limits   Orientation Level   (oriented to place and month generally, not to room# nor date)   Memory Decreased short term memory;Decreased recall of precautions   Following Commands Follows one step commands without difficulty   Vision   Vision Comments pt was provided with magnifying glass  to use while in hospital   Activity Tolerance   Activity Tolerance Patient tolerated treatment well   Assessment   Assessment pt participated in am ot session and was seen focusing on adls seated in bedside chair  pt engaged in bathing and dressing needing mod to max asst x 1  pt was wearing hearing aids this session  pt reprots having had 5 children   pt is unableto maintain nwbing r le therepfore per nsg she perfomred asst x 2-3 trnasfer with them to get to recliner chair prior to ot session  pt was seen for adls in chair  Plan   Treatment Interventions ADL retraining;Functional transfer training; Endurance training;Cognitive reorientation;Patient/family training;Equipment evaluation/education; Activityengagement   Goal Expiration Date 04/01/21   OT Treatment Day 2   OT Frequency 3-5x/wk   Recommendation   OT Discharge Recommendation Post-Acute Rehabilitation Services   OT - OK to Discharge Yes   AM-PAC Daily Activity Inpatient   Lower Body Dressing 2   Bathing 2   Toileting 2   Upper Body Dressing 2   Grooming 2   Eating 2   Daily Activity Raw Score 12   Daily Activity Standardized Score (Calc for Raw Score >=11) 30 6   AM-PAC Applied Cognition Inpatient   Following a Speech/Presentation 2   Understanding Ordinary Conversation 3   Taking Medications 2   Remembering Where Things Are Placed or Put Away 2   Remembering List of 4-5 Errands 2   Taking Care of Complicated Tasks 2   Applied Cognition Raw Score 13   Applied Cognition Standardized Score 30 46   April A VLAD Martinez

## 2021-03-22 VITALS
BODY MASS INDEX: 31.41 KG/M2 | RESPIRATION RATE: 16 BRPM | DIASTOLIC BLOOD PRESSURE: 52 MMHG | HEIGHT: 64 IN | TEMPERATURE: 97.5 F | OXYGEN SATURATION: 96 % | SYSTOLIC BLOOD PRESSURE: 125 MMHG | HEART RATE: 77 BPM | WEIGHT: 184 LBS

## 2021-03-22 LAB
FLUAV RNA RESP QL NAA+PROBE: NEGATIVE
FLUBV RNA RESP QL NAA+PROBE: NEGATIVE
RSV RNA RESP QL NAA+PROBE: NEGATIVE
SARS-COV-2 RNA RESP QL NAA+PROBE: NEGATIVE

## 2021-03-22 PROCEDURE — 92526 ORAL FUNCTION THERAPY: CPT

## 2021-03-22 PROCEDURE — 99238 HOSP IP/OBS DSCHRG MGMT 30/<: CPT | Performed by: EMERGENCY MEDICINE

## 2021-03-22 PROCEDURE — NC001 PR NO CHARGE: Performed by: EMERGENCY MEDICINE

## 2021-03-22 PROCEDURE — 0241U HB NFCT DS VIR RESP RNA 4 TRGT: CPT | Performed by: PHYSICIAN ASSISTANT

## 2021-03-22 RX ORDER — CARVEDILOL 3.12 MG/1
3.12 TABLET ORAL 2 TIMES DAILY WITH MEALS
Refills: 0
Start: 2021-03-22

## 2021-03-22 RX ADMIN — POLYETHYLENE GLYCOL 3350 17 G: 17 POWDER, FOR SOLUTION ORAL at 10:00

## 2021-03-22 RX ADMIN — FOLIC ACID 1 MG: 1 TABLET ORAL at 10:00

## 2021-03-22 RX ADMIN — FERROUS SULFATE TAB 325 MG (65 MG ELEMENTAL FE) 325 MG: 325 (65 FE) TAB at 10:00

## 2021-03-22 RX ADMIN — THIAMINE HCL TAB 100 MG 100 MG: 100 TAB at 10:00

## 2021-03-22 RX ADMIN — ATORVASTATIN CALCIUM 40 MG: 40 TABLET, FILM COATED ORAL at 10:00

## 2021-03-22 RX ADMIN — FUROSEMIDE 40 MG: 40 TABLET ORAL at 10:00

## 2021-03-22 RX ADMIN — HEPARIN SODIUM 5000 UNITS: 5000 INJECTION INTRAVENOUS; SUBCUTANEOUS at 15:18

## 2021-03-22 RX ADMIN — HEPARIN SODIUM 5000 UNITS: 5000 INJECTION INTRAVENOUS; SUBCUTANEOUS at 06:09

## 2021-03-22 RX ADMIN — CITALOPRAM HYDROBROMIDE 10 MG: 10 TABLET ORAL at 10:00

## 2021-03-22 RX ADMIN — SENNOSIDES A AND B 15 ML: 8.8 SYRUP ORAL at 10:21

## 2021-03-22 RX ADMIN — CARVEDILOL 3.12 MG: 3.12 TABLET, FILM COATED ORAL at 17:45

## 2021-03-22 RX ADMIN — CARVEDILOL 3.12 MG: 3.12 TABLET, FILM COATED ORAL at 10:00

## 2021-03-22 NOTE — DISCHARGE SUMMARY
[address] [facility]  Discharge- Lilli Lino 5/5/1932, 80 y o  female MRN: [id number]  Unit/Bed#: [facility] 631-01 Encounter: [id number]  Primary Care Provider: Antonia Chavez,    Date and time admitted to hospital: 3/17/2021  7:15 PM    Nasal bone fracture  Assessment & Plan  - 3/17 CT Facial bones: Worsening of nasal bone fractures after prior treatment per facial plastic surgery Dr Carlene Ag on 2/8/21  - Multimodal pain management  -patient to follow-up with plastic surgeon as an outpatient      Closed right ankle fracture  Assessment & Plan  - S/p fall on 3/11, seen at [facility] and evaluated by podiatry  - Podiatry reduced her bimalleolar ankle fractures, non-operative management  - Patient discharged to rehab, where she sustained another fall and was transferred from [facility] to [address] [place] [address] for neurosurgery evaluation for cervical fractures  - Multimodal pain management  - SQH, left sided SCD for DVT ppx  - Splint in place, NVI   - PT and OT recommend inpatient rehab  - Podiatry outpatient follow up appointment scheduled for 3/22 but patient remains inpatient  Spoke with office of Rosmery Kirk and the podiatry resident will come see patient while she is inpatient    Inpatient consult to podiatry    Ambulatory dysfunction  Assessment & Plan  Patient reportedly has had frequent falls  - PT/OT recommends rehab  - case management for dispo planning    Acute on chronic combined systolic and diastolic congestive heart failure (HCC)  Assessment & Plan  Wt Readings from Last 3 Encounters:   03/18/21 83 5 kg (184 lb)   03/16/21 83 9 kg (184 lb 15 5 oz)   02/08/21 84 8 kg (187 lb)     - Baseline shortness of breath, does not require supplemental O2   - Lower extremity edema   - Apply NC O2 if needed, O2 sat <94%  - Continue Lasix  - Monitor I/O        Atrial fibrillation (HCC)  Assessment & Plan  - Continue carvedilol    * Closed cervical spine fracture [facility])  Assessment & Plan  - 3/17 CT- Cspine: Acute avulsion at the anterior-inferior corner of C6 vertebral body with mild widening of the anterior C6-7 disc space compared with the prior study  Nondisplaced fracture right C7 and possibly right C6 facet    - Neurosurgery consulted:  Nothing to do acutely  Follow-up 2 weeks with repeat AP lateral cervical spine x-rays  MRI deferred at this time no signs of myelopathy the patient has pacemaker   - Multimodal pain management  - SQH for DVT ppx  -C-collar at all times until follow-up appointment and cleared by Neurosurgery      DVT ppx: SQH, SCD  PT and OT: rehab    Disposition: Discharge to SNF  Follow up outpatient with Podiatry, Neurosurgery  SUBJECTIVE:  Chief Complaint: "I'm alright"    Subjective:  Patient admits that she feels all right today and that her cervical collar is bothering her  She admits that she feels best when she is sitting up in the chair  She admits that she has a mild headache in her forehead and the tylenol helps  She reports facial swelling  She denies dizziness, vision changes, chest pain, shortness of breath, abdominal pain          OBJECTIVE:     Meds/Allergies     Current Facility-Administered Medications:     acetaminophen (TYLENOL) tablet 650 mg, 650 mg, Oral, Q6H PRN, Allan Loja MD, 650 mg at 03/21/21 2140    albuterol (PROVENTIL HFA,VENTOLIN HFA) inhaler 2 puff, 2 puff, Inhalation, Q4H PRN, Wu Soria MD    aspirin (ECOTRIN LOW STRENGTH) EC tablet 81 mg, 81 mg, Oral, Daily, Wu Soria MD, 81 mg at 03/21/21 0911    atorvastatin (LIPITOR) tablet 40 mg, 40 mg, Oral, Daily, Allan Loja MD, 40 mg at 03/22/21 1000    carvedilol (COREG) tablet 3 125 mg, 3 125 mg, Oral, BID With Meals, Allan Loja MD, 3 125 mg at 03/22/21 1000    citalopram (CeleXA) tablet 10 mg, 10 mg, Oral, Daily, Allan Loja MD, 10 mg at 03/22/21 1000    docusate sodium (COLACE) capsule 100 mg, 100 mg, Oral, Daily, Allan Loja MD, Stopped at 03/20/21 9263    ferrous sulfate tablet 325 mg, 325 mg, Oral, Daily With Breakfast, Jeremy Sutherland MD, 325 mg at 97/75/48 9938    folic acid (FOLVITE) tablet 1 mg, 1 mg, Oral, Daily, Jeremy Sutherland MD, 1 mg at 03/22/21 1000    furosemide (LASIX) tablet 40 mg, 40 mg, Oral, Daily, Jeremy Sutherland MD, 40 mg at 03/22/21 1000    heparin (porcine) subcutaneous injection 5,000 Units, 5,000 Units, Subcutaneous, Q8H [address], Nisha Loyola MD, 5,000 Units at 03/22/21 1518    melatonin tablet 6 mg, 6 mg, Oral, HS, Santiago Patel MD, 6 mg at 03/21/21 2140    multivitamin with iron-minerals liquid 15 mL, 15 mL, Oral, Daily, Jeremy Sutherland MD, 15 mL at 03/22/21 1021    ondansetron (ZOFRAN) injection 4 mg, 4 mg, Intravenous, Q6H PRN, Jeremy Sutherland MD    polyethylene glycol (MIRALAX) packet 17 g, 17 g, Oral, Daily, Jeremy Sutherland MD, 17 g at 03/22/21 1000    thiamine tablet 100 mg, 100 mg, Oral, Daily, Jeremy Sutherland MD, 100 mg at 03/22/21 1000     Vitals:   Vitals:    03/22/21 1438   BP: 125/52   Pulse: 77   Resp: 16   Temp: 97 5 °F (36 4 °C)   SpO2: 96%       Intake/Output:  I/O       03/20 0701 - 03/21 0700 03/21 0701 - 03/22 0700 03/22 0701 - 03/23 0700    P  O  622 990 240    Total Intake(mL/kg) 622 (7 4) 990 (11 9) 240 (2 9)    Urine (mL/kg/hr) 203 (0 1) 460 (0 2)     Stool 0      Total Output 203 460     Net +419 +530 +240           Unmeasured Urine Occurrence 1 x 2 x     Unmeasured Stool Occurrence 1 x             Nutrition/GI Proph/Bowel Reg: Colace, miralax    Physical Exam:   GENERAL APPEARANCE: No acute distress  NEURO: GCS 15, chronic numbness in bilateral feet  HEENT: Extensive ecchymosis and swelling to bilateral periorbital areas, forehead, chin  EOMI, PERRLA  CV:  Regular heart rate and rhythm   LUNGS:  CTAB, mild shortness of breath on room air saturations 99%  GI:  Nontender, nondistended  :  Voiding  MSK: RLE splint in place, NVI  2/4 radial and pedal pulses bilaterally  Motor function, ROM, strength intact throughout  SKIN: Pink, warm, dry      Resolved Problems  Date Reviewed: 3/22/2021    None          Admission Date:   Admission Orders (From admission, onward)     Ordered        03/17/21 2007  Inpatient Admission  Once                     Admitting Diagnosis: Closed fracture of multiple cervical vertebrae, initial encounter ([facility] [[email or web address] ) [S12  9XXA]    HPI: written by Loren Banks MD 3/17/2021 "Jeanna Ford is a 80 y o  female who presents as a transfer [facility]  History is provided by daughter at bedside  She states that the patient was recently admitted to the [facility] where she was found to have a nasal bone fracture which was repaired by Plastic surgery as well as a right ankle fracture which was surgically repaired by Orthopedics  She was then discharged earlier today to a rehab facility  While there she had another fall forward with new facial trauma and was transported back to [facility] for re-evaluation  CT of head and C-spine showed worsening of the nasal bone fracture as well as cervical spine fractures  Secondary to cervical spine fracture she was transferred to our [place] for evaluation by Neurosurgery   "    Procedures Performed: No orders of the defined types were placed in this encounter  Summary of Hospital Course:  Patient was transferred to [facility] 3/17/2021 for evaluation by the trauma service after sustaining a C6, C7 fractures  Imaging revealed acute on subacute nasal bone fractures with worsening displacement, acute avulsion at the inferior-anterior corner of C6 vertebral body with mild widening of anterior C6 through 7 disc space, nondisplaced fracture of the right C7 and possibly C6 facet  No acute intracranial abnormality   Patient was admitted to the hospital for Neurosurgery evaluation, which determined an MRI was not warranted for possible ligamentous injury because patient did not have symptoms of spinal cord impingement, daughter also reports that patient's pacemaker is not MRI compatible  Patient is to stay in [place] cervical collar at all times  Patient had previous right ankle fracture treated at [facility] by Podiatry, podiatry was consulted inpatient  Patient was also seen by Gerontology for assistance in managing chronic conditions and recent ambulatory dysfunction  Patient's pain was controlled throughout inpatient stay with Case management was involved for disposition planning  Significant Findings, Care, Treatment and Services Provided:   CT head wo contrast   Final Result by Didier Vail DO (03/19 1141)   Stable cerebral atrophy with chronic small vessel ischemic white matter disease  No acute intracranial abnormality  Workstation performed: [id number]         XR spine cervical 2 or 3 vw injury   Final Result by Pauline Velez MD (03/19 9500)      No acute osseous abnormality  Workstation performed: [id number]         XR spine cervical 2 or 3 vw injury    (Results Pending)         Complications: none    Condition at Discharge: stable         Discharge instructions/Information to patient and family:   See after visit summary for information provided to patient and family  Provisions for Follow-Up Care:  See after visit summary for information related to follow-up care and any pertinent home health orders  PCP: Aide Fountain DO    Disposition: Skilled nursing facility at [facility]    Planned Readmission: No    Discharge Statement   I spent 30 minutes discharging the patient  This time was spent on the day of discharge  I had direct contact with the patient on the day of discharge  Additional documentation is required if more than 30 minutes were spent on discharge  Discharge Medications:  See after visit summary for reconciled discharge medications provided to patient and family

## 2021-03-22 NOTE — ASSESSMENT & PLAN NOTE
- 3/17 CT Facial bones: Worsening of nasal bone fractures after prior treatment per facial plastic surgery Dr Yadira Reardon on 2/8/21  - Multimodal pain management  -patient to follow-up with plastic surgeon as an outpatient

## 2021-03-22 NOTE — CASE MANAGEMENT
Pt accepted to [facility]  CM contacted them and submitted for insurance auth   Once obtained, pt can d/c to [facility]

## 2021-03-22 NOTE — DISCHARGE INSTRUCTIONS
Follow up with Podiatrist, Dr Danilo Yan for right ankle fracture outpatient  Patient was seen by Podiatry inpatient and right lower extremity is non-weightbearing and the splint/dressing is to remain clean, dry, intact until outpatient follow up with surgeon  Neurosurgery discharge instructions following neck fracture:      VISTA collar at all times except for showering change to mars (peach) collar   No bending, twisting or heavy lifting  No pushing or pulling over 10lbs  No strenuous activities  NO DRIVING  **Please notify MD immediately if you have increased neck or arm pain  New numbness and/or weakness in your arm  Difficulty swallowing or breathing especially while lying down  Numbness or weakness in arms or legs  Increased difficulty walking **          Cervical Fracture   WHAT YOU NEED TO KNOW:   A cervical fracture is a break in a vertebra (bone) in your neck  The 7 cervical vertebrae are called C1 through C7  Cervical vertebrae support your head and allow your neck to bend and twist  The vertebrae enclose and protect the spinal cord  Nerves in the spinal cord control your ability to move  DISCHARGE INSTRUCTIONS:   Call your local emergency number (911 in the [address] [place] [address],[address]) or have someone else call if:   · You feel lightheaded, short of breath, or have chest pain  · You cough up blood  · You cannot feel or move your arms or legs  Seek care immediately if:   · You have a sudden, severe headache with nausea and vomiting  · You are seeing double or suddenly cannot see  · You cannot stay awake  · The pins in your halo brace have loosened or look deeper in the skin than before  · You feel new weakness or numbness in your hands or fingers  · Your arm or leg feels warm, tender, and painful  It may look swollen and red  Call your doctor or neurologist if:   · You have a fever  · You see a skin rash, redness, or sores under your brace      · You have problems swallowing while you are wearing your halo brace  · Your neck pain is not getting better even with treatment  · You have questions or concerns about your cervical fracture, medicine, or care  Medicines:   · Prescription pain medicine  may be given  Ask your healthcare provider how to take this medicine safely  Some prescription pain medicines contain acetaminophen  Do not take other medicines that contain acetaminophen without talking to your healthcare provider  Too much acetaminophen may cause liver damage  Prescription pain medicine may cause constipation  Ask your healthcare provider how to prevent or treat constipation  · Take your medicine as directed  Contact your healthcare provider if you think your medicine is not helping or if you have side effects  Tell him or her if you are allergic to any medicine  Keep a list of the medicines, vitamins, and herbs you take  Include the amounts, and when and why you take them  Bring the list or the pill bottles to follow-up visits  Carry your medicine list with you in case of an emergency  Skin and brace care:  Skin breakdown can lead to deep wounds caused by pressure or pulling on your skin  Check your chin, ears, back of your head, and shoulders for redness or sores if you are wearing a brace  Check the skin daily around halo brace pins for signs of infection, such as redness or bad-smelling drainage  Change your vest lining if it gets wet  Ask your healthcare provider how to care for your halo pins and vest  Ask your provider for more information about using a halo brace, semirigid collar, or soft collar  Therapy  may be recommended  A physical therapist and an occupational therapist may exercise your arms, legs, and hands  They may also teach you new ways to do things around the house  A speech therapist may work with you to help you talk or swallow    Follow up with your doctor or neurologist as directed:  Write down your questions so you remember to ask them during your visits  © Copyright [address] Information is for End User's use only and may not be sold, redistributed or otherwise used for commercial purposes  All illustrations and images included in CareNotes® are the copyrighted property of A D A M , Inc  or Ronda Silverio  The above information is an  only  It is not intended as medical advice for individual conditions or treatments  Talk to your doctor, nurse or pharmacist before following any medical regimen to see if it is safe and effective for you

## 2021-03-22 NOTE — ASSESSMENT & PLAN NOTE
Wt Readings from Last 3 Encounters:   03/18/21 83 5 kg (184 lb)   03/16/21 83 9 kg (184 lb 15 5 oz)   02/08/21 84 8 kg (187 lb)     - Baseline shortness of breath, does not require supplemental O2   - Lower extremity edema   - Apply NC O2 if needed, O2 sat <94%  - Continue Lasix  - Monitor I/O

## 2021-03-22 NOTE — QUICK NOTE
Right lower extremity splint and dressing removed at bedside  Incisions evaluated  Skin margins well approximated with sutures intact, no signs of acute infection or dehiscence noted at this time  Surgical sites appear stable and healing uneventfully  Applied new below-knee posterior splint with sugar-tong, secured with Ace wrap  Update:  Was paged by nursing the patient was experiencing posterior calf burning with application of new splint  Ace wrap was loosened and symptoms subsided  Patient was evaluated and splint is well contoured without any focal areas of pressure  Patient states that pain is no longer present with the loosening of Ace wrap  Patient is stable for discharge at this time from Podiatry point of view

## 2021-03-22 NOTE — ASSESSMENT & PLAN NOTE
- S/p fall on 3/11, seen at [facility] and evaluated by podiatry  - Podiatry reduced her bimalleolar ankle fractures, non-operative management  - Patient discharged to rehab, where she sustained another fall and was transferred from [facility] to [facility] for neurosurgery evaluation for cervical fractures  - Multimodal pain management  - SQH, left sided SCD for DVT ppx  - Splint in place, NVI   - PT and OT recommend inpatient rehab  - Podiatry outpatient follow up appointment scheduled for 3/22 but patient remains inpatient  Spoke with office of Ileana Sutherland and the podiatry resident will come see patient while she is inpatient    Inpatient consult to podiatry

## 2021-03-22 NOTE — TRANSPORTATION MEDICAL NECESSITY
Section I - General Information    Name of Patient: Ashley Samaniego                 : 1932    Medicare #: [id number]  Transport Date: 21 (PCS is valid for round trips on this date and for all repetitive trips in the 60-day range as noted below )  Origin: [address] [facility]                                                         Destination: [facility]  Is the pt's stay covered under Medicare Part A (PPS/DRG)   []     Closest appropriate facility? If no, why is transport to more distant facility required? Yes  If hospice pt, is this transport related to pt's terminal illness? No       Section II - Medical Necessity Questionnaire  Ambulance transportation is medically necessary only if other means of transport are contraindicated or would be potentially harmful to the patient  To meet this requirement, the patient must either be "bed confined" or suffer from a condition such that transport by means other than ambulance is contraindicated by the patient's condition  The following questions must be answered by the medical professional signing below for this form to be valid:    1)  Describe the MEDICAL CONDITION (physical and/or mental) of this patient AT [address] [place] [address] that requires the patient to be transported in an ambulance and why transport by other means is contraindicated by the patient's condition: Closed cervical spine fracture, Nasal bone fracture, Closed right ankle fracture, falls    2) Is the patient "bed confined" as defined below? No  To be "be confined" the patient must satisfy all three of the following conditions: (1) unable to get up from bed without Assistance; AND (2) unable to ambulate; AND (3) unable to sit in a chair or wheelchair  3) Can this patient safely be transported by car or wheelchair van (i e , seated during transport without a medical attendant or monitoring)?    No    4) In addition to completing questions 1-3 above, please check any of the following conditions that apply*:   *Note: supporting documentation for any boxes checked must be maintained in the patient's medical records  If hosp-hosp transfer, describe services needed at 2nd facility not available at 1st facility? Non-Healed Fractures  Patient is confused  Moderate/severe pain on movement   Unable to tolerate seated position for time needed to transport       Section III - Signature of Physician or Healthcare Professional  I certify that the above information is true and correct based on my evaluation of this patient, and represent that the patient requires transport by ambulance and that other forms of transport are contraindicated  I understand that this information will be used by the Centers for Medicare and Medicaid Services (CMS) to support the determination of medical necessity for ambulance services, and I represent that I have personal knowledge of the patient's condition at time of transport  []  If this box is checked, I also certify that the patient is physically or mentally incapable of signing the ambulance service's claim and that the institution with which I am affiliated has furnished care, services, or assistance to the patient  My signature below is made on behalf of the patient pursuant to 42 CFR §424 36(b)(4)   In accordance with 42 CFR §424 37, the specific reason(s) that the patient is physically or mentally incapable of signing the claim form is as follows:      Signature of Physician* or Healthcare Professional______________________________________________________________  Signature Date 03/22/21 (For scheduled repetitive transports, this form is not valid for transports performed more than 60 days after this date)    Printed Name & Credentials of Physician or Healthcare Professional (MD, DO, RN, etc )___Ross Stokes _____________________________  *Form must be signed by patient's attending physician for scheduled, repetitive transports   For non-repetitive, unscheduled ambulance transports, if unable to obtain the signature of the attending physician, any of the following may sign (choose appropriate option below)  [] Physician Assistant []  Clinical Nurse Specialist []  Registered Nurse  []  Nurse Practitioner  [x] Discharge Planner

## 2021-03-22 NOTE — PLAN OF CARE
Problem: Prexisting or High Potential for Compromised Skin Integrity  Goal: Skin integrity is maintained or improved  Description: INTERVENTIONS:  - Identify patients at risk for skin breakdown  - Assess and monitor skin integrity  - Assess and monitor nutrition and hydration status  - Monitor labs   - Assess for incontinence   - Turn and reposition patient  - Assist with mobility/ambulation  - Relieve pressure over bony prominences  - Avoid friction and shearing  - Provide appropriate hygiene as needed including keeping skin clean and dry  - Evaluate need for skin moisturizer/barrier cream  - Collaborate with interdisciplinary team   - Patient/family teaching  - Consider wound care consult   Outcome: Progressing     Problem: PAIN - ADULT  Goal: Verbalizes/displays adequate comfort level or baseline comfort level  Description: Interventions:  - Encourage patient to monitor pain and request assistance  - Assess pain using appropriate pain scale  - Administer analgesics based on type and severity of pain and evaluate response  - Implement non-pharmacological measures as appropriate and evaluate response  - Consider cultural and social influences on pain and pain management  - Notify physician/advanced practitioner if interventions unsuccessful or patient reports new pain  Outcome: Progressing     Problem: INFECTION - ADULT  Goal: Absence or prevention of progression during hospitalization  Description: INTERVENTIONS:  - Assess and monitor for signs and symptoms of infection  - Monitor lab/diagnostic results  - Monitor all insertion sites, i e  indwelling lines, tubes, and drains  - Monitor endotracheal if appropriate and nasal secretions for changes in amount and color  - [place] appropriate cooling/warming therapies per order  - Administer medications as ordered  - Instruct and encourage patient and family to use good hand hygiene technique  - Identify and instruct in appropriate isolation precautions for identified infection/condition  Outcome: Progressing  Goal: Absence of fever/infection during neutropenic period  Description: INTERVENTIONS:  - Monitor WBC    Outcome: Progressing     Problem: SAFETY ADULT  Goal: Patient will remain free of falls  Description: INTERVENTIONS:  - Assess patient frequently for physical needs  -  Identify cognitive and physical deficits and behaviors that affect risk of falls    -  [place] fall precautions as indicated by assessment   - Educate patient/family on patient safety including physical limitations  - Instruct patient to call for assistance with activity based on assessment  - Modify environment to reduce risk of injury  - Consider OT/PT consult to assist with strengthening/mobility  Outcome: Progressing  Goal: Maintain or return to baseline ADL function  Description: INTERVENTIONS:  -  Assess patient's ability to carry out ADLs; assess patient's baseline for ADL function and identify physical deficits which impact ability to perform ADLs (bathing, care of mouth/teeth, toileting, grooming, dressing, etc )  - Assess/evaluate cause of self-care deficits   - Assess range of motion  - Assess patient's mobility; develop plan if impaired  - Assess patient's need for assistive devices and provide as appropriate  - Encourage maximum independence but intervene and supervise when necessary  - Involve family in performance of ADLs  - Assess for home care needs following discharge   - Consider OT consult to assist with ADL evaluation and planning for discharge  - Provide patient education as appropriate  Outcome: Progressing  Goal: Maintain or return mobility status to optimal level  Description: INTERVENTIONS:  - Assess patient's baseline mobility status (ambulation, transfers, stairs, etc )    - Identify cognitive and physical deficits and behaviors that affect mobility  - Identify mobility aids required to assist with transfers and/or ambulation (gait belt, sit-to-stand, lift, walker, cane, etc )  - [place] fall precautions as indicated by assessment  - Record patient progress and toleration of activity level on Mobility SBAR; progress patient to next Phase/Stage  - Instruct patient to call for assistance with activity based on assessment  - Consider rehabilitation consult to assist with strengthening/weightbearing, etc   Outcome: Progressing     Problem: DISCHARGE PLANNING  Goal: Discharge to home or other facility with appropriate resources  Description: INTERVENTIONS:  - Identify barriers to discharge w/patient and caregiver  - Arrange for needed discharge resources and transportation as appropriate  - Identify discharge learning needs (meds, wound care, etc )  - Arrange for interpretive services to assist at discharge as needed  - Refer to Case Management Department for coordinating discharge planning if the patient needs post-hospital services based on physician/advanced practitioner order or complex needs related to functional status, cognitive ability, or social support system  Outcome: Progressing     Problem: Knowledge Deficit  Goal: Patient/family/caregiver demonstrates understanding of disease process, treatment plan, medications, and discharge instructions  Description: Complete learning assessment and assess knowledge base  Interventions:  - Provide teaching at level of understanding  - Provide teaching via preferred learning methods  Outcome: Progressing     Problem: Nutrition/Hydration-ADULT  Goal: Nutrient/Hydration intake appropriate for improving, restoring or maintaining nutritional needs  Description: Monitor and assess patient's nutrition/hydration status for malnutrition  Collaborate with interdisciplinary team and initiate plan and interventions as ordered  Monitor patient's weight and dietary intake as ordered or per policy  Utilize nutrition screening tool and intervene as necessary   Determine patient's food preferences and provide high-protein, high-caloric foods as appropriate       INTERVENTIONS:  - Monitor oral intake, urinary output, labs, and treatment plans  - Assess nutrition and hydration status and recommend course of action  - Evaluate amount of meals eaten  - Assist patient with eating if necessary   - Allow adequate time for meals  - Recommend/ encourage appropriate diets, oral nutritional supplements, and vitamin/mineral supplements  - Order, calculate, and assess calorie counts as needed  - Recommend, monitor, and adjust tube feedings and TPN/PPN based on assessed needs  - Assess need for intravenous fluids  - Provide specific nutrition/hydration education as appropriate  - Include patient/family/caregiver in decisions related to nutrition  Outcome: Progressing     Problem: ANXIETY  Goal: Will report anxiety at manageable levels  Description: INTERVENTIONS:  - Administer medication as ordered  - Teach and encourage coping skills  - Provide emotional support  - Assess patient/family for anxiety and ability to cope  Outcome: Progressing  Goal: By discharge: Patient will verbalize 2 strategies to deal with anxiety  Description: Interventions:  - Identify any obvious source/trigger to anxiety  - Staff will assist patient in applying identified coping technique/skills  - Encourage attendance of scheduled groups and activities  Outcome: Progressing     Problem: SLEEP DISTURBANCE  Goal: Will exhibit normal sleeping pattern  Description: Interventions:  -  Assess the patients sleep pattern, noting recent changes  - Administer medication as ordered  - Decrease environmental stimuli, including noise, as appropriate during the night  - Encourage the patient to actively participate in unit groups and or exercise during the day to enhance ability to achieve adequate sleep at night  - Assess the patient, in the morning, encouraging a description of sleep experience  Outcome: Progressing

## 2021-03-22 NOTE — CASE MANAGEMENT
Auth obtained  [id number]  Good for 8 days  Next review is 3/30/21  Level 1    Pt medically stable to d/c to [facility] today  CM sent script to facility for bed rails to assist with the pt at their facility  Pt will d/c @1700 via [address] to [employer]  Pt's dtr notified

## 2021-03-22 NOTE — PLAN OF CARE
Problem: Prexisting or High Potential for Compromised Skin Integrity  Goal: Skin integrity is maintained or improved  Description: INTERVENTIONS:  - Identify patients at risk for skin breakdown  - Assess and monitor skin integrity  - Assess and monitor nutrition and hydration status  - Monitor labs   - Assess for incontinence   - Turn and reposition patient  - Assist with mobility/ambulation  - Relieve pressure over bony prominences  - Avoid friction and shearing  - Provide appropriate hygiene as needed including keeping skin clean and dry  - Evaluate need for skin moisturizer/barrier cream  - Collaborate with interdisciplinary team   - Patient/family teaching  - Consider wound care consult   3/22/2021 1846 by Ping Ken RN  Outcome: Completed  3/22/2021 1428 by Ping Ken RN  Outcome: Progressing     Problem: PAIN - ADULT  Goal: Verbalizes/displays adequate comfort level or baseline comfort level  Description: Interventions:  - Encourage patient to monitor pain and request assistance  - Assess pain using appropriate pain scale  - Administer analgesics based on type and severity of pain and evaluate response  - Implement non-pharmacological measures as appropriate and evaluate response  - Consider cultural and social influences on pain and pain management  - Notify physician/advanced practitioner if interventions unsuccessful or patient reports new pain  3/22/2021 1846 by Ping Ken RN  Outcome: Completed  3/22/2021 1428 by Ping Ken RN  Outcome: Progressing     Problem: INFECTION - ADULT  Goal: Absence or prevention of progression during hospitalization  Description: INTERVENTIONS:  - Assess and monitor for signs and symptoms of infection  - Monitor lab/diagnostic results  - Monitor all insertion sites, i e  indwelling lines, tubes, and drains  - Monitor endotracheal if appropriate and nasal secretions for changes in amount and color  - [place] appropriate cooling/warming therapies per order  - Administer medications as ordered  - Instruct and encourage patient and family to use good hand hygiene technique  - Identify and instruct in appropriate isolation precautions for identified infection/condition  3/22/2021 1846 by Radha Puente RN  Outcome: Completed  3/22/2021 1428 by Radha Puente RN  Outcome: Progressing  Goal: Absence of fever/infection during neutropenic period  Description: INTERVENTIONS:  - Monitor WBC    3/22/2021 1846 by Radha Puente RN  Outcome: Completed  3/22/2021 1428 by Radha Puente RN  Outcome: Progressing     Problem: SAFETY ADULT  Goal: Patient will remain free of falls  Description: INTERVENTIONS:  - Assess patient frequently for physical needs  -  Identify cognitive and physical deficits and behaviors that affect risk of falls    -  [place] fall precautions as indicated by assessment   - Educate patient/family on patient safety including physical limitations  - Instruct patient to call for assistance with activity based on assessment  - Modify environment to reduce risk of injury  - Consider OT/PT consult to assist with strengthening/mobility  3/22/2021 1846 by Radha Puente RN  Outcome: Completed  3/22/2021 1428 by Radha Puente RN  Outcome: Progressing  Goal: Maintain or return to baseline ADL function  Description: INTERVENTIONS:  -  Assess patient's ability to carry out ADLs; assess patient's baseline for ADL function and identify physical deficits which impact ability to perform ADLs (bathing, care of mouth/teeth, toileting, grooming, dressing, etc )  - Assess/evaluate cause of self-care deficits   - Assess range of motion  - Assess patient's mobility; develop plan if impaired  - Assess patient's need for assistive devices and provide as appropriate  - Encourage maximum independence but intervene and supervise when necessary  - Involve family in performance of ADLs  - Assess for home care needs following discharge   - Consider OT consult to assist with ADL evaluation and planning for discharge  - Provide patient education as appropriate  3/22/2021 1846 by Bradley Brandon RN  Outcome: Adequate for Discharge  3/22/2021 1428 by Bradley Brandon RN  Outcome: Progressing  Goal: Maintain or return mobility status to optimal level  Description: INTERVENTIONS:  - Assess patient's baseline mobility status (ambulation, transfers, stairs, etc )    - Identify cognitive and physical deficits and behaviors that affect mobility  - Identify mobility aids required to assist with transfers and/or ambulation (gait belt, sit-to-stand, lift, walker, cane, etc )  - [place] fall precautions as indicated by assessment  - Record patient progress and toleration of activity level on Mobility SBAR; progress patient to next Phase/Stage  - Instruct patient to call for assistance with activity based on assessment  - Consider rehabilitation consult to assist with strengthening/weightbearing, etc   3/22/2021 1846 by Bradley Brandon RN  Outcome: Adequate for Discharge  3/22/2021 1428 by Bradley Brandon RN  Outcome: Progressing     Problem: DISCHARGE PLANNING  Goal: Discharge to home or other facility with appropriate resources  Description: INTERVENTIONS:  - Identify barriers to discharge w/patient and caregiver  - Arrange for needed discharge resources and transportation as appropriate  - Identify discharge learning needs (meds, wound care, etc )  - Arrange for interpretive services to assist at discharge as needed  - Refer to Case Management Department for coordinating discharge planning if the patient needs post-hospital services based on physician/advanced practitioner order or complex needs related to functional status, cognitive ability, or social support system  3/22/2021 1846 by Bradley Brandon RN  Outcome: Completed  3/22/2021 1428 by Bradley Brandon RN  Outcome: Progressing     Problem: Knowledge Deficit  Goal: Patient/family/caregiver demonstrates understanding of disease process, treatment plan, medications, and discharge instructions  Description: Complete learning assessment and assess knowledge base  Interventions:  - Provide teaching at level of understanding  - Provide teaching via preferred learning methods  3/22/2021 1846 by Nikunj Bello RN  Outcome: Completed  3/22/2021 1428 by Nikunj Bello RN  Outcome: Progressing     Problem: Nutrition/Hydration-ADULT  Goal: Nutrient/Hydration intake appropriate for improving, restoring or maintaining nutritional needs  Description: Monitor and assess patient's nutrition/hydration status for malnutrition  Collaborate with interdisciplinary team and initiate plan and interventions as ordered  Monitor patient's weight and dietary intake as ordered or per policy  Utilize nutrition screening tool and intervene as necessary  Determine patient's food preferences and provide high-protein, high-caloric foods as appropriate       INTERVENTIONS:  - Monitor oral intake, urinary output, labs, and treatment plans  - Assess nutrition and hydration status and recommend course of action  - Evaluate amount of meals eaten  - Assist patient with eating if necessary   - Allow adequate time for meals  - Recommend/ encourage appropriate diets, oral nutritional supplements, and vitamin/mineral supplements  - Order, calculate, and assess calorie counts as needed  - Recommend, monitor, and adjust tube feedings and TPN/PPN based on assessed needs  - Assess need for intravenous fluids  - Provide specific nutrition/hydration education as appropriate  - Include patient/family/caregiver in decisions related to nutrition  3/22/2021 1846 by Nikunj Bello RN  Outcome: Completed  3/22/2021 1428 by Nikunj Bello RN  Outcome: Progressing     Problem: ANXIETY  Goal: Will report anxiety at manageable levels  Description: INTERVENTIONS:  - Administer medication as ordered  - Teach and encourage coping skills  - Provide emotional support  - Assess patient/family for anxiety and ability to cope  3/22/2021 1846 by Tarik Rob RN  Outcome: Completed  3/22/2021 1428 by Tarik Rob RN  Outcome: Progressing  Goal: By discharge: Patient will verbalize 2 strategies to deal with anxiety  Description: Interventions:  - Identify any obvious source/trigger to anxiety  - Staff will assist patient in applying identified coping technique/skills  - Encourage attendance of scheduled groups and activities  3/22/2021 1846 by Tarik Rob RN  Outcome: Completed  3/22/2021 1428 by Tarik Rob RN  Outcome: Progressing     Problem: SLEEP DISTURBANCE  Goal: Will exhibit normal sleeping pattern  Description: Interventions:  -  Assess the patients sleep pattern, noting recent changes  - Administer medication as ordered  - Decrease environmental stimuli, including noise, as appropriate during the night  - Encourage the patient to actively participate in unit groups and or exercise during the day to enhance ability to achieve adequate sleep at night  - Assess the patient, in the morning, encouraging a description of sleep experience  3/22/2021 1846 by Tarik Rob RN  Outcome: Completed  3/22/2021 1428 by Tarik Rob RN  Outcome: Progressing

## 2021-03-22 NOTE — SPEECH THERAPY NOTE
Speech Language/Pathology    Speech/Language Pathology Progress Note    Patient Name: Megha Zheng  GVKTB'A Date: 3/22/2021     Problem List  Principal Problem:    Closed cervical spine fracture [facility])  Active Problems:    Ambulatory dysfunction    Nasal bone fracture       Past Medical History  Past Medical History:   Diagnosis Date    Arthritis     Cancer [facility])     breast    Cardiac disease     afib    CHF [facility]congestive heart failure) ([facility] [address] )     Hyperlipidemia     Hypertension     [facility] ([email or web address] )     TIA    TIA (transient ischemic attack)         Past Surgical History  Past Surgical History:   Procedure Laterality Date    BREAST SURGERY      right lumpectomy    CARDIAC PACEMAKER PLACEMENT      CORONARY STENT PLACEMENT      ORIF TIBIA & FIBULA FRACTURES Right 3/11/2021    Procedure: 1)OPEN REDUCTION W/ INTERNAL FIXATION (ORIF) RIGHT ANKLE bimalleolar fracture 2) ORIF right tibiofibular syndesmosis; Surgeon: Da Jackson DPM;  Location: [facility];  Service: Podiatry    TOTAL HIP ARTHROPLASTY Bilateral          Subjective:  Pt is awake and alert in bed  She is agreeable to trial puree snacks and upgrades  Objective:  Pt is seen for f/u dysphagia therapy  ST feeds pt ice cream and thin liquids, with trials of soft solids (lila crackers)  Retrieval, manipulation, and transfer are adequate for puree via tspn and thin liquids via straw  Bite strength is adequate w/ min prolonged mastication and transfer  However, min to no lingual residual is observed post swallow  No s/s of aspiration, or c/o difficulty/pain w/ masticating or swallowing are observed  Assessment:  Pt tolerated puree, soft solids, and thin liquids well  Plan/Recommendations:  Recommend advancing pt diet to mechanical soft/2 with thin liquids  ST will f/u and assess safety with diet and potential for upgrades as able

## 2021-03-22 NOTE — INCIDENTAL FINDINGS
The following findings require follow up:  Radiographic finding     1  Acute avulsion at the anterior-inferior corner of C6 vertebral body with mild widening of the anterior C6-7 disc space compared with the prior study  Nondisplaced fracture right C7 and possibly right C6 facet  Suggest follow-up with MRI to assess for ligamentous injury  Kaleb aYnez PA-C from Neurosurgery 3/18/2021 "On exam, the patient does not show signs of myelopathy  However, if her clinical exam worsens, per primary team may consider ordering a MRI cervical w/o contrast pending the compatibility of her pacemaker "     Patient's exam did not worsen or show new signs or symptoms of possible spinal cord injury  Patient is to follow up outpatient with Neurosurgery in two weeks with cervical spine x-rays  Follow up should be done within 2 week(s) with Neurosurgery    2  Advanced degenerative changes are present in the left TM joint  There is mild flattening of the left mandibular condyle  There is flattening of the left temporal eminence and mandibular fossa  Degenerative anterior subluxation of the left TM joint  Subchondral cystic changes in the left mandibular condyle  Follow up as needed with primary care provider         Please notify the following clinician to assist with the follow up:  Jonelle Davila, [id number] [facility]

## 2021-03-22 NOTE — CONSULTS
[facility] Podiatry - Consultation    Patient Information:   Jaswinder Freeman 80 y o  female MRN: [id number]  [facility] En[facility]: [id number]  PCP: Evin Falk DO  Date of Admission:  3/17/2021  Date of Consultation: 03/22/21  Requesting Physician: Carlene Abel MD      ASSESSMENT:    Jaswinder Freeman is a 80 y o  female with:    1  Closed, bimalleolar right ankle fracture   - s/p right ankle ORIF (DOS 03/11/2021)  2  CHF  3  CKD stage 3  4  Ambulatory dysfunction    PLAN:    · Right ankle xray from 3/17 reviewed, hardware intact with uneventful healing  · RLE splint/dressing to remain clean, dry, intact until outpatient follow up with surgeon  · Continue NWB RLE  · DVT ppx and pain management per primary team   · Rest of care per primary team   · Will discuss this plan with my attending and update as needed  SUBJECTIVE    History of Present Illness:    Jaswinder Freeman is a 80 y o  female with past medical history of HTN, afib, CKD 3, CHF, ambulatory dysfunction with multpile falls who was admitted to trauma service on 3/17/21 with C-spine fracture s/p fall  Podiatry consulted for management of RLE  Patient was recently admitted to [address] [place] [address] after experiencing a fall which resulted in a closed right bimalleolar ankle fracture  Subsequently, patient underwent right ankle ORIF with Dr Petra Norris on 03/11/21  Patient denies pain or discomfort to the RLE  States that she has not followed up with Dr Petra Norris in outpatient setting  Reports numbness to bilateral feet, which is baseline for her  Has not remove RLE dressings, no discomfort to RLE  No other pedal complaints  Review of Systems:    Constitutional: Negative  HENT: Negative  Eyes: Negative  Respiratory: Negative  Cardiovascular: Negative  Gastrointestinal: Negative  Musculoskeletal: s/p right ankle ORIF   Skin:bruisung   Neurological: numbness bilateral feet   Psych: Negative       Past Medical and Surgical History:     Past Medical History:   Diagnosis Date    Arthritis     Cancer [facility])     breast    Cardiac disease     afib    CHF (congestive heart failure) (HCC)     Hyperlipidemia     Hypertension     Stroke ([address] )     TIA    TIA (transient ischemic attack)        Past Surgical History:   Procedure Laterality Date    BREAST SURGERY      right lumpectomy    CARDIAC PACEMAKER PLACEMENT      CORONARY STENT PLACEMENT      ORIF TIBIA & FIBULA FRACTURES Right 3/11/2021    Procedure: 1)OPEN REDUCTION W/ INTERNAL FIXATION (ORIF) RIGHT ANKLE bimalleolar fracture 2) ORIF right tibiofibular syndesmosis; Surgeon: Lenny Layton DPM;  [facility];  Service: Podiatry    TOTAL HIP ARTHROPLASTY Bilateral        Meds/Allergies:    Medications Prior to Admission   Medication    acetaminophen (TYLENOL) 325 mg tablet    albuterol (PROVENTIL HFA,VENTOLIN HFA) 90 mcg/act inhaler    aspirin 81 MG tablet    atorvastatin (LIPITOR) 40 mg tablet    bisacodyl (DULCOLAX) 10 mg suppository    calcium carbonate-vitamin D (OSCAL-D) 500 mg-200 units per tablet    carvedilol (COREG) 6 25 mg tablet    citalopram (CeleXA) 10 mg tablet    docusate sodium (COLACE) 100 mg capsule    ferrous sulfate 325 (65 Fe) mg tablet    folic acid (FOLVITE) 1 mg tablet    furosemide (LASIX) 40 mg tablet    melatonin 3 mg    Multiple Vitamins-Minerals (CENTRUM SILVER PO)    omeprazole (PriLOSEC) 20 mg delayed release capsule    psyllium (METAMUCIL) 0 52 G capsule    thiamine (VITAMIN B1) 100 mg tablet       Allergies   Allergen Reactions    Lovenox [Enoxaparin Sodium] Swelling       Social History:     Marital Status:      Substance Use History:   Social History     Substance and Sexual Activity   Alcohol Use Not Currently     Social History     Tobacco Use   Smoking Status Never Smoker   Smokeless Tobacco Never Used     Social History     Substance and Sexual Activity   Drug Use No       Family History:    Family History   Problem Relation Age of Onset    Cancer Sister     Stroke Family     Arthritis Family     Heart disease Family     Kidney disease Family          OBJECTIVE:    Vitals:   Blood Pressure: (!) 119/44 (03/22/21 0707)  Pulse: 68 (03/22/21 0707)  Temperature: 97 7 °F (36 5 °C) (03/21/21 2330)  Temp Source: Oral (03/20/21 1523)  Respirations: 17 (03/22/21 0707)  Height: 5' 4" (162 6 cm) (03/18/21 1401)  Weight - Scale: 83 5 kg (184 lb)(3/16/21) (03/18/21 1401)  SpO2: 99 % (03/22/21 0707)    Physical Exam:     General Appearance: Alert, cooperative, no distress  HEENT: In cervical neck brace with contusions/ecchymosis of face/head  Heart: Normal rate and rhythm  Lungs: Non-labored breathing  No respiratory distress  Abdomen: Without distension  Psychiatric: AAOx3  Lower Extremity:  Vascular:   DP/PT pedal pulses on the left are weak  DP/PT pedal pulses on the right are present  CRT brisk at the digits  Pedal hair is absent  +1/4 edema noted at bilateral lower extremities  Skin temperature is wnl bilaterally  Musculoskeletal:  No Pain on palpation of right foot/leg  Able to actively dorsiflex and plantarflex digits on the right  Dermatological:  Contusion with ecchymosis on the posterior aspect of the right leg, no pain on palption  No open lesions on the lower extremities, stable digital eschars on the right  Neurological:  Gross sensation is diminished  Protective sensation is diminished  Patient Reports numbness and/or paresthesias      Additional Data:     Lab Results: I have personally reviewed pertinent labs including:    Results from last 7 days   Lab Units 03/18/21  0422 03/17/21  1514   WBC Thousand/uL 5 25 4 98   HEMOGLOBIN g/dL 8 6* 8 9*   HEMATOCRIT % 27 7* 28 2*   PLATELETS Thousands/uL 149 158   NEUTROS PCT %  --  65   LYMPHS PCT %  --  14   MONOS PCT %  --  18*   EOS PCT %  --  2     Results from last 7 days   Lab Units 03/18/21  0422   POTASSIUM mmol/L 4 5   CHLORIDE mmol/L 103   CO2 mmol/L 28   BUN mg/dL 35* CREATININE mg/dL 1 19   CALCIUM mg/dL 9 8           Cultures: I have personally reviewed pertinent cultures including:              Imaging: I have personally reviewed pertinent films in PACS  EKG, Pathology, and Other Studies: I have personally reviewed pertinent reports  ** Please Note: Portions of the record may have been created with voice recognition software  Occasional wrong word or "sound a like" substitutions may have occurred due to the inherent limitations of voice recognition software  Read the chart carefully and recognize, using context, where substitutions have occurred   **

## 2021-03-22 NOTE — ASSESSMENT & PLAN NOTE
- 3/17 CT- Cspine: Acute avulsion at the anterior-inferior corner of C6 vertebral body with mild widening of the anterior C6-7 disc space compared with the prior study  Nondisplaced fracture right C7 and possibly right C6 facet    - Neurosurgery consulted:  Nothing to do acutely  Follow-up 2 weeks with repeat AP lateral cervical spine x-rays    MRI deferred at this time no signs of myelopathy the patient has pacemaker   - Multimodal pain management  - SQH for DVT ppx  -C-collar at all times until follow-up appointment and cleared by Neurosurgery

## 2021-03-23 ENCOUNTER — TELEPHONE (OUTPATIENT)
Dept: NEUROSURGERY | Facility: CLINIC | Age: 86
End: 2021-03-23

## 2021-03-23 NOTE — UTILIZATION REVIEW
Notification of Discharge  This is a Notification of Discharge from our facility [address]  Please be advised that this patient has been discharge from our facility  Below you will find the admission and discharge date and time including the patients disposition  PRESENTATION DATE: 3/17/2021  7:15 PM  OBS ADMISSION DATE:   IP ADMISSION DATE: 3/17/21 2005   DISCHARGE DATE: 3/22/2021  6:05 PM  DISPOSITION: Non [facility] Non [facility]   Admission Orders listed below:  Admission Orders (From admission, onward)     Ordered        03/17/21 2007  Inpatient Admission  Once                   Please contact the UR Department if additional information is required to close this patient's authorization/case  [facility] [facility] Utilization Review Department  Main: [phone] x carefully listen to the prompts  All voicemails are confidential   [email or web address]  Send all requests for admission clinical reviews, approved or denied determinations and any other requests to dedicated fax number below belonging to the campus where the patient is receiving treatment   List of dedicated fax numbers:  [address] DENIALS (Administrative/Medical Necessity) [phone]   [address] (Maternity/NICU/Pediatrics) [phone]   [facility] [place] [phone]   Miquel Andrade [phone]   [facility] [phone]   [facility] [facility] [phone]   [facility]  [phone]   [address] [facility]  [address] [facility] [address] [facility] [phone]

## 2021-03-23 NOTE — TELEPHONE ENCOUNTER
03/23/2021-CALLED Hand Manuel AT [facility]#[phone], SPOKE TO JOVI, CONFIRMED 4/05/2021 VIRTUAL APT AND XRAY NEEDED TO BE COMPLETE PRIOR TO APT  FAXED [address] [employer]#[phone]      ----- Message from Amy Verdugo PA-C sent at 3/19/2021  7:41 AM EDT -----  2 week follow up with new cervical x-rays ap/lat  AP visit

## 2021-03-25 ENCOUNTER — CONSULT (OUTPATIENT)
Dept: PLASTIC SURGERY | Facility: CLINIC | Age: 86
End: 2021-03-25
Payer: COMMERCIAL

## 2021-03-25 DIAGNOSIS — S02.2XXA CLOSED DISPLACED FRACTURE OF NASAL BONE, INITIAL ENCOUNTER: Primary | ICD-10-CM

## 2021-03-25 PROCEDURE — 99203 OFFICE O/P NEW LOW 30 MIN: CPT | Performed by: PHYSICIAN ASSISTANT

## 2021-03-25 NOTE — PROGRESS NOTES
H&P Exam - Plastic Surgery   Jeanna Ford 80 y o  female MRN: [id number]  Unit/Bed#:  Encounter: [id number]    Assessment/Plan     Assessment:  Repeat nasal fracture after a fall  Plan:  Refer to her previous nose surgeon, Dr Freida Alcaraz  Consider 1-1 observation to avoid repetitive falls  History of Present Illness     HPI:  Jeanna Ford is a 80 y o  female with a history of  CVA, afib, HTN, HLD  who fell on 2/4/21, sustaining a nose fracture, and underwent closed reduction on 2/8/21  She sustained a fall on 3/11, resulting in right malleolar fracture  3/17, another fall occurred, from which she re-fractured her nose  CT showed worsening of nasal bone fractures  She also sustained multiple facial bruises and a hematoma over the frontal convexity  She presents to our office today, as she was told to follow up with a plastic surgeon  Review of Systems   Constitutional: Negative for chills and fever  HENT: Negative for ear pain and sore throat  Eyes: Negative for pain and visual disturbance  Respiratory: Negative for cough and shortness of breath  Cardiovascular: Negative for chest pain and palpitations  Gastrointestinal: Negative for abdominal pain and vomiting  Genitourinary: Negative for dysuria and hematuria  Musculoskeletal: Positive for arthralgias  Negative for back pain  Skin: Negative for color change and rash  Neurological: Negative for seizures and syncope  Psychiatric/Behavioral: The patient is nervous/anxious  All other systems reviewed and are negative        Historical Information   Past Medical History:   Diagnosis Date    Arthritis     Cancer [facility])     breast    Cardiac disease     afib    CHF (congestive heart failure) (HCC)     Hyperlipidemia     Hypertension     St[facility] )     TIA    TIA (transient ischemic attack)      Past Surgical History:   Procedure Laterality Date    BREAST SURGERY      right lumpectomy    CARDIAC PACEMAKER PLACEMENT      CORONARY STENT PLACEMENT      ORIF TIBIA & FIBULA FRACTURES Right 3/11/2021    Procedure: 1)OPEN REDUCTION W/ INTERNAL FIXATION (ORIF) RIGHT ANKLE bimalleolar fracture 2) ORIF right tibiofibular syndesmosis; Surgeon: Baldo Arrington DPM;  [facility];  Service: Podiatry    TOTAL HIP ARTHROPLASTY Bilateral      Social History   Social History     Substance and Sexual Activity   Alcohol Use Not Currently     Social History     Substance and Sexual Activity   Drug Use No     Social History     Tobacco Use   Smoking Status Never Smoker   Smokeless Tobacco Never Used     E-Cigarette/Vaping    E-Cigarette Use Never User      E-Cigarette/Vaping Substances    Nicotine No     THC No     Flavoring No     Other No     Unknown No      Family History:   Family History   Problem Relation Age of Onset    Cancer Sister     Stroke Family     Arthritis Family     Heart disease Family     Kidney disease Family        Meds/Allergies     Current Outpatient Medications:     acetaminophen (TYLENOL) 325 mg tablet, Take 2 tablets (650 mg total) by mouth every 6 (six) hours as needed for mild pain, headaches or fever, Disp:  , Rfl: 0    albuterol (PROVENTIL HFA,VENTOLIN HFA) 90 mcg/act inhaler, Inhale 2 puffs every 4 (four) hours as needed for wheezing, Disp: , Rfl:     aspirin 81 MG tablet, Take 81 mg by mouth daily  , Disp: , Rfl:     atorvastatin (LIPITOR) 40 mg tablet, Take 40 mg by mouth daily, Disp: , Rfl:     bisacodyl (DULCOLAX) 10 mg suppository, Insert 1 suppository (10 mg total) into the rectum daily (Patient not taking: Reported on 2/8/2021), Disp: 12 suppository, Rfl: 0    calcium carbonate-vitamin D (OSCAL-D) 500 mg-200 units per tablet, Take 1 tablet by mouth daily with breakfast, Disp: , Rfl:     carvedilol (COREG) 3 125 mg tablet, Take 1 tablet (3 125 mg total) by mouth 2 (two) times a day with meals, Disp: , Rfl: 0    citalopram (CeleXA) 10 mg tablet, Take 10 mg by mouth daily  , Disp: , Rfl:    docusate sodium (COLACE) 100 mg capsule, Take 100 mg by mouth daily, Disp: , Rfl:     ferrous sulfate 325 (65 Fe) mg tablet, Take 1 tablet (325 mg total) by mouth daily with breakfast, Disp:  , Rfl: 0    folic acid (FOLVITE) 1 mg tablet, Take 1 mg by mouth daily  , Disp: , Rfl:     furosemide (LASIX) 40 mg tablet, Take 40 mg by mouth daily, Disp: , Rfl:     melatonin 3 mg, Take 1 tablet (3 mg total) by mouth daily at bedtime, Disp: , Rfl: 0    Multiple Vitamins-Minerals (CENTRUM SILVER PO), Take 1 tablet by mouth daily  , Disp: , Rfl:     omeprazole (PriLOSEC) 20 mg delayed release capsule, Take 20 mg by mouth Sunday, Tues, Thurs, Disp: , Rfl:     psyllium (METAMUCIL) 0 52 G capsule, Take 0 52 g by mouth daily  , Disp: , Rfl:     thiamine (VITAMIN B1) 100 mg tablet, Take 100 mg by mouth daily  , Disp: , Rfl:   Allergies   Allergen Reactions    Lovenox [Enoxaparin Sodium] Swelling       Objective   First Vitals:   @VSFIRST2(5,8,6,7,9,11,14,10:FIRST)@    Current Vitals:        [unfilled]    Invasive Devices     Drain            External Urinary Catheter 6 days                Physical Exam  Vitals signs and nursing note reviewed  Constitutional:       General: She is not in acute distress  Appearance: She is well-developed  HENT:      Head: Normocephalic and atraumatic  Eyes:      Conjunctiva/sclera: Conjunctivae normal    Neck:      Musculoskeletal: Neck supple  Cardiovascular:      Rate and Rhythm: Normal rate and regular rhythm  Heart sounds: No murmur  Pulmonary:      Effort: Pulmonary effort is normal  No respiratory distress  Breath sounds: Normal breath sounds  Abdominal:      Palpations: Abdomen is soft  Tenderness: There is no abdominal tenderness  Skin:     General: Skin is warm and dry  Neurological:      Mental Status: She is alert  Lab Results: I have personally reviewed pertinent lab results  Imaging: I have personally reviewed pertinent reports      EKG, Pathology, and Other Studies: I have personally reviewed pertinent reports

## 2021-03-26 NOTE — PROGRESS NOTES
Agree with geriatrics diagnosis of acute metabolic encephalopathy   Treated and improved at time of discharge

## 2021-04-02 ENCOUNTER — APPOINTMENT (OUTPATIENT)
Dept: RADIOLOGY | Facility: MEDICAL CENTER | Age: 86
End: 2021-04-02
Payer: COMMERCIAL

## 2021-04-02 DIAGNOSIS — S12.591A OTHER CLOSED NONDISPLACED FRACTURE OF SIXTH CERVICAL VERTEBRA, INITIAL ENCOUNTER (HCC): ICD-10-CM

## 2021-04-02 DIAGNOSIS — S12.9XXA CLOSED FRACTURE OF MULTIPLE CERVICAL VERTEBRAE, INITIAL ENCOUNTER (HCC): ICD-10-CM

## 2021-04-02 PROCEDURE — 72040 X-RAY EXAM NECK SPINE 2-3 VW: CPT

## 2021-04-05 ENCOUNTER — TELEMEDICINE (OUTPATIENT)
Dept: NEUROSURGERY | Facility: CLINIC | Age: 86
End: 2021-04-05
Payer: COMMERCIAL

## 2021-04-05 VITALS — HEART RATE: 94 BPM | DIASTOLIC BLOOD PRESSURE: 70 MMHG | SYSTOLIC BLOOD PRESSURE: 149 MMHG | TEMPERATURE: 98.1 F

## 2021-04-05 DIAGNOSIS — S12.591A OTHER CLOSED NONDISPLACED FRACTURE OF SIXTH CERVICAL VERTEBRA, INITIAL ENCOUNTER (HCC): Primary | ICD-10-CM

## 2021-04-05 PROCEDURE — 99213 OFFICE O/P EST LOW 20 MIN: CPT | Performed by: PHYSICIAN ASSISTANT

## 2021-04-05 RX ORDER — CEPHALEXIN 500 MG/1
500 CAPSULE ORAL EVERY 6 HOURS SCHEDULED
COMMUNITY

## 2021-04-05 RX ORDER — SODIUM PHOSPHATE,MONO-DIBASIC 19G-7G/118
1 ENEMA (ML) RECTAL ONCE AS NEEDED
COMMUNITY

## 2021-04-05 RX ORDER — ACETAMINOPHEN 500 MG
1000 TABLET ORAL 2 TIMES DAILY
COMMUNITY

## 2021-04-05 RX ORDER — SENNA PLUS 8.6 MG/1
1 TABLET ORAL AS NEEDED
COMMUNITY

## 2021-04-05 NOTE — PROGRESS NOTES
Virtual Regular Visit      Assessment/Plan:    Problem List Items Addressed This Visit        Musculoskeletal and Integument    Closed cervical spine fracture ([address] ) - Primary     · Pt had virtual visit for 2 week follow for C6 anterior avulsion fx/ right C7 fx status post fall twice within one week  She had presented to the hospital with headaches without neck pain  · Pt currently at [facility]       · Images reviewed personally  Final results as below  · CT head: No acute intracranial pathology  Frontal scalp hematoma  · CT cervical spine: Acute avulsion at the anterior-inferior corner of C6 vertebral body with mild widening of the anterior C6-7 disc space compared with the prior study  Nondisplaced fracture right C7 and possibly right C6 facet       Plan  · [place] J brace was ordered to be worn at all times and Diamond collar for showers  · Continue PT/OT as needed  · At this time, no nsx intervention anticipated  Though pt does not report neck pain at this time, given that pt is only 2 weeks s/p fall with acute avulsion fx at C6 and right C7 fx,  recommend pt continue conservative mgt with the Cervical brace for another month  · Pt will have further follow up with neurosurgery in 1 month with repeat xrays of the Cervical spine  If the 1 month xrays are stable and if pt continues to deny neck pain, may consider getting flex/ext xrays at that time to check for instability  · Pt/ facility to contact neurosurgery with any questions or concerns  Chief Complaint   Patient presents with    Virtual Regular Visit     **Yazan Jackson; Phone: [phone]**  PHONE CALL - 2 week hosp   f/u with XR Cspine 2 or 3 vw 4/2/21 for multiple cervical fractures; PER SHAR        Encounter provider Perlita Lu PA-C    Provider located at [address] [place]  [address] [place] [address] [phone]      Recent Visits  No visits were found meeting these conditions  Showing recent visits within past 7 days and meeting all other requirements     Today's Visits  Date Type Provider Dept   04/05/21 Telemedicine Thaddeus Bassett, [address]   Showing today's visits and meeting all other requirements     Future Appointments  No visits were found meeting these conditions  Showing future appointments within next 150 days and meeting all other requirements        The patient was identified by name and date of birth  Alek Worley was informed that this is a telemedicine visit and that the visit is being conducted through telephone  My office door was closed  No one else was in the room  She acknowledged consent and understanding of privacy and security of the video platform  The patient has agreed to participate and understands they can discontinue the visit at any time  My intent was to complete video visit but patient was unable to make a video connection so we defaulted to phone    Patient is aware this is a billable service  Subjective  Alek Worley is a 80 y o  female  Pt had virtual visit for 2 week follow for C6 anterior avulsion fx/ right C7 fx status post fall twice within one week  She had presented to the hospital with headaches without neck pain  Pt currently at [facility]  Today pt denies having neck pain but reports pain and weakness in bilateral upper extremities  She reports she has more weakness than pain in bilateral upper extremities thus has difficulty with lifting her arms up  Pt also reports she has weakness in bilateral lower extremities which is chronic  She reports she uses a walker for mobility  Pt reports she continues physical therapy which has been helping  Pt reports she has been compliant with use of the Cervical brace          Past Medical History:   Diagnosis Date    Arthritis     Cancer [facility])     breast    Cardiac disease     afib    [facility]     Hyperlipidemia     Hypertension     Stroke ([facility] )     TIA    TIA (transient ischemic attack)        Past Surgical History:   Procedure Laterality Date    BREAST SURGERY      right lumpectomy    CARDIAC PACEMAKER PLACEMENT      CORONARY STENT PLACEMENT      ORIF TIBIA & FIBULA FRACTURES Right 3/11/2021    Procedure: 1)OPEN REDUCTION W/ INTERNAL FIXATION (ORIF) RIGHT ANKLE bimalleolar fracture 2) ORIF right tibiofibular syndesmosis; Surgeon: Stevenson Carrillo DPM;  [facility];  Service: Podiatry    TOTAL HIP ARTHROPLASTY Bilateral        Current Outpatient Medications   Medication Sig Dispense Refill    acetaminophen (TYLENOL) 325 mg tablet Take 2 tablets (650 mg total) by mouth every 6 (six) hours as needed for mild pain, headaches or fever  0    acetaminophen (TYLENOL) 500 mg tablet Take 1,000 mg by mouth every 6 (six) hours as needed for mild pain      albuterol (PROVENTIL HFA,VENTOLIN HFA) 90 mcg/act inhaler Inhale 2 puffs every 4 (four) hours as needed for wheezing      aspirin 81 MG tablet Take 81 mg by mouth daily   atorvastatin (LIPITOR) 40 mg tablet Take 40 mg by mouth daily      bisacodyl (DULCOLAX) 10 mg suppository Insert 1 suppository (10 mg total) into the rectum daily 12 suppository 0    calcium carbonate-vitamin D (OSCAL-D) 500 mg-200 units per tablet Take 1 tablet by mouth daily with breakfast      carvedilol (COREG) 3 125 mg tablet Take 1 tablet (3 125 mg total) by mouth 2 (two) times a day with meals  0    cephalexin (KEFLEX) 500 mg capsule Take 500 mg by mouth every 6 (six) hours      citalopram (CeleXA) 10 mg tablet Take 10 mg by mouth daily        ferrous sulfate 325 (65 Fe) mg tablet Take 1 tablet (325 mg total) by mouth daily with breakfast  0    furosemide (LASIX) 40 mg tablet Take 40 mg by mouth daily      magnesium hydroxide (MILK OF MAGNESIA) 400 mg/5 mL oral suspension Take by mouth daily as needed for constipation      melatonin 3 mg Take 1 tablet (3 mg total) by mouth daily at bedtime  0    Multiple Vitamins-Minerals (CENTRUM SILVER PO) Take 1 tablet by mouth daily   omeprazole (PriLOSEC) 20 mg delayed release capsule Take 20 mg by mouth Sunday, Tues, Thurs      psyllium (METAMUCIL) 0 52 G capsule Take 0 52 g by mouth daily   senna (SENOKOT) 8 6 MG tablet Take 1 tablet by mouth as needed for constipation      Skin Protectants, Misc  (Remedy Phytoplex Hydraguard) CREA Apply topically as needed      sodium phosphate-biphosphate (FLEET) 7-19 g 118 mL enema Insert 1 enema into the rectum once as needed       No current facility-administered medications for this visit  Allergies   Allergen Reactions    Lisinopril Other (See Comments)     cough per hTp, pt unaware    Lovenox [Enoxaparin Sodium] Swelling       Review of Systems   Constitutional: Positive for activity change (fall risk; doing therapy for arms and legs)  Negative for appetite change (but has a problem with lifting her arms to get the food in her mouth)  HENT:        Broken nose s/p fall   Eyes: Positive for visual disturbance (Macular degeneration)  Respiratory: Negative  Cardiovascular: Positive for leg swelling (bilateral)  Gastrointestinal: Negative  Endocrine: Negative  Genitourinary: Negative  Musculoskeletal: Positive for back pain (only for 1-2 minutes when lying flat in bed) and gait problem  Negative for neck pain and neck stiffness  Skin: Negative  Allergic/Immunologic: Negative  Neurological: Positive for weakness (bilateral arms and legs) and numbness (n/t in fingers and toes)  Hematological: Negative  Psychiatric/Behavioral: Positive for sleep disturbance (sometimes she wakes up at night and cannot go back to sleep)  All other systems reviewed and are negative        Video Exam    Vitals:    04/05/21 1048   BP: 149/70   Pulse: 94   Temp: 98 1 °F (36 7 °C)       Physical Exam  Constitutional:       General: She is not in acute distress  Comments: Speech is fluent and comprehensible  HENT:      Ears:      Comments: Pt is hard of hearing  Neurological:      Mental Status: She is alert  I spent 15' minutes directly with the patient during this visit      VIRTUAL VISIT DISCLAIMER    Wiliam Adriana acknowledges that she has consented to an online visit or consultation  She understands that the online visit is based solely on information provided by her, and that, in the absence of a face-to-face physical evaluation by the physician, the diagnosis she receives is both limited and provisional in terms of accuracy and completeness  This is not intended to replace a full medical face-to-face evaluation by the physician  Wiliam Wright understands and accepts these terms

## 2021-04-05 NOTE — ASSESSMENT & PLAN NOTE
· Pt had virtual visit for 2 week follow for C6 anterior avulsion fx/ right C7 fx status post fall twice within one week  She had presented to the hospital with headaches without neck pain  · Pt currently at [facility]       · Images reviewed personally  Final results as below  · CT head: No acute intracranial pathology  Frontal scalp hematoma  · CT cervical spine: Acute avulsion at the anterior-inferior corner of C6 vertebral body with mild widening of the anterior C6-7 disc space compared with the prior study  Nondisplaced fracture right C7 and possibly right C6 facet       Plan  · [place] J brace was ordered to be worn at all times and Diamond collar for showers  · Continue PT/OT as needed  · At this time, no nsx intervention anticipated  Though pt does not report neck pain at this time, given that pt is only 2 weeks s/p fall with acute avulsion fx at C6 and right C7 fx,  recommend pt continue conservative mgt with the Cervical brace for another month  · Pt will have further follow up with neurosurgery in 1 month with repeat xrays of the Cervical spine  If the 1 month xrays are stable and if pt continues to deny neck pain, may consider getting flex/ext xrays at that time to check for instability  · Pt/ facility to contact neurosurgery with any questions or concerns

## 2021-04-05 NOTE — PATIENT INSTRUCTIONS
Neurosurgery instructions following neck fracture:     · Follow up with our office in about 4 weeks  Please obtain an Xray of the Cervical spine prior to your follow up visit  An electronic script has been entered for the Xray  These X-rays can be done at any [facility] with radiology   VISTA Cervical Brace to be worn at all times except for showering change to mars (peach) collar   No bending, twisting or heavy lifting  No pushing or pulling over 10lbs  No strenuous activities  NO DRIVING  **Please notify our office immediately or go to the Emergency room based on severity, if you have increased neck or arm pain  New or worsening numbness and/or weakness in your arms or legs  Difficulty swallowing or breathing especially while lying down    Changes in control of your bowel or bladder or increased difficulty walking**

## 2021-05-03 ENCOUNTER — OFFICE VISIT (OUTPATIENT)
Dept: NEUROSURGERY | Facility: CLINIC | Age: 86
End: 2021-05-03
Payer: COMMERCIAL

## 2021-05-03 VITALS
BODY MASS INDEX: 31.58 KG/M2 | DIASTOLIC BLOOD PRESSURE: 70 MMHG | SYSTOLIC BLOOD PRESSURE: 120 MMHG | HEIGHT: 64 IN | TEMPERATURE: 99 F

## 2021-05-03 DIAGNOSIS — S12.9XXA CLOSED CERVICAL SPINE FRACTURE (HCC): ICD-10-CM

## 2021-05-03 DIAGNOSIS — S12.591A OTHER CLOSED NONDISPLACED FRACTURE OF SIXTH CERVICAL VERTEBRA, INITIAL ENCOUNTER (HCC): Primary | ICD-10-CM

## 2021-05-03 PROCEDURE — 99213 OFFICE O/P EST LOW 20 MIN: CPT | Performed by: PHYSICIAN ASSISTANT

## 2021-05-03 NOTE — PROGRESS NOTES
Neurosurgery Office Note  Tushar Morrison 80 y o  female MRN: [id number]      Assessment/Plan     Closed cervical spine fracture ([facility] )  · Ongoing follow up for C6 anterior avulsion fx/ right C7 fx status post fall twice within one week  She had presented to the hospital with headaches without neck pain  · Pt currently at [facility]       Images reviewed personally  Final results as below  · XR cervical spine: completed at outside facility  Portable x-rays  Alignment appears to be maintained  Fractures are not well visualized at this time  Plan  · [place] J brace was ordered to be worn at all times and Diamond collar for showers  · Continue PT/OT as needed  · Pt will have further follow up with neurosurgery in 2 weeks with flex/ext x-rays of the cervical spine at that time to check for instability  · If negative for instability will discuss with patient collar weaning process  · Pt/ facility to contact neurosurgery with any questions or concerns  Diagnoses and all orders for this visit:    Other closed nondisplaced fracture of sixth cervical vertebra, initial encounter ([facility] )  -     XR spine cervical complete 6+ vw flex/ext/obl; Future    Closed cervical spine fracture (HCC)  -     XR spine cervical complete 6+ vw flex/ext/obl; Future            CHIEF COMPLAINT    Chief Complaint   Patient presents with    Follow-up     Other closed nondisplaced fracture of sixth cervical vertebra       HISTORY    History of Present Illness     80y o  year old female     79 y/o F who presents to the office today for follow up of her C6 anterior avulsion fx/ right C7 fx status post fall twice within one week  She had presented to the hospital with headaches without neck pain  Pt currently at [facility]  Today pt denies having neck pain but reports discomfort and DROM in Bilateral shoulders L>R with inable to abduct her arms above 90 degrees    Pt also reports she has chronic numbness in BLE  She reports she uses a walker for mobility, but at this time she is in a wheelchair  She has a boot in place for her RLE ankle fracture that was incurred from her first fall prior to fall at nursing home  Pt reports she continues physical therapy which has been helping  Pt reports she has been compliant with use of the Cervical brace and it has not been intrusive  She had a mild sore throat yesterday that improved  She related mild trouble eating attributed to the collar  Otherwise no acute or new issues  See Discussion    REVIEW OF SYSTEMS    Review of Systems   Constitutional: Negative  HENT: Negative  Eyes: Negative  Respiratory: Negative  Cardiovascular: Negative  Gastrointestinal: Negative  Endocrine: Negative  Genitourinary: Negative  Musculoskeletal: Positive for arthralgias ("crunching" Left shoulder but no pain), gait problem (Wheelchair) and myalgias (Pain in the throat area when lying flast  Patient rates pain 2/10)  Skin: Negative  Allergic/Immunologic: Negative  Neurological: Positive for numbness (Hands and feet)  Hematological: Negative  Psychiatric/Behavioral: Negative  Meds/Allergies     Current Outpatient Medications   Medication Sig Dispense Refill    acetaminophen (TYLENOL) 325 mg tablet Take 2 tablets (650 mg total) by mouth every 6 (six) hours as needed for mild pain, headaches or fever (Patient taking differently: Take 650 mg by mouth every 4 (four) hours as needed for mild pain, headaches or fever )  0    acetaminophen (TYLENOL) 500 mg tablet Take 1,000 mg by mouth 2 (two) times a day       albuterol (PROVENTIL HFA,VENTOLIN HFA) 90 mcg/act inhaler Inhale 2 puffs every 4 (four) hours as needed for wheezing      aspirin 81 MG tablet Take 81 mg by mouth daily        atorvastatin (LIPITOR) 40 mg tablet Take 40 mg by mouth daily      bisacodyl (DULCOLAX) 10 mg suppository Insert 1 suppository (10 mg total) into the rectum daily 12 suppository 0    calcium carbonate-vitamin D (OSCAL-D) 500 mg-200 units per tablet Take 1 tablet by mouth daily with breakfast      carvedilol (COREG) 3 125 mg tablet Take 1 tablet (3 125 mg total) by mouth 2 (two) times a day with meals (Patient taking differently: Take 6 25 mg by mouth 2 (two) times a day with meals )  0    citalopram (CeleXA) 10 mg tablet Take 10 mg by mouth daily   ferrous sulfate 325 (65 Fe) mg tablet Take 1 tablet (325 mg total) by mouth daily with breakfast  0    furosemide (LASIX) 40 mg tablet Take 40 mg by mouth daily      magnesium hydroxide (MILK OF MAGNESIA) 400 mg/5 mL oral suspension Take by mouth daily as needed for constipation      melatonin 3 mg Take 1 tablet (3 mg total) by mouth daily at bedtime  0    Multiple Vitamins-Minerals (CENTRUM SILVER PO) Take 1 tablet by mouth daily   omeprazole (PriLOSEC) 20 mg delayed release capsule Take 20 mg by mouth Sunday, Tues, Thurs      psyllium (METAMUCIL) 0 52 G capsule Take 0 52 g by mouth daily   senna (SENOKOT) 8 6 MG tablet Take 1 tablet by mouth as needed for constipation      Skin Protectants, Misc  (Remedy Phytoplex Hydraguard) CREA Apply topically as needed      sodium phosphate-biphosphate (FLEET) 7-19 g 118 mL enema Insert 1 enema into the rectum once as needed      cephalexin (KEFLEX) 500 mg capsule Take 500 mg by mouth every 6 (six) hours       No current facility-administered medications for this visit          Allergies   Allergen Reactions    Lisinopril Other (See Comments)     cough per hTp, pt unaware    Lovenox [Enoxaparin Sodium] Swelling       PAST HISTORY    Past Medical History:   Diagnosis Date    Arthritis     Cancer [facility])     breast    Cardiac disease     afib    CHF (congestive heart failure) (HCC)     Hyperlipidemia     Hypertension     Stroke ([facility] )     TIA    TIA (transient ischemic attack)        Past Surgical History:   Procedure Laterality Date    BREAST SURGERY right lumpectomy    CARDIAC PACEMAKER PLACEMENT      CORONARY STENT PLACEMENT      ORIF TIBIA & FIBULA FRACTURES Right 3/11/2021    Procedure: 1)OPEN REDUCTION W/ INTERNAL FIXATION (ORIF) RIGHT ANKLE bimalleolar fracture 2) ORIF right tibiofibular syndesmosis; Surgeon: Amanda Coon DPM;  Location: [facility];  Service: Podiatry    TOTAL HIP ARTHROPLASTY Bilateral        Social History     Tobacco Use    Smoking status: Never Smoker    Smokeless tobacco: Never Used   Substance Use Topics    Alcohol use: Not Currently    Drug use: No       Family History   Problem Relation Age of Onset    Cancer Sister     Stroke Family     Arthritis Family     Heart disease Family     Kidney disease Family          Above history personally reviewed  EXAM    Vitals:Blood pressure 120/70, temperature 99 °F (37 2 °C), temperature source Temporal, height 5' 4" (1 626 m)  ,Body mass index is 31 58 kg/m²  Physical Exam  Constitutional:       Appearance: Normal appearance  She is well-developed  HENT:      Head: Normocephalic and atraumatic  Eyes:      General: No scleral icterus  Extraocular Movements: Extraocular movements intact and EOM normal       Conjunctiva/sclera: Conjunctivae normal       Pupils: Pupils are equal, round, and reactive to light  Neck:      Musculoskeletal: Normal range of motion and neck supple  Vascular: No JVD  Trachea: No tracheal deviation  Cardiovascular:      Rate and Rhythm: Normal rate  Pulmonary:      Effort: Pulmonary effort is normal  No respiratory distress  Abdominal:      General: There is no distension  Palpations: Abdomen is soft  Musculoskeletal: Normal range of motion  General: No tenderness or deformity  Skin:     General: Skin is warm and dry  Neurological:      Mental Status: She is alert and oriented to person, place, and time  Cranial Nerves: No cranial nerve deficit  Sensory: No sensory deficit        Motor: No weakness  Deep Tendon Reflexes: Reflexes are normal and symmetric  Reflex Scores:       Bicep reflexes are 2+ on the right side and 2+ on the left side  Brachioradialis reflexes are 2+ on the right side and 2+ on the left side  Patellar reflexes are 2+ on the right side and 2+ on the left side  Psychiatric:         Speech: Speech normal          Behavior: Behavior normal          Thought Content: Thought content normal          Neurologic Exam     Mental Status   Oriented to person, place, and time  Attention: normal    Speech: speech is normal   Level of consciousness: alert  Knowledge: good  Able to perform simple calculations  Able to repeat  Normal comprehension  Cranial Nerves     CN III, IV, VI   Pupils are equal, round, and reactive to light  Extraocular motions are normal    Upgaze: normal  Downgaze: normal    CN VII   Facial expression full, symmetric  CN VIII   CN VIII normal    Hearing: intact    Motor Exam   Muscle bulk: normal  Right arm tone: normal  Left arm tone: normal  Right leg tone: normal  Left leg tone: normal  Generalized 4+/5 strength throughout  Slightly more pronounced 4/5 weakness in B/l shoulders  Sensory Exam   Light touch normal      Gait, Coordination, and Reflexes     Gait  Gait: (in wheelchair at this time  Uses walker at facility  )    Tremor   Resting tremor: absent  Action tremor: absent    Reflexes   Right brachioradialis: 2+  Left brachioradialis: 2+  Right biceps: 2+  Left biceps: 2+  Right patellar: 2+  Left patellar: 2+  Right Sharif: absent  Left Sharif: absent        MEDICAL DECISION MAKING    Imaging Studies:     No results found  I have personally reviewed pertinent reports     and I have personally reviewed pertinent films in PACS

## 2021-05-03 NOTE — PATIENT INSTRUCTIONS
Cervical Fracture   WHAT YOU NEED TO KNOW:   A cervical fracture is a break in a vertebra (bone) in your neck  The 7 cervical vertebrae are called C1 through C7  Cervical vertebrae support your head and allow your neck to bend and twist  The vertebrae enclose and protect the spinal cord  Nerves in the spinal cord control your ability to move  DISCHARGE INSTRUCTIONS:   Call your local emergency number (911 in the [address] [facility]) or have someone else call if:   · You feel lightheaded, short of breath, or have chest pain  · You cough up blood  · You cannot feel or move your arms or legs  Return to the emergency department if:   · You have a sudden, severe headache with nausea and vomiting  · You are seeing double or suddenly cannot see  · You cannot stay awake  · The pins in your halo brace have loosened or look deeper in the skin than before  · You feel new weakness or numbness in your hands or fingers  · Your arm or leg feels warm, tender, and painful  It may look swollen and red  Call your doctor or neurologist if:   · You have a fever  · You see a skin rash, redness, or sores under your brace  · You have problems swallowing while you are wearing your halo brace  · Your neck pain is not getting better even with treatment  · You have questions or concerns about your cervical fracture, medicine, or care  Medicines:   · Prescription pain medicine  may be given  Ask your healthcare provider how to take this medicine safely  Some prescription pain medicines contain acetaminophen  Do not take other medicines that contain acetaminophen without talking to your healthcare provider  Too much acetaminophen may cause liver damage  Prescription pain medicine may cause constipation  Ask your healthcare provider how to prevent or treat constipation  · Take your medicine as directed  Contact your healthcare provider if you think your medicine is not helping or if you have side effects   Tell him of her if you are allergic to any medicine  Keep a list of the medicines, vitamins, and herbs you take  Include the amounts, and when and why you take them  Bring the list or the pill bottles to follow-up visits  Carry your medicine list with you in case of an emergency  Therapy  may be recommended  A physical therapist and an occupational therapist may exercise your arms, legs, and hands  They may also teach you new ways to do things around the house  A speech therapist may work with you to help you talk or swallow  Skin and brace care:  Skin breakdown can lead to deep wounds caused by pressure or pulling on your skin  Check your chin, ears, back of your head, and shoulders for redness or sores if you are wearing a brace  Check the skin daily around halo brace pins for signs of infection, such as redness or bad-smelling drainage  Change your vest lining if it gets wet  Ask your healthcare provider how to care for your halo pins and vest  Ask your provider for more information about using a halo brace, semirigid collar, or soft collar  Follow up with your doctor or neurologist as directed:  Write down your questions so you remember to ask them during your visits  © Copyright [address] Information is for End User's use only and may not be sold, redistributed or otherwise used for commercial purposes  All illustrations and images included in CareNotes® are the copyrighted property of [employer]  or Ronda Silver[employer]  The above information is an  only  It is not intended as medical advice for individual conditions or treatments  Talk to your doctor, nurse or pharmacist before following any medical regimen to see if it is safe and effective for you

## 2021-05-03 NOTE — ASSESSMENT & PLAN NOTE
· Ongoing follow up for C6 anterior avulsion fx/ right C7 fx status post fall twice within one week  She had presented to the hospital with headaches without neck pain  · Pt currently at [facility]       Images reviewed personally  Final results as below  · XR cervical spine: completed at outside facility  Portable x-rays  Alignment appears to be maintained  Fractures are not well visualized at this time  Plan  · [place] J brace was ordered to be worn at all times and Diamond collar for showers  · Continue PT/OT as needed  · Pt will have further follow up with neurosurgery in 2 weeks with flex/ext x-rays of the cervical spine at that time to check for instability  · If negative for instability will discuss with patient collar weaning process  · Pt/ facility to contact neurosurgery with any questions or concerns

## 2021-05-17 ENCOUNTER — TELEPHONE (OUTPATIENT)
Dept: NEUROSURGERY | Facility: CLINIC | Age: 86
End: 2021-05-17

## 2021-05-17 NOTE — TELEPHONE ENCOUNTER
Attempted to reach out to MARTIN IRENE to confirm appt for patient and remind facility that she needs XR Cspine prior to visit  I was told by front office staff that there is no patient by that name

## 2021-05-18 ENCOUNTER — OFFICE VISIT (OUTPATIENT)
Dept: NEUROSURGERY | Facility: CLINIC | Age: 86
End: 2021-05-18
Payer: COMMERCIAL

## 2021-05-18 ENCOUNTER — APPOINTMENT (OUTPATIENT)
Dept: RADIOLOGY | Facility: MEDICAL CENTER | Age: 86
End: 2021-05-18
Payer: COMMERCIAL

## 2021-05-18 VITALS
RESPIRATION RATE: 16 BRPM | TEMPERATURE: 97.6 F | BODY MASS INDEX: 31.58 KG/M2 | DIASTOLIC BLOOD PRESSURE: 80 MMHG | HEART RATE: 82 BPM | SYSTOLIC BLOOD PRESSURE: 146 MMHG | HEIGHT: 64 IN

## 2021-05-18 DIAGNOSIS — S12.9XXA CLOSED CERVICAL SPINE FRACTURE (HCC): ICD-10-CM

## 2021-05-18 DIAGNOSIS — S12.591A OTHER CLOSED NONDISPLACED FRACTURE OF SIXTH CERVICAL VERTEBRA, INITIAL ENCOUNTER (HCC): ICD-10-CM

## 2021-05-18 DIAGNOSIS — S12.591A OTHER CLOSED NONDISPLACED FRACTURE OF SIXTH CERVICAL VERTEBRA, INITIAL ENCOUNTER (HCC): Primary | ICD-10-CM

## 2021-05-18 PROCEDURE — 72052 X-RAY EXAM NECK SPINE 6/>VWS: CPT

## 2021-05-18 PROCEDURE — 99212 OFFICE O/P EST SF 10 MIN: CPT | Performed by: NURSE PRACTITIONER

## 2021-05-18 NOTE — PATIENT INSTRUCTIONS
You may wean out of collar by taking the collar off for short periods of time (ie  30-45minutes) a few times per day (ie  About three times per day) and each day moving forward gradually increase the amount of time you have the back brace off until fully out of collar in 2 weeks  You may complete range of motion and activity as tolerated from neurosurgical standpoint once out of collar  Although do suggest you take fall precautions and refrain from activity that increases chance of fall or injury to neck  Further neurosurgical follow up may be on an as needed basis  Contact our office or present to hospital Emergency Room if experience worsening or new neck pain, radiating arm pain, sensory or motor change, bladder or bowel function change, or other neurological change

## 2021-05-18 NOTE — ASSESSMENT & PLAN NOTE
· Ongoing follow up for C6 anterior avulsion fx/ right C7 fx status post fall twice within one week  She had presented to the hospital with headaches without neck pain  · Pt currently at [facility]       Images reviewed personally  Final results as below    XR cervical spine flexion/extension 5/18/21:Stable appearing cervical spine  Nondisplaced avulsion fracture of the anterior-inferior corner of C6  Multilevel severe degenerative changes  No evidence for dynamic instability  Plan  · Reviewed imaging with patient and daughter  · [place] J brace was ordered to be worn at all times and Diamond collar for showers  No instability seen on flexion/extension x-rays  Patient can slowly wean her cervical collar over the next 2 weeks, instructions provided to patient  · Continue PT/OT as needed  · Patient will follow-up prn or if symptoms worsen  · Patient made aware if she develops any new or worsening neurological changes ordered flags to follow-up sooner  · Patient made aware to contact Neurosurgery with any further questions or concerns

## 2021-05-18 NOTE — PROGRESS NOTES
Neurosurgery Office Note  Nancy Lawrence 80 y o  female MRN: [id number]       Assessment/Plan     Closed cervical spine fracture ([address] )  · Ongoing follow up for C6 anterior avulsion fx/ right C7 fx status post fall twice within one week  She had presented to the hospital with headaches without neck pain  · Pt currently at [facility]       Images reviewed personally  Final results as below    XR cervical spine flexion/extension 5/18/21:Stable appearing cervical spine  Nondisplaced avulsion fracture of the anterior-inferior corner of C6  Multilevel severe degenerative changes  No evidence for dynamic instability  Plan  · Reviewed imaging with patient and daughter  · Thomas J brace was ordered to be worn at all times and Diamond collar for showers  No instability seen on flexion/extension x-rays  Patient can slowly wean her cervical collar over the next 2 weeks, instructions provided to patient  · Continue PT/OT as needed  · Patient will follow-up prn or if symptoms worsen  · Patient made aware if she develops any new or worsening neurological changes ordered flags to follow-up sooner  · Patient made aware to contact Neurosurgery with any further questions or concerns  There are no diagnoses linked to this encounter  CHIEF COMPLAINT    Chief Complaint   Patient presents with    Follow-up     C6 anterior avulsion fx/ right C7 fx        HISTORY    History of Present Illness     80y o  year old female with past medical history significant for pneumonia, cardiomyopathy, AFib, hypertension, CHF, nasal fracture, right ankle fracture, and chronic kidney disease  Patient presents the outpatient office today for further evaluation of C6 anterior avulsion and right C7 fracture status post fall  Per chart review patient fell twice within 1 week and presented to the hospital with headaches and no neck pain  Patient currently resides at [address] [place] [address]    Per chart review patient was hospitalized for a fall and then discharged on 03/16/2021 to rehab  Patient sustained a nasal and right ankle fracture  While in rehab patient sustained a unwitnessed fall and was found down on 03/17/2021 and was brought into the ED for further evaluation  Patient was found to have C6 anterior avulsion fracture as well as right C7 fracture  Patient was conservatively managed in collar with upright x-rays which were stable  Patient currently denies any neck pain  Patient does endorse some lower extremity weakness  She also reports difficulty with fine motor skills but states she has neuropathy in her hands and this has been going on even since before the fall  She also endorses bilateral lower extremity neuropathy  Patient states she is able to ambulate with a walker  Patient reports she has been compliant with her cervical collar  She denies any recent falls or traumas  Patient patient states she believes her vision has worsened  She also endorses shortness of breath with exertion which is not new or unchanged  Patient also endorses urinary incontinence which has also been going on for years which is unchanged  She denies any headaches, dizziness, blurry vision, chest pain, abdominal pain, nausea, vomiting, diarrhea, no new problems with bowel or bladder, no new weakness or numbness/tingling  HPI    See Discussion    REVIEW OF SYSTEMS    Review of Systems   Constitutional: Negative  HENT: Positive for trouble swallowing (C/O jaw pain when chewing )  Eyes: Negative  Respiratory: Negative  Cardiovascular: Negative  Gastrointestinal: Negative  Endocrine: Negative  Genitourinary: Negative  Vaginal discharge:      Musculoskeletal: Positive for arthralgias ("crunching" Left shoulder but no pain) and gait problem (Wheelchair)  Negative for myalgias  C6 anterior avulsion fx/ right C7 fx     Cervical collar in place   Skin: Negative      Allergic/Immunologic: Negative  Neurological: Positive for weakness (difficulty raising arm, not new) and numbness (Hands and feet)  Hematological: Bruises/bleeds easily (ASA 81 mg)  Psychiatric/Behavioral: Negative  ROS reviewed with patient and agree and changes were made as needed  Patient did not report any draw pain during my exam     Meds/Allergies     Current Outpatient Medications   Medication Sig Dispense Refill    acetaminophen (TYLENOL) 325 mg tablet Take 2 tablets (650 mg total) by mouth every 6 (six) hours as needed for mild pain, headaches or fever (Patient taking differently: Take 650 mg by mouth every 4 (four) hours as needed for mild pain, headaches or fever )  0    acetaminophen (TYLENOL) 500 mg tablet Take 1,000 mg by mouth 2 (two) times a day       albuterol (PROVENTIL HFA,VENTOLIN HFA) 90 mcg/act inhaler Inhale 2 puffs every 4 (four) hours as needed for wheezing      aspirin 81 MG tablet Take 81 mg by mouth daily   atorvastatin (LIPITOR) 40 mg tablet Take 40 mg by mouth daily      bisacodyl (DULCOLAX) 10 mg suppository Insert 1 suppository (10 mg total) into the rectum daily 12 suppository 0    calcium carbonate-vitamin D (OSCAL-D) 500 mg-200 units per tablet Take 1 tablet by mouth daily with breakfast      carvedilol (COREG) 3 125 mg tablet Take 1 tablet (3 125 mg total) by mouth 2 (two) times a day with meals (Patient taking differently: Take 6 25 mg by mouth 2 (two) times a day with meals )  0    citalopram (CeleXA) 10 mg tablet Take 10 mg by mouth daily        ferrous sulfate 325 (65 Fe) mg tablet Take 1 tablet (325 mg total) by mouth daily with breakfast  0    furosemide (LASIX) 40 mg tablet Take 40 mg by mouth daily      magnesium hydroxide (MILK OF MAGNESIA) 400 mg/5 mL oral suspension Take by mouth daily as needed for constipation      melatonin 3 mg Take 1 tablet (3 mg total) by mouth daily at bedtime  0    Multiple Vitamins-Minerals (CENTRUM SILVER PO) Take 1 tablet by mouth daily       omeprazole (PriLOSEC) 20 mg delayed release capsule Take 20 mg by mouth Sunday, Tues, Thurs      psyllium (METAMUCIL) 0 52 G capsule Take 0 52 g by mouth daily   senna (SENOKOT) 8 6 MG tablet Take 1 tablet by mouth as needed for constipation      Skin Protectants, Misc  (Remedy Phytoplex Hydraguard) CREA Apply topically as needed      sodium phosphate-biphosphate (FLEET) 7-19 g 118 mL enema Insert 1 enema into the rectum once as needed      cephalexin (KEFLEX) 500 mg capsule Take 500 mg by mouth every 6 (six) hours       No current facility-administered medications for this visit  Allergies   Allergen Reactions    Lisinopril Other (See Comments)     cough per hTp, pt unaware    Lovenox [Enoxaparin Sodium] Swelling       PAST HISTORY    Past Medical History:   Diagnosis Date    Arthritis     Cancer [facility])     breast    Cardiac disease     afib    CHF [facility]estive heart failure) [address] [address]     Hyperlipidemia     Hypertension     Stroke ([facility] [address] )     TIA    TIA (transient ischemic attack)        Past Surgical History:   Procedure Laterality Date    BREAST SURGERY      right lumpectomy    CARDIAC PACEMAKER PLACEMENT      CORONARY STENT PLACEMENT      ORIF TIBIA & FIBULA FRACTURES Right 3/11/2021    Procedure: 1)OPEN REDUCTION W/ INTERNAL FIXATION (ORIF) RIGHT ANKLE bimalleolar fracture 2) ORIF right tibiofibular syndesmosis; Surgeon: Angie Perez DPM;  [facility];  Service: Podiatry    TOTAL HIP ARTHROPLASTY Bilateral        Social History     Tobacco Use    Smoking status: Never Smoker    Smokeless tobacco: Never Used   Substance Use Topics    Alcohol use: Not Currently    Drug use: No       Family History   Problem Relation Age of Onset    Cancer Sister     Stroke Family     Arthritis Family     Heart disease Family     Kidney disease Family          Above history personally reviewed         EXAM    Vitals:Blood pressure 146/80, pulse 82, temperature 97 6 °F (36 4 °C), temperature source Tympanic, resp  rate 16, height 5' 4" (1 626 m)  ,Body mass index is 31 58 kg/m²  Physical Exam  Vitals signs reviewed  Constitutional:       General: She is awake  She is not in acute distress  Appearance: Normal appearance  She is not ill-appearing  Comments: Collar in place   HENT:      Head: Normocephalic and atraumatic  Eyes:      Extraocular Movements: Extraocular movements intact and EOM normal       Conjunctiva/sclera: Conjunctivae normal       Pupils: Pupils are equal, round, and reactive to light  Neck:      Musculoskeletal: Normal range of motion and neck supple  No spinous process tenderness or muscular tenderness  Comments: No pain with ROM of neck when collar removed  Cardiovascular:      Rate and Rhythm: Normal rate  Pulmonary:      Effort: Pulmonary effort is normal  No respiratory distress  Chest:      Chest wall: No tenderness  Abdominal:      General: There is no distension  Palpations: Abdomen is soft  Tenderness: There is no abdominal tenderness  Musculoskeletal: Normal range of motion  Cervical back: She exhibits no tenderness  Comments: RLE in CAM walker boot   Skin:     General: Skin is warm and dry  Neurological:      Mental Status: She is alert and oriented to person, place, and time  Coordination: Finger-Nose-Finger Test normal       Deep Tendon Reflexes:      Reflex Scores:       Tricep reflexes are 1+ on the right side and 1+ on the left side  Bicep reflexes are 1+ on the right side and 1+ on the left side  Brachioradialis reflexes are 1+ on the right side and 1+ on the left side  Patellar reflexes are 1+ on the right side and 1+ on the left side  Psychiatric:         Attention and Perception: Attention and perception normal          Mood and Affect: Mood and affect normal          Speech: Speech normal          Behavior: Behavior normal  Behavior is cooperative           Thought Content: Thought content normal          Cognition and Memory: Cognition and memory normal          Judgment: Judgment normal          Neurologic Exam     Mental Status   Oriented to person, place, and time  Follows 2 step commands  Attention: normal  Concentration: normal    Speech: speech is normal   Level of consciousness: alert  Knowledge: good  Able to perform simple calculations  Able to name object  Able to repeat  Normal comprehension  Cranial Nerves     CN III, IV, VI   Pupils are equal, round, and reactive to light  Extraocular motions are normal    CN III: no CN III palsy  CN VI: no CN VI palsy  Nystagmus: none   Diplopia: none  Conjugate gaze: present    CN V   Facial sensation intact  CN VII   Facial expression full, symmetric  CN VIII   CN VIII normal    Hearing: intact    CN IX, X   CN IX normal      CN XI   CN XI normal      CN XII   CN XII normal      Motor Exam   Muscle bulk: normal  Overall muscle tone: normal  Right arm pronator drift: absent  Left arm pronator drift: absentStrength 4+/5 throughout except bilateral deltoid 4/5     Sensory Exam   Light touch normal    Proprioception normal    JPS and DST intact     Gait, Coordination, and Reflexes     Gait  Gait: (Patient presented to visit in wheelchair)    Coordination   Finger to nose coordination: normal    Tremor   Resting tremor: absent  Intention tremor: absent  Action tremor: absent    Reflexes   Right brachioradialis: 1+  Left brachioradialis: 1+  Right biceps: 1+  Left biceps: 1+  Right triceps: 1+  Left triceps: 1+  Right patellar: 1+  Left patellar: 1+  Right Sharif: absent  Left Sharif: absent  Right ankle clonus present: unable to assess    Left ankle clonus: absent        MEDICAL DECISION MAKING    Imaging Studies:     Xr Spine Cervical Complete 6+ Vw Flex/ext/obl    Result Date: 5/18/2021  Narrative: CERVICAL SPINE INDICATION:   S12 591A: Other nondisplaced fracture of sixth cervical vertebra, initial encounter for closed fracture S12  9XXA: Fracture of neck, unspecified, initial encounter  COMPARISON:  4/2/2021 VIEWS:  XR SPINE CERVICAL COMPLETE 6+ VW FLEX /EXT /OBL Images: 6 FINDINGS: Stable, nondisplaced avulsion of the anterior inferior corner of C6  Additional facet fracture of C7 not visualized  Stable degenerative anterolisthesis of C7 on T1 and T1 on T2  Severe multilevel degenerative changes involving discs, facet joints and uncovertebral joints  No evidence of dynamic instability seen with flexion or extension  The prevertebral soft tissues are within normal limits  Narrowed neural foramina are seen on the right from C4-5 through C6-7  The lung apices are clear  Impression: Stable appearing cervical spine  Nondisplaced avulsion fracture of the anterior-inferior corner of C6  Multilevel severe degenerative changes  No evidence for dynamic instability  Workstation performed: [id number]       I have personally reviewed pertinent reports     and I have personally reviewed pertinent films in PACS

## 2021-06-25 ENCOUNTER — TELEPHONE (OUTPATIENT)
Dept: OBGYN CLINIC | Facility: HOSPITAL | Age: 86
End: 2021-06-25

## 2021-06-25 NOTE — TELEPHONE ENCOUNTER
Patients daughter is calling trying to reach the office for Dr Adarsh Dooley  I advised that it not one of our dr's here  She will check to see where she is scheduled

## 2024-10-28 NOTE — ASSESSMENT & PLAN NOTE
Lab Results   Component Value Date    EGFR 35 01/02/2021    EGFR 35 01/01/2021    EGFR 33 12/31/2020    CREATININE 1 36 (H) 01/02/2021    CREATININE 1 35 (H) 01/01/2021    CREATININE 1 42 (H) 12/31/2020   · POA, baseline approximately 0 9  · Possibly in setting of volume overload  · Improving with IV diuresis per nephrology recs  · UA reviewed, measure PVR and bladder scan  · Monitor with BMP  · Hold nephrotoxins avoid hypotension Warm

## 2024-12-17 NOTE — ASSESSMENT & PLAN NOTE
Anemia is likely due to chronic disease due to Chronic liver disease. Most recent hemoglobin and hematocrit are listed below.  Recent Labs     12/15/24  0520 12/16/24  0604 12/17/24  0641   HGB 7.3* 7.4* 9.0*   HCT 22.8* 22.1* 26.1*       Plan  - Monitor serial CBC: Daily  - Transfuse PRBC if patient becomes hemodynamically unstable, symptomatic or H/H drops below 7/21.  - Patient has received 1 units of PRBCs on 11/20, 11/21, 12/15  - Patient's anemia is currently stable  - Hb baseline 7-8. No obvious bleeding  - Eliquis held on admission.  DVT prophylaxis held.   Lab Results   Component Value Date    EGFR 35 01/01/2021    EGFR 33 12/31/2020    EGFR 32 12/30/2020    CREATININE 1 35 (H) 01/01/2021    CREATININE 1 42 (H) 12/31/2020    CREATININE 1 45 (H) 12/30/2020     Management as per primary team,  Nephrology on board

## (undated) DEVICE — PADDING CAST 6IN COTTON STRL

## (undated) DEVICE — SUT VICRYL 3-0 PS-2 27 IN J427H

## (undated) DEVICE — 3M™ DURAPORE™ SURGICAL TAPE 1538-3, 3 INCH X 10 YARD (7,5CM X 9,1M), 4 ROLLS/BOX: Brand: 3M™ DURAPORE™

## (undated) DEVICE — GLOVE PI ULTRA TOUCH SZ.7.0

## (undated) DEVICE — INTENDED FOR TISSUE SEPARATION, AND OTHER PROCEDURES THAT REQUIRE A SHARP SURGICAL BLADE TO PUNCTURE OR CUT.: Brand: BARD-PARKER ® CARBON RIB-BACK BLADES

## (undated) DEVICE — TUBING SUCTION 5MM X 12 FT

## (undated) DEVICE — GLOVE INDICATOR PI UNDERGLOVE SZ 7.5 BLUE

## (undated) DEVICE — CHLORAPREP HI-LITE 26ML ORANGE

## (undated) DEVICE — CHLORAPREP HI-LITE 10.5ML ORANGE

## (undated) DEVICE — PLUMEPEN PRO 10FT

## (undated) DEVICE — NON-LOCKING SCREW, 3.5 X 52MM, SS
Type: IMPLANTABLE DEVICE | Site: ANKLE | Status: NON-FUNCTIONAL
Brand: ANTHEM

## (undated) DEVICE — GLOVE SRG BIOGEL 8

## (undated) DEVICE — 10FR FRAZIER SUCTION HANDLE: Brand: CARDINAL HEALTH

## (undated) DEVICE — 1.8MM DRILL BIT, 137MM, AO QUICK CONNECT

## (undated) DEVICE — ABDOMINAL PAD: Brand: DERMACEA

## (undated) DEVICE — COBAN 4 IN STERILE

## (undated) DEVICE — SUT VICRYL 2-0 SH 27 IN UNDYED J417H

## (undated) DEVICE — DRAPE C-ARMOUR

## (undated) DEVICE — 2000CC GUARDIAN II: Brand: GUARDIAN

## (undated) DEVICE — IMPERVIOUS STOCKINETTE: Brand: DEROYAL

## (undated) DEVICE — 2.7MM DRILL BIT, 125MM, AO QUICK CONNECT

## (undated) DEVICE — DRAPE C-ARM X-RAY

## (undated) DEVICE — NON-LOCKING SCREW, 3.5 X 50MM, SS
Type: IMPLANTABLE DEVICE | Site: ANKLE | Status: NON-FUNCTIONAL
Brand: ANTHEM

## (undated) DEVICE — NEEDLE 18 G X 1 1/2

## (undated) DEVICE — NEEDLE 25G X 1 1/2

## (undated) DEVICE — 3.5MM DRILL BIT, 110MM, AO QUICK CONNECT

## (undated) DEVICE — SYRINGE 10ML LL

## (undated) DEVICE — CAST PADDING 4 IN SYNTHETIC NON-STRL

## (undated) DEVICE — GLOVE SRG BIOGEL 7.5

## (undated) DEVICE — BETHLEHEM UNIVERSAL  MIONR EXT: Brand: CARDINAL HEALTH

## (undated) DEVICE — PROXIMATE SKIN STAPLERS (35 WIDE) CONTAINS 35 STAINLESS STEEL STAPLES (FIXED HEAD): Brand: PROXIMATE

## (undated) DEVICE — PREP PAD BNS: Brand: CONVERTORS

## (undated) DEVICE — SCD SEQUENTIAL COMPRESSION COMFORT SLEEVE MEDIUM KNEE LENGTH: Brand: KENDALL SCD

## (undated) DEVICE — CUFF TOURNIQUET 30 X 4 IN QUICK CONNECT DISP 1BLA